# Patient Record
Sex: FEMALE | Race: WHITE | Employment: UNEMPLOYED | URBAN - NONMETROPOLITAN AREA
[De-identification: names, ages, dates, MRNs, and addresses within clinical notes are randomized per-mention and may not be internally consistent; named-entity substitution may affect disease eponyms.]

---

## 2018-07-05 ENCOUNTER — OFFICE VISIT (OUTPATIENT)
Dept: CARDIOLOGY CLINIC | Age: 53
End: 2018-07-05
Payer: COMMERCIAL

## 2018-07-05 VITALS
RESPIRATION RATE: 16 BRPM | HEIGHT: 63 IN | WEIGHT: 222 LBS | SYSTOLIC BLOOD PRESSURE: 110 MMHG | HEART RATE: 75 BPM | DIASTOLIC BLOOD PRESSURE: 76 MMHG | BODY MASS INDEX: 39.34 KG/M2

## 2018-07-05 DIAGNOSIS — R06.02 SOB (SHORTNESS OF BREATH): Primary | ICD-10-CM

## 2018-07-05 DIAGNOSIS — E78.5 DYSLIPIDEMIA: ICD-10-CM

## 2018-07-05 DIAGNOSIS — R07.2 PRECORDIAL CHEST PAIN: ICD-10-CM

## 2018-07-05 DIAGNOSIS — E66.9 NON MORBID OBESITY: ICD-10-CM

## 2018-07-05 DIAGNOSIS — Z98.890 S/P MVR (MITRAL VALVE REPAIR): ICD-10-CM

## 2018-07-05 DIAGNOSIS — Z99.89 OSA ON CPAP: ICD-10-CM

## 2018-07-05 DIAGNOSIS — G47.33 OSA ON CPAP: ICD-10-CM

## 2018-07-05 DIAGNOSIS — I25.10 CORONARY ARTERY DISEASE INVOLVING NATIVE CORONARY ARTERY OF NATIVE HEART WITHOUT ANGINA PECTORIS: ICD-10-CM

## 2018-07-05 PROCEDURE — 1036F TOBACCO NON-USER: CPT | Performed by: INTERNAL MEDICINE

## 2018-07-05 PROCEDURE — G8417 CALC BMI ABV UP PARAM F/U: HCPCS | Performed by: INTERNAL MEDICINE

## 2018-07-05 PROCEDURE — G8427 DOCREV CUR MEDS BY ELIG CLIN: HCPCS | Performed by: INTERNAL MEDICINE

## 2018-07-05 PROCEDURE — 3017F COLORECTAL CA SCREEN DOC REV: CPT | Performed by: INTERNAL MEDICINE

## 2018-07-05 PROCEDURE — 93000 ELECTROCARDIOGRAM COMPLETE: CPT | Performed by: INTERNAL MEDICINE

## 2018-07-05 PROCEDURE — 99204 OFFICE O/P NEW MOD 45 MIN: CPT | Performed by: INTERNAL MEDICINE

## 2018-07-05 PROCEDURE — G8598 ASA/ANTIPLAT THER USED: HCPCS | Performed by: INTERNAL MEDICINE

## 2018-07-05 RX ORDER — BUPROPION HYDROCHLORIDE 100 MG/1
100 TABLET, EXTENDED RELEASE ORAL 2 TIMES DAILY
COMMUNITY
End: 2021-04-01

## 2018-07-05 RX ORDER — ALPRAZOLAM 0.5 MG/1
TABLET ORAL
Refills: 0 | COMMUNITY
Start: 2018-06-28

## 2018-07-05 RX ORDER — LISINOPRIL 10 MG/1
TABLET ORAL
Refills: 0 | COMMUNITY
Start: 2018-04-16 | End: 2018-07-05 | Stop reason: SDUPTHER

## 2018-07-05 RX ORDER — ATORVASTATIN CALCIUM 40 MG/1
TABLET, FILM COATED ORAL
Refills: 0 | COMMUNITY
Start: 2018-04-16 | End: 2019-02-13 | Stop reason: SDUPTHER

## 2018-07-05 RX ORDER — LISINOPRIL 10 MG/1
TABLET ORAL
COMMUNITY
Start: 2018-04-16 | End: 2018-07-05 | Stop reason: SDUPTHER

## 2018-07-05 RX ORDER — ACETAMINOPHEN 325 MG/1
325 TABLET ORAL
COMMUNITY

## 2018-07-05 RX ORDER — FLUOXETINE HYDROCHLORIDE 60 MG/1
TABLET, FILM COATED ORAL; ORAL
Refills: 0 | COMMUNITY
Start: 2018-07-03 | End: 2021-04-01

## 2018-07-05 RX ORDER — LISINOPRIL 5 MG/1
5 TABLET ORAL DAILY
Qty: 90 TABLET | Refills: 3 | Status: SHIPPED | OUTPATIENT
Start: 2018-07-05 | End: 2018-09-13 | Stop reason: SDUPTHER

## 2018-07-05 RX ORDER — ASPIRIN 81 MG/1
81 TABLET, CHEWABLE ORAL
COMMUNITY

## 2018-07-05 RX ORDER — LANOLIN ALCOHOL/MO/W.PET/CERES
3 CREAM (GRAM) TOPICAL DAILY
COMMUNITY
End: 2018-12-18 | Stop reason: ALTCHOICE

## 2018-07-05 RX ORDER — ATORVASTATIN CALCIUM 20 MG/1
TABLET, FILM COATED ORAL
COMMUNITY
Start: 2018-04-16 | End: 2018-08-14 | Stop reason: SDUPTHER

## 2018-07-05 NOTE — PROGRESS NOTES
Past Surgical History:   Procedure Laterality Date    CARDIAC CATHETERIZATION       SECTION      ENDOMETRIAL ABLATION      MITRAL VALVE REPLACEMENT          History reviewed. No pertinent family history. Social History   Substance Use Topics    Smoking status: Former Smoker     Quit date:     Smokeless tobacco: Never Used    Alcohol use No         Review of Systems:  HEENT: negative for acute visual symptoms or auditory problems, no dysphagia  Constitutional: negative for fever and chills, or significant weight loss  Respiratory: negative for cough, wheezing, or hemoptysis  Cardiovascular: +ve for chest pain and dyspnea  Gastrointestinal: negative for abdominal pain, diarrhea, nausea and vomiting  Endocrine: Negative for polyuria and polydyspsia  Genitourinary:negative for dysuria and hematuria  Derm: negative for rash and skin lesion(s)  Neurological: negative for tingling, numbness, weakness, seizures and tremors  Endocrine: negative for polydipsia and polyuria  Musculoskeletal: negative for pain or tenderness  Psychiatric: negative for anxiety, depression, or suicidal ideations         O:  COMPLETE PHYSICAL EXAM:       /76   Pulse 75   Resp 16   Ht 5' 3\" (1.6 m)   Wt 222 lb (100.7 kg)   BMI 39.33 kg/m²       General:   Patient alert, comfortable, no distress. Appears stated age. HEENT:    Pupils equal, no icterus, no nasal drainage, tongue moist.   Neck:              No masses, Thyroid not palpable. Chest:   Normal configuration, non tender. Lungs:   Clear to auscultation bilaterally, few scattered rhonchi. Cardiovascular:  Regular rhythm, 1/6 systolic murmur, No S3, no palpable thrills, No elevated JVD, No carotid bruit. Abdomen:  Soft, Non tender, Bowel sounds normal, no pulsatile abdominal aorta, no palpable masses. Extremities:  No edema. Distal pulses palpable. No cyanosis, no clubbing. Skin:   Good turgor, warm and dry, no cyanosis. Musculoskeletal: No joint swelling or deformity. Neuro:   Cranial nerves grossly intact; No focal neurologic deficit. Psych:   Alert, good mood and effect. REVIEW OF DIAGNOSTIC TESTS:        Electrocardiogram: NSR             A/P:   ASSESSMENT / PLAN:    Sue Swan was seen today for shortness of breath. Diagnoses and all orders for this visit:    SOB (shortness of breath): no acute CHF  -     EKG 12 Lead  -     ECHO Complete 2D W Doppler W Color; Future  -     Stress test, myoview; Future    S/P MVR (mitral valve repair)  -     ECHO Complete 2D W Doppler W Color; Future    Precordial chest pain  -     Stress test, myoview; Future    Coronary artery disease involving native coronary artery of native heart without angina pectoris: s/p MI 2014, s/p PCI 2014- On ASA, Statins; Add low dose BB-Monitor BP/HR; side effects discussed; Decrease Lisinopril dose  -     Stress test, myoview; Future    Non morbid obesity: Diet, exercise and weight loss discussed. Dyslipidemia: On Statins    FELIX on CPAP    Others:  -Metoprolol Tartrate 12.5 mg TWICE daily    Preventive Cardiology: Low cholesterol diet, regular exercise as tolerate, and gradual weight loss discussed. Monitor BP and heart rates. All questions answered about cardiac diagnoses and cardiac medications. Continue current medications. Compliance with medications and f/u with all physicians discussed. Risk factor modification based on risk profile discussed. Call if any exertional chest pain, short of breath, dizzy or palpitations   Follow up in 4-6 weeks or earlier if needed.         [facility]  [address], [place], [address] [facility]  [phone]

## 2018-07-27 LAB
LV EF: 63 %
LVEF MODALITY: NORMAL

## 2018-08-06 ENCOUNTER — TELEPHONE (OUTPATIENT)
Dept: CARDIOLOGY CLINIC | Age: 53
End: 2018-08-06

## 2018-08-06 NOTE — TELEPHONE ENCOUNTER
Stress test was abnormal  She needs to come for OV to discuss test results and also to discuss possible cardiac cath

## 2018-08-14 ENCOUNTER — OFFICE VISIT (OUTPATIENT)
Dept: CARDIOLOGY CLINIC | Age: 53
End: 2018-08-14
Payer: COMMERCIAL

## 2018-08-14 VITALS
BODY MASS INDEX: 39.34 KG/M2 | HEIGHT: 63 IN | WEIGHT: 222 LBS | HEART RATE: 57 BPM | DIASTOLIC BLOOD PRESSURE: 80 MMHG | SYSTOLIC BLOOD PRESSURE: 128 MMHG | RESPIRATION RATE: 14 BRPM

## 2018-08-14 DIAGNOSIS — I25.10 CORONARY ARTERY DISEASE INVOLVING NATIVE CORONARY ARTERY OF NATIVE HEART WITHOUT ANGINA PECTORIS: Primary | ICD-10-CM

## 2018-08-14 DIAGNOSIS — E78.5 HYPERLIPIDEMIA, UNSPECIFIED HYPERLIPIDEMIA TYPE: ICD-10-CM

## 2018-08-14 PROCEDURE — 93000 ELECTROCARDIOGRAM COMPLETE: CPT | Performed by: INTERNAL MEDICINE

## 2018-08-14 PROCEDURE — 1036F TOBACCO NON-USER: CPT | Performed by: INTERNAL MEDICINE

## 2018-08-14 PROCEDURE — 99214 OFFICE O/P EST MOD 30 MIN: CPT | Performed by: INTERNAL MEDICINE

## 2018-08-14 PROCEDURE — G8598 ASA/ANTIPLAT THER USED: HCPCS | Performed by: INTERNAL MEDICINE

## 2018-08-14 PROCEDURE — 3017F COLORECTAL CA SCREEN DOC REV: CPT | Performed by: INTERNAL MEDICINE

## 2018-08-14 PROCEDURE — G8417 CALC BMI ABV UP PARAM F/U: HCPCS | Performed by: INTERNAL MEDICINE

## 2018-08-14 PROCEDURE — G8427 DOCREV CUR MEDS BY ELIG CLIN: HCPCS | Performed by: INTERNAL MEDICINE

## 2018-08-14 RX ORDER — METOPROLOL SUCCINATE 25 MG/1
25 TABLET, EXTENDED RELEASE ORAL DAILY
Qty: 30 TABLET | Refills: 3 | Status: SHIPPED | OUTPATIENT
Start: 2018-08-14 | End: 2018-09-13 | Stop reason: SDUPTHER

## 2018-08-14 RX ORDER — ATORVASTATIN CALCIUM 40 MG/1
40 TABLET, FILM COATED ORAL DAILY
Qty: 30 TABLET | Refills: 5 | Status: ON HOLD | OUTPATIENT
Start: 2018-08-14 | End: 2018-08-22 | Stop reason: SDUPTHER

## 2018-08-14 NOTE — PROGRESS NOTES
OUTPATIENT CARDIOLOGY FOLLOW-UP    Name: Allan Lake    Age: 47 y.o. Primary Care Physician: Cindy Dixon MD    Date of Service: 8/14/2018    Chief Complaint:   Chief Complaint   Patient presents with    Abnormal Test Results       Interim History:   Mrs. Gary Perez is a 48-year-old female history for CAD, status post MI diagnosed in 2014, status post PCI, mitral valve repair 2015 , obesity, dyslipidemia on statin, obstructive sleep apnea on CPAP is here for follow-up visit. She was seen in the office on the 7/5/2018. Since her last visit, she has not had any ER visits or hospitalizations. She was evaluated in the office for an intermittent midsternal chest pain precipitated by stress and also exertion. She underwent stress test on 7/31/2018 which came back as abnormal and it showed a large fixed defect involving the inferior and inferolateral wall and also reversible defect involving the anteriolateral wall. LV function was 60%. Her echocardiogram showed a decreased LV function with an EF of 45-50%, moderate LVH and global hypokinesis. She was instructed to come to the office for evaluation and also to review her a stress test results. She is still complaining of intermittent midsternal chest pain and the fatigue and dyspnea with exertion as well as emotional stress. She also gets intermittent wheezing especially at nighttime without any PND or orthopnea. She is compliant with medications, as well as salt and fluid intake. He does not take any over the arthritis medications. No new cardiac complaints since last cardiology evaluation. He/She denies recent chest pain, SOB, palpitations, lightheadedness, dizziness, syncope, PND, or orthopnea. SR on EKG.     Review of Systems:   Cardiac: As per HPI  General: No fever, chills  Pulmonary: As per HPI  HEENT: No visual disturbances, difficult swallowing  GI: No nausea, vomiting  Endocrine: No thyroid disease or DM  Musculoskeletal: CARMONA x 4, no focal motor deficits  Skin: Intact, no rashes  Neuro/Psych: No headache or seizures    Past Medical History:  Past Medical History:   Diagnosis Date    Anxiety neurosis     Asthma     CAD (coronary artery disease)     Dyspnea     HTN (hypertension)     Hypercholesteremia     Sleep apnea     Wheezing        Past Surgical History:  Past Surgical History:   Procedure Laterality Date    CARDIAC CATHETERIZATION       SECTION      ENDOMETRIAL ABLATION      MITRAL VALVE REPLACEMENT         Family History:  History reviewed. No pertinent family history. Social History:  Social History     Social History    Marital status: Unknown     Spouse name: N/A    Number of children: N/A    Years of education: N/A     Occupational History    Not on file. Social History Main Topics    Smoking status: Former Smoker     Quit date:     Smokeless tobacco: Never Used    Alcohol use No    Drug use: No    Sexual activity: Not on file     Other Topics Concern    Not on file     Social History Narrative    No narrative on file       Allergies:  No Known Allergies    Current Medications:  Current Outpatient Prescriptions   Medication Sig Dispense Refill    acetaminophen (TYLENOL) 325 MG tablet Take 325 mg by mouth      aspirin 81 MG chewable tablet Take 81 mg by mouth      atorvastatin (LIPITOR) 20 MG tablet Take 1 tablet daily in the evening.       ALPRAZolam (XANAX) 0.5 MG tablet take 1 tablet by mouth twice a day if needed  0    buPROPion (WELLBUTRIN SR) 100 MG extended release tablet Take 100 mg by mouth      FLUoxetine HCl 60 MG TABS take 1 tablet by mouth once daily  0    VENTOLIN  (90 Base) MCG/ACT inhaler   0    melatonin 3 MG TABS tablet Take 3 mg by mouth daily      lisinopril (PRINIVIL;ZESTRIL) 5 MG tablet Take 1 tablet by mouth daily 90 tablet 3    metoprolol tartrate (LOPRESSOR) 25 MG tablet Take 0.5 tablets by mouth 2 times daily 90 tablet 3    atorvastatin (LIPITOR) 20 MG tablet take 1 tablet by mouth every evening  0     No current facility-administered medications for this visit. Physical Exam:  /80   Pulse 57   Resp 14   Ht 5' 3\" (1.6 m)   Wt 222 lb (100.7 kg)   BMI 39.33 kg/m²   Wt Readings from Last 3 Encounters:   08/14/18 222 lb (100.7 kg)   07/05/18 222 lb (100.7 kg)     Appearance: Awake, alert and oriented x 3, no acute respiratory distress  Skin: Intact, no rash  Head: Normocephalic, atraumatic  Eyes: EOMI, no conjunctival erythema  ENMT: No pharyngeal erythema, MMM, no rhinorrhea  Neck: Supple, no elevated JVP, no carotid bruits  Lungs: Clear to auscultation bilaterally. No wheezes, rales, or rhonchi. Cardiac: Regular rate and rhythm, +S1S2, no murmurs apparent  Abdomen: Soft, nontender, +bowel sounds  Extremities: Moves all extremities x 4, no lower extremity edema  Neurologic: No focal motor deficits apparent, normal mood and affect, alert and oriented x 3  Peripheral Pulses: Intact posterior tibial pulses bilaterally    Laboratory Tests:  No results found for: CREATININE, BUN, NA, K, CL, CO2  No results found for: MG  No results found for: WBC, HGB, HCT, MCV, PLT  No results found for: ALT, AST, GGT, ALKPHOS, BILITOT  No results found for: CKTOTAL, CKMB, CKMBINDEX, TROPONINI  No results found for: INR, PROTIME  No results found for: TSHFT4, TSH  No results found for: LABA1C  No results found for: EAG  No results found for: CHOL  No results found for: TRIG  No results found for: HDL  No results found for: LDLCALC, LDLCHOLESTEROL  No results found for: LABVLDL, VLDL  No results found for: CHOLHDLRATIO    Cardiac Tests:  ECG:    Echocardiogram: 8/2/2018  LVEF 45-50%, moderate LVH, global hypokinesis, status post mitral valve ring annuloplasty.     Stress test:  7/31/2018abnormal myocardial perfusion scan there is a large fixed defect involving the inferior and inferolateral wall and the reversible defect involving wall and anterolateral wall, LV function was

## 2018-08-22 ENCOUNTER — HOSPITAL ENCOUNTER (OUTPATIENT)
Dept: CARDIAC CATH/INVASIVE PROCEDURES | Age: 53
Discharge: HOME | End: 2018-08-23
Attending: INTERNAL MEDICINE | Admitting: INTERNAL MEDICINE
Payer: COMMERCIAL

## 2018-08-22 DIAGNOSIS — I25.10 CAD IN NATIVE ARTERY: ICD-10-CM

## 2018-08-22 LAB
ABO/RH: NORMAL
ABO/RH: NORMAL
ANION GAP SERPL CALCULATED.3IONS-SCNC: 11 MMOL/L (ref 7–16)
ANTIBODY SCREEN: NORMAL
BUN BLDV-MCNC: 13 MG/DL (ref 6–20)
CALCIUM SERPL-MCNC: 8.8 MG/DL (ref 8.6–10.2)
CHLORIDE BLD-SCNC: 106 MMOL/L (ref 98–107)
CO2: 26 MMOL/L (ref 22–29)
CREAT SERPL-MCNC: 1.1 MG/DL (ref 0.5–1)
GFR AFRICAN AMERICAN: >60
GFR NON-AFRICAN AMERICAN: 52 ML/MIN/1.73
GLUCOSE BLD-MCNC: 118 MG/DL (ref 74–109)
GONADOTROPIN, CHORIONIC (HCG) QUANT: 0.5 MIU/ML
HCT VFR BLD CALC: 39 % (ref 34–48)
HEMOGLOBIN: 12.7 G/DL (ref 11.5–15.5)
INR BLD: 1
MCH RBC QN AUTO: 32.1 PG (ref 26–35)
MCHC RBC AUTO-ENTMCNC: 32.6 % (ref 32–34.5)
MCV RBC AUTO: 98.5 FL (ref 80–99.9)
PDW BLD-RTO: 12.9 FL (ref 11.5–15)
PLATELET # BLD: 204 E9/L (ref 130–450)
PMV BLD AUTO: 10.7 FL (ref 7–12)
POTASSIUM SERPL-SCNC: 4 MMOL/L (ref 3.5–5)
PROTHROMBIN TIME: 11.2 SEC (ref 9.3–12.4)
RBC # BLD: 3.96 E12/L (ref 3.5–5.5)
SODIUM BLD-SCNC: 143 MMOL/L (ref 132–146)
WBC # BLD: 7 E9/L (ref 4.5–11.5)

## 2018-08-22 PROCEDURE — 84702 CHORIONIC GONADOTROPIN TEST: CPT

## 2018-08-22 PROCEDURE — 80048 BASIC METABOLIC PNL TOTAL CA: CPT

## 2018-08-22 PROCEDURE — 6360000002 HC RX W HCPCS

## 2018-08-22 PROCEDURE — 85610 PROTHROMBIN TIME: CPT

## 2018-08-22 PROCEDURE — 86850 RBC ANTIBODY SCREEN: CPT

## 2018-08-22 PROCEDURE — 2709999900 HC NON-CHARGEABLE SUPPLY

## 2018-08-22 PROCEDURE — C1887 CATHETER, GUIDING: HCPCS

## 2018-08-22 PROCEDURE — 6370000000 HC RX 637 (ALT 250 FOR IP): Performed by: INTERNAL MEDICINE

## 2018-08-22 PROCEDURE — 2500000003 HC RX 250 WO HCPCS

## 2018-08-22 PROCEDURE — 92928 PRQ TCAT PLMT NTRAC ST 1 LES: CPT | Performed by: INTERNAL MEDICINE

## 2018-08-22 PROCEDURE — 85027 COMPLETE CBC AUTOMATED: CPT

## 2018-08-22 PROCEDURE — 93458 L HRT ARTERY/VENTRICLE ANGIO: CPT | Performed by: INTERNAL MEDICINE

## 2018-08-22 PROCEDURE — 36415 COLL VENOUS BLD VENIPUNCTURE: CPT

## 2018-08-22 PROCEDURE — C1874 STENT, COATED/COV W/DEL SYS: HCPCS

## 2018-08-22 PROCEDURE — C1769 GUIDE WIRE: HCPCS

## 2018-08-22 PROCEDURE — 6370000000 HC RX 637 (ALT 250 FOR IP)

## 2018-08-22 PROCEDURE — 86900 BLOOD TYPING SEROLOGIC ABO: CPT

## 2018-08-22 PROCEDURE — 86901 BLOOD TYPING SEROLOGIC RH(D): CPT

## 2018-08-22 PROCEDURE — C1894 INTRO/SHEATH, NON-LASER: HCPCS

## 2018-08-22 RX ORDER — SODIUM CHLORIDE 9 MG/ML
INJECTION, SOLUTION INTRAVENOUS ONCE
Status: DISCONTINUED | OUTPATIENT
Start: 2018-08-22 | End: 2018-08-22

## 2018-08-22 RX ORDER — METOPROLOL SUCCINATE 25 MG/1
25 TABLET, EXTENDED RELEASE ORAL DAILY
Status: DISCONTINUED | OUTPATIENT
Start: 2018-08-23 | End: 2018-08-23 | Stop reason: HOSPADM

## 2018-08-22 RX ORDER — ASPIRIN 81 MG/1
81 TABLET, CHEWABLE ORAL DAILY
Status: DISCONTINUED | OUTPATIENT
Start: 2018-08-23 | End: 2018-08-23 | Stop reason: HOSPADM

## 2018-08-22 RX ORDER — LISINOPRIL 5 MG/1
5 TABLET ORAL DAILY
Status: DISCONTINUED | OUTPATIENT
Start: 2018-08-23 | End: 2018-08-23 | Stop reason: HOSPADM

## 2018-08-22 RX ORDER — ATORVASTATIN CALCIUM 40 MG/1
40 TABLET, FILM COATED ORAL DAILY
Status: DISCONTINUED | OUTPATIENT
Start: 2018-08-22 | End: 2018-08-23 | Stop reason: HOSPADM

## 2018-08-22 RX ORDER — BUPROPION HYDROCHLORIDE 100 MG/1
100 TABLET, EXTENDED RELEASE ORAL 2 TIMES DAILY
Status: DISCONTINUED | OUTPATIENT
Start: 2018-08-22 | End: 2018-08-23 | Stop reason: HOSPADM

## 2018-08-22 RX ADMIN — BUPROPION HYDROCHLORIDE 100 MG: 100 TABLET, EXTENDED RELEASE ORAL at 22:07

## 2018-08-22 RX ADMIN — DESMOPRESSIN ACETATE 40 MG: 0.2 TABLET ORAL at 22:07

## 2018-08-22 RX ADMIN — TICAGRELOR 90 MG: 90 TABLET ORAL at 18:56

## 2018-08-22 ASSESSMENT — PAIN SCALES - GENERAL: PAINLEVEL_OUTOF10: 0

## 2018-08-22 NOTE — OP NOTE
Indication:    1. CCS III angina. Procedure: Left Heart Catheterization, coronary angiography, left ventriculography, percutaneous coronary intervention to LAD AND CIRCUMFLEX      Anesthesia: Versed, Fentanyl, BENADRYL  Time sedation was administered: 0850. I was present in the room when sedation was administered. Procedure end time: 7167  Time spent with face to face monitoring of moderate sedation: 45 MIN    Cardiac cath performed via RIGHT RADIAL approach using SLENDER 6 sheath. Findings:   Left main: 0% stenosis  LAD: 80 %  MID stenosis  Circumflex: 80%  PROXIMAL stenosis WITH MID VESSEL  AFTER OM1 WITH LAD-CX COLLATERALS. RCA: Dominant. MODERATE DIFFUSE DISEASE WITH 70 % DISTAL OTIAL PL stenosis. LV angio: 50%  ejection fraction WITH PB HYPOKINESIS. NO MR.  ANNULPLASTY RING NOTED      Hemodynamics: Ao: 131/86  LV: 152  LVEDP: 7    Interventional procedure:   1. PCI vessel: MID LAD. Lesion type: A. ANGLE III flow pre PCI. PRIMARY Stenting performed with MARILIN 3.5 X 12 AND 2.75 X 8 . Result: 0% residual stenosis and ANGLE III distal flow. 2. PCI vessel:  PROXIMAL CX Lesion type A  ANGLE III flow pre PCI  PRIMARY  Stent with MARILIN 3.0 X 12  Result: 0% residual stenosis with ANGLE III distal flow. Drugs: . Anticoagulation was maintained with HEPARIN . BRILINTA load given  in cath lab. Right RADIAL sheath: PULLED AND TR BAND APPLIED. There was good distal pulses in the RUE at the end of the procedure. Complication: None   Blood loss: 10 CC  Contrast used: 125cc      Post op diagnosis:  PCI TO LAD AND CIRCUMFLEX    Plan:  DAPT  Risk profile modification.    See orders

## 2018-08-22 NOTE — PROGRESS NOTES
M in the box handout given for Brilinta, stent model shown to patient and , and stressed need for med compliance vivi with antiplatelet meds.

## 2018-08-22 NOTE — PROCEDURES
LAD AND CIRCUMFLEX       Anesthesia: Versed, Fentanyl, BENADRYL  Time sedation was administered: 0850. I was present in the room when sedation was administered. Procedure end time: 0932  Time spent with face to face monitoring of moderate sedation: 45 MIN     Cardiac cath performed via RIGHT RADIAL approach using SLENDER 6 sheath.     Findings:   Left main: 0% stenosis  LAD: 80 %  MID stenosis  Circumflex: 80%  PROXIMAL stenosis WITH MID VESSEL  AFTER OM1 WITH LAD-CX COLLATERALS. RCA: Dominant. MODERATE DIFFUSE DISEASE WITH 70 % DISTAL OTIAL PL stenosis. LV angio: 50%  ejection fraction WITH PB HYPOKINESIS. NO MR.  ANNULPLASTY RING NOTED        Hemodynamics: Ao: 131/86  LV: 152  LVEDP: 7     Interventional procedure:   1. PCI vessel: MID LAD. Lesion type: A. ANGLE III flow pre PCI. PRIMARY Stenting performed with MARILIN 3.5 X 12 AND 2.75 X 8 . Result: 0% residual stenosis and ANGLE III distal flow.      2. PCI vessel:  PROXIMAL CX Lesion type A  ANGLE III flow pre PCI  PRIMARY  Stent with MARILIN 3.0 X 12  Result: 0% residual stenosis with ANGLE III distal flow.      Drugs: . Anticoagulation was maintained with HEPARIN . BRILINTA load given  in cath lab.      Right RADIAL sheath: PULLED AND TR BAND APPLIED. There was good distal pulses in the RUE at the end of the procedure.     Complication: None   Blood loss: 10 CC  Contrast used: 125cc        Post op diagnosis:  PCI TO LAD AND CIRCUMFLEX     Plan:  DAPT  Risk profile modification.    See orders    Zachariah Davis MD    D: 08/22/2018 9:34:25       T: 08/22/2018 10:58:58     [email or web address]  Job#: [id number]     Doc#: [id number]    CC:

## 2018-08-23 VITALS
WEIGHT: 222 LBS | HEIGHT: 63 IN | RESPIRATION RATE: 16 BRPM | DIASTOLIC BLOOD PRESSURE: 66 MMHG | HEART RATE: 61 BPM | TEMPERATURE: 97.9 F | BODY MASS INDEX: 39.34 KG/M2 | OXYGEN SATURATION: 96 % | SYSTOLIC BLOOD PRESSURE: 133 MMHG

## 2018-08-23 LAB
ANION GAP SERPL CALCULATED.3IONS-SCNC: 11 MMOL/L (ref 7–16)
BUN BLDV-MCNC: 14 MG/DL (ref 6–20)
CALCIUM SERPL-MCNC: 8.6 MG/DL (ref 8.6–10.2)
CHLORIDE BLD-SCNC: 105 MMOL/L (ref 98–107)
CO2: 24 MMOL/L (ref 22–29)
CREAT SERPL-MCNC: 1.2 MG/DL (ref 0.5–1)
GFR AFRICAN AMERICAN: 57
GFR NON-AFRICAN AMERICAN: 47 ML/MIN/1.73
GLUCOSE BLD-MCNC: 128 MG/DL (ref 74–109)
POTASSIUM SERPL-SCNC: 4 MMOL/L (ref 3.5–5)
SODIUM BLD-SCNC: 140 MMOL/L (ref 132–146)

## 2018-08-23 PROCEDURE — 36415 COLL VENOUS BLD VENIPUNCTURE: CPT

## 2018-08-23 PROCEDURE — 6370000000 HC RX 637 (ALT 250 FOR IP): Performed by: INTERNAL MEDICINE

## 2018-08-23 PROCEDURE — 99225 PR SBSQ OBSERVATION CARE/DAY 25 MINUTES: CPT | Performed by: INTERNAL MEDICINE

## 2018-08-23 PROCEDURE — 80048 BASIC METABOLIC PNL TOTAL CA: CPT

## 2018-08-23 RX ADMIN — METOPROLOL SUCCINATE 25 MG: 25 TABLET, FILM COATED, EXTENDED RELEASE ORAL at 10:46

## 2018-08-23 RX ADMIN — TICAGRELOR 90 MG: 90 TABLET ORAL at 08:41

## 2018-08-23 RX ADMIN — ASPIRIN 81 MG CHEWABLE TABLET 81 MG: 81 TABLET CHEWABLE at 08:40

## 2018-08-23 RX ADMIN — BUPROPION HYDROCHLORIDE 100 MG: 100 TABLET, EXTENDED RELEASE ORAL at 08:40

## 2018-08-23 RX ADMIN — LISINOPRIL 5 MG: 5 TABLET ORAL at 08:41

## 2018-08-23 ASSESSMENT — PAIN SCALES - GENERAL
PAINLEVEL_OUTOF10: 0

## 2018-08-23 NOTE — PROGRESS NOTES
CLINICAL PHARMACY NOTE: MEDS TO [address] Select Patient?: No  Total # of Prescriptions Filled: 1   The following medications were delivered to the patient    brilinta 90mg    Total # of Interventions Completed: 2  Time Spent (min): 15    Additional Documentation:

## 2018-09-13 ENCOUNTER — OFFICE VISIT (OUTPATIENT)
Dept: CARDIOLOGY CLINIC | Age: 53
End: 2018-09-13
Payer: COMMERCIAL

## 2018-09-13 VITALS
HEIGHT: 64 IN | SYSTOLIC BLOOD PRESSURE: 130 MMHG | BODY MASS INDEX: 37.73 KG/M2 | DIASTOLIC BLOOD PRESSURE: 82 MMHG | WEIGHT: 221 LBS | RESPIRATION RATE: 14 BRPM | HEART RATE: 62 BPM

## 2018-09-13 DIAGNOSIS — I25.5 ISCHEMIC CARDIOMYOPATHY: ICD-10-CM

## 2018-09-13 DIAGNOSIS — I25.10 CORONARY ARTERY DISEASE INVOLVING NATIVE CORONARY ARTERY OF NATIVE HEART WITHOUT ANGINA PECTORIS: Primary | ICD-10-CM

## 2018-09-13 PROCEDURE — G8598 ASA/ANTIPLAT THER USED: HCPCS | Performed by: INTERNAL MEDICINE

## 2018-09-13 PROCEDURE — 93000 ELECTROCARDIOGRAM COMPLETE: CPT | Performed by: INTERNAL MEDICINE

## 2018-09-13 PROCEDURE — 99214 OFFICE O/P EST MOD 30 MIN: CPT | Performed by: INTERNAL MEDICINE

## 2018-09-13 PROCEDURE — 1036F TOBACCO NON-USER: CPT | Performed by: INTERNAL MEDICINE

## 2018-09-13 PROCEDURE — G8427 DOCREV CUR MEDS BY ELIG CLIN: HCPCS | Performed by: INTERNAL MEDICINE

## 2018-09-13 PROCEDURE — 3017F COLORECTAL CA SCREEN DOC REV: CPT | Performed by: INTERNAL MEDICINE

## 2018-09-13 PROCEDURE — G8417 CALC BMI ABV UP PARAM F/U: HCPCS | Performed by: INTERNAL MEDICINE

## 2018-09-13 RX ORDER — LISINOPRIL 10 MG/1
10 TABLET ORAL DAILY
Qty: 30 TABLET | Refills: 11 | Status: SHIPPED | OUTPATIENT
Start: 2018-09-13 | End: 2018-12-18 | Stop reason: DRUGHIGH

## 2018-09-13 RX ORDER — METOPROLOL SUCCINATE 25 MG/1
25 TABLET, EXTENDED RELEASE ORAL 2 TIMES DAILY
Qty: 60 TABLET | Refills: 5 | Status: SHIPPED
Start: 2018-09-13 | End: 2020-03-05

## 2018-09-13 NOTE — PROGRESS NOTES
deficits  Skin: Intact, no rashes  Neuro/Psych: No headache or seizures    Past Medical History:  Past Medical History:   Diagnosis Date    Anxiety neurosis     Asthma     CAD (coronary artery disease)     Dyspnea     HTN (hypertension)     Hypercholesteremia     Sleep apnea     Wheezing        Past Surgical History:  Past Surgical History:   Procedure Laterality Date    CARDIAC CATHETERIZATION       SECTION      CORONARY ANGIOPLASTY WITH STENT PLACEMENT  2018    Resolute onyx3. 5x12 and 2.75x8 mid LAD  3.0 x12 circumflex by DR Shun Gillespie ENDOMETRIAL ABLATION      MITRAL VALVE REPLACEMENT         Family History:  History reviewed. No pertinent family history. Social History:  Social History     Social History    Marital status: Unknown     Spouse name: N/A    Number of children: N/A    Years of education: N/A     Occupational History    Not on file.      Social History Main Topics    Smoking status: Former Smoker     Quit date:     Smokeless tobacco: Never Used    Alcohol use No    Drug use: No    Sexual activity: Not on file     Other Topics Concern    Not on file     Social History Narrative    No narrative on file       Allergies:  No Known Allergies    Current Medications:  Current Outpatient Prescriptions   Medication Sig Dispense Refill    ticagrelor (BRILINTA) 90 MG TABS tablet Take 1 tablet by mouth 2 times daily 60 tablet 5    metoprolol succinate (TOPROL XL) 25 MG extended release tablet Take 1 tablet by mouth daily 30 tablet 3    acetaminophen (TYLENOL) 325 MG tablet Take 325 mg by mouth      aspirin 81 MG chewable tablet Take 81 mg by mouth      atorvastatin (LIPITOR) 40 MG tablet take 1 tablet by mouth every evening  0    ALPRAZolam (XANAX) 0.5 MG tablet take 1 tablet by mouth twice a day if needed  0    buPROPion (WELLBUTRIN SR) 100 MG extended release tablet 100 mg 2 times daily       FLUoxetine HCl 60 MG TABS take 1 tablet by mouth once daily  0   

## 2018-09-13 NOTE — PATIENT INSTRUCTIONS
1. Continue dual antiplatelet therapy with aspirin and Brilinta  2. Continue Atorvastatin to 40 mg po daily  3. Increase Metoprolol succinate 25 mg po BID due to LV dysfunction. Increase lisinopril to 10 mg po daily  4. Schedule for cardiac rehab in couple of weeks. Schedule for an echo in 3 months  5. Monitor BP at home   6. Continue current rest of the medications as before  7.  Follow up in 3 months

## 2018-12-18 ENCOUNTER — OFFICE VISIT (OUTPATIENT)
Dept: CARDIOLOGY CLINIC | Age: 53
End: 2018-12-18
Payer: COMMERCIAL

## 2018-12-18 VITALS
RESPIRATION RATE: 12 BRPM | HEIGHT: 64 IN | SYSTOLIC BLOOD PRESSURE: 132 MMHG | BODY MASS INDEX: 37.39 KG/M2 | DIASTOLIC BLOOD PRESSURE: 82 MMHG | HEART RATE: 61 BPM | WEIGHT: 219 LBS

## 2018-12-18 DIAGNOSIS — I25.5 ISCHEMIC CARDIOMYOPATHY: ICD-10-CM

## 2018-12-18 DIAGNOSIS — I25.10 CORONARY ARTERY DISEASE INVOLVING NATIVE CORONARY ARTERY OF NATIVE HEART WITHOUT ANGINA PECTORIS: Primary | ICD-10-CM

## 2018-12-18 PROCEDURE — 99214 OFFICE O/P EST MOD 30 MIN: CPT | Performed by: INTERNAL MEDICINE

## 2018-12-18 PROCEDURE — 3017F COLORECTAL CA SCREEN DOC REV: CPT | Performed by: INTERNAL MEDICINE

## 2018-12-18 PROCEDURE — G8417 CALC BMI ABV UP PARAM F/U: HCPCS | Performed by: INTERNAL MEDICINE

## 2018-12-18 PROCEDURE — G8484 FLU IMMUNIZE NO ADMIN: HCPCS | Performed by: INTERNAL MEDICINE

## 2018-12-18 PROCEDURE — 93000 ELECTROCARDIOGRAM COMPLETE: CPT | Performed by: INTERNAL MEDICINE

## 2018-12-18 PROCEDURE — G8427 DOCREV CUR MEDS BY ELIG CLIN: HCPCS | Performed by: INTERNAL MEDICINE

## 2018-12-18 PROCEDURE — G8598 ASA/ANTIPLAT THER USED: HCPCS | Performed by: INTERNAL MEDICINE

## 2018-12-18 PROCEDURE — 1036F TOBACCO NON-USER: CPT | Performed by: INTERNAL MEDICINE

## 2018-12-18 RX ORDER — LISINOPRIL 10 MG/1
10 TABLET ORAL 2 TIMES DAILY
Qty: 60 TABLET | Refills: 5 | Status: SHIPPED | OUTPATIENT
Start: 2018-12-18 | End: 2019-07-08 | Stop reason: SDUPTHER

## 2018-12-18 NOTE — PROGRESS NOTES
[facility]  [facility], [facility] Michelle Castillo M.D. Estefany Bañuelos   1965  Elia Cantu MD    Sofya Niño returned to our [facility] office today, 12/18/2018, for follow up of her atherosclerotic heart disease and ischemic cardiomyopathy. She is a 27-year-old woman who suffered a myocardial infarction in 2014. She underwent percutaneous intervention, but had significant mitral insufficiency and therefore underwent mitral valve repair in 2015. She did experience recent mid sternal chest pain and underwent a stress test 07/31/2018, which demonstrated anterolateral ischemia. Ejection fraction was between 45 and 50%. She therefore underwent catheterization on 08/22/2018, and was found to have an 80% mid LAD stenosis as well as an 80% proximal circumflex lesion. The right coronary artery had diffuse 70% ostial stenosis of a posterolateral ventricular branch. She was treated with a drug-eluting stent to the mid LAD and proximal circumflex arteries and has been treated with aspirin and Brilinta since then. She did recently complete cardiac rehab. It is interesting to note that she does not become dyspneic while exercising, but still does become dyspneic with normal activities of daily living. She denies any chest pain. Past history includes:   1. Asthma. 2. Essential hypertension. 3. Hyperlipidemia. 4. Sleep apnea. 5. Coronary artery disease, S/P myocardial infarction in 2014. Treated with percutaneous intervention. 6. Mitral valve repair, 2015.   7. Cardiac catheterization, 08/22/2018 after an abnormal stress test showed an 80% mid LAD stenosis with 80% proximal circumflex stenosis and diffuse 70% right coronary artery stenosis involving the ostium of a posterolateral branch. Patient was treated with drug-eluting stents to the mid LAD and proximal circumflex.       Review of Systems:  HEENT: negative for acute visual and auditory problems  Constitutional: negative for fever and chills  Respiratory: Patient does have dyspnea with exertion. Cardiovascular: negative for chest pain and dyspnea  Gastrointestinal: negative for abdominal pain, diarrhea, nausea and vomiting  Genitourinary: negative for dysuria and hematuria  Derm: negative for rash and skin lesion(s)  Neurological: negative for seizures and tremors  Endocrine: negative for diabetic symptoms including polydipsia and polyuria  Musculoskeletal: negative for CTD  Psychiatric: negative for anxiety and major depression. The remainder of the review of systems is negative except as noted above. On exam, she is an overweight white female who is awake, alert and oriented. P; 61 and regular. BP; 132/82. Wt. 219 lbs. , which is 2 lbs. Less than she weighed in 09/2018. BMI: 37.6. HEENT is normocephalic and atraumatic. Extraocular muscles are intact. Sclerae are clear. Pupils are equal, round and react to light. The oral mucosa is moist.  Tongue is midline. Her neck is supple. She has no jugular distention or hepatojugular reflux. Carotids are full. There are no bruits. She has no neck or supraclavicular masses and no thyromegaly. Respirations are unlabored. Her chest is clear to auscultation and percussion. She has no presacral edema or chest wall tenderness. Her heart has a regular rhythm with a 4th heart sound, but no 3rd heart sound or murmur. The PMI is not displaced. She has no precordial heave, lift or thrill. Her abdomen is soft and normally active without masses, organomegaly or bruits. Extremities showed no edema. Peripheral pulses are palpable in the feet. I reviewed her electrocardiogram, which showed sinus rhythm at a rate of 61 with an inferior wall myocardial infarction, lateral T wave inversions and lateral ST segment depressions. This was unchanged from 09/13/2018.       She does seem to be clinically

## 2019-02-13 RX ORDER — ATORVASTATIN CALCIUM 40 MG/1
TABLET, FILM COATED ORAL
Qty: 30 TABLET | Refills: 3 | Status: SHIPPED | OUTPATIENT
Start: 2019-02-13 | End: 2019-07-18 | Stop reason: SDUPTHER

## 2019-03-08 PROBLEM — I21.19 INFERIOR MYOCARDIAL INFARCTION (HCC): Status: ACTIVE | Noted: 2017-04-06

## 2019-03-08 RX ORDER — DEXAMETHASONE 4 MG/1
TABLET ORAL
Refills: 0 | COMMUNITY
Start: 2019-01-11

## 2019-03-08 RX ORDER — LISINOPRIL 5 MG/1
TABLET ORAL
Refills: 0 | COMMUNITY
Start: 2018-12-26 | End: 2019-03-11 | Stop reason: DRUGHIGH

## 2019-04-01 RX ORDER — TICAGRELOR 90 MG/1
TABLET ORAL
Qty: 60 TABLET | Refills: 5 | Status: SHIPPED
Start: 2019-04-01 | End: 2020-03-05

## 2019-06-04 ENCOUNTER — OFFICE VISIT (OUTPATIENT)
Dept: CARDIOLOGY CLINIC | Age: 54
End: 2019-06-04
Payer: COMMERCIAL

## 2019-06-04 VITALS
RESPIRATION RATE: 18 BRPM | WEIGHT: 207 LBS | BODY MASS INDEX: 35.34 KG/M2 | HEIGHT: 64 IN | HEART RATE: 54 BPM | SYSTOLIC BLOOD PRESSURE: 124 MMHG | DIASTOLIC BLOOD PRESSURE: 78 MMHG

## 2019-06-04 DIAGNOSIS — I25.10 CORONARY ARTERY DISEASE INVOLVING NATIVE CORONARY ARTERY OF NATIVE HEART WITHOUT ANGINA PECTORIS: Primary | ICD-10-CM

## 2019-06-04 PROCEDURE — G8427 DOCREV CUR MEDS BY ELIG CLIN: HCPCS | Performed by: INTERNAL MEDICINE

## 2019-06-04 PROCEDURE — 99214 OFFICE O/P EST MOD 30 MIN: CPT | Performed by: INTERNAL MEDICINE

## 2019-06-04 PROCEDURE — G8598 ASA/ANTIPLAT THER USED: HCPCS | Performed by: INTERNAL MEDICINE

## 2019-06-04 PROCEDURE — 1036F TOBACCO NON-USER: CPT | Performed by: INTERNAL MEDICINE

## 2019-06-04 PROCEDURE — G8417 CALC BMI ABV UP PARAM F/U: HCPCS | Performed by: INTERNAL MEDICINE

## 2019-06-04 PROCEDURE — 93000 ELECTROCARDIOGRAM COMPLETE: CPT | Performed by: INTERNAL MEDICINE

## 2019-06-04 PROCEDURE — 3017F COLORECTAL CA SCREEN DOC REV: CPT | Performed by: INTERNAL MEDICINE

## 2019-06-04 NOTE — PROGRESS NOTES
CHIEF COMPLAINT: CAD/MVRp    HISTORY OF PRESENT ILLNESS: Patient is a 46 y.o. female seen at the request of Jinny Weiss MD.      No CP or SOB. Past history includes:   1. Asthma. 2. Essential hypertension. 3. Hyperlipidemia. 4. Sleep apnea. 5. Coronary artery disease, S/P myocardial infarction in 2014. Treated with percutaneous intervention. 6. Mitral valve repair, 2015.   7. Cardiac catheterization, 08/22/2018 after an abnormal stress test showed an 80% mid LAD stenosis with 80% proximal circumflex stenosis and diffuse 70% right coronary artery stenosis involving the ostium of a posterolateral branch. Patient was treated with drug-eluting stents to the mid LAD and proximal circumflex.       Past Medical History:   Diagnosis Date    Anxiety neurosis     Asthma     CAD (coronary artery disease)     Dyspnea     HTN (hypertension)     Hypercholesteremia     Sleep apnea     Wheezing        Patient Active Problem List   Diagnosis    S/P MVR (mitral valve repair)    Coronary artery disease involving native coronary artery of native heart without angina pectoris    Non morbid obesity    Dyslipidemia    FELIX on CPAP    CAD in native artery    Inferior myocardial infarction (HCC)       No Known Allergies    Current Outpatient Medications   Medication Sig Dispense Refill    ticagrelor (BRILINTA) 90 MG TABS tablet Take 1 tablet by mouth 2 times daily 60 tablet 5    BRILINTA 90 MG TABS tablet take 1 tablet by mouth twice a day 60 tablet 5    FLOVENT  MCG/ACT inhaler   0    atorvastatin (LIPITOR) 40 MG tablet take 1 tablet by mouth every evening 30 tablet 3    lisinopril (PRINIVIL;ZESTRIL) 10 MG tablet Take 1 tablet by mouth 2 times daily 60 tablet 5    metoprolol succinate (TOPROL XL) 25 MG extended release tablet Take 1 tablet by mouth 2 times daily 60 tablet 5    acetaminophen (TYLENOL) 325 MG tablet Take 325 mg by mouth      aspirin 81 MG chewable tablet Take 81 mg by mouth  ALPRAZolam (XANAX) 0.5 MG tablet take 1 tablet by mouth twice a day if needed  0    buPROPion (WELLBUTRIN SR) 100 MG extended release tablet 100 mg 2 times daily       FLUoxetine HCl 60 MG TABS take 1 tablet by mouth once daily  0    VENTOLIN  (90 Base) MCG/ACT inhaler   0     No current facility-administered medications for this visit. Social History     Socioeconomic History    Marital status: Unknown     Spouse name: Not on file    Number of children: Not on file    Years of education: Not on file    Highest education level: Not on file   Occupational History    Not on file   Social Needs    Financial resource strain: Not on file    Food insecurity:     Worry: Not on file     Inability: Not on file    Transportation needs:     Medical: Not on file     Non-medical: Not on file   Tobacco Use    Smoking status: Former Smoker     Last attempt to quit:      Years since quittin.4    Smokeless tobacco: Never Used   Substance and Sexual Activity    Alcohol use: No    Drug use: No    Sexual activity: Not on file   Lifestyle    Physical activity:     Days per week: Not on file     Minutes per session: Not on file    Stress: Not on file   Relationships    Social connections:     Talks on phone: Not on file     Gets together: Not on file     Attends Synagogue service: Not on file     Active member of club or organization: Not on file     Attends meetings of clubs or organizations: Not on file     Relationship status: Not on file    Intimate partner violence:     Fear of current or ex partner: Not on file     Emotionally abused: Not on file     Physically abused: Not on file     Forced sexual activity: Not on file   Other Topics Concern    Not on file   Social History Narrative    Not on file       History reviewed. No pertinent family history. Review of Systems:  Heart: as above   Lungs: as above   Eyes: denies changes in vision or discharge.    Ears: denies changes in hearing or pain. Nose: denies epistaxis or masses   Throat: denies sore throat or trouble swallowing. Neuro: denies numbness, tingling, tremors. Skin: denies rashes or itching. : denies hematuria, dysuria   GI: denies vomiting, diarrhea   Psych: denies mood changed, anxiety, depression. All other systems negative. Physical Exam   /78   Pulse 54   Resp 18   Ht 5' 4\" (1.626 m)   Wt 207 lb (93.9 kg)   BMI 35.53 kg/m²   Constitutional: Oriented to person, place, and time. Well-developed and well-nourished. No distress. Head: Normocephalic and atraumatic. Eyes: EOM are normal. Pupils are equal, round, and reactive to light. Neck: Normal range of motion. Neck supple. No hepatojugular reflux and no JVD present. Carotid bruit is not present. No tracheal deviation present. No thyromegaly present. Cardiovascular: Normal rate, regular rhythm, normal heart sounds and intact distal pulses. Exam reveals no gallop and no friction rub. No murmur heard. Pulmonary/Chest: Effort normal and breath sounds normal. No respiratory distress. No wheezes. No rales. No tenderness. Abdominal: Soft. Bowel sounds are normal. No distension and no mass. No tenderness. No rebound and no guarding. Musculoskeletal: Normal range of motion. No edema and no tenderness. Lymphadenopathy:   No cervical adenopathy. No groin adenopathy. Neurological: Alert and oriented to person, place, and time. Skin: Skin is warm and dry. No rash noted. Not diaphoretic. No erythema. Psychiatric: Normal mood and affect. Behavior is normal.     EKG:  nonspecific ST and T waves changes, sinus bradycardia. ASSESSMENT AND PLAN:  Patient Active Problem List   Diagnosis    S/P MVR (mitral valve repair)    Coronary artery disease involving native coronary artery of native heart without angina pectoris    Non morbid obesity    Dyslipidemia    FELIX on CPAP    CAD in native artery    Inferior myocardial infarction ([facility] [address]     1. CAD: Post PCI 8/18. ASA/brilinta/statin/BB/ACEI. 2. MVRp: Stable by echo 8/18. 3. HTN: Observe. 4. Lipids: Statin. 5. Asthma    6. FELIX    Abby Barber D.O.   Cardiologist  Cardiology, [facility]

## 2019-07-08 RX ORDER — LISINOPRIL 10 MG/1
10 TABLET ORAL 2 TIMES DAILY
Qty: 60 TABLET | Refills: 5 | Status: SHIPPED
Start: 2019-07-08 | End: 2020-03-05 | Stop reason: SDUPTHER

## 2019-07-18 RX ORDER — ATORVASTATIN CALCIUM 40 MG/1
TABLET, FILM COATED ORAL
Qty: 30 TABLET | Refills: 3 | Status: SHIPPED
Start: 2019-07-18 | End: 2020-03-05 | Stop reason: SDUPTHER

## 2020-02-28 LAB
ABSOLUTE BASO #: 0 10*3/UL (ref 0–0.1)
ABSOLUTE EOS #: 0.2 10*3/UL (ref 0–0.4)
ABSOLUTE NEUT #: 4.7 10*3/UL (ref 2.3–7.9)
BASOPHILS %: 0.5 % (ref 0–1)
BUN BLDV-MCNC: 13 MG/DL (ref 7–24)
CALCIUM SERPL-MCNC: 8.4 MG/DL (ref 8.5–10.5)
CHLORIDE BLD-SCNC: 110 MMOL/L (ref 98–107)
CHOLESTEROL: 214 MG/DL
CO2: 27 MMOL/L (ref 21–32)
CREAT SERPL-MCNC: 1.2 MG/DL (ref 0.55–1.02)
EOSINOPHILS %: 2.6 % (ref 1–4)
GFR AFRICAN AMERICAN: 57 ML/MIN
GFR SERPL CREATININE-BSD FRML MDRD: 47 ML/MIN/
GLUCOSE: 99 MG/DL (ref 65–99)
HCT VFR BLD CALC: 40.7 % (ref 37–47)
HDLC SERPL-MCNC: 43 MG/DL (ref 40–60)
HEMOGLOBIN: 13.3 G/DL (ref 12–16)
IMMATURE GRANULOCYTES #: 0 10*3/UL (ref 0–0.1)
IMMATURE GRANULOCYTES: 0.3 % (ref 0–1)
LDL CHOLESTEROL: 150 MG/DL (ref 9–159)
LYMPHOCYTE %: 16.5 % (ref 27–41)
LYMPHOCYTES # BLD: 1.1 10*3/UL (ref 1.3–4.4)
MCH RBC QN AUTO: 31.3 PG (ref 27–31)
MCHC RBC AUTO-ENTMCNC: 32.7 G/DL (ref 33–37)
MCV RBC AUTO: 95.8 FL (ref 81–99)
MONOCYTES # BLD: 0.5 10*3/UL (ref 0.1–1)
MONOCYTES %: 7.8 % (ref 3–9)
NEUTROPHILS %: 72.3 % (ref 47–73)
NUCLEATED RED BLOOD CELLS: 0 % (ref 0–0)
PDW BLD-RTO: 12.4 % (ref 0–14.5)
PLATELET # BLD: 232 10*3/UL (ref 130–400)
PMV BLD AUTO: 9.9 FL (ref 9.6–12.3)
POTASSIUM SERPL-SCNC: 3.9 MMOL/L (ref 3.5–5.1)
RBC # BLD: 4.25 10*6/UL (ref 4.1–5.1)
SODIUM BLD-SCNC: 142 MMOL/L (ref 136–145)
TRIGL SERPL-MCNC: 103 MG/DL
VLDLC SERPL CALC-MCNC: 21 MG/DL (ref 6–40)
WBC # BLD: 6.5 10*3/UL (ref 4.8–10.8)

## 2020-03-05 ENCOUNTER — OFFICE VISIT (OUTPATIENT)
Dept: CARDIOLOGY CLINIC | Age: 55
End: 2020-03-05
Payer: COMMERCIAL

## 2020-03-05 VITALS
HEIGHT: 64 IN | HEART RATE: 66 BPM | RESPIRATION RATE: 18 BRPM | BODY MASS INDEX: 35.51 KG/M2 | WEIGHT: 208 LBS | SYSTOLIC BLOOD PRESSURE: 126 MMHG | DIASTOLIC BLOOD PRESSURE: 76 MMHG

## 2020-03-05 PROCEDURE — G8417 CALC BMI ABV UP PARAM F/U: HCPCS | Performed by: INTERNAL MEDICINE

## 2020-03-05 PROCEDURE — G8427 DOCREV CUR MEDS BY ELIG CLIN: HCPCS | Performed by: INTERNAL MEDICINE

## 2020-03-05 PROCEDURE — 93000 ELECTROCARDIOGRAM COMPLETE: CPT | Performed by: INTERNAL MEDICINE

## 2020-03-05 PROCEDURE — 1036F TOBACCO NON-USER: CPT | Performed by: INTERNAL MEDICINE

## 2020-03-05 PROCEDURE — G8484 FLU IMMUNIZE NO ADMIN: HCPCS | Performed by: INTERNAL MEDICINE

## 2020-03-05 PROCEDURE — 3017F COLORECTAL CA SCREEN DOC REV: CPT | Performed by: INTERNAL MEDICINE

## 2020-03-05 PROCEDURE — 99214 OFFICE O/P EST MOD 30 MIN: CPT | Performed by: INTERNAL MEDICINE

## 2020-03-05 RX ORDER — METOPROLOL SUCCINATE 25 MG/1
25 TABLET, EXTENDED RELEASE ORAL DAILY
Qty: 90 TABLET | Refills: 3 | Status: SHIPPED
Start: 2020-03-05 | End: 2021-03-11

## 2020-03-05 RX ORDER — FLUOXETINE HYDROCHLORIDE 20 MG/1
3 CAPSULE ORAL DAILY
COMMUNITY
Start: 2020-02-28 | End: 2020-03-05 | Stop reason: SDUPTHER

## 2020-03-05 RX ORDER — ATORVASTATIN CALCIUM 40 MG/1
TABLET, FILM COATED ORAL
Qty: 90 TABLET | Refills: 3 | Status: SHIPPED
Start: 2020-03-05 | End: 2021-03-11

## 2020-03-05 RX ORDER — LISINOPRIL 10 MG/1
10 TABLET ORAL 2 TIMES DAILY
Qty: 180 TABLET | Refills: 3 | Status: SHIPPED
Start: 2020-03-05 | End: 2020-10-05 | Stop reason: SDUPTHER

## 2020-03-05 NOTE — PROGRESS NOTES
times daily       FLUoxetine HCl 60 MG TABS take 1 tablet by mouth once daily  0    VENTOLIN  (90 Base) MCG/ACT inhaler   0     No current facility-administered medications for this visit. Social History     Socioeconomic History    Marital status: Unknown     Spouse name: Not on file    Number of children: Not on file    Years of education: Not on file    Highest education level: Not on file   Occupational History    Not on file   Social Needs    Financial resource strain: Not on file    Food insecurity:     Worry: Not on file     Inability: Not on file    Transportation needs:     Medical: Not on file     Non-medical: Not on file   Tobacco Use    Smoking status: Former Smoker     Last attempt to quit:      Years since quittin.1    Smokeless tobacco: Never Used   Substance and Sexual Activity    Alcohol use: No    Drug use: No    Sexual activity: Not on file   Lifestyle    Physical activity:     Days per week: Not on file     Minutes per session: Not on file    Stress: Not on file   Relationships    Social connections:     Talks on phone: Not on file     Gets together: Not on file     Attends Anabaptism service: Not on file     Active member of club or organization: Not on file     Attends meetings of clubs or organizations: Not on file     Relationship status: Not on file    Intimate partner violence:     Fear of current or ex partner: Not on file     Emotionally abused: Not on file     Physically abused: Not on file     Forced sexual activity: Not on file   Other Topics Concern    Not on file   Social History Narrative    Not on file       History reviewed. No pertinent family history. Review of Systems:  Heart: as above   Lungs: as above   Eyes: denies changes in vision or discharge. Ears: denies changes in hearing or pain. Nose: denies epistaxis or masses   Throat: denies sore throat or trouble swallowing. Neuro: denies numbness, tingling, tremors.    Skin: denies rashes or itching. : denies hematuria, dysuria   GI: denies vomiting, diarrhea   Psych: denies mood changed, anxiety, depression. All other systems negative. Physical Exam   /76   Pulse 66   Resp 18   Ht 5' 4\" (1.626 m)   Wt 208 lb (94.3 kg)   BMI 35.70 kg/m²   Constitutional: Oriented to person, place, and time. Well-developed and well-nourished. No distress. Head: Normocephalic and atraumatic. Eyes: EOM are normal. Pupils are equal, round, and reactive to light. Neck: Normal range of motion. Neck supple. No hepatojugular reflux and no JVD present. Carotid bruit is not present. No tracheal deviation present. No thyromegaly present. Cardiovascular: Normal rate, regular rhythm, normal heart sounds and intact distal pulses. Exam reveals no gallop and no friction rub. No murmur heard. Pulmonary/Chest: Effort normal and breath sounds normal. No respiratory distress. No wheezes. No rales. No tenderness. Abdominal: Soft. Bowel sounds are normal. No distension and no mass. No tenderness. No rebound and no guarding. Musculoskeletal: Normal range of motion. No edema and no tenderness. Lymphadenopathy:   No cervical adenopathy. No groin adenopathy. Neurological: Alert and oriented to person, place, and time. Skin: Skin is warm and dry. No rash noted. Not diaphoretic. No erythema. Psychiatric: Normal mood and affect. Behavior is normal.     EKG:  NSR, nonspecific ST and T waves changes. ASSESSMENT AND PLAN:  Patient Active Problem List   Diagnosis    S/P MVR (mitral valve repair)    Coronary artery disease involving native coronary artery of native heart without angina pectoris    Non morbid obesity    Dyslipidemia    FELIX on CPAP    CAD in native artery    Inferior myocardial infarction ([facility] [address]     1. CAD: Post PCI 8/18. ASA/statin/BB(reduce)/ACEI. 2. MVRp: Stable by echo 8/18. 3. HTN: Observe. 4. Lipids: Statin. 5. Asthma    6.  FELIX    Ania Jurado TOBY  Cardiologist  Cardiology, [facility]

## 2020-10-05 RX ORDER — LISINOPRIL 10 MG/1
10 TABLET ORAL 2 TIMES DAILY
Qty: 180 TABLET | Refills: 3 | Status: SHIPPED
Start: 2020-10-05 | End: 2021-12-27

## 2020-11-03 PROBLEM — I25.10 CAD IN NATIVE ARTERY: Status: RESOLVED | Noted: 2018-08-22 | Resolved: 2020-11-03

## 2021-03-11 RX ORDER — ATORVASTATIN CALCIUM 40 MG/1
TABLET, FILM COATED ORAL
Qty: 90 TABLET | Refills: 3 | Status: SHIPPED
Start: 2021-03-11 | End: 2022-03-24

## 2021-03-11 RX ORDER — METOPROLOL SUCCINATE 25 MG/1
TABLET, EXTENDED RELEASE ORAL
Qty: 90 TABLET | Refills: 3 | Status: SHIPPED
Start: 2021-03-11 | End: 2022-03-17

## 2021-04-01 ENCOUNTER — OFFICE VISIT (OUTPATIENT)
Dept: CARDIOLOGY CLINIC | Age: 56
End: 2021-04-01
Payer: COMMERCIAL

## 2021-04-01 VITALS
BODY MASS INDEX: 38.76 KG/M2 | DIASTOLIC BLOOD PRESSURE: 74 MMHG | RESPIRATION RATE: 18 BRPM | HEIGHT: 64 IN | HEART RATE: 51 BPM | WEIGHT: 227 LBS | SYSTOLIC BLOOD PRESSURE: 128 MMHG

## 2021-04-01 DIAGNOSIS — I25.10 CORONARY ARTERY DISEASE INVOLVING NATIVE CORONARY ARTERY OF NATIVE HEART WITHOUT ANGINA PECTORIS: Primary | ICD-10-CM

## 2021-04-01 PROCEDURE — 93000 ELECTROCARDIOGRAM COMPLETE: CPT | Performed by: INTERNAL MEDICINE

## 2021-04-01 PROCEDURE — 99214 OFFICE O/P EST MOD 30 MIN: CPT | Performed by: INTERNAL MEDICINE

## 2021-04-01 RX ORDER — DOXEPIN HYDROCHLORIDE 6 MG/1
1 TABLET ORAL NIGHTLY
COMMUNITY
Start: 2021-03-29

## 2021-04-01 RX ORDER — BUPROPION HYDROCHLORIDE 150 MG/1
1 TABLET, EXTENDED RELEASE ORAL 2 TIMES DAILY
COMMUNITY
Start: 2021-03-29

## 2021-04-01 NOTE — PROGRESS NOTES
CHIEF COMPLAINT: CAD/MVRp    HISTORY OF PRESENT ILLNESS: Patient is a 63 y.o. female seen at the request of John Paul Kraus MD.      No CP or SOB. Rare palpitations. Mild edema. Past history includes:   1. Asthma. 2. Essential hypertension. 3. Hyperlipidemia. 4. Sleep apnea. 5. Coronary artery disease, S/P myocardial infarction in 2014. Treated with percutaneous intervention. 6. Mitral valve repair, 2015.   7. Cardiac catheterization, 08/22/2018 after an abnormal stress test showed an 80% mid LAD stenosis with 80% proximal circumflex stenosis and diffuse 70% right coronary artery stenosis involving the ostium of a posterolateral branch. Patient was treated with drug-eluting stents to the mid LAD and proximal circumflex.       Past Medical History:   Diagnosis Date    Anxiety neurosis     Asthma     CAD (coronary artery disease)     Dyspnea     HTN (hypertension)     Hypercholesteremia     Sleep apnea     Wheezing        Patient Active Problem List   Diagnosis    S/P MVR (mitral valve repair)    Coronary artery disease involving native coronary artery of native heart without angina pectoris    Non morbid obesity    Dyslipidemia    FELIX on CPAP    Inferior myocardial infarction (HCC)       No Known Allergies    Current Outpatient Medications   Medication Sig Dispense Refill    buPROPion (WELLBUTRIN SR) 150 MG extended release tablet Take 1 tablet by mouth 2 times daily       Doxepin HCl 6 MG TABS Take 1 tablet by mouth nightly      Melatonin 5 MG CAPS Take 1 tablet by mouth      atorvastatin (LIPITOR) 40 MG tablet take 1 tablet by mouth once daily 90 tablet 3    metoprolol succinate (TOPROL XL) 25 MG extended release tablet take 1 tablet by mouth once daily 90 tablet 3    lisinopril (PRINIVIL;ZESTRIL) 10 MG tablet Take 1 tablet by mouth 2 times daily 180 tablet 3    FLOVENT  MCG/ACT inhaler   0    acetaminophen (TYLENOL) 325 MG tablet Take 325 mg by mouth      aspirin 81 MG chewable tablet Take 81 mg by mouth      ALPRAZolam (XANAX) 0.5 MG tablet take 1 tablet by mouth twice a day if needed  0    VENTOLIN  (90 Base) MCG/ACT inhaler   0     No current facility-administered medications for this visit. Social History     Socioeconomic History    Marital status: Unknown     Spouse name: Not on file    Number of children: Not on file    Years of education: Not on file    Highest education level: Not on file   Occupational History    Not on file   Social Needs    Financial resource strain: Not on file    Food insecurity     Worry: Not on file     Inability: Not on file    Transportation needs     Medical: Not on file     Non-medical: Not on file   Tobacco Use    Smoking status: Former Smoker     Quit date:      Years since quittin.2    Smokeless tobacco: Never Used   Substance and Sexual Activity    Alcohol use: No    Drug use: No    Sexual activity: Not on file   Lifestyle    Physical activity     Days per week: Not on file     Minutes per session: Not on file    Stress: Not on file   Relationships    Social connections     Talks on phone: Not on file     Gets together: Not on file     Attends Protestant service: Not on file     Active member of club or organization: Not on file     Attends meetings of clubs or organizations: Not on file     Relationship status: Not on file    Intimate partner violence     Fear of current or ex partner: Not on file     Emotionally abused: Not on file     Physically abused: Not on file     Forced sexual activity: Not on file   Other Topics Concern    Not on file   Social History Narrative    Not on file       History reviewed. No pertinent family history. Review of Systems:  Heart: as above   Lungs: as above   Eyes: denies changes in vision or discharge. Ears: denies changes in hearing or pain. Nose: denies epistaxis or masses   Throat: denies sore throat or trouble swallowing.    Neuro: denies numbness, tingling, tremors. Skin: denies rashes or itching. : denies hematuria, dysuria   GI: denies vomiting, diarrhea   Psych: denies mood changed, anxiety, depression. All other systems negative. Physical Exam   /74   Pulse 51   Resp 18   Ht 5' 4\" (1.626 m)   Wt 227 lb (103 kg)   BMI 38.96 kg/m²   Constitutional: Oriented to person, place, and time. Well-developed and well-nourished. No distress. Head: Normocephalic and atraumatic. Eyes: EOM are normal. Pupils are equal, round, and reactive to light. Neck: Normal range of motion. Neck supple. No hepatojugular reflux and no JVD present. Carotid bruit is not present. No tracheal deviation present. No thyromegaly present. Cardiovascular: Normal rate, regular rhythm, normal heart sounds and intact distal pulses. Exam reveals no gallop and no friction rub. No murmur heard. Pulmonary/Chest: Effort normal and breath sounds normal. No respiratory distress. No wheezes. No rales. No tenderness. Abdominal: Soft. Bowel sounds are normal. No distension and no mass. No tenderness. No rebound and no guarding. Musculoskeletal: Normal range of motion. No edema and no tenderness. Lymphadenopathy:   No cervical adenopathy. No groin adenopathy. Neurological: Alert and oriented to person, place, and time. Skin: Skin is warm and dry. No rash noted. Not diaphoretic. No erythema. Psychiatric: Normal mood and affect. Behavior is normal.     EKG:  Sinus bradycardia, nonspecific ST and T waves changes. ASSESSMENT AND PLAN:  Patient Active Problem List   Diagnosis    S/P MVR (mitral valve repair)    Coronary artery disease involving native coronary artery of native heart without angina pectoris    Non morbid obesity    Dyslipidemia    FELIX on CPAP    Inferior myocardial infarction ([facility] [address]     1. CAD: Post PCI 8/18. ASA/statin/BB/ACEI. 2. MVRp: Stable by echo 8/18. 3. HTN: Observe. 4. Lipids: Statin. 5. Asthma    6.  FELIX    Jason JACOBSEN Abigail Rodríguez.   Cardiologist  Cardiology, [facility]

## 2021-12-27 RX ORDER — LISINOPRIL 10 MG/1
TABLET ORAL
Qty: 180 TABLET | Refills: 1 | Status: SHIPPED | OUTPATIENT
Start: 2021-12-27

## 2022-03-17 RX ORDER — METOPROLOL SUCCINATE 25 MG/1
TABLET, EXTENDED RELEASE ORAL
Qty: 90 TABLET | Refills: 3 | Status: SHIPPED | OUTPATIENT
Start: 2022-03-17

## 2022-03-24 RX ORDER — ATORVASTATIN CALCIUM 40 MG/1
TABLET, FILM COATED ORAL
Qty: 90 TABLET | Refills: 0 | Status: SHIPPED
Start: 2022-03-24 | End: 2022-07-18

## 2022-07-18 RX ORDER — ATORVASTATIN CALCIUM 40 MG/1
TABLET, FILM COATED ORAL
Qty: 90 TABLET | Refills: 0 | Status: SHIPPED | OUTPATIENT
Start: 2022-07-18

## 2022-11-28 RX ORDER — ATORVASTATIN CALCIUM 40 MG/1
TABLET, FILM COATED ORAL
Qty: 90 TABLET | Refills: 0 | Status: SHIPPED | OUTPATIENT
Start: 2022-11-28

## 2022-12-21 RX ORDER — DOXEPIN HYDROCHLORIDE 10 MG/1
CAPSULE ORAL
COMMUNITY
Start: 2022-11-23

## 2023-03-07 ENCOUNTER — OFFICE VISIT (OUTPATIENT)
Dept: CARDIOLOGY CLINIC | Age: 58
End: 2023-03-07
Payer: COMMERCIAL

## 2023-03-07 VITALS
HEIGHT: 64 IN | HEART RATE: 55 BPM | WEIGHT: 222 LBS | DIASTOLIC BLOOD PRESSURE: 86 MMHG | BODY MASS INDEX: 37.9 KG/M2 | SYSTOLIC BLOOD PRESSURE: 134 MMHG | RESPIRATION RATE: 18 BRPM

## 2023-03-07 DIAGNOSIS — I25.10 CORONARY ARTERY DISEASE INVOLVING NATIVE CORONARY ARTERY OF NATIVE HEART WITHOUT ANGINA PECTORIS: Primary | ICD-10-CM

## 2023-03-07 PROCEDURE — G8417 CALC BMI ABV UP PARAM F/U: HCPCS | Performed by: INTERNAL MEDICINE

## 2023-03-07 PROCEDURE — 3017F COLORECTAL CA SCREEN DOC REV: CPT | Performed by: INTERNAL MEDICINE

## 2023-03-07 PROCEDURE — G8484 FLU IMMUNIZE NO ADMIN: HCPCS | Performed by: INTERNAL MEDICINE

## 2023-03-07 PROCEDURE — 93000 ELECTROCARDIOGRAM COMPLETE: CPT | Performed by: INTERNAL MEDICINE

## 2023-03-07 PROCEDURE — 1036F TOBACCO NON-USER: CPT | Performed by: INTERNAL MEDICINE

## 2023-03-07 PROCEDURE — 99214 OFFICE O/P EST MOD 30 MIN: CPT | Performed by: INTERNAL MEDICINE

## 2023-03-07 PROCEDURE — G8427 DOCREV CUR MEDS BY ELIG CLIN: HCPCS | Performed by: INTERNAL MEDICINE

## 2023-03-07 NOTE — PROGRESS NOTES
CHIEF COMPLAINT: CAD/MVRp    HISTORY OF PRESENT ILLNESS: Patient is a 61 y.o. female seen at the request of Westley Arora MD.      No CP or SOB. Rare palpitations. Mild edema. Past history includes:   Asthma. Essential hypertension. Hyperlipidemia. Sleep apnea. Coronary artery disease, S/P myocardial infarction in 2014. Treated with percutaneous intervention. Mitral valve repair, 2015. Cardiac catheterization, 08/22/2018 after an abnormal stress test showed an 80% mid LAD stenosis with 80% proximal circumflex stenosis and diffuse 70% right coronary artery stenosis involving the ostium of a posterolateral branch. Patient was treated with drug-eluting stents to the mid LAD and proximal circumflex.       Past Medical History:   Diagnosis Date    Anxiety neurosis     Asthma     CAD (coronary artery disease)     Dyspnea     HTN (hypertension)     Hypercholesteremia     Sleep apnea     Wheezing        Patient Active Problem List   Diagnosis    S/P MVR (mitral valve repair)    Coronary artery disease involving native coronary artery of native heart without angina pectoris    Non morbid obesity    Dyslipidemia    FELIX on CPAP    Inferior myocardial infarction (HCC)       No Known Allergies    Current Outpatient Medications   Medication Sig Dispense Refill    doxepin (SINEQUAN) 10 MG capsule take 1 capsule by mouth at bedtime      atorvastatin (LIPITOR) 40 MG tablet TAKE 1 TABLET BY MOUTH DAILY 90 tablet 0    metoprolol succinate (TOPROL XL) 25 MG extended release tablet take 1 tablet by mouth once daily 90 tablet 3    lisinopril (PRINIVIL;ZESTRIL) 10 MG tablet take 1 tablet by mouth twice a day 180 tablet 1    buPROPion (WELLBUTRIN SR) 150 MG extended release tablet Take 1 tablet by mouth 2 times daily       Melatonin 5 MG CAPS Take 1 tablet by mouth      FLOVENT  MCG/ACT inhaler   0    acetaminophen (TYLENOL) 325 MG tablet Take 325 mg by mouth      aspirin 81 MG chewable tablet Take 81 mg by mouth ALPRAZolam (XANAX) 0.5 MG tablet take 1 tablet by mouth twice a day if needed  0    VENTOLIN  (90 Base) MCG/ACT inhaler   0     No current facility-administered medications for this visit. Social History     Socioeconomic History    Marital status:      Spouse name: Not on file    Number of children: Not on file    Years of education: Not on file    Highest education level: Not on file   Occupational History    Not on file   Tobacco Use    Smoking status: Former     Types: Cigarettes     Quit date:      Years since quittin.1    Smokeless tobacco: Never   Vaping Use    Vaping Use: Never used   Substance and Sexual Activity    Alcohol use: No    Drug use: No    Sexual activity: Not on file   Other Topics Concern    Not on file   Social History Narrative    Not on file     Social Determinants of Health     Financial Resource Strain: Not on file   Food Insecurity: Not on file   Transportation Needs: Not on file   Physical Activity: Not on file   Stress: Not on file   Social Connections: Not on file   Intimate Partner Violence: Not on file   Housing Stability: Not on file       No family history on file. Review of Systems:  Heart: as above   Lungs: as above   Eyes: denies changes in vision or discharge. Ears: denies changes in hearing or pain. Nose: denies epistaxis or masses   Throat: denies sore throat or trouble swallowing. Neuro: denies numbness, tingling, tremors. Skin: denies rashes or itching. : denies hematuria, dysuria   GI: denies vomiting, diarrhea   Psych: denies mood changed, anxiety, depression. All other systems negative. Physical Exam   /86   Pulse 55   Resp 18   Ht 5' 4\" (1.626 m)   Wt 222 lb (100.7 kg)   BMI 38.11 kg/m²   Constitutional: Oriented to person, place, and time. Well-developed and well-nourished. No distress. Head: Normocephalic and atraumatic. Eyes: EOM are normal. Pupils are equal, round, and reactive to light.    Neck: Normal range of motion. Neck supple. No hepatojugular reflux and no JVD present. Carotid bruit is not present. No tracheal deviation present. No thyromegaly present. Cardiovascular: Normal rate, regular rhythm, normal heart sounds and intact distal pulses. Exam reveals no gallop and no friction rub. No murmur heard. Pulmonary/Chest: Effort normal and breath sounds normal. No respiratory distress. No wheezes. No rales. No tenderness. Abdominal: Soft. Bowel sounds are normal. No distension and no mass. No tenderness. No rebound and no guarding. Musculoskeletal: Normal range of motion. No edema and no tenderness. Lymphadenopathy:   No cervical adenopathy. No groin adenopathy. Neurological: Alert and oriented to person, place, and time. Skin: Skin is warm and dry. No rash noted. Not diaphoretic. No erythema. Psychiatric: Normal mood and affect. Behavior is normal.     EKG:  Sinus bradycardia, nonspecific ST and T waves changes. ASSESSMENT AND PLAN:  Patient Active Problem List   Diagnosis    S/P MVR (mitral valve repair)    Coronary artery disease involving native coronary artery of native heart without angina pectoris    Non morbid obesity    Dyslipidemia    FELIX on CPAP    Inferior myocardial infarction ([facility] [address]     1. CAD: Post PCI 8/18. ASA/statin/BB/ACEI. 2. MVRp: Stable by echo 8/18. 3. HTN: Observe. 4. Lipids: Statin. 5. Asthma    6. FELIX    Josefina Brewer D.O.   Cardiologist  Cardiology, [facility]

## 2023-03-10 RX ORDER — ATORVASTATIN CALCIUM 40 MG/1
TABLET, FILM COATED ORAL
Qty: 90 TABLET | Refills: 3 | Status: SHIPPED | OUTPATIENT
Start: 2023-03-10

## 2023-04-21 RX ORDER — LISINOPRIL 10 MG/1
10 TABLET ORAL 2 TIMES DAILY
Qty: 180 TABLET | Refills: 1 | Status: SHIPPED | OUTPATIENT
Start: 2023-04-21

## 2023-04-21 RX ORDER — METOPROLOL SUCCINATE 25 MG/1
TABLET, EXTENDED RELEASE ORAL
Qty: 90 TABLET | Refills: 3 | Status: SHIPPED | OUTPATIENT
Start: 2023-04-21

## 2024-01-15 RX ORDER — LISINOPRIL 10 MG/1
10 TABLET ORAL 2 TIMES DAILY
Qty: 180 TABLET | Refills: 1 | Status: SHIPPED | OUTPATIENT
Start: 2024-01-15

## 2024-03-18 RX ORDER — ATORVASTATIN CALCIUM 40 MG/1
TABLET, FILM COATED ORAL
Qty: 90 TABLET | Refills: 0 | Status: SHIPPED | OUTPATIENT
Start: 2024-03-18

## 2024-03-21 NOTE — PATIENT INSTRUCTIONS
Increase lisinopril pill to one tablet twice a day. Major Shift Events  Overnight pt remained in stable condition requiring BiPap and anti-arrhythmic medications. In terms of BiPap support, pt tolerating 4L NC well during wakefulness, but requires BiPap w/ minimal settings during sleep. Pt remains on 0.5 mg/min Amiodarone infusion overnight r/t new onset Afib/Flutter during day, tolerating well.     Neuro: A&Ox4, following commands, PERRLA, sensation intact, MSK: 4/5  Cv: Afib/Aflutter (HR: 's), BP WDL, pulses +2, afebrile  Resp: 4L NC vs. BiPap (35%, 12, 16/5), LS: clear/diminished, SpO2 > 92%, minimal secretion  GI/: Anuric, BM x2 (continent, loose/liquid), BS WDL, regular diet, PEG w/ TF 35 mL/hr, FWF 30 mL q4  Skin:  generalized ecchymosis, Skin tear LUE, R-neck incision, sacral erythema      Drips: Amiodarone 0.5 mg/min, TKO  Sedation: None     Plan: Follow POC. Monitor closely, notify MD of acute changes.  For vital signs and complete assessments, please see documentation flowsheets.

## 2024-04-29 RX ORDER — METOPROLOL SUCCINATE 25 MG/1
25 TABLET, EXTENDED RELEASE ORAL DAILY
Qty: 90 TABLET | Refills: 3 | Status: SHIPPED | OUTPATIENT
Start: 2024-04-29

## 2024-04-29 RX ORDER — METOPROLOL SUCCINATE 25 MG/1
TABLET, EXTENDED RELEASE ORAL
Qty: 90 TABLET | Refills: 0 | Status: SHIPPED | OUTPATIENT
Start: 2024-04-29
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
## 2024-09-20 ENCOUNTER — OFFICE VISIT (OUTPATIENT)
Dept: OTOLARYNGOLOGY | Facility: HOSPITAL | Age: 59
End: 2024-09-20
Payer: MEDICAID

## 2024-09-20 VITALS — WEIGHT: 166 LBS | TEMPERATURE: 98.8 F | HEIGHT: 63 IN | BODY MASS INDEX: 29.41 KG/M2

## 2024-09-20 DIAGNOSIS — C10.9 MALIGNANT NEOPLASM OF OROPHARYNX: Primary | ICD-10-CM

## 2024-09-20 DIAGNOSIS — R13.12 OROPHARYNGEAL DYSPHAGIA: ICD-10-CM

## 2024-09-20 DIAGNOSIS — K14.8 TONGUE MASS: ICD-10-CM

## 2024-09-20 PROCEDURE — 99244 OFF/OP CNSLTJ NEW/EST MOD 40: CPT | Performed by: OTOLARYNGOLOGY

## 2024-09-20 PROCEDURE — 1036F TOBACCO NON-USER: CPT | Performed by: OTOLARYNGOLOGY

## 2024-09-20 PROCEDURE — 31575 DIAGNOSTIC LARYNGOSCOPY: CPT | Performed by: OTOLARYNGOLOGY

## 2024-09-20 RX ORDER — ALBUTEROL SULFATE 90 UG/1
2 INHALANT RESPIRATORY (INHALATION) EVERY 4 HOURS PRN
COMMUNITY

## 2024-09-20 RX ORDER — FLUTICASONE PROPIONATE 44 UG/1
2 AEROSOL, METERED RESPIRATORY (INHALATION)
COMMUNITY
Start: 2024-05-17

## 2024-09-20 RX ORDER — METOPROLOL SUCCINATE 25 MG/1
25 TABLET, EXTENDED RELEASE ORAL DAILY
COMMUNITY
End: 2024-09-20 | Stop reason: WASHOUT

## 2024-09-20 RX ORDER — ACETAMINOPHEN 325 MG/1
325 TABLET ORAL
COMMUNITY

## 2024-09-20 RX ORDER — NAPROXEN SODIUM 220 MG/1
81 TABLET, FILM COATED ORAL DAILY
COMMUNITY

## 2024-09-20 RX ORDER — LISINOPRIL 10 MG/1
10 TABLET ORAL 2 TIMES DAILY
COMMUNITY
End: 2024-09-20 | Stop reason: WASHOUT

## 2024-09-20 RX ORDER — ALPRAZOLAM 0.5 MG/1
0.5 TABLET ORAL
COMMUNITY
Start: 2024-09-17

## 2024-09-20 RX ORDER — ATORVASTATIN CALCIUM 40 MG/1
1 TABLET, FILM COATED ORAL DAILY
COMMUNITY
Start: 2024-03-18

## 2024-09-20 RX ORDER — BUPROPION HYDROCHLORIDE 150 MG/1
1 TABLET, EXTENDED RELEASE ORAL DAILY
COMMUNITY
Start: 2024-09-19

## 2024-09-20 RX ORDER — DOXEPIN HYDROCHLORIDE 10 MG/1
1 CAPSULE ORAL NIGHTLY
COMMUNITY
Start: 2022-11-23

## 2024-09-20 RX ORDER — MIDODRINE HYDROCHLORIDE 5 MG/1
TABLET ORAL
COMMUNITY
Start: 2024-09-06

## 2024-09-20 ASSESSMENT — PATIENT HEALTH QUESTIONNAIRE - PHQ9
1. LITTLE INTEREST OR PLEASURE IN DOING THINGS: NOT AT ALL
SUM OF ALL RESPONSES TO PHQ9 QUESTIONS 1 & 2: 0
2. FEELING DOWN, DEPRESSED OR HOPELESS: NOT AT ALL

## 2024-09-25 ENCOUNTER — SOCIAL WORK (OUTPATIENT)
Dept: CASE MANAGEMENT | Facility: HOSPITAL | Age: 59
End: 2024-09-25
Payer: MEDICAID

## 2024-09-25 ENCOUNTER — TELEPHONE (OUTPATIENT)
Dept: OTOLARYNGOLOGY | Facility: CLINIC | Age: 59
End: 2024-09-25
Payer: MEDICAID

## 2024-09-25 NOTE — TELEPHONE ENCOUNTER
"I received the following email at 8:35 a.m.:    \"Good morning,    My name is Taylor Cuadra i am reaching out in regards to my mother Amalia Roa her  is 64.  First I want to thank you for being so kind and informative at her first appointment.  I just wanted to reach to see if you have social work available. On top of this my parents are also tight on finances, i didnt know if you have any resources available to assist with transportation expenses, and or lodging accomedations if needed.     My email is [email or web address]  Phone number [phone]    Best Regards,  Taylor\"    I messaged KATHY Palencia asking that she reach out to Taylor.  I messaged Taylor back at 9:42 a.m.:    \"Good morning Taylor--    We do have a .  Her name is Amirah.  I will reach out to her and have her contact you directly.    Thanks,    Adalgisa Mejía, MSN, RN, ACNS-BC, CORLN  Clinical Nurse Specialist  Certified Otorhinolaryngology Nurse  [phone]/fax [phone]\"    "

## 2024-09-25 NOTE — PROGRESS NOTES
CHARLOTTE received staff message from PABLO Diana to call Amalia's daughter, Taylor [phone]. Phone call made, no answer. Left vm introducing self and role and to return call at earliest convenience. Will continue to follow.     UPDATE: Taylor returned call. Shared that her mother lives roughly 1.5 hours away and was curious on assistance. SW shared [facility] information, Taylor explained that they would like that option. CHARLOTTE explained process of referral once Amalia's treatment plan is scheduled. Taylor asked about gas cards, shared unfortunately team does not have any. Encouraged Taylor to reach out with any questions or concerns in the meantime. Will continue to follow.

## 2024-10-03 ENCOUNTER — PRE-ADMISSION TESTING (OUTPATIENT)
Dept: PREADMISSION TESTING | Facility: HOSPITAL | Age: 59
End: 2024-10-03
Payer: MEDICAID

## 2024-10-03 ENCOUNTER — ANESTHESIA EVENT (OUTPATIENT)
Dept: OPERATING ROOM | Facility: HOSPITAL | Age: 59
End: 2024-10-03
Payer: MEDICAID

## 2024-10-03 VITALS
SYSTOLIC BLOOD PRESSURE: 125 MMHG | BODY MASS INDEX: 29.06 KG/M2 | HEIGHT: 63 IN | WEIGHT: 164 LBS | OXYGEN SATURATION: 98 % | HEART RATE: 75 BPM | TEMPERATURE: 97.9 F | DIASTOLIC BLOOD PRESSURE: 80 MMHG

## 2024-10-03 DIAGNOSIS — Z41.9 SURGERY, ELECTIVE: ICD-10-CM

## 2024-10-03 DIAGNOSIS — K14.8 TONGUE MASS: ICD-10-CM

## 2024-10-03 DIAGNOSIS — I25.10 CORONARY ARTERY DISEASE INVOLVING NATIVE HEART WITHOUT ANGINA PECTORIS, UNSPECIFIED VESSEL OR LESION TYPE: ICD-10-CM

## 2024-10-03 DIAGNOSIS — I10 PRIMARY HYPERTENSION: Primary | ICD-10-CM

## 2024-10-03 DIAGNOSIS — R13.12 OROPHARYNGEAL DYSPHAGIA: ICD-10-CM

## 2024-10-03 LAB
ABO GROUP (TYPE) IN BLOOD: NORMAL
ANION GAP SERPL CALC-SCNC: 13 MMOL/L (ref 10–20)
ANTIBODY SCREEN: NORMAL
BUN SERPL-MCNC: 13 MG/DL (ref 6–23)
CALCIUM SERPL-MCNC: 9.4 MG/DL (ref 8.6–10.6)
CHLORIDE SERPL-SCNC: 102 MMOL/L (ref 98–107)
CO2 SERPL-SCNC: 32 MMOL/L (ref 21–32)
CREAT SERPL-MCNC: 0.95 MG/DL (ref 0.5–1.05)
EGFRCR SERPLBLD CKD-EPI 2021: 69 ML/MIN/1.73M*2
ERYTHROCYTE [DISTWIDTH] IN BLOOD BY AUTOMATED COUNT: 12.5 % (ref 11.5–14.5)
GLUCOSE SERPL-MCNC: 102 MG/DL (ref 74–99)
HCT VFR BLD AUTO: 40 % (ref 36–46)
HGB BLD-MCNC: 12.8 G/DL (ref 12–16)
MCH RBC QN AUTO: 30.1 PG (ref 26–34)
MCHC RBC AUTO-ENTMCNC: 32 G/DL (ref 32–36)
MCV RBC AUTO: 94 FL (ref 80–100)
NRBC BLD-RTO: 0 /100 WBCS (ref 0–0)
PLATELET # BLD AUTO: 284 X10*3/UL (ref 150–450)
POTASSIUM SERPL-SCNC: 4.6 MMOL/L (ref 3.5–5.3)
RBC # BLD AUTO: 4.25 X10*6/UL (ref 4–5.2)
RH FACTOR (ANTIGEN D): NORMAL
SODIUM SERPL-SCNC: 142 MMOL/L (ref 136–145)
WBC # BLD AUTO: 8.9 X10*3/UL (ref 4.4–11.3)

## 2024-10-03 PROCEDURE — 93005 ELECTROCARDIOGRAM TRACING: CPT

## 2024-10-03 PROCEDURE — 85027 COMPLETE CBC AUTOMATED: CPT

## 2024-10-03 PROCEDURE — 36415 COLL VENOUS BLD VENIPUNCTURE: CPT

## 2024-10-03 PROCEDURE — 86901 BLOOD TYPING SEROLOGIC RH(D): CPT

## 2024-10-03 PROCEDURE — 80048 BASIC METABOLIC PNL TOTAL CA: CPT

## 2024-10-03 PROCEDURE — 99244 OFF/OP CNSLTJ NEW/EST MOD 40: CPT | Performed by: ANESTHESIOLOGY

## 2024-10-03 PROCEDURE — 93010 ELECTROCARDIOGRAM REPORT: CPT | Performed by: INTERNAL MEDICINE

## 2024-10-03 PROCEDURE — 82374 ASSAY BLOOD CARBON DIOXIDE: CPT

## 2024-10-03 ASSESSMENT — DUKE ACTIVITY SCORE INDEX (DASI)
CAN YOU CLIMB A FLIGHT OF STAIRS OR WALK UP A HILL: YES
DASI METS SCORE: 9.9
CAN YOU WALK INDOORS, SUCH AS AROUND YOUR HOUSE: YES
CAN YOU RUN A SHORT DISTANCE: YES
CAN YOU PARTICIPATE IN STRENOUS SPORTS LIKE SWIMMING, SINGLES TENNIS, FOOTBALL, BASKETBALL, OR SKIING: YES
CAN YOU TAKE CARE OF YOURSELF (EAT, DRESS, BATHE, OR USE TOILET): YES
CAN YOU DO HEAVY WORK AROUND THE HOUSE LIKE SCRUBBING FLOORS OR LIFTING AND MOVING HEAVY FURNITURE: YES
TOTAL_SCORE: 58.2
CAN YOU DO YARD WORK LIKE RAKING LEAVES, WEEDING OR PUSHING A MOWER: YES
CAN YOU HAVE SEXUAL RELATIONS: YES
CAN YOU DO MODERATE WORK AROUND THE HOUSE LIKE VACUUMING, SWEEPING FLOORS OR CARRYING GROCERIES: YES
CAN YOU WALK A BLOCK OR TWO ON LEVEL GROUND: YES
CAN YOU DO LIGHT WORK AROUND THE HOUSE LIKE DUSTING OR WASHING DISHES: YES
CAN YOU PARTICIPATE IN MODERATE RECREATIONAL ACTIVITIES LIKE GOLF, BOWLING, DANCING, DOUBLES TENNIS OR THROWING A BASEBALL OR FOOTBALL: YES

## 2024-10-03 ASSESSMENT — ENCOUNTER SYMPTOMS
CHILLS: 0
NAUSEA: 0
EYES NEGATIVE: 1
WEAKNESS: 0
VOMITING: 0
DIFFICULTY URINATING: 0
FEVER: 0
NUMBNESS: 0
COUGH: 1
TROUBLE SWALLOWING: 1
NECK SWELLING: 1
UNEXPECTED WEIGHT CHANGE: 1
NECK PAIN: 0

## 2024-10-03 ASSESSMENT — LIFESTYLE VARIABLES: SMOKING_STATUS: NONSMOKER

## 2024-10-03 NOTE — PREPROCEDURE INSTRUCTIONS
Thank you for visiting [facility] ([facility]) today for your pre-procedure evaluation, you were seen by Dr. Vik Gaytan ([phone])    This summary includes instructions and information to aid you during your perioperative period.  Please read carefully. If you have any questions about your visit today, please call the number listed above.  If you become ill or have any changes to your health before your surgery, please contact your primary care provider and alert your surgeon.    Preparing for your Surgery       Exercises  Preoperative Deep Breathing Exercises  Why it is important to do deep breathing exercises before my surgery?  Deep breathing exercises strengthen your breathing muscles.  This helps you to recover after your surgery and decreases the chance of breathing complications.  How are the deep breathing exercises done?  Sit straight with your back supported.  Breathe in deeply and slowly through your nose. Your lower rib cage should expand and your abdomen may move forward.  Hold that breath for 3 to 5 seconds.  Breathe out through pursed lips, slowly and completely.  Rest and repeat 10 times every hour while awake.  Rest longer if you become dizzy or lightheaded.       Incentive Spirometer   You were not provided an incentive spirometer in [facility], please disregard the incentive spirometer instructions  What is an incentive spirometer?  An incentive spirometer is a device used before and after surgery to “exercise” your lungs.  It helps you to take deeper breaths to expand your lungs.  Below is an example of a basic incentive spirometer.  The device you receive may differ slightly but they all function the same.    Why do I need to use an incentive spirometer?  Using your incentive spirometer prepares your lungs for surgery and helps prevent lung problems after surgery.  How do I use my incentive spirometer?  When you're using your incentive spirometer, make sure to breathe  through your mouth. If you breathe through your nose, the incentive spirometer won't work properly. You can hold your nose if you have trouble.  If you feel dizzy at any time, stop and rest. Try again at a later time.  Follow the steps below:  Set up your incentive spirometer, expand the flexible tubing and connect to the outlet.  Sit upright in a chair or bed. Hold the incentive spirometer at eye level.   Put the mouthpiece in your mouth and close your lips tightly around it. Slowly breathe out (exhale) completely.  Breathe in (inhale) slowly through your mouth as deeply as you can. As you take a breath, you will see the piston rise inside the large column. While the piston rises, the indicator should move upwards. It should stay in between the 2 arrows (see Figure).  Try to get the piston as high as you can, while keeping the indicator between the arrows.   If the indicator doesn't stay between the arrows, you're breathing either too fast or too slow.  When you get it as high as you can, hold your breath for 10 seconds, or as long as possible. While you're holding your breath, the piston will slowly fall to the base of the spirometer.  Once the piston reaches the bottom of the spirometer, breathe out slowly through your mouth. Rest for a few seconds.  Repeat 10 times. Try to get the piston to the same level with each breath.  Repeat every hour while awake  You can carefully clean the outside of the mouthpiece with an alcohol wipe or soap and water.      Preoperative Brain Exercises    What are brain exercises?  A brain exercise is any activity that engages your thinking (cognitive) skills.    What types of activities are considered brain exercises?  Jigsaw puzzles, crossword puzzles, word jumble, memory games, word search, and many more.  Many can be found free online or on your phone via a mobile [employer]ari.    Why should I do brain exercises before my surgery?  More recent research has shown brain exercise before  surgery can lower the risk of postoperative delirium (confusion) which can be especially important for older adults.  Patients who did brain exercises for 5 to 10 hours the days before surgery, cut their risk of postoperative delirium in half up to 1 week after surgery.    Sit-to-Stand Exercise    What is the sit-to-stand exercise?  The sit-to-stand exercise strengthens the muscles of your lower body and muscles in the center of your body (core muscles for stability) helping to maintain and improve your strength and mobility.  How do I do the sit-to-stand exercise?  The goal is to do this exercise without using your arms or hands.  If this is too difficult, use your arms and hands or a chair with armrests to help slowly push yourself to the standing position and lower yourself back to the sitting position. As the movement becomes easier use your arms and hands less.    Steps to the sit-to-stand exercise  Sit up tall in a sturdy chair, knees bent, feet flat on the floor shoulder-width apart.  Shift your hips/pelvis forward in the chair to correctly position yourself for the next movement.  Lean forward at your hips.  Stand up straight putting equal weight on both feet.  Check to be sure you are properly aligned with the chair, in a slow controlled movement sit back down.  Repeat this exercise 10-15 times.  If needed you can do it fewer times until your strength improves.  Rest for 1 minute.  Do another 10-15 sit-to-stand exercises.  Try to do this in the morning and evening.        Instructions    Preoperative Fasting Guidelines    Why must I stop eating and drinking near surgery time?  With sedation, food or liquid in your stomach can enter your lungs causing serious complications  Food can increase nausea and vomiting  When do I need to stop eating and drinking before my surgery?      Do not eat any food or drink any liquids after midnight the night before your surgery/procedure.  You may have sips of water to take  medications.            Simple things you can do to help prevent blood clots     Blood clots are blockages that can form in the body's veins. When a blood clot forms in your deep veins, it may be called a deep vein thrombosis, or DVT for short. Blood clots can happen in any part of the body where blood flows, but they are most common in the arms and legs. If a piece of a blood clot breaks free and travels to the lungs, it is called a pulmonary embolus (PE). A PE can be a very serious problem.         Being in the hospital or having surgery can raise your chances of getting a blood clot because you may not be well enough to move around as much as you normally do.         Ways you can help prevent blood clots in the hospital       Wearing SCDs  SCDs stands for Sequential Compression Devices.   SCDs are special sleeves that wrap around your legs. They attach to a pump that fills them with air to gently squeeze your legs every few minutes.  This helps return the blood in your legs to your heart.   SCDs should only be taken off when walking or bathing. SCDs may not be comfortable, but they can help save your life.              Pump SCD leg sleeves  Wearing compression stockings - if your doctor orders them. These special snug-fitting stockings gently squeeze your legs to help blood flow.       Walking. Walking helps move the blood in your legs.   If your doctor says it is ok, try walking the halls at least   5 times a day. Ask us to help you get up, so you don't fall.      Taking any blood-thinning medicines your doctor orders.              Ways you can help prevent blood clots at home         Wearing compression stockings - if your doctor orders them.   Walking - to help move the blood in your legs.    Taking any blood-thinning medicines your doctor orders.      Signs of a blood clot or PE    Tell your doctor or nurse right away if you have any of the problems listed below.         If you are at home, seek medical care  right away. Call 911 for chest pain or problems breathing.            Signs of a blood clot (DVT) - such as pain, swelling, redness, or warmth in your arm or legs.  Signs of a pulmonary embolism (PE) - such as chest pain or feeling short of breath      Tobacco and Alcohol;  Do not drink alcohol or smoke within 24 hours of surgery.  It is best to quit smoking for as long as possible before any surgery or procedure.          The Week before Surgery        Seven days before Surgery  Check your [facility] medication instructions  Do the exercises provided to you by [facility]   Arrange for a responsible, adult licensed  to take you home after surgery and stay with you for 24 hours.  You will not be permitted to drive yourself home if you have received any anesthetic/sedation  Six days before surgery  Check your [facility] medication instructions  Do the exercises provided to you by [facility]   Start using Chlorhexidene (CHG) body wash if prescribed  Five days before surgery  Check your [facility] medication instructions  Do the exercises provided to you by [facility]   Continue to use CHG body wash if prescribed  Three days before surgery  Check your [facility] medication instructions  Do the exercises provided to you by [facility]   Continue to use CHG body wash if prescribed  Two days before surgery  Check your [facility] medication instructions  Do the exercises provided to you by [facility]   Continue to use CHG body wash if prescribed    The Day before Surgery       Check your [facility] medication and all other [facility] instructions including when to stop eating and drinking  You will be called with your arrival time for surgery in the late afternoon.  If you do not receive a call please reach out to your surgeon's office.  Do not smoke or drink 24 hours before surgery  Prepare items to bring with you to the hospital  Shower with your chlorhexidine wash if prescribed  Brush your teeth and use your chlorhexidine dental rinse if prescribed    The Day of Surgery       Check your [facility]  medication instructions  Ensure you follow the instructions for when to stop eating and drinking  Shower, if prescribed use CHG.  Do not apply any lotions, creams, moisturizers, perfume or deodorant  Brush your teeth and use your CHG dental rinse if prescribed  Wear loose comfortable clothing  Avoid make-up  Remove  jewelry and piercings, consider professional piercing removal with a plastic spacer if needed  Bring photo ID and Insurance card  Bring an accurate medication list that includes medication dose, frequency and allergies  Bring a copy of your advanced directives (will, health care power of )  Bring any devices and controllers as well as medical devices you have been provided with for surgery (CPAP, slings, braces, etc.)  Dentures, eyeglasses, and contacts will be removed before surgery, please bring cases for contacts or glasses         Medication List            Accurate as of October 3, 2024  2:02 PM. Always use your most recent med list.                acetaminophen 325 mg tablet  Commonly known as: Tylenol  Medication Adjustments for Surgery: Take/Use as prescribed     albuterol 90 mcg/actuation inhaler  Medication Adjustments for Surgery: Take/Use as prescribed     ALPRAZolam 0.5 mg tablet  Commonly known as: Xanax  Medication Adjustments for Surgery: Take/Use as prescribed     aspirin 81 mg chewable tablet  Medication Adjustments for Surgery: Take/Use as prescribed     atorvastatin 40 mg tablet  Commonly known as: Lipitor  Medication Adjustments for Surgery: Take/Use as prescribed     buPROPion  mg 12 hr tablet  Commonly known as: Wellbutrin SR  Medication Adjustments for Surgery: Take/Use as prescribed     doxepin 10 mg capsule  Commonly known as: SINEquan  Medication Adjustments for Surgery: Take/Use as prescribed     fluticasone 44 mcg/actuation inhaler  Commonly known as: Flovent  Medication Adjustments for Surgery: Take/Use as prescribed     melatonin 10 mg  tablet,chewable  Medication Adjustments for Surgery: Take/Use as prescribed     midodrine 5 mg tablet  Commonly known as: Proamatine  Medication Adjustments for Surgery: Take/Use as prescribed
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
## 2024-10-07 LAB
ATRIAL RATE: 72 BPM
P AXIS: 61 DEGREES
P OFFSET: 209 MS
P ONSET: 149 MS
PR INTERVAL: 132 MS
Q ONSET: 215 MS
QRS COUNT: 12 BEATS
QRS DURATION: 88 MS
QT INTERVAL: 404 MS
QTC CALCULATION(BAZETT): 442 MS
QTC FREDERICIA: 429 MS
R AXIS: 15 DEGREES
T AXIS: -17 DEGREES
T OFFSET: 417 MS
VENTRICULAR RATE: 72 BPM

## 2024-10-07 RX ORDER — MIDODRINE HYDROCHLORIDE 5 MG/1
TABLET ORAL
Qty: 45 TABLET | Refills: 3 | Status: SHIPPED | OUTPATIENT
Start: 2024-10-07

## 2024-10-08 ENCOUNTER — HOSPITAL ENCOUNTER (OUTPATIENT)
Dept: RADIOLOGY | Facility: HOSPITAL | Age: 59
Discharge: HOME | End: 2024-10-08
Payer: MEDICAID

## 2024-10-08 DIAGNOSIS — K14.8 TONGUE MASS: ICD-10-CM

## 2024-10-08 DIAGNOSIS — R13.12 OROPHARYNGEAL DYSPHAGIA: ICD-10-CM

## 2024-10-08 PROCEDURE — 2550000001 HC RX 255 CONTRASTS: Mod: SE | Performed by: OTOLARYNGOLOGY

## 2024-10-08 PROCEDURE — 70491 CT SOFT TISSUE NECK W/DYE: CPT

## 2024-10-08 PROCEDURE — 70491 CT SOFT TISSUE NECK W/DYE: CPT | Performed by: RADIOLOGY

## 2024-10-10 ENCOUNTER — TELEPHONE (OUTPATIENT)
Dept: OTOLARYNGOLOGY | Facility: HOSPITAL | Age: 59
End: 2024-10-10
Payer: MEDICAID

## 2024-10-10 NOTE — TELEPHONE ENCOUNTER
"Taylor called and left me a message at 9:03 a.m. asking that I call her about some symptoms her mom is experiencing.  Amalia is scheduled for her triple endoscopy with biopsy and placement of feeding tube on 10/14/24.  When we saw her in the office we wanted to admit her but she declined.    I called Taylor and we spoke.  She shared with me that after her mom had her CT scan and they were driving home she \"passed out.\"  She stated that her mom \"made herself NPO\" and took her blood pressure medication after the test.  Taylor wondered if the syncope could be related to her mass.    I informed her not likely.  I stated she was not NPO for the scan.  She was instructed that it's clear liquids only 3 hours before the test.    At the time of her apt with us she was on a holter monitor at the request of her cardiologist as she was having syncopal episodes.  I also noted that she had a virtual visit with her PCP this morning who adjusted her anti-hypertensive medication and also commented about being concerned with her poor po intake and how dehydration may be part of the issue as well.    I reminded Taylor that we had concerns about her po intake in the office and wanted to admit her but she refused.  I really encouraged her to find out exactly what her mom has been eating and drinking (and how much) over the past several days.  Taylor did tell me she's been taking a [place] Instant Breakfast with whole milk, but not sure how many she's doing a day.    I did confirm for her that we will be placing a feeding tube at the time of her surgery.  I did inform her as well that Dr. Montanez may keep her after the surgery to start enteral nutrition.    She asked about setting her mom up with cardiology at .  She stated they are having issues with contacting her cardiologist and concerned about how far out her apt with them is.    Not specializing in cardiology, I stated the timing of the apt may be due to when the holter " monitor data is back.  I suggested they call to inquire about a sooner apt and maybe ask about when the data would be back.  However, if need be we can see about connecting her with cardiology through .  But since they don't live close, I know her mom would prefer someone closer to them.    Taylor appreciated the call back and will follow up with her mom.  I encouraged her to keep me posted.  I did confirm for her that both hers and her brother's FMLA forms were completed and faxed to their companies.  She was appreciative.

## 2024-10-14 ENCOUNTER — HOSPITAL ENCOUNTER (INPATIENT)
Facility: HOSPITAL | Age: 59
LOS: 4 days | Discharge: HOME HEALTH | End: 2024-10-18
Attending: OTOLARYNGOLOGY | Admitting: OTOLARYNGOLOGY
Payer: MEDICAID

## 2024-10-14 ENCOUNTER — APPOINTMENT (OUTPATIENT)
Dept: RADIOLOGY | Facility: HOSPITAL | Age: 59
End: 2024-10-14
Payer: MEDICAID

## 2024-10-14 ENCOUNTER — ANESTHESIA (OUTPATIENT)
Dept: OPERATING ROOM | Facility: HOSPITAL | Age: 59
End: 2024-10-14
Payer: MEDICAID

## 2024-10-14 DIAGNOSIS — R13.12 OROPHARYNGEAL DYSPHAGIA: ICD-10-CM

## 2024-10-14 DIAGNOSIS — C01 CANCER OF BASE OF TONGUE (MULTI): Primary | ICD-10-CM

## 2024-10-14 DIAGNOSIS — K14.8 TONGUE MASS: ICD-10-CM

## 2024-10-14 LAB
ABO GROUP (TYPE) IN BLOOD: NORMAL
CREAT SERPL-MCNC: 0.76 MG/DL (ref 0.5–1.05)
EGFRCR SERPLBLD CKD-EPI 2021: 90 ML/MIN/1.73M*2
GLUCOSE BLD MANUAL STRIP-MCNC: 112 MG/DL (ref 74–99)
RH FACTOR (ANTIGEN D): NORMAL

## 2024-10-14 PROCEDURE — 2550000001 HC RX 255 CONTRASTS: Performed by: OTOLARYNGOLOGY

## 2024-10-14 PROCEDURE — 3700000001 HC GENERAL ANESTHESIA TIME - INITIAL BASE CHARGE: Performed by: OTOLARYNGOLOGY

## 2024-10-14 PROCEDURE — 88305 TISSUE EXAM BY PATHOLOGIST: CPT | Mod: TC,SUR | Performed by: OTOLARYNGOLOGY

## 2024-10-14 PROCEDURE — 71260 CT THORAX DX C+: CPT | Performed by: RADIOLOGY

## 2024-10-14 PROCEDURE — 71260 CT THORAX DX C+: CPT

## 2024-10-14 PROCEDURE — 0BJ08ZZ INSPECTION OF TRACHEOBRONCHIAL TREE, VIA NATURAL OR ARTIFICIAL OPENING ENDOSCOPIC: ICD-10-PCS | Performed by: OTOLARYNGOLOGY

## 2024-10-14 PROCEDURE — 88342 IMHCHEM/IMCYTCHM 1ST ANTB: CPT | Performed by: STUDENT IN AN ORGANIZED HEALTH CARE EDUCATION/TRAINING PROGRAM

## 2024-10-14 PROCEDURE — 88331 PATH CONSLTJ SURG 1 BLK 1SPC: CPT | Mod: TC,SUR | Performed by: PATHOLOGY

## 2024-10-14 PROCEDURE — A31535 PR LARYNGOSCOPY,DIRCT,OP,BIOPSY: Performed by: ANESTHESIOLOGIST ASSISTANT

## 2024-10-14 PROCEDURE — 2500000004 HC RX 250 GENERAL PHARMACY W/ HCPCS (ALT 636 FOR OP/ED)

## 2024-10-14 PROCEDURE — 36415 COLL VENOUS BLD VENIPUNCTURE: CPT

## 2024-10-14 PROCEDURE — 2500000004 HC RX 250 GENERAL PHARMACY W/ HCPCS (ALT 636 FOR OP/ED): Performed by: ANESTHESIOLOGIST ASSISTANT

## 2024-10-14 PROCEDURE — 43246 EGD PLACE GASTROSTOMY TUBE: CPT | Performed by: OTOLARYNGOLOGY

## 2024-10-14 PROCEDURE — 31535 LARYNGOSCOPY W/BIOPSY: CPT | Performed by: OTOLARYNGOLOGY

## 2024-10-14 PROCEDURE — 1200000003 HC ONCOLOGY  ROOM WITH TELEMETRY DAILY

## 2024-10-14 PROCEDURE — 36415 COLL VENOUS BLD VENIPUNCTURE: CPT | Performed by: STUDENT IN AN ORGANIZED HEALTH CARE EDUCATION/TRAINING PROGRAM

## 2024-10-14 PROCEDURE — 3600000009 HC OR TIME - EACH INCREMENTAL 1 MINUTE - PROCEDURE LEVEL FOUR: Performed by: OTOLARYNGOLOGY

## 2024-10-14 PROCEDURE — 7100000001 HC RECOVERY ROOM TIME - INITIAL BASE CHARGE: Performed by: OTOLARYNGOLOGY

## 2024-10-14 PROCEDURE — 82565 ASSAY OF CREATININE: CPT

## 2024-10-14 PROCEDURE — 2500000004 HC RX 250 GENERAL PHARMACY W/ HCPCS (ALT 636 FOR OP/ED): Performed by: OTOLARYNGOLOGY

## 2024-10-14 PROCEDURE — 2500000005 HC RX 250 GENERAL PHARMACY W/O HCPCS: Performed by: STUDENT IN AN ORGANIZED HEALTH CARE EDUCATION/TRAINING PROGRAM

## 2024-10-14 PROCEDURE — 82947 ASSAY GLUCOSE BLOOD QUANT: CPT

## 2024-10-14 PROCEDURE — 2500000001 HC RX 250 WO HCPCS SELF ADMINISTERED DRUGS (ALT 637 FOR MEDICARE OP)

## 2024-10-14 PROCEDURE — 2500000001 HC RX 250 WO HCPCS SELF ADMINISTERED DRUGS (ALT 637 FOR MEDICARE OP): Performed by: OTOLARYNGOLOGY

## 2024-10-14 PROCEDURE — 0CBM8ZX EXCISION OF PHARYNX, VIA NATURAL OR ARTIFICIAL OPENING ENDOSCOPIC, DIAGNOSTIC: ICD-10-PCS | Performed by: OTOLARYNGOLOGY

## 2024-10-14 PROCEDURE — 3600000004 HC OR TIME - INITIAL BASE CHARGE - PROCEDURE LEVEL FOUR: Performed by: OTOLARYNGOLOGY

## 2024-10-14 PROCEDURE — A31535 PR LARYNGOSCOPY,DIRCT,OP,BIOPSY: Performed by: STUDENT IN AN ORGANIZED HEALTH CARE EDUCATION/TRAINING PROGRAM

## 2024-10-14 PROCEDURE — 3700000002 HC GENERAL ANESTHESIA TIME - EACH INCREMENTAL 1 MINUTE: Performed by: OTOLARYNGOLOGY

## 2024-10-14 PROCEDURE — 88305 TISSUE EXAM BY PATHOLOGIST: CPT | Performed by: STUDENT IN AN ORGANIZED HEALTH CARE EDUCATION/TRAINING PROGRAM

## 2024-10-14 PROCEDURE — 2780000003 HC OR 278 NO HCPCS: Performed by: OTOLARYNGOLOGY

## 2024-10-14 PROCEDURE — 31622 DX BRONCHOSCOPE/WASH: CPT | Performed by: OTOLARYNGOLOGY

## 2024-10-14 PROCEDURE — 7100000002 HC RECOVERY ROOM TIME - EACH INCREMENTAL 1 MINUTE: Performed by: OTOLARYNGOLOGY

## 2024-10-14 PROCEDURE — 0DH63UZ INSERTION OF FEEDING DEVICE INTO STOMACH, PERCUTANEOUS APPROACH: ICD-10-PCS | Performed by: OTOLARYNGOLOGY

## 2024-10-14 RX ORDER — OXYCODONE HCL 5 MG/5 ML
5 SOLUTION, ORAL ORAL EVERY 4 HOURS PRN
Status: DISCONTINUED | OUTPATIENT
Start: 2024-10-14 | End: 2024-10-18 | Stop reason: HOSPADM

## 2024-10-14 RX ORDER — SENNOSIDES 8.6 MG/1
2 TABLET ORAL 2 TIMES DAILY
Status: DISCONTINUED | OUTPATIENT
Start: 2024-10-14 | End: 2024-10-18 | Stop reason: HOSPADM

## 2024-10-14 RX ORDER — DROPERIDOL 2.5 MG/ML
0.62 INJECTION, SOLUTION INTRAMUSCULAR; INTRAVENOUS ONCE AS NEEDED
Status: DISCONTINUED | OUTPATIENT
Start: 2024-10-14 | End: 2024-10-14 | Stop reason: HOSPADM

## 2024-10-14 RX ORDER — LIDOCAINE 560 MG/1
1 PATCH PERCUTANEOUS; TOPICAL; TRANSDERMAL EVERY 24 HOURS
Status: DISCONTINUED | OUTPATIENT
Start: 2024-10-14 | End: 2024-10-18 | Stop reason: HOSPADM

## 2024-10-14 RX ORDER — NALOXONE HYDROCHLORIDE 0.4 MG/ML
0.2 INJECTION, SOLUTION INTRAMUSCULAR; INTRAVENOUS; SUBCUTANEOUS EVERY 5 MIN PRN
Status: DISCONTINUED | OUTPATIENT
Start: 2024-10-14 | End: 2024-10-18 | Stop reason: HOSPADM

## 2024-10-14 RX ORDER — ACETAMINOPHEN 160 MG/5ML
1000 SOLUTION ORAL EVERY 8 HOURS SCHEDULED
Status: DISCONTINUED | OUTPATIENT
Start: 2024-10-14 | End: 2024-10-18 | Stop reason: HOSPADM

## 2024-10-14 RX ORDER — HYDROMORPHONE HYDROCHLORIDE 1 MG/ML
0.2 INJECTION, SOLUTION INTRAMUSCULAR; INTRAVENOUS; SUBCUTANEOUS EVERY 5 MIN PRN
Status: DISCONTINUED | OUTPATIENT
Start: 2024-10-14 | End: 2024-10-14 | Stop reason: HOSPADM

## 2024-10-14 RX ORDER — ALPRAZOLAM 0.5 MG/1
0.5 TABLET ORAL DAILY PRN
Status: DISCONTINUED | OUTPATIENT
Start: 2024-10-14 | End: 2024-10-15

## 2024-10-14 RX ORDER — PROPOFOL 10 MG/ML
INJECTION, EMULSION INTRAVENOUS AS NEEDED
Status: DISCONTINUED | OUTPATIENT
Start: 2024-10-14 | End: 2024-10-14

## 2024-10-14 RX ORDER — LIDOCAINE HYDROCHLORIDE 10 MG/ML
INJECTION, SOLUTION INFILTRATION; PERINEURAL AS NEEDED
Status: DISCONTINUED | OUTPATIENT
Start: 2024-10-14 | End: 2024-10-14 | Stop reason: HOSPADM

## 2024-10-14 RX ORDER — OXYCODONE HYDROCHLORIDE 5 MG/1
5 TABLET ORAL EVERY 4 HOURS PRN
Status: DISCONTINUED | OUTPATIENT
Start: 2024-10-14 | End: 2024-10-18 | Stop reason: HOSPADM

## 2024-10-14 RX ORDER — DOXEPIN HYDROCHLORIDE 10 MG/1
10 CAPSULE ORAL NIGHTLY
Status: DISCONTINUED | OUTPATIENT
Start: 2024-10-14 | End: 2024-10-18 | Stop reason: HOSPADM

## 2024-10-14 RX ORDER — MIDAZOLAM HYDROCHLORIDE 1 MG/ML
INJECTION INTRAMUSCULAR; INTRAVENOUS AS NEEDED
Status: DISCONTINUED | OUTPATIENT
Start: 2024-10-14 | End: 2024-10-14

## 2024-10-14 RX ORDER — ENOXAPARIN SODIUM 100 MG/ML
40 INJECTION SUBCUTANEOUS EVERY 24 HOURS
Status: DISCONTINUED | OUTPATIENT
Start: 2024-10-14 | End: 2024-10-18 | Stop reason: HOSPADM

## 2024-10-14 RX ORDER — CEFAZOLIN 1 G/1
INJECTION, POWDER, FOR SOLUTION INTRAVENOUS AS NEEDED
Status: DISCONTINUED | OUTPATIENT
Start: 2024-10-14 | End: 2024-10-14

## 2024-10-14 RX ORDER — POLYETHYLENE GLYCOL 3350 17 G/17G
17 POWDER, FOR SOLUTION ORAL DAILY
Status: DISCONTINUED | OUTPATIENT
Start: 2024-10-14 | End: 2024-10-15

## 2024-10-14 RX ORDER — ONDANSETRON HYDROCHLORIDE 2 MG/ML
INJECTION, SOLUTION INTRAVENOUS AS NEEDED
Status: DISCONTINUED | OUTPATIENT
Start: 2024-10-14 | End: 2024-10-14

## 2024-10-14 RX ORDER — NAPROXEN SODIUM 220 MG/1
81 TABLET, FILM COATED ORAL DAILY
Status: DISCONTINUED | OUTPATIENT
Start: 2024-10-14 | End: 2024-10-15

## 2024-10-14 RX ORDER — MEPERIDINE HYDROCHLORIDE 25 MG/ML
12.5 INJECTION INTRAMUSCULAR; INTRAVENOUS; SUBCUTANEOUS EVERY 10 MIN PRN
Status: DISCONTINUED | OUTPATIENT
Start: 2024-10-14 | End: 2024-10-14 | Stop reason: HOSPADM

## 2024-10-14 RX ORDER — SODIUM CHLORIDE, SODIUM LACTATE, POTASSIUM CHLORIDE, CALCIUM CHLORIDE 600; 310; 30; 20 MG/100ML; MG/100ML; MG/100ML; MG/100ML
100 INJECTION, SOLUTION INTRAVENOUS CONTINUOUS
Status: DISCONTINUED | OUTPATIENT
Start: 2024-10-14 | End: 2024-10-14 | Stop reason: HOSPADM

## 2024-10-14 RX ORDER — ROCURONIUM BROMIDE 10 MG/ML
INJECTION, SOLUTION INTRAVENOUS AS NEEDED
Status: DISCONTINUED | OUTPATIENT
Start: 2024-10-14 | End: 2024-10-14

## 2024-10-14 RX ORDER — HYDROMORPHONE HYDROCHLORIDE 1 MG/ML
0.2 INJECTION, SOLUTION INTRAMUSCULAR; INTRAVENOUS; SUBCUTANEOUS
Status: DISCONTINUED | OUTPATIENT
Start: 2024-10-14 | End: 2024-10-16

## 2024-10-14 RX ORDER — CYANOCOBALAMIN (VITAMIN B-12) 500 MCG
1 TABLET ORAL NIGHTLY PRN
Status: DISCONTINUED | OUTPATIENT
Start: 2024-10-14 | End: 2024-10-15

## 2024-10-14 RX ORDER — OXYCODONE HYDROCHLORIDE 5 MG/1
5 TABLET ORAL EVERY 4 HOURS PRN
Status: DISCONTINUED | OUTPATIENT
Start: 2024-10-14 | End: 2024-10-14 | Stop reason: HOSPADM

## 2024-10-14 RX ORDER — HYDROMORPHONE HYDROCHLORIDE 1 MG/ML
0.5 INJECTION, SOLUTION INTRAMUSCULAR; INTRAVENOUS; SUBCUTANEOUS EVERY 5 MIN PRN
Status: DISCONTINUED | OUTPATIENT
Start: 2024-10-14 | End: 2024-10-14 | Stop reason: HOSPADM

## 2024-10-14 RX ORDER — PHENYLEPHRINE HYDROCHLORIDE 10 MG/ML
INJECTION INTRAVENOUS AS NEEDED
Status: DISCONTINUED | OUTPATIENT
Start: 2024-10-14 | End: 2024-10-14

## 2024-10-14 RX ORDER — LIDOCAINE HYDROCHLORIDE 40 MG/ML
SOLUTION TOPICAL AS NEEDED
Status: DISCONTINUED | OUTPATIENT
Start: 2024-10-14 | End: 2024-10-14

## 2024-10-14 RX ORDER — ONDANSETRON 4 MG/1
4 TABLET, FILM COATED ORAL EVERY 8 HOURS PRN
Status: DISCONTINUED | OUTPATIENT
Start: 2024-10-14 | End: 2024-10-16

## 2024-10-14 RX ORDER — ALBUTEROL SULFATE 90 UG/1
2 INHALANT RESPIRATORY (INHALATION) EVERY 4 HOURS PRN
Status: DISCONTINUED | OUTPATIENT
Start: 2024-10-14 | End: 2024-10-18 | Stop reason: HOSPADM

## 2024-10-14 RX ORDER — SODIUM CHLORIDE 9 MG/ML
INJECTION, SOLUTION INTRAVENOUS CONTINUOUS PRN
Status: DISCONTINUED | OUTPATIENT
Start: 2024-10-14 | End: 2024-10-14

## 2024-10-14 RX ORDER — ACETAMINOPHEN 325 MG/1
975 TABLET ORAL EVERY 8 HOURS SCHEDULED
Status: DISCONTINUED | OUTPATIENT
Start: 2024-10-14 | End: 2024-10-18 | Stop reason: HOSPADM

## 2024-10-14 RX ORDER — ONDANSETRON HYDROCHLORIDE 2 MG/ML
4 INJECTION, SOLUTION INTRAVENOUS ONCE AS NEEDED
Status: DISCONTINUED | OUTPATIENT
Start: 2024-10-14 | End: 2024-10-14 | Stop reason: HOSPADM

## 2024-10-14 RX ORDER — ATORVASTATIN CALCIUM 40 MG/1
40 TABLET, FILM COATED ORAL DAILY
Status: DISCONTINUED | OUTPATIENT
Start: 2024-10-14 | End: 2024-10-15

## 2024-10-14 RX ORDER — LIDOCAINE HYDROCHLORIDE 10 MG/ML
0.1 INJECTION, SOLUTION INFILTRATION; PERINEURAL ONCE
Status: DISCONTINUED | OUTPATIENT
Start: 2024-10-14 | End: 2024-10-14 | Stop reason: HOSPADM

## 2024-10-14 RX ORDER — ONDANSETRON HYDROCHLORIDE 2 MG/ML
4 INJECTION, SOLUTION INTRAVENOUS EVERY 8 HOURS PRN
Status: DISCONTINUED | OUTPATIENT
Start: 2024-10-14 | End: 2024-10-16

## 2024-10-14 RX ORDER — SODIUM CHLORIDE, SODIUM LACTATE, POTASSIUM CHLORIDE, CALCIUM CHLORIDE 600; 310; 30; 20 MG/100ML; MG/100ML; MG/100ML; MG/100ML
75 INJECTION, SOLUTION INTRAVENOUS CONTINUOUS
Status: DISCONTINUED | OUTPATIENT
Start: 2024-10-14 | End: 2024-10-15

## 2024-10-14 RX ORDER — GLYCOPYRROLATE 0.2 MG/ML
INJECTION INTRAMUSCULAR; INTRAVENOUS AS NEEDED
Status: DISCONTINUED | OUTPATIENT
Start: 2024-10-14 | End: 2024-10-14

## 2024-10-14 RX ORDER — BUPROPION HYDROCHLORIDE 150 MG/1
150 TABLET, EXTENDED RELEASE ORAL DAILY
Status: DISCONTINUED | OUTPATIENT
Start: 2024-10-14 | End: 2024-10-18 | Stop reason: HOSPADM

## 2024-10-14 RX ORDER — FENTANYL CITRATE 50 UG/ML
INJECTION, SOLUTION INTRAMUSCULAR; INTRAVENOUS AS NEEDED
Status: DISCONTINUED | OUTPATIENT
Start: 2024-10-14 | End: 2024-10-14

## 2024-10-14 RX ORDER — MIDODRINE HYDROCHLORIDE 5 MG/1
5 TABLET ORAL
Status: DISCONTINUED | OUTPATIENT
Start: 2024-10-14 | End: 2024-10-18 | Stop reason: HOSPADM

## 2024-10-14 SDOH — ECONOMIC STABILITY: HOUSING INSECURITY: AT ANY TIME IN THE PAST 12 MONTHS, WERE YOU HOMELESS OR LIVING IN A SHELTER (INCLUDING NOW)?: NO

## 2024-10-14 SDOH — ECONOMIC STABILITY: FOOD INSECURITY: WITHIN THE PAST 12 MONTHS, YOU WORRIED THAT YOUR FOOD WOULD RUN OUT BEFORE YOU GOT THE MONEY TO BUY MORE.: NEVER TRUE

## 2024-10-14 SDOH — ECONOMIC STABILITY: HOUSING INSECURITY: IN THE PAST 12 MONTHS, HOW MANY TIMES HAVE YOU MOVED WHERE YOU WERE LIVING?: 1

## 2024-10-14 SDOH — SOCIAL STABILITY: SOCIAL INSECURITY
WITHIN THE LAST YEAR, HAVE YOU BEEN KICKED, HIT, SLAPPED, OR OTHERWISE PHYSICALLY HURT BY YOUR PARTNER OR EX-PARTNER?: NO

## 2024-10-14 SDOH — SOCIAL STABILITY: SOCIAL INSECURITY: WITHIN THE LAST YEAR, HAVE YOU BEEN HUMILIATED OR EMOTIONALLY ABUSED IN OTHER WAYS BY YOUR PARTNER OR EX-PARTNER?: NO

## 2024-10-14 SDOH — SOCIAL STABILITY: SOCIAL INSECURITY
WITHIN THE LAST YEAR, HAVE YOU BEEN RAPED OR FORCED TO HAVE ANY KIND OF SEXUAL ACTIVITY BY YOUR PARTNER OR EX-PARTNER?: NO

## 2024-10-14 SDOH — SOCIAL STABILITY: SOCIAL INSECURITY: HAVE YOU HAD ANY THOUGHTS OF HARMING ANYONE ELSE?: NO

## 2024-10-14 SDOH — SOCIAL STABILITY: SOCIAL INSECURITY: DO YOU FEEL UNSAFE GOING BACK TO THE PLACE WHERE YOU ARE LIVING?: NO

## 2024-10-14 SDOH — ECONOMIC STABILITY: HOUSING INSECURITY: IN THE LAST 12 MONTHS, WAS THERE A TIME WHEN YOU WERE NOT ABLE TO PAY THE MORTGAGE OR RENT ON TIME?: NO

## 2024-10-14 SDOH — SOCIAL STABILITY: SOCIAL INSECURITY: ARE THERE ANY APPARENT SIGNS OF INJURIES/BEHAVIORS THAT COULD BE RELATED TO ABUSE/NEGLECT?: NO

## 2024-10-14 SDOH — SOCIAL STABILITY: SOCIAL INSECURITY: WITHIN THE LAST YEAR, HAVE YOU BEEN AFRAID OF YOUR PARTNER OR EX-PARTNER?: NO

## 2024-10-14 SDOH — SOCIAL STABILITY: SOCIAL INSECURITY: DOES ANYONE TRY TO KEEP YOU FROM HAVING/CONTACTING OTHER FRIENDS OR DOING THINGS OUTSIDE YOUR HOME?: NO

## 2024-10-14 SDOH — SOCIAL STABILITY: SOCIAL INSECURITY: WERE YOU ABLE TO COMPLETE ALL THE BEHAVIORAL HEALTH SCREENINGS?: YES

## 2024-10-14 SDOH — ECONOMIC STABILITY: INCOME INSECURITY
IN THE PAST 12 MONTHS HAS THE ELECTRIC, GAS, OIL, OR WATER COMPANY THREATENED TO SHUT OFF SERVICES IN YOUR HOME?: PATIENT DECLINED

## 2024-10-14 SDOH — SOCIAL STABILITY: SOCIAL INSECURITY: HAVE YOU HAD THOUGHTS OF HARMING ANYONE ELSE?: NO

## 2024-10-14 SDOH — ECONOMIC STABILITY: TRANSPORTATION INSECURITY: IN THE PAST 12 MONTHS, HAS LACK OF TRANSPORTATION KEPT YOU FROM MEDICAL APPOINTMENTS OR FROM GETTING MEDICATIONS?: NO

## 2024-10-14 SDOH — ECONOMIC STABILITY: FOOD INSECURITY: WITHIN THE PAST 12 MONTHS, THE FOOD YOU BOUGHT JUST DIDN'T LAST AND YOU DIDN'T HAVE MONEY TO GET MORE.: NEVER TRUE

## 2024-10-14 SDOH — ECONOMIC STABILITY: FOOD INSECURITY: HOW HARD IS IT FOR YOU TO PAY FOR THE VERY BASICS LIKE FOOD, HOUSING, MEDICAL CARE, AND HEATING?: NOT HARD AT ALL

## 2024-10-14 SDOH — SOCIAL STABILITY: SOCIAL INSECURITY: HAS ANYONE EVER THREATENED TO HURT YOUR FAMILY OR YOUR PETS?: NO

## 2024-10-14 SDOH — SOCIAL STABILITY: SOCIAL INSECURITY: ARE YOU OR HAVE YOU BEEN THREATENED OR ABUSED PHYSICALLY, EMOTIONALLY, OR SEXUALLY BY ANYONE?: NO

## 2024-10-14 SDOH — SOCIAL STABILITY: SOCIAL INSECURITY: ABUSE: ADULT

## 2024-10-14 SDOH — SOCIAL STABILITY: SOCIAL INSECURITY: DO YOU FEEL ANYONE HAS EXPLOITED OR TAKEN ADVANTAGE OF YOU FINANCIALLY OR OF YOUR PERSONAL PROPERTY?: NO

## 2024-10-14 ASSESSMENT — PAIN - FUNCTIONAL ASSESSMENT
PAIN_FUNCTIONAL_ASSESSMENT: 0-10

## 2024-10-14 ASSESSMENT — LIFESTYLE VARIABLES
AUDIT-C TOTAL SCORE: 0
AUDIT-C TOTAL SCORE: 0
HOW MANY STANDARD DRINKS CONTAINING ALCOHOL DO YOU HAVE ON A TYPICAL DAY: PATIENT DOES NOT DRINK
SKIP TO QUESTIONS 9-10: 1
HOW OFTEN DO YOU HAVE A DRINK CONTAINING ALCOHOL: NEVER
HOW OFTEN DO YOU HAVE 6 OR MORE DRINKS ON ONE OCCASION: NEVER

## 2024-10-14 ASSESSMENT — PAIN SCALES - GENERAL
PAINLEVEL_OUTOF10: 0 - NO PAIN
PAINLEVEL_OUTOF10: 8
PAINLEVEL_OUTOF10: 6
PAINLEVEL_OUTOF10: 0 - NO PAIN
PAINLEVEL_OUTOF10: 8

## 2024-10-14 ASSESSMENT — ACTIVITIES OF DAILY LIVING (ADL)
FEEDING YOURSELF: NEEDS ASSISTANCE
HEARING - RIGHT EAR: FUNCTIONAL
ADEQUATE_TO_COMPLETE_ADL: YES
TOILETING: INDEPENDENT
LACK_OF_TRANSPORTATION: NO
DRESSING YOURSELF: INDEPENDENT
GROOMING: INDEPENDENT
HEARING - LEFT EAR: FUNCTIONAL
PATIENT'S MEMORY ADEQUATE TO SAFELY COMPLETE DAILY ACTIVITIES?: YES
BATHING: INDEPENDENT
JUDGMENT_ADEQUATE_SAFELY_COMPLETE_DAILY_ACTIVITIES: YES
WALKS IN HOME: INDEPENDENT

## 2024-10-14 ASSESSMENT — COGNITIVE AND FUNCTIONAL STATUS - GENERAL
DAILY ACTIVITIY SCORE: 24
PATIENT BASELINE BEDBOUND: NO
MOBILITY SCORE: 24

## 2024-10-14 ASSESSMENT — COLUMBIA-SUICIDE SEVERITY RATING SCALE - C-SSRS
2. HAVE YOU ACTUALLY HAD ANY THOUGHTS OF KILLING YOURSELF?: NO
1. IN THE PAST MONTH, HAVE YOU WISHED YOU WERE DEAD OR WISHED YOU COULD GO TO SLEEP AND NOT WAKE UP?: NO
6. HAVE YOU EVER DONE ANYTHING, STARTED TO DO ANYTHING, OR PREPARED TO DO ANYTHING TO END YOUR LIFE?: NO

## 2024-10-14 ASSESSMENT — PATIENT HEALTH QUESTIONNAIRE - PHQ9
SUM OF ALL RESPONSES TO PHQ9 QUESTIONS 1 & 2: 0
2. FEELING DOWN, DEPRESSED OR HOPELESS: NOT AT ALL
1. LITTLE INTEREST OR PLEASURE IN DOING THINGS: NOT AT ALL

## 2024-10-14 ASSESSMENT — PAIN DESCRIPTION - LOCATION: LOCATION: ABDOMEN

## 2024-10-14 NOTE — BRIEF OP NOTE
[facility] - Operative Report  Name: Amalia Roa   MRN: [id number]  Date of Procedure: 10/14/24  Location: [facility]    Attending Surgeon: Morro Montanez MD     Resident/Fellow: [place]    Preoperative Diagnosis:  Oropharyngeal cancer, dysphagia    Postoperative Diagnosis:  Same    Operative indications:  This patient was found to have a large lesion involving her pharynx and base of tongue.  She also has bilateral neck metastasis.  She is here today to undergo a panendoscopy, biopsy, and insertion of a feeding tube (PEG).  She understands the surgery and the possible complications and wishes to proceed.    Procedure:  Direct laryngoscopy and biopsy, flexible bronchoscopy, gastro esophagoscopy and insertion of a feeding tube (PEG)  Operative procedure:  With the patient under general anesthesia the Dedo laryngoscope was introduced.  Careful examination of the oral cavity, oropharynx, hypopharynx, and larynx is carried out.  There is a larger mass that is mainly submucosal involving the right oropharynx and base of tongue and hypopharynx.  Biopsies were obtained and sent for frozen section.  The laryngoscope was then suspended over the larynx.  The bronchoscope was inserted through the laryngoscope into the tracheobronchial tree.  All the different bronchial segments are visualized and are felt to be within normal limits.  The scopes are pulled.  The gastroscope was introduced all the way down to the stomach.  The stomach was inflated.  The abdomen was prepped and draped in usual sterile fashion.  The area to place the PEG was determined by manual pressure and transillumination.  A needle was inserted with negative pressure and no air was obtained until the needle was seen in the stomach.  The tract was injected with Xylocaine 1% to 100,000 epinephrine.  A small cutaneous incision was made.  The trocar and catheter were inserted.  The catheter was retrieved in the scopic lead with a  snare.  The guidewire was then inserted retrieved under sterile and pull into the oral cavity.  The PEG tube was attached to the guidewire and pulled into its proper position on the abdominal wall where it was secured with the provided bumper.  Light dressing with antibiotic ointment was applied around the PEG site and the tube was secured to the abdomen.  The gastroscope was then utilized to examine the entirety of the stomach from the gastroesophageal junction to the pylorus and was found to be negative.  The PEG tube is benign in appearance.  The entire length of the esophagus is visualized on the way out and found to be negative.  The frozen section came back as squamous cell carcinoma.  It was not felt anything further needed to be done.  The procedure was terminated and the patient was sent back to recovery in fair condition this is dictated on 10/14/2024.  I was present for the entire procedure    Morro Montanez MD

## 2024-10-14 NOTE — OP NOTE
[facility] - Operative Report  Name: Amalia Roa   MRN: [id number]  Date of Procedure: 10/14/24  Location: [facility]    Attending Surgeon: Morro Montanez MD     Resident/Fellow: Maximo Joiner    Preoperative Diagnosis:  Oropharyngeal cancer, dysphagia    Postoperative Diagnosis:  Same   Operative indications:  This patient was found to have a large oropharyngeal lesion with bilateral neck disease.  She is here today to undergo panendoscopy, biopsy, and insertion of a PEG tube    Procedure:  Direct laryngoscopy and biopsy, flexible bronchoscopy, flexible gastro esophagoscopy and insertion of a feeding tube (PEG)  Operative procedure:  With the patient under general anesthesia the Dedo laryngoscope was introduced.  Careful examination of the oral cavity, oropharynx, hypopharynx, and larynx is carried out.  A larger lesion which is mainly submucosal was found to involve the base of tongue crossing midline and extending superiorly into the oropharynx to the level of the soft palate and possibly higher.  It extends inferiorly in the piriform sinus.  Biopsies were obtained.  This was sent for frozen section.  The laryngoscope was then suspended over the larynx.  The bronchoscope was inserted through the laryngoscope into the tracheobronchial tree.  All the different bronchial segments are visualized and are felt to be within normal limits.  The scopes are pulled.  The gastroscope was introduced all the way down to the stomach.  The stomach was inflated.  The abdomen was prepped and draped in usual sterile fashion.  The area to place the PEG was determined by manual pressure and transillumination.  A needle was inserted with negative pressure and no air was obtained until the needle was seen in the stomach.  The tract was injected with Xylocaine 1% to 100,000 epinephrine.  A small cutaneous incision was made.  The trocar and catheter were inserted.  The catheter was retrieved endoscopically with a  snare.  The guidewire was then inserted retrieved with a snare and pulled into the oral cavity.  The PEG tube was attached to guidewire and pulled to its proper position on the abdominal wall where was secured with the provided bumper.  Light dressing with antibiotic ointment was applied around the PEG site and the tube was secured to the abdomen.  The gastroscope was then utilized to examine the entirety of the stomach from the gastroesophageal junction to the pylorus and was found to be negative.  The entire length of the esophagus is visualized on the way up to is also negative.  The frozen section was consistent with squamous cell carcinoma.  The procedure was terminated and the patient was sent back to recovery in fair condition.  This is dictated on 10/14/2024.  I was present for the entire procedure    Morro Montanez MD

## 2024-10-14 NOTE — PROGRESS NOTES
Pharmacy Medication History Review    Amalia Roa is a 59 y.o. female admitted for Cancer of base of tongue (Multi). Pharmacy reviewed the patient's [id number] medications and allergies for accuracy.    Medications ADDED:  N/A  Medications CHANGED:  Xanax 0.5 mg tab  Tylenol 325 mg tab  Flovent inhaler  Melatonin 10 mg tab  Medications REMOVED:   Wellbutrin  mg tab     The list below reflects the updated PTA list.   Prior to Admission Medications   Prescriptions Last Dose Informant   ALPRAZolam (Xanax) 0.5 mg tablet 10/14/2024 Self   Sig: Take 1 tablet (0.5 mg) by mouth 2 times a day as needed for anxiety.   acetaminophen (Tylenol) 325 mg tablet 10/13/2024 Self   Sig: Take 1 tablet (325 mg) by mouth every 8 hours if needed.   albuterol 90 mcg/actuation inhaler Not Taking Self   Sig: Inhale 2 puffs every 4 hours if needed for wheezing.   aspirin 81 mg chewable tablet 10/13/2024 Self   Sig: Chew 1 tablet (81 mg) once daily.   atorvastatin (Lipitor) 40 mg tablet More than a month Self   Sig: Take 1 tablet (40 mg) by mouth once daily.   doxepin (SINEquan) 10 mg capsule 10/13/2024 Self   Sig: Take 1 capsule (10 mg) by mouth once daily at bedtime.   fluticasone (Flovent) 44 mcg/actuation inhaler Past Week Self   Sig: Inhale 2 puffs 2 times a day.   melatonin 10 mg tablet,chewable Not Taking Self   Sig: Chew 1 tablet once daily at bedtime.   midodrine (Proamatine) 5 mg tablet 10/14/2024 Self   Sig: TAKE ONE-HALF TABLET BY MOUTH THREE TIMES A DAY. CHECK BLOOD PRESSURE AT HOME THREE TIMES A DAY. NOTIFY PCP IF BLOOD PRESSURE IS OVER 160 OR      Facility-Administered Medications: None          The list below reflects the updated allergy list. Please review each documented allergy for additional clarification and justification.  Allergies  Reviewed by Gerson Angeles on 10/14/2024        Severity Reactions Comments    Cyclobenzaprine High Anaphylaxis             Patient accepts M2B at discharge.     Sources:  "  Medication dispense history  Patient interviewed at bedside (patient was a good historian)  OARRS    Additional Comments:  N/A      MARIAM REED  Pharmacy Technician  10/14/24     Secure Chat preferred   If no response call [phone] or [employer] \"Med Rec\"   "

## 2024-10-14 NOTE — ANESTHESIA PROCEDURE NOTES
Airway  Date/Time: 10/14/2024 12:30 PM  Urgency: elective    Difficult airway    Staffing  Performed: JACOB and attending   Authorized by: Edu Vila MD    Performed by: DALIA Larios  Patient location during procedure: OR    Indications and Patient Condition  Indications for airway management: anesthesia  Spontaneous Ventilation: absent  Sedation level: deep  Preoxygenated: yes  Patient position: sniffing  MILS not maintained throughout  Mask difficulty assessment: 1 - vent by mask  Planned trial extubation    Final Airway Details  Final airway type: endotracheal airway      Successful airway: ETT  Cuffed: yes   Successful intubation technique: video laryngoscopy  Facilitating devices/methods: cricoid pressure and intubating stylet  Endotracheal tube insertion site: oral  Blade: Nolberto  Blade size: #3  ETT size (mm): 6.0  Cormack-Lehane Classification: grade IIa - partial view of glottis  Placement verified by: chest auscultation and capnometry   Measured from: lips  ETT to lips (cm): 23  Number of attempts at approach: 2  Ventilation between attempts: BVM  Number of other approaches attempted: 0    Additional Comments  Difficult airway anatomy. 1 failed attempt by JACOB. Second attempt by ENT surgeon. Cuff on ETT was ripped by pt. Teeth. Third attempt successful. Glidescope used. Pt. Had multiple broken and decayed teeth.

## 2024-10-14 NOTE — ANESTHESIA POSTPROCEDURE EVALUATION
Patient: Amalia Roa    Procedure Summary       Date: 10/14/24 Room / Location: [facility] OR 04 / Virtual [facility] OR    Anesthesia Start: 1225 Anesthesia Stop: 1336    Procedures:       Panendoscopy      Bronchoscopy      Esophagoscopy      Esophagogastroduodenoscopy with Insertion PEG Diagnosis:       Oropharyngeal dysphagia      Tongue mass      (Oropharyngeal dysphagia [R13.12])      (Tongue mass [K14.8])    Surgeons: Morro Montanez MD Responsible Provider: Edu Vila MD    Anesthesia Type: general ASA Status: 3            Anesthesia Type: general    Vitals Value Taken Time   /72 10/14/24 1430   Temp 36.4 °C (97.5 °F) 10/14/24 1430   Pulse 68 10/14/24 1431   Resp 13 10/14/24 1431   SpO2 98 % 10/14/24 1431   Vitals shown include unfiled device data.    Anesthesia Post Evaluation    Patient location during evaluation: PACU  Patient participation: complete - patient participated  Level of consciousness: awake  Pain management: adequate  Airway patency: patent  Cardiovascular status: acceptable  Respiratory status: acceptable  Hydration status: acceptable  Postoperative Nausea and Vomiting: none        No notable events documented.

## 2024-10-14 NOTE — CARE PLAN
The patient's goals for the shift include      The clinical goals for the shift include to have adequate pain management    Over the shift, the patient did not make progress toward the following goals. Barriers to progression include surgical pain. Recommendations to address these barriers include utilize pain medication and repositioning.

## 2024-10-14 NOTE — INTERVAL H&P NOTE
H&P reviewed. The patient was examined and there are no changes to the H&P except the following additions:    Pulm: breathing comfortably on room air, no stridor or stertor  CV: clinically well perfused, pulses RRR
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
## 2024-10-14 NOTE — CONSULTS
"Nutrition Initial Assessment:   Nutrition Assessment    The patient is a 59 y.o. female who is hospital day #0.  Pt w/ large oropharyngeal lesion w/ bilateral neck disease. Is s/p bx and PEG tube placement.     Reason for Assessment: Admission nursing screening  Malnutrition Screening Tool (MST) Score: 4  Have you recently lost weight without trying?: Yes  If yes, how much weight have you lost?: Lost 34 pounds or more  Have you been eating poorly because of a decreased appetite?: No  Home Tube Feeding or Total Parenteral Nutrition (TPN): Yes (Comment) (new peg tube placement)    Nutrition History:  Food and Nutrient History: Pt reports she has had constant wt loss in the past year with increase in wt loss rate the past couple of months. Harder to take food via PO given throat pain. States she would have good and bad days. On the good days able to eat 3 times a day with snacks. Ex. if eating pizza could do a couple of slices + breadsticks. If it was a bad day only doing some bites of food at a time.  States food that went down well is food \"you would not expect\" like the pizza and breadcrums and chicken in a biskit crackers. Things like applesauce or soups were harder. Also avoiding ground beef or spaguetti noodles.  Recently starting drinking CIB w/ whole milk - doing 1-2 a day. Doing 2 if it was a day where she was not able to eat as much. No N/V/D/C nor food allergies. PEG tube placed today. Thinks TF will be supplemental to PO intake.  Vitamin/Herbal Supplement Use: none per pt      Anthropometrics:  Start of admission anthropometrics:  Height: 160 cm (5' 3\")  Weight: 73.3 kg (161 lb 8 oz)  BMI (Calculated): 28.62    IBW/kg (Dietitian Calculated): 52.27 kg  Percent of IBW: 140 %       Wt Hx   10/14/24 73.3 kg (161 lb 8 oz)   10/03/24 74.4 kg (164 lb)   09/20/24 75.3 kg (166 lb)   05/17/24 83 kg    11/17/23 89.4 kg (197 lb 3.2 oz)     Weight Change %:  Weight History / % Weight Change: Only two measured weights in " EMR this past year - show a 18% wt loss x11 months (significant). Per pt, constant wt loss over the past year d/t issues with PO intake and throat pain. Used to be 228# and thinks she is now 162-165# - 26% wt loss (significant). Thinks wt loss rate has increased in the last couple of months given increased difficulty with eating.  Significant Weight Loss: Yes    Nutrition Focused Physical Exam Findings:    Subcutaneous Fat Loss:   Orbital Fat Pads: Well nourished (slightly bulging fat pads)  Buccal Fat Pads: Mild-Moderate (flat cheeks, minimal bounce)  Triceps: Mild-Moderate (less than ample fat tissue)  Muscle Wasting:  Temporalis: Mild-Moderate (slight depression)  Pectoralis (Clavicular Region): Well nourished (clavicle not visible)  Deltoid/Trapezius: Well nourished (rounded appearance at arm, shoulder, neck)  Quadriceps: Defer  Gastrocnemius: Defer  Edema:  Edema: none  Physical Findings:  Hair: Negative  Eyes: Negative  Mouth: Negative  Nails: Negative  Skin: Negative    Objective Data:         Nutrition Significant Labs:          Nutrition Specific Medications:  atorvastatin, 40 mg, oral, Daily  polyethylene glycol, 17 g, oral, Daily  sennosides, 2 tablet, g-tube, BID  lactated Ringer's, 75 mL/hr        I/O:   No intake/output data recorded.    Dietary Orders (From admission, onward)       Start     Ordered    10/14/24 1400  NPO Diet; Effective now  Diet effective now         10/14/24 1359                     Estimated Needs:   Total Energy Estimated Needs (kCal): 1825 kCal  Method for Estimating Needs: 25 kcal/kg * 73 kg    Total Protein Estimated Needs (g): 87.96 g  Method for Estimating Needs: 1.2 g/kg * 73 kg    Method for Estimating Needs: 1 ml/kcal or per team          Nutrition Diagnosis   Malnutrition Diagnosis  Patient has Malnutrition Diagnosis: Yes  Diagnosis Status: New  Malnutrition Diagnosis: Moderate malnutrition related to chronic disease or condition  As Evidenced by: significant wt loss of  18% x11 months; PO intake likely meeting <75% of nutr needs for >/= 1 month            Nutrition Interventions/Recommendations   Individualized Nutrition Prescription Provided for : 1850 kcal and 90g protein via TF and oral diet     - Check and replete electrolytes as needed prior to TF initiation.    - Recommend the following TF regimen:  -- Start Isosource 1.5 @20 ml/hr and increase by 10 ml/hr q12h until goal rate of 40 ml/hr  -- Check RFP + Mg BID - if lytes drop then start and complete repletion prior to increasing TF rate  -- FWF: 180 ml x4/d or per team   -- This regimen provides: 960 ml, 1440 kcal, 65g protein, 1453 ml free water (733 ml from TF)    - Once pt is at continuous goal rate for at least 24 hrs with adequate tolerance and stable lytes can transition to bolus feeds:   -- Isosource 1.5 bolus @330 ml x3/d (4 cartons/d)   -- FWF: 60 ml pre/post feeds + 180 ml BID   -- This regimen provides: 1000 ml, 1500 kcal, 68g protein 1484 ml free water (764 ml from TF)    --> Note, plan is for TF to meet ~75% of nutrient needs as pt reports she will continue to be on a PO diet.     Nutrition Education: Discussed basics of EN       Nutrition Monitoring and Evaluation   Monitoring and Evaluation Plan: Enteral and parenteral nutrition intake, Energy intake  Energy Intake: Estimated energy intake  Criteria: at least 25% of nutr needs  Enteral and Parenteral Nutrition Intake: Enteral nutrition formula/solution, Enteral nutrition intake  Criteria: Recommended regimen    Monitoring and Evaluation Plan: Weight  Weight: Weight change  Criteria: no wt change                   Time Spent (min): 60 minutes

## 2024-10-15 ENCOUNTER — APPOINTMENT (OUTPATIENT)
Dept: RADIOLOGY | Facility: HOSPITAL | Age: 59
End: 2024-10-15
Payer: MEDICAID

## 2024-10-15 LAB
ALBUMIN SERPL BCP-MCNC: 3.7 G/DL (ref 3.4–5)
ANION GAP SERPL CALC-SCNC: 15 MMOL/L (ref 10–20)
BUN SERPL-MCNC: 16 MG/DL (ref 6–23)
CALCIUM SERPL-MCNC: 9 MG/DL (ref 8.6–10.6)
CHLORIDE SERPL-SCNC: 103 MMOL/L (ref 98–107)
CO2 SERPL-SCNC: 28 MMOL/L (ref 21–32)
CREAT SERPL-MCNC: 0.85 MG/DL (ref 0.5–1.05)
EGFRCR SERPLBLD CKD-EPI 2021: 79 ML/MIN/1.73M*2
ERYTHROCYTE [DISTWIDTH] IN BLOOD BY AUTOMATED COUNT: 12.7 % (ref 11.5–14.5)
GLUCOSE SERPL-MCNC: 119 MG/DL (ref 74–99)
HCT VFR BLD AUTO: 41.1 % (ref 36–46)
HGB BLD-MCNC: 12.9 G/DL (ref 12–16)
MAGNESIUM SERPL-MCNC: 2.02 MG/DL (ref 1.6–2.4)
MCH RBC QN AUTO: 29.7 PG (ref 26–34)
MCHC RBC AUTO-ENTMCNC: 31.4 G/DL (ref 32–36)
MCV RBC AUTO: 95 FL (ref 80–100)
NRBC BLD-RTO: 0 /100 WBCS (ref 0–0)
PHOSPHATE SERPL-MCNC: 4.3 MG/DL (ref 2.5–4.9)
PLATELET # BLD AUTO: 232 X10*3/UL (ref 150–450)
POTASSIUM SERPL-SCNC: 4.6 MMOL/L (ref 3.5–5.3)
RBC # BLD AUTO: 4.34 X10*6/UL (ref 4–5.2)
SODIUM SERPL-SCNC: 141 MMOL/L (ref 136–145)
WBC # BLD AUTO: 9.7 X10*3/UL (ref 4.4–11.3)

## 2024-10-15 PROCEDURE — 2500000001 HC RX 250 WO HCPCS SELF ADMINISTERED DRUGS (ALT 637 FOR MEDICARE OP): Performed by: NURSE PRACTITIONER

## 2024-10-15 PROCEDURE — 99232 SBSQ HOSP IP/OBS MODERATE 35: CPT | Performed by: NURSE PRACTITIONER

## 2024-10-15 PROCEDURE — 99223 1ST HOSP IP/OBS HIGH 75: CPT

## 2024-10-15 PROCEDURE — 74230 X-RAY XM SWLNG FUNCJ C+: CPT | Performed by: RADIOLOGY

## 2024-10-15 PROCEDURE — 92610 EVALUATE SWALLOWING FUNCTION: CPT | Mod: GN | Performed by: SPEECH-LANGUAGE PATHOLOGIST

## 2024-10-15 PROCEDURE — 2500000001 HC RX 250 WO HCPCS SELF ADMINISTERED DRUGS (ALT 637 FOR MEDICARE OP)

## 2024-10-15 PROCEDURE — 1200000003 HC ONCOLOGY  ROOM WITH TELEMETRY DAILY

## 2024-10-15 PROCEDURE — 2500000005 HC RX 250 GENERAL PHARMACY W/O HCPCS: Performed by: STUDENT IN AN ORGANIZED HEALTH CARE EDUCATION/TRAINING PROGRAM

## 2024-10-15 PROCEDURE — 92526 ORAL FUNCTION THERAPY: CPT | Mod: GN | Performed by: SPEECH-LANGUAGE PATHOLOGIST

## 2024-10-15 PROCEDURE — 2500000004 HC RX 250 GENERAL PHARMACY W/ HCPCS (ALT 636 FOR OP/ED)

## 2024-10-15 PROCEDURE — 80069 RENAL FUNCTION PANEL: CPT

## 2024-10-15 PROCEDURE — 70355 PANORAMIC X-RAY OF JAWS: CPT | Performed by: RADIOLOGY

## 2024-10-15 PROCEDURE — 83735 ASSAY OF MAGNESIUM: CPT

## 2024-10-15 PROCEDURE — 70355 PANORAMIC X-RAY OF JAWS: CPT

## 2024-10-15 PROCEDURE — 74230 X-RAY XM SWLNG FUNCJ C+: CPT

## 2024-10-15 PROCEDURE — 92611 MOTION FLUOROSCOPY/SWALLOW: CPT | Mod: GN | Performed by: SPEECH-LANGUAGE PATHOLOGIST

## 2024-10-15 PROCEDURE — 3E0G76Z INTRODUCTION OF NUTRITIONAL SUBSTANCE INTO UPPER GI, VIA NATURAL OR ARTIFICIAL OPENING: ICD-10-PCS | Performed by: NURSE PRACTITIONER

## 2024-10-15 PROCEDURE — 36415 COLL VENOUS BLD VENIPUNCTURE: CPT

## 2024-10-15 PROCEDURE — 85027 COMPLETE CBC AUTOMATED: CPT

## 2024-10-15 PROCEDURE — 99222 1ST HOSP IP/OBS MODERATE 55: CPT | Performed by: OTOLARYNGOLOGY

## 2024-10-15 PROCEDURE — 2500000005 HC RX 250 GENERAL PHARMACY W/O HCPCS: Performed by: OTOLARYNGOLOGY

## 2024-10-15 RX ORDER — CYANOCOBALAMIN (VITAMIN B-12) 500 MCG
1 TABLET ORAL NIGHTLY PRN
Status: DISCONTINUED | OUTPATIENT
Start: 2024-10-15 | End: 2024-10-18 | Stop reason: HOSPADM

## 2024-10-15 RX ORDER — POLYETHYLENE GLYCOL 3350 17 G/17G
17 POWDER, FOR SOLUTION ORAL DAILY
Status: DISCONTINUED | OUTPATIENT
Start: 2024-10-15 | End: 2024-10-16

## 2024-10-15 RX ORDER — ATORVASTATIN CALCIUM 40 MG/1
40 TABLET, FILM COATED ORAL DAILY
Status: DISCONTINUED | OUTPATIENT
Start: 2024-10-15 | End: 2024-10-18 | Stop reason: HOSPADM

## 2024-10-15 RX ORDER — ALPRAZOLAM 0.5 MG/1
0.5 TABLET ORAL DAILY PRN
Status: DISCONTINUED | OUTPATIENT
Start: 2024-10-15 | End: 2024-10-18 | Stop reason: HOSPADM

## 2024-10-15 RX ORDER — NAPROXEN SODIUM 220 MG/1
81 TABLET, FILM COATED ORAL DAILY
Status: DISCONTINUED | OUTPATIENT
Start: 2024-10-15 | End: 2024-10-18 | Stop reason: HOSPADM

## 2024-10-15 ASSESSMENT — COGNITIVE AND FUNCTIONAL STATUS - GENERAL
DAILY ACTIVITIY SCORE: 24
MOBILITY SCORE: 24

## 2024-10-15 ASSESSMENT — PAIN SCALES - GENERAL
PAINLEVEL_OUTOF10: 8
PAINLEVEL_OUTOF10: 4
PAINLEVEL_OUTOF10: 7
PAINLEVEL_OUTOF10: 6
PAINLEVEL_OUTOF10: 5 - MODERATE PAIN
PAINLEVEL_OUTOF10: 3
PAINLEVEL_OUTOF10: 7

## 2024-10-15 ASSESSMENT — PAIN - FUNCTIONAL ASSESSMENT
PAIN_FUNCTIONAL_ASSESSMENT: 0-10

## 2024-10-15 ASSESSMENT — PAIN DESCRIPTION - LOCATION: LOCATION: ABDOMEN

## 2024-10-15 ASSESSMENT — PAIN DESCRIPTION - ORIENTATION: ORIENTATION: ANTERIOR;LOWER
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
## 2024-10-15 NOTE — CONSULTS
Inpatient consult to Medicine  Consult performed by: Jensen Crane MD  Consult ordered by: Morro Montanez MD        Reason For Consult  Co-management    History Of Present Illness  Amalia Roa is a 59 y.o. female 60 y/o female s/p panendoscopy, biopsy and G-tube insertion on 10/14/24 with Dr. Montanez secondary to dysphagia in the setting of large tongue mass.  PMHX includes Depression, anxiety, PTSD, panic attacks, HTN, HFrEF (EF 43% 9/2024), HLD, CAD with MI in 2014 s/p stenting to LAD and LCx, mitral regurgitation s/p repair in 2015 with residual mild MVR, Asthma, MEÑO on CPAP.     Patient doing well this morning. Denies chest pain, palpitations, syncope, dizziness, SOB, nausea, vomiting. States she has had no episodes or orthostasis since being admitted. Tube feeds started today. Patient mentions she was previously on lisinopril and metoprolol but the medications were stopped due to episodes of fainting prior to discovery of tongue mass. Medications were thought to be contributing to patients low blood pressure so they were discontinued and she was started on midodrine. Pressures have been stable without midodrine since hospitalized. Patients syncopal episodes likely due to poor oral intake in setting of odynophagia from tongue mass. Patient also endorsing history of 70lb weight loss in last 6 months - 1 year due to odynophagia     Past Medical History  She has a past medical history of Asthma, CHF (congestive heart failure), Coronary artery disease, Depression, Dysphagia, Heart valve disease, Hyperlipidemia, Hypertension, Myocardial infarction (Multi), PTSD (post-traumatic stress disorder), and Sleep apnea.    She has no past medical history of Cerebral vascular accident (Multi), Chronic kidney disease, Disease of thyroid gland, GERD (gastroesophageal reflux disease), PONV (postoperative nausea and vomiting), Seizure disorder (Multi), TIA (transient ischemic attack), or Type 2 diabetes  mellitus.    Surgical History  She has no past surgical history on file.     Social History  She reports that she has quit smoking. Her smoking use included cigarettes. She started smoking about 10 years ago. She has a 10.8 pack-year smoking history. She has never used smokeless tobacco. She reports that she does not drink alcohol and does not use drugs.    Family History  No family history on file.     Allergies  Cyclobenzaprine       Physical Exam  Constitutional:       Appearance: Normal appearance.   Cardiovascular:      Rate and Rhythm: Normal rate and regular rhythm.      Pulses: Normal pulses.      Heart sounds: Normal heart sounds.   Pulmonary:      Effort: Pulmonary effort is normal.      Breath sounds: Normal breath sounds.   Abdominal:      General: Abdomen is flat.      Palpations: Abdomen is soft.   Skin:     General: Skin is warm and dry.   Neurological:      General: No focal deficit present.      Mental Status: She is alert and oriented to person, place, and time.   Psychiatric:         Mood and Affect: Mood normal.         Behavior: Behavior normal.        Last Recorded Vitals  /84 (Patient Position: Sitting)   Pulse 69   Temp 36.6 °C (97.9 °F)   Resp 18   Wt 73.3 kg (161 lb 8 oz)   SpO2 98%     Relevant Results       Assessment/Plan     Amalia Roa is a 59 y.o. female 58 y/o female s/p panendoscopy, biopsy and G-tube insertion on 10/14/24 with Dr. Montanez secondary to dysphagia in the setting of large tongue mass.  PMHX includes Depression, anxiety, PTSD, panic attacks, HTN, HFrEF (EF 43% 9/2024), HLD, CAD with MI in 2014 s/p stenting to LAD and LCx, mitral regurgitation s/p repair in 2015 with residual mild MVR, Asthma, MEÑO on CPAP. Medicine consulted for co-management.    Recs:  -Restart home wellbutrin  -Restart metoprolol 12.5mg BID, monitor BP after initiation   -Continue holding midodrine in setting of normal blood pressures  -RFP/Mag BID, replete lytes as needed     Medicine  will continue to follow     *Recommendations not final until signed by attending*    Jensen Crane MD  PGY-1
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
## 2024-10-15 NOTE — CONSULTS
OTOLARYNGOLOGY - HEAD AND NECK SURGERY ROUNDING NOTE    Subjective:  Doing well. No changes from morning rounds. Passed MBS.    Objective:  Visit Vitals  /84 (Patient Position: Sitting)   Pulse 69   Temp 36.6 °C (97.9 °F)   Resp 18     Gen: NAD, AOx3, resting comfortably in bed  Eyes: EOMI, sclera clear, PERRL  Ears: Normal external ears bilaterally  Nose: No rhinorrhea; anterior nares clear  Oral Cavity: MMM  Head: normocephalic, atraumatic  Neck: Skin free of scars  Resp: Non-labored breathing  on julio gail  Cards: No clubbing/cyanosis/edema in hands  Gastro: Soft, non-distended,   : Voids  MSK: Moves all extremities  Psych: Appropriate mood and affect  Drains: All drains in place and holding suction with serosanguinous drainage (stripped)      Assessment/Plan:  59 year old female with a lesion involving her tongue status post direct laryngoscopy and biopsy, bronchoscopy, and esophagoscopy and insertion of a feeding tube on 10/14/2024 by Dr. Montanez.     - Continue current management    ENT [id number]

## 2024-10-15 NOTE — PROGRESS NOTES
"  Ear Nose & Throat Progress Note        Amalia Roa is a 59 y.o. female on day 1 of admission presenting with Cancer of base of tongue (Multi).     Subjective   No acute events overnight. Patient doing well.       Objective   Physical Exam  Vitals reviewed in EMR  Gen: NAD, AOx3, resting comfortably in bed  Eyes: EOMI, sclera clear, PERRL  Ears: Normal external ears bilaterally  Nose: No rhinorrhea; anterior nares clear  Oral Cavity: MMM  Head: normocephalic, atraumatic  Neck: Skin free of scars  Resp: Non-labored breathing  on julio gail  Cards: No clubbing/cyanosis/edema in hands  Gastro: Soft, non-distended,   : Voids  MSK: Moves all extremities  Psych: Appropriate mood and affect    Last Recorded Vitals  Blood pressure 145/84, pulse 69, temperature 36.6 °C (97.9 °F), resp. rate 18, height 1.6 m (5' 3\"), weight 73.3 kg (161 lb 8 oz), SpO2 98%.  Intake/Output last 3 Shifts:  I/O last 3 completed shifts:  In: 160 (2.2 mL/kg) [I.V.:100 (1.4 mL/kg); NG/GT:60]  Out: 150 (2 mL/kg) [Urine:150 (0.1 mL/kg/hr)]  Weight: 73.3 kg     Relevant Results  Results for orders placed or performed during the hospital encounter of 10/14/24 (from the past 24 hour(s))   Creatinine, Serum   Result Value Ref Range    Creatinine 0.76 0.50 - 1.05 mg/dL    eGFR 90 >60 mL/min/1.73m*2   POCT GLUCOSE   Result Value Ref Range    POCT Glucose 112 (H) 74 - 99 mg/dL   Renal Function Panel   Result Value Ref Range    Glucose 119 (H) 74 - 99 mg/dL    Sodium 141 136 - 145 mmol/L    Potassium 4.6 3.5 - 5.3 mmol/L    Chloride 103 98 - 107 mmol/L    Bicarbonate 28 21 - 32 mmol/L    Anion Gap 15 10 - 20 mmol/L    Urea Nitrogen 16 6 - 23 mg/dL    Creatinine 0.85 0.50 - 1.05 mg/dL    eGFR 79 >60 mL/min/1.73m*2    Calcium 9.0 8.6 - 10.6 mg/dL    Phosphorus 4.3 2.5 - 4.9 mg/dL    Albumin 3.7 3.4 - 5.0 g/dL   CBC   Result Value Ref Range    WBC 9.7 4.4 - 11.3 x10*3/uL    nRBC 0.0 0.0 - 0.0 /100 WBCs    RBC 4.34 4.00 - 5.20 x10*6/uL    Hemoglobin 12.9 12.0 " - 16.0 g/dL    Hematocrit 41.1 36.0 - 46.0 %    MCV 95 80 - 100 fL    MCH 29.7 26.0 - 34.0 pg    MCHC 31.4 (L) 32.0 - 36.0 g/dL    RDW 12.7 11.5 - 14.5 %    Platelets 232 150 - 450 x10*3/uL   Magnesium   Result Value Ref Range    Magnesium 2.02 1.60 - 2.40 mg/dL      No results found.      Scheduled medications  acetaminophen, 975 mg, oral, q8h JESSE   Or  acetaminophen, 1,000 mg, oral, q8h JESSE   Or  acetaminophen, 1,000 mg, g-tube, q8h JESSE  aspirin, 81 mg, g-tube, Daily  atorvastatin, 40 mg, g-tube, Daily  [Held by provider] buPROPion SR, 150 mg, oral, Daily  doxepin, 10 mg, g-tube, Nightly  enoxaparin, 40 mg, subcutaneous, q24h  lidocaine, 1 patch, transdermal, q24h  [Held by provider] midodrine, 5 mg, g-tube, TID  mometasone, 2 puff, inhalation, Daily  polyethylene glycol, 17 g, g-tube, Daily  sennosides, 2 tablet, g-tube, BID      Continuous medications  lactated Ringer's, 75 mL/hr, Last Rate: 75 mL/hr (10/14/24 1530)      PRN medications  PRN medications: albuterol, ALPRAZolam, HYDROmorphone, melatonin, naloxone, ondansetron **OR** ondansetron, oxyCODONE **OR** oxyCODONE **OR** oxyCODONE                    Assessment/Plan   Assessment & Plan  Cancer of base of tongue (Multi)    Oropharyngeal dysphagia    Tongue mass      Malnutrition Diagnosis Status: New  Malnutrition Diagnosis: Moderate malnutrition related to chronic disease or condition  As Evidenced by: significant wt loss of 18% x11 months; PO intake likely meeting <75% of nutr needs for >/= 1 month  I agree with the dietitian's malnutrition diagnosis.          Assessment:  58 year old female with a lesion involving her tongue status post direct laryngoscopy and biopsy, bronchoscopy, and esophagoscopy and insertion of a feeding tube on 10/14/2024 by Dr. Montanez.    Active Issues:  Base of tongue Cancer  Oropharyngeal Dysphagia  Overweight per BMI  Moderate Protein Calorie Malnutrition  Depression  Anxiety  PTSD  Panic Attacks  HTN  HFrEF(43%  HLD  CAD  Hx  ox MI  Stent to LAD  Asthma  MEÑO    Plan:  -Analgesia:  Scheduled Acetaminophen; PRN Oxycodone  -FEN: advancing TF, Regular diet; monitor and replete electrolytes as needed  -Pulm: wean oxygen to room air,   -ID: No current interventions  -Cardiac: Vitals Q4hr   -Endo: No current interventions  -GI: Bowel regimen, PPI,  and PRN anti-emetic  -: voids  -Steroids/Special: SLP consult->MBS-> passed  -Embolic PPx: SQH, SCDs while in bed  -Dispo: Continue Care on Tip 5; Discharge planning for POD 2 3 home with home care vs skilled nursing facility    All plans discussed with attendings after morning rounds.       I spent 35 minutes in the professional and overall care of this patient.      Madelyn Parr APRN, CNP  Certified Family Nurse Practitioner  Nurse Practitioner III  Department of Otolaryngology: Head & Neck Surgery  Personal Pager [phone]  ENT Team  Head and Neck Phone: [phone]

## 2024-10-15 NOTE — CARE PLAN
The patient's goals for the shift include      The clinical goals for the shift include to have adequate pain management

## 2024-10-16 LAB
ALBUMIN SERPL BCP-MCNC: 3.5 G/DL (ref 3.4–5)
ALBUMIN SERPL BCP-MCNC: 3.7 G/DL (ref 3.4–5)
ANION GAP SERPL CALC-SCNC: 11 MMOL/L (ref 10–20)
ANION GAP SERPL CALC-SCNC: 13 MMOL/L (ref 10–20)
BUN SERPL-MCNC: 14 MG/DL (ref 6–23)
BUN SERPL-MCNC: 15 MG/DL (ref 6–23)
CALCIUM SERPL-MCNC: 8.9 MG/DL (ref 8.6–10.6)
CALCIUM SERPL-MCNC: 9 MG/DL (ref 8.6–10.6)
CHLORIDE SERPL-SCNC: 101 MMOL/L (ref 98–107)
CHLORIDE SERPL-SCNC: 103 MMOL/L (ref 98–107)
CO2 SERPL-SCNC: 31 MMOL/L (ref 21–32)
CO2 SERPL-SCNC: 33 MMOL/L (ref 21–32)
CREAT SERPL-MCNC: 0.78 MG/DL (ref 0.5–1.05)
CREAT SERPL-MCNC: 0.81 MG/DL (ref 0.5–1.05)
EGFRCR SERPLBLD CKD-EPI 2021: 83 ML/MIN/1.73M*2
EGFRCR SERPLBLD CKD-EPI 2021: 87 ML/MIN/1.73M*2
ERYTHROCYTE [DISTWIDTH] IN BLOOD BY AUTOMATED COUNT: 12.8 % (ref 11.5–14.5)
GLUCOSE SERPL-MCNC: 101 MG/DL (ref 74–99)
GLUCOSE SERPL-MCNC: 130 MG/DL (ref 74–99)
HCT VFR BLD AUTO: 36 % (ref 36–46)
HGB BLD-MCNC: 11.8 G/DL (ref 12–16)
MAGNESIUM SERPL-MCNC: 2.03 MG/DL (ref 1.6–2.4)
MAGNESIUM SERPL-MCNC: 2.08 MG/DL (ref 1.6–2.4)
MCH RBC QN AUTO: 30.7 PG (ref 26–34)
MCHC RBC AUTO-ENTMCNC: 32.8 G/DL (ref 32–36)
MCV RBC AUTO: 94 FL (ref 80–100)
NRBC BLD-RTO: 0 /100 WBCS (ref 0–0)
PHOSPHATE SERPL-MCNC: 3.5 MG/DL (ref 2.5–4.9)
PHOSPHATE SERPL-MCNC: 3.7 MG/DL (ref 2.5–4.9)
PLATELET # BLD AUTO: 217 X10*3/UL (ref 150–450)
POTASSIUM SERPL-SCNC: 4.1 MMOL/L (ref 3.5–5.3)
POTASSIUM SERPL-SCNC: 4.2 MMOL/L (ref 3.5–5.3)
RBC # BLD AUTO: 3.84 X10*6/UL (ref 4–5.2)
SODIUM SERPL-SCNC: 141 MMOL/L (ref 136–145)
SODIUM SERPL-SCNC: 143 MMOL/L (ref 136–145)
WBC # BLD AUTO: 9 X10*3/UL (ref 4.4–11.3)

## 2024-10-16 PROCEDURE — 2500000001 HC RX 250 WO HCPCS SELF ADMINISTERED DRUGS (ALT 637 FOR MEDICARE OP)

## 2024-10-16 PROCEDURE — 83735 ASSAY OF MAGNESIUM: CPT

## 2024-10-16 PROCEDURE — 80069 RENAL FUNCTION PANEL: CPT

## 2024-10-16 PROCEDURE — 36415 COLL VENOUS BLD VENIPUNCTURE: CPT

## 2024-10-16 PROCEDURE — 2500000004 HC RX 250 GENERAL PHARMACY W/ HCPCS (ALT 636 FOR OP/ED)

## 2024-10-16 PROCEDURE — 2500000004 HC RX 250 GENERAL PHARMACY W/ HCPCS (ALT 636 FOR OP/ED): Performed by: NURSE PRACTITIONER

## 2024-10-16 PROCEDURE — 1200000003 HC ONCOLOGY  ROOM WITH TELEMETRY DAILY

## 2024-10-16 PROCEDURE — 85027 COMPLETE CBC AUTOMATED: CPT

## 2024-10-16 PROCEDURE — 92526 ORAL FUNCTION THERAPY: CPT | Mod: GN | Performed by: SPEECH-LANGUAGE PATHOLOGIST

## 2024-10-16 PROCEDURE — 2500000001 HC RX 250 WO HCPCS SELF ADMINISTERED DRUGS (ALT 637 FOR MEDICARE OP): Performed by: NURSE PRACTITIONER

## 2024-10-16 PROCEDURE — 99232 SBSQ HOSP IP/OBS MODERATE 35: CPT | Performed by: NURSE PRACTITIONER

## 2024-10-16 PROCEDURE — 2500000005 HC RX 250 GENERAL PHARMACY W/O HCPCS: Performed by: STUDENT IN AN ORGANIZED HEALTH CARE EDUCATION/TRAINING PROGRAM

## 2024-10-16 PROCEDURE — 99232 SBSQ HOSP IP/OBS MODERATE 35: CPT

## 2024-10-16 RX ORDER — ONDANSETRON HYDROCHLORIDE 2 MG/ML
4 INJECTION, SOLUTION INTRAVENOUS EVERY 8 HOURS PRN
Status: DISCONTINUED | OUTPATIENT
Start: 2024-10-16 | End: 2024-10-18 | Stop reason: HOSPADM

## 2024-10-16 RX ORDER — ADHESIVE BANDAGE
30 BANDAGE TOPICAL DAILY PRN
Status: DISCONTINUED | OUTPATIENT
Start: 2024-10-16 | End: 2024-10-17

## 2024-10-16 RX ORDER — HYDROMORPHONE HYDROCHLORIDE 1 MG/ML
0.2 INJECTION, SOLUTION INTRAMUSCULAR; INTRAVENOUS; SUBCUTANEOUS
Status: DISCONTINUED | OUTPATIENT
Start: 2024-10-16 | End: 2024-10-18 | Stop reason: HOSPADM

## 2024-10-16 RX ORDER — METOCLOPRAMIDE HYDROCHLORIDE 5 MG/ML
10 INJECTION INTRAMUSCULAR; INTRAVENOUS EVERY 6 HOURS PRN
Status: DISCONTINUED | OUTPATIENT
Start: 2024-10-16 | End: 2024-10-18 | Stop reason: HOSPADM

## 2024-10-16 RX ORDER — POLYETHYLENE GLYCOL 3350 17 G/17G
17 POWDER, FOR SOLUTION ORAL 2 TIMES DAILY
Status: DISCONTINUED | OUTPATIENT
Start: 2024-10-16 | End: 2024-10-18 | Stop reason: HOSPADM

## 2024-10-16 RX ORDER — METOPROLOL TARTRATE 25 MG/1
12.5 TABLET, FILM COATED ORAL 2 TIMES DAILY
Status: DISCONTINUED | OUTPATIENT
Start: 2024-10-16 | End: 2024-10-18 | Stop reason: HOSPADM

## 2024-10-16 RX ORDER — OXYCODONE HYDROCHLORIDE 5 MG/1
10 TABLET ORAL EVERY 4 HOURS PRN
Status: DISCONTINUED | OUTPATIENT
Start: 2024-10-16 | End: 2024-10-18 | Stop reason: HOSPADM

## 2024-10-16 RX ORDER — ONDANSETRON 4 MG/1
4 TABLET, FILM COATED ORAL EVERY 8 HOURS PRN
Status: DISCONTINUED | OUTPATIENT
Start: 2024-10-16 | End: 2024-10-18 | Stop reason: HOSPADM

## 2024-10-16 ASSESSMENT — PAIN SCALES - GENERAL
PAINLEVEL_OUTOF10: 6
PAINLEVEL_OUTOF10: 9
PAINLEVEL_OUTOF10: 4
PAINLEVEL_OUTOF10: 5 - MODERATE PAIN
PAINLEVEL_OUTOF10: 3
PAINLEVEL_OUTOF10: 4
PAINLEVEL_OUTOF10: 9
PAINLEVEL_OUTOF10: 4
PAINLEVEL_OUTOF10: 7

## 2024-10-16 ASSESSMENT — COGNITIVE AND FUNCTIONAL STATUS - GENERAL
MOBILITY SCORE: 21
CLIMB 3 TO 5 STEPS WITH RAILING: A LITTLE
STANDING UP FROM CHAIR USING ARMS: A LITTLE
WALKING IN HOSPITAL ROOM: A LITTLE
DAILY ACTIVITIY SCORE: 24

## 2024-10-16 ASSESSMENT — PAIN - FUNCTIONAL ASSESSMENT
PAIN_FUNCTIONAL_ASSESSMENT: 0-10
PAIN_FUNCTIONAL_ASSESSMENT: 0-10

## 2024-10-16 ASSESSMENT — ACTIVITIES OF DAILY LIVING (ADL): LACK_OF_TRANSPORTATION: NO

## 2024-10-16 ASSESSMENT — PAIN DESCRIPTION - LOCATION
LOCATION: ABDOMEN
LOCATION: ABDOMEN

## 2024-10-16 ASSESSMENT — PAIN DESCRIPTION - ORIENTATION: ORIENTATION: ANTERIOR;LEFT

## 2024-10-16 NOTE — PROGRESS NOTES
Speech-Language Pathology  Adult Inpatient Swallow Treatment    Patient Name: Amalia Roa  MRN: [id number]  Today's Date: 10/16/2024   Start Time: 1046  Stop Time: 1107  Time Calculation (min): 21    Impression:   Dysphagia therapy completed. Pt reported eating ok but feeling full. Will ask RD to coordinate PO and tube feeding. Pt independently stated and performed safe swallow strategies reportedly during meals and with thin liquids during session. Instructed pt to perform effortful swallow x10, 3 times a day. SLP to continue as IP, would benefit from OP services upon discharge.      Recommendations:  - Regular (IDDSI Level 7)  - Thin liquids (IDDSI Level 0)  Per the results of today's MBSS, patient to continue baseline diet of regular consistencies and thin liquids following swallow strategies listed below:     STRATEGIES:  - Small bites  - Small, single sips  - Medications whole in puree or as best tolerated  -Cough every few bites/sips      Goal:   Pt will tolerate least restrictive diet without s/s aspiration, respiratory compromise, or overt difficulty throughout admission.  Start: 10/15/24, End: 10/29/24  Status:  Progressing     Pt will complete x30 trials of effortful swallows with cues as needed for accurate completion.               Start: 10/15/24, End: 10/29/24              Status: goal initiated       Plan:  SLP Services Indicated: Yes  Frequency: 2x week  Discussed POC with patient  SLP - OK to Discharge    Pain:   0-10  0 = No pain.     Inpatient Education:  Extensive education provided to patient regarding current swallow function, recommendations/results, and POC.      Consultations/Referrals/Coordination of Services:   N/A

## 2024-10-16 NOTE — NURSING NOTE
Paused tube feed due to patients nausea. Zofran was given, residual checked (10 ml). Mya Parr notified. Will restart tube feed in 30 mins if tolerated.
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
## 2024-10-16 NOTE — NURSING NOTE
Restarted tube feed at 30 ML/HR. Patient has been nauseated during shift. Team aware. Had patient ambulate in the talley twice this shift to help to promote bowel movement

## 2024-10-16 NOTE — PROGRESS NOTES
"Amalia Roa is a 59 y.o. female on day 2 of admission presenting with Cancer of base of tongue (Multi).    Subjective   Patient endorsing some nausea after starting tube feeds. Denies vomiting. Feeling well otherwise.        Objective     Physical Exam  Constitutional:       Appearance: Normal appearance.   Cardiovascular:      Rate and Rhythm: Normal rate and regular rhythm.      Pulses: Normal pulses.      Heart sounds: Normal heart sounds.   Pulmonary:      Effort: Pulmonary effort is normal.      Breath sounds: Normal breath sounds.   Abdominal:      General: Abdomen is flat.      Palpations: Abdomen is soft.   Skin:     General: Skin is warm and dry.   Neurological:      General: No focal deficit present.      Mental Status: She is alert and oriented to person, place, and time.   Psychiatric:         Mood and Affect: Mood normal.         Behavior: Behavior normal.     Last Recorded Vitals  Blood pressure 109/65, pulse 70, temperature 37 °C (98.6 °F), temperature source Temporal, resp. rate 16, height 1.6 m (5' 3\"), weight 73.3 kg (161 lb 8 oz), SpO2 98%.  Intake/Output last 3 Shifts:  I/O last 3 completed shifts:  In: 870 (11.9 mL/kg) [P.O.:50; I.V.:300 (4.1 mL/kg); NG/GT:520]  Out: 2300 (31.4 mL/kg) [Urine:2100 (0.8 mL/kg/hr); Emesis/NG output:200]  Weight: 73.3 kg     Relevant Results               Scheduled medications  acetaminophen, 975 mg, oral, q8h JESSE   Or  acetaminophen, 1,000 mg, oral, q8h JESSE   Or  acetaminophen, 1,000 mg, g-tube, q8h JSESE  aspirin, 81 mg, g-tube, Daily  atorvastatin, 40 mg, g-tube, Daily  [Held by provider] buPROPion SR, 150 mg, oral, Daily  doxepin, 10 mg, g-tube, Nightly  enoxaparin, 40 mg, subcutaneous, q24h  lidocaine, 1 patch, transdermal, q24h  [Held by provider] midodrine, 5 mg, g-tube, TID  mometasone, 2 puff, inhalation, Daily  polyethylene glycol, 17 g, g-tube, Daily  sennosides, 2 tablet, g-tube, BID      Continuous medications     PRN medications  PRN medications: " albuterol, ALPRAZolam, HYDROmorphone, melatonin, naloxone, ondansetron **OR** ondansetron, oxyCODONE, oxyCODONE **OR** oxyCODONE **OR** oxyCODONE  No results found for this or any previous visit (from the past 24 hours).           Assessment/Plan   Assessment & Plan  Cancer of base of tongue (Multi)    Oropharyngeal dysphagia    Tongue mass    Amalia Roa is a 59 y.o. female 60 y/o female s/p panendoscopy, biopsy and G-tube insertion on 10/14/24 with Dr. Montanez secondary to dysphagia in the setting of large tongue mass.  PMHX includes Depression, anxiety, PTSD, panic attacks, HTN, HFrEF (EF 43% 9/2024), HLD, CAD with MI in 2014 s/p stenting to LAD and LCx, mitral regurgitation s/p repair in 2015 with residual mild MVR, Asthma, MEÑO on CPAP. Medicine consulted for co-management.     Recs:  -Restart home wellbutrin  -Continue metoprolol 12.5mg BID, monitor BP after initiation   -Continue holding midodrine in setting of normal blood pressures  -RFP/Mag BID, replete lytes as needed     *Recommendations not final until signed by attending*    Jensen Crane MD  PGY-1

## 2024-10-16 NOTE — PROGRESS NOTES
10/16/24 0800   Discharge Planning   Living Arrangements Spouse/significant other   Support Systems Spouse/significant other;Children;Family members   Assistance Needed none   Type of Residence Private residence   Number of Stairs to Enter Residence 3   Number of Stairs Within Residence 6   Who is requesting discharge planning? Provider   Home or Post Acute Services In home services   Type of Home Care Services DME or oxygen;Home nursing visits   Expected Discharge Disposition Home H   Does the patient need discharge transport arranged? No   Financial Resource Strain   How hard is it for you to pay for the very basics like food, housing, medical care, and heating? Not hard   Housing Stability   In the last 12 months, was there a time when you were not able to pay the mortgage or rent on time? N   In the past 12 months, how many times have you moved where you were living? 1   At any time in the past 12 months, were you homeless or living in a shelter (including now)? N   Transportation Needs   In the past 12 months, has lack of transportation kept you from medical appointments or from getting medications? no   In the past 12 months, has lack of transportation kept you from meetings, work, or from getting things needed for daily living? No     TCC Note    Plan per Medical/Surgical Team:  status post direct laryngoscopy and biopsy, bronchoscopy, and esophagoscopy and insertion of a feeding tube on 10/14/2024 by Dr. Montanez   Status: inpatient   Payor Source: [facility] medicaid  Discharge disposition: home  Expected date of discharge: 10/17  Barriers: none  Preferred home care agency: blanket referral    TCC met with patient at bedside to discuss anticipated discharge needs. Demographics and insurance verifed with patient. Patient lives at home with spouse and was independent with ADLs prior to admission. Patient is agreeable to TCC sending referral for home enteral nutrition to [facility] and a blanket  referral for home health. Will continue to follow patient for any discharge needs.   Stephania Easley RN TCC

## 2024-10-16 NOTE — CARE PLAN
The patient's goals for the shift include rest and pain management.       The clinical goals for the shift include Pt will report adequate pain management and rest throughout shift.    Over the shift, the patient did not make progress toward the following goals. Barriers to progression include . Recommendations to address these barriers include .      Problem: Fall/Injury  Goal: Not fall by end of shift  Outcome: Progressing  Goal: Be free from injury by end of the shift  Outcome: Progressing  Goal: Verbalize understanding of personal risk factors for fall in the hospital  Outcome: Progressing  Goal: Verbalize understanding of risk factor reduction measures to prevent injury from fall in the home  Outcome: Progressing  Goal: Use assistive devices by end of the shift  Outcome: Progressing  Goal: Pace activities to prevent fatigue by end of the shift  Outcome: Progressing

## 2024-10-16 NOTE — CARE PLAN
The patient's goals for the shift include      The clinical goals for the shift include Pt will report adequate pain management and rest throughout shift.

## 2024-10-17 DIAGNOSIS — C01 CANCER OF BASE OF TONGUE (MULTI): ICD-10-CM

## 2024-10-17 LAB
ALBUMIN SERPL BCP-MCNC: 3.3 G/DL (ref 3.4–5)
ALBUMIN SERPL BCP-MCNC: 3.5 G/DL (ref 3.4–5)
ANION GAP SERPL CALC-SCNC: 12 MMOL/L (ref 10–20)
ANION GAP SERPL CALC-SCNC: 8 MMOL/L (ref 10–20)
BUN SERPL-MCNC: 14 MG/DL (ref 6–23)
BUN SERPL-MCNC: 14 MG/DL (ref 6–23)
CALCIUM SERPL-MCNC: 8.3 MG/DL (ref 8.6–10.6)
CALCIUM SERPL-MCNC: 8.5 MG/DL (ref 8.6–10.6)
CHLORIDE SERPL-SCNC: 101 MMOL/L (ref 98–107)
CHLORIDE SERPL-SCNC: 103 MMOL/L (ref 98–107)
CO2 SERPL-SCNC: 29 MMOL/L (ref 21–32)
CO2 SERPL-SCNC: 36 MMOL/L (ref 21–32)
CREAT SERPL-MCNC: 0.71 MG/DL (ref 0.5–1.05)
CREAT SERPL-MCNC: 0.72 MG/DL (ref 0.5–1.05)
EGFRCR SERPLBLD CKD-EPI 2021: >90 ML/MIN/1.73M*2
EGFRCR SERPLBLD CKD-EPI 2021: >90 ML/MIN/1.73M*2
ERYTHROCYTE [DISTWIDTH] IN BLOOD BY AUTOMATED COUNT: 12.8 % (ref 11.5–14.5)
GLUCOSE SERPL-MCNC: 131 MG/DL (ref 74–99)
GLUCOSE SERPL-MCNC: 92 MG/DL (ref 74–99)
HCT VFR BLD AUTO: 36.7 % (ref 36–46)
HGB BLD-MCNC: 11.1 G/DL (ref 12–16)
MAGNESIUM SERPL-MCNC: 2.04 MG/DL (ref 1.6–2.4)
MAGNESIUM SERPL-MCNC: 2.17 MG/DL (ref 1.6–2.4)
MCH RBC QN AUTO: 30 PG (ref 26–34)
MCHC RBC AUTO-ENTMCNC: 30.2 G/DL (ref 32–36)
MCV RBC AUTO: 99 FL (ref 80–100)
NRBC BLD-RTO: 0 /100 WBCS (ref 0–0)
PHOSPHATE SERPL-MCNC: 3.1 MG/DL (ref 2.5–4.9)
PHOSPHATE SERPL-MCNC: 3.3 MG/DL (ref 2.5–4.9)
PLATELET # BLD AUTO: 192 X10*3/UL (ref 150–450)
POTASSIUM SERPL-SCNC: 3.7 MMOL/L (ref 3.5–5.3)
POTASSIUM SERPL-SCNC: 4.2 MMOL/L (ref 3.5–5.3)
RBC # BLD AUTO: 3.7 X10*6/UL (ref 4–5.2)
SODIUM SERPL-SCNC: 140 MMOL/L (ref 136–145)
SODIUM SERPL-SCNC: 141 MMOL/L (ref 136–145)
WBC # BLD AUTO: 8 X10*3/UL (ref 4.4–11.3)

## 2024-10-17 PROCEDURE — 2500000001 HC RX 250 WO HCPCS SELF ADMINISTERED DRUGS (ALT 637 FOR MEDICARE OP)

## 2024-10-17 PROCEDURE — 1200000003 HC ONCOLOGY  ROOM WITH TELEMETRY DAILY

## 2024-10-17 PROCEDURE — 2500000004 HC RX 250 GENERAL PHARMACY W/ HCPCS (ALT 636 FOR OP/ED): Performed by: NURSE PRACTITIONER

## 2024-10-17 PROCEDURE — 80069 RENAL FUNCTION PANEL: CPT

## 2024-10-17 PROCEDURE — 92526 ORAL FUNCTION THERAPY: CPT | Mod: GN | Performed by: SPEECH-LANGUAGE PATHOLOGIST

## 2024-10-17 PROCEDURE — 99252 IP/OBS CONSLTJ NEW/EST SF 35: CPT | Performed by: PHYSICIAN ASSISTANT

## 2024-10-17 PROCEDURE — 83735 ASSAY OF MAGNESIUM: CPT

## 2024-10-17 PROCEDURE — 2500000005 HC RX 250 GENERAL PHARMACY W/O HCPCS: Performed by: STUDENT IN AN ORGANIZED HEALTH CARE EDUCATION/TRAINING PROGRAM

## 2024-10-17 PROCEDURE — 36415 COLL VENOUS BLD VENIPUNCTURE: CPT

## 2024-10-17 PROCEDURE — 99232 SBSQ HOSP IP/OBS MODERATE 35: CPT

## 2024-10-17 PROCEDURE — 2500000001 HC RX 250 WO HCPCS SELF ADMINISTERED DRUGS (ALT 637 FOR MEDICARE OP): Performed by: NURSE PRACTITIONER

## 2024-10-17 PROCEDURE — 85027 COMPLETE CBC AUTOMATED: CPT

## 2024-10-17 PROCEDURE — 2500000004 HC RX 250 GENERAL PHARMACY W/ HCPCS (ALT 636 FOR OP/ED)

## 2024-10-17 PROCEDURE — 99232 SBSQ HOSP IP/OBS MODERATE 35: CPT | Performed by: NURSE PRACTITIONER

## 2024-10-17 RX ORDER — ADHESIVE BANDAGE
30 BANDAGE TOPICAL DAILY
Status: DISCONTINUED | OUTPATIENT
Start: 2024-10-17 | End: 2024-10-18 | Stop reason: HOSPADM

## 2024-10-17 ASSESSMENT — PAIN SCALES - GENERAL
PAINLEVEL_OUTOF10: 8
PAINLEVEL_OUTOF10: 7

## 2024-10-17 NOTE — PROGRESS NOTES
"Amalia Roa is a 59 y.o. female on day 3 of admission presenting with Cancer of base of tongue (Multi).    Subjective   Patient endorsing some nausea after starting tube feeds. Denies vomiting. Feeling well otherwise.        Objective     Physical Exam  Constitutional:       Appearance: Normal appearance.   Cardiovascular:      Rate and Rhythm: Normal rate and regular rhythm.      Pulses: Normal pulses.      Heart sounds: Normal heart sounds.   Pulmonary:      Effort: Pulmonary effort is normal.      Breath sounds: Normal breath sounds.   Abdominal:      General: Abdomen is flat.      Palpations: Abdomen is soft.   Skin:     General: Skin is warm and dry.   Neurological:      General: No focal deficit present.      Mental Status: She is alert and oriented to person, place, and time.   Psychiatric:         Mood and Affect: Mood normal.         Behavior: Behavior normal.     Last Recorded Vitals  Blood pressure 115/67, pulse 62, temperature 36.6 °C (97.9 °F), resp. rate 16, height 1.6 m (5' 3\"), weight 73.3 kg (161 lb 8 oz), SpO2 98%.  Intake/Output last 3 Shifts:  I/O last 3 completed shifts:  In: 1340 (18.3 mL/kg) [P.O.:100; NG/GT:1240]  Out: 2400 (32.8 mL/kg) [Urine:2200 (0.8 mL/kg/hr); Emesis/NG output:200]  Weight: 73.3 kg     Relevant Results               Scheduled medications  acetaminophen, 975 mg, oral, q8h JESSE   Or  acetaminophen, 1,000 mg, oral, q8h JESSE   Or  acetaminophen, 1,000 mg, g-tube, q8h JESSE  aspirin, 81 mg, g-tube, Daily  atorvastatin, 40 mg, g-tube, Daily  buPROPion SR, 150 mg, oral, Daily  doxepin, 10 mg, g-tube, Nightly  enoxaparin, 40 mg, subcutaneous, q24h  lidocaine, 1 patch, transdermal, q24h  metoprolol tartrate, 12.5 mg, oral, BID  [Held by provider] midodrine, 5 mg, g-tube, TID  mometasone, 2 puff, inhalation, Daily  polyethylene glycol, 17 g, g-tube, BID  sennosides, 2 tablet, g-tube, BID      Continuous medications     PRN medications  PRN medications: albuterol, ALPRAZolam, " HYDROmorphone, magnesium hydroxide, melatonin, metoclopramide, naloxone, ondansetron **OR** ondansetron, oxyCODONE, oxyCODONE **OR** oxyCODONE **OR** oxyCODONE  Results for orders placed or performed during the hospital encounter of 10/14/24 (from the past 24 hours)   CBC   Result Value Ref Range    WBC 9.0 4.4 - 11.3 x10*3/uL    nRBC 0.0 0.0 - 0.0 /100 WBCs    RBC 3.84 (L) 4.00 - 5.20 x10*6/uL    Hemoglobin 11.8 (L) 12.0 - 16.0 g/dL    Hematocrit 36.0 36.0 - 46.0 %    MCV 94 80 - 100 fL    MCH 30.7 26.0 - 34.0 pg    MCHC 32.8 32.0 - 36.0 g/dL    RDW 12.8 11.5 - 14.5 %    Platelets 217 150 - 450 x10*3/uL   Renal function panel   Result Value Ref Range    Glucose 130 (H) 74 - 99 mg/dL    Sodium 143 136 - 145 mmol/L    Potassium 4.1 3.5 - 5.3 mmol/L    Chloride 103 98 - 107 mmol/L    Bicarbonate 31 21 - 32 mmol/L    Anion Gap 13 10 - 20 mmol/L    Urea Nitrogen 15 6 - 23 mg/dL    Creatinine 0.78 0.50 - 1.05 mg/dL    eGFR 87 >60 mL/min/1.73m*2    Calcium 9.0 8.6 - 10.6 mg/dL    Phosphorus 3.5 2.5 - 4.9 mg/dL    Albumin 3.5 3.4 - 5.0 g/dL   Magnesium   Result Value Ref Range    Magnesium 2.03 1.60 - 2.40 mg/dL   Renal function panel   Result Value Ref Range    Glucose 101 (H) 74 - 99 mg/dL    Sodium 141 136 - 145 mmol/L    Potassium 4.2 3.5 - 5.3 mmol/L    Chloride 101 98 - 107 mmol/L    Bicarbonate 33 (H) 21 - 32 mmol/L    Anion Gap 11 10 - 20 mmol/L    Urea Nitrogen 14 6 - 23 mg/dL    Creatinine 0.81 0.50 - 1.05 mg/dL    eGFR 83 >60 mL/min/1.73m*2    Calcium 8.9 8.6 - 10.6 mg/dL    Phosphorus 3.7 2.5 - 4.9 mg/dL    Albumin 3.7 3.4 - 5.0 g/dL   Magnesium   Result Value Ref Range    Magnesium 2.08 1.60 - 2.40 mg/dL   CBC   Result Value Ref Range    WBC 8.0 4.4 - 11.3 x10*3/uL    nRBC 0.0 0.0 - 0.0 /100 WBCs    RBC 3.70 (L) 4.00 - 5.20 x10*6/uL    Hemoglobin 11.1 (L) 12.0 - 16.0 g/dL    Hematocrit 36.7 36.0 - 46.0 %    MCV 99 80 - 100 fL    MCH 30.0 26.0 - 34.0 pg    MCHC 30.2 (L) 32.0 - 36.0 g/dL    RDW 12.8 11.5 - 14.5  %    Platelets 192 150 - 450 x10*3/uL              Assessment/Plan   Assessment & Plan  Cancer of base of tongue (Multi)    Oropharyngeal dysphagia    Tongue mass    Amalia Roa is a 59 y.o. female 60 y/o female s/p panendoscopy, biopsy and G-tube insertion on 10/14/24 with Dr. Montanez secondary to dysphagia in the setting of large tongue mass.  PMHX includes Depression, anxiety, PTSD, panic attacks, HTN, HFrEF (EF 43% 9/2024), HLD, CAD with MI in 2014 s/p stenting to LAD and LCx, mitral regurgitation s/p repair in 2015 with residual mild MVR, Asthma, MEÑO on CPAP. Medicine consulted for co-management.     Recs:  -Continue home wellbutrin  -Continue metoprolol 12.5mg BID, monitor BP after initiation   -Continue holding midodrine in setting of normal blood pressures  -RFP/Mag BID, replete lytes as needed     *Recommendations not final until signed by attending*    Jensen Crane MD  PGY-1

## 2024-10-17 NOTE — CARE PLAN
Briefly, this is a 60 yo F with a tongue mass found on prelim biopsy to be squamous cell carcinoma. Patient will follow with ENT, rad/onc, and med/onc after discharge. Patient is on for tumor board 10/25.     She has a scheduled appointment to see Dr. Pauline Garcia on 10/22 at 1:20pm at [facility].    Chico Lauren MD  Medical Oncology Fellow  10/17/2024

## 2024-10-17 NOTE — PROGRESS NOTES
Speech-Language Pathology  Adult Inpatient Swallow Treatment    Patient Name: Amalia Roa  MRN: [id number]  Today's Date: 10/17/2024   Start Time: 1039  Stop Time: 1108  Time Calculation (min): 29    Impression:   Dysphagia therapy completed. Pt has not been eating due to nausea, but has been drinking water utilizing the safe swallow strategies reviewed in prior session.     Pt most likely going to begin XRT and Chemo as both referrals have been made. Therefore introduced head and neck exercises and provided written sheet for her to follow. Pt to complete each exercise 10 times, 2-3 times a day. Pt demonstrated all exercises without further instruction. Provided general information regarding the effects of radiation on the swallow mechanism and answered all questions.     SLP to continue to follow to ensure compliance with PO recommendations as well as introduce EMST.      Recommendations:  - Regular (IDDSI Level 7)  - Thin liquids (IDDSI Level 0)  Per the results of today's MBSS, patient to continue baseline diet of regular consistencies and thin liquids following swallow strategies listed below:     STRATEGIES:  - Small bites  - Small, single sips  - Medications whole in puree or as best tolerated  -Cough every few bites/sips      Goal:   Pt will tolerate least restrictive diet without s/s aspiration, respiratory compromise, or overt difficulty throughout admission.  Start: 10/15/24, End: 10/29/24  Status:  Progressing     Pt will complete x30 trials of effortful swallows with cues as needed for accurate completion.               Start: 10/15/24, End: 10/29/24              Status: progressing       Plan:  SLP Services Indicated: Yes  Frequency: 2x week  Discussed POC with patient  SLP - OK to Discharge    Pain:   0-10  0 = No pain.     Inpatient Education:  Extensive education provided to patient regarding current swallow function, recommendations/results, and POC.      Consultations/Referrals/Coordination of  Services:   N/A

## 2024-10-17 NOTE — PROGRESS NOTES
Dr. Montanez left a note asking that Ms. Roa be added to the head and neck tumor board.    I added her to 10/25 tumor board.

## 2024-10-17 NOTE — PROGRESS NOTES
"  Ear Nose & Throat Progress Note      Amalia Roa is a 59 y.o. female on day 3 of admission presenting with Cancer of base of tongue (Multi).     Subjective   No acute event overnight. Patient continues to require her TF being held due tofeel full/nauseated. Patient's bowel regimen has been increased.     Objective   Physical Exam  Vitals reviewed in EMR  Gen: NAD, AOx3, resting comfortably in bed  Eyes: EOMI, sclera clear, PERRL  Ears: Normal external ears bilaterally  Nose: No rhinorrhea; anterior nares clear  Oral Cavity: MMM  Head: normocephalic, atraumatic  Neck: Skin free of scars  Resp: Non-labored breathing  on julio gail  Cards: No clubbing/cyanosis/edema in hands  Gastro: Soft, non-distended, PEG tube in place  : Voids  MSK: Moves all extremities  Psych: Appropriate mood and affect    Last Recorded Vitals  Blood pressure 128/70, pulse 76, temperature 36.6 °C (97.9 °F), temperature source Temporal, resp. rate 16, height 1.6 m (5' 3\"), weight 73.3 kg (161 lb 8 oz), SpO2 93%.  Intake/Output last 3 Shifts:  I/O last 3 completed shifts:  In: 1340 (18.3 mL/kg) [P.O.:100; NG/GT:1240]  Out: 2400 (32.8 mL/kg) [Urine:2200 (0.8 mL/kg/hr); Emesis/NG output:200]  Weight: 73.3 kg     Relevant Results  Results for orders placed or performed during the hospital encounter of 10/14/24 (from the past 24 hours)   CBC   Result Value Ref Range    WBC 9.0 4.4 - 11.3 x10*3/uL    nRBC 0.0 0.0 - 0.0 /100 WBCs    RBC 3.84 (L) 4.00 - 5.20 x10*6/uL    Hemoglobin 11.8 (L) 12.0 - 16.0 g/dL    Hematocrit 36.0 36.0 - 46.0 %    MCV 94 80 - 100 fL    MCH 30.7 26.0 - 34.0 pg    MCHC 32.8 32.0 - 36.0 g/dL    RDW 12.8 11.5 - 14.5 %    Platelets 217 150 - 450 x10*3/uL   Renal function panel   Result Value Ref Range    Glucose 130 (H) 74 - 99 mg/dL    Sodium 143 136 - 145 mmol/L    Potassium 4.1 3.5 - 5.3 mmol/L    Chloride 103 98 - 107 mmol/L    Bicarbonate 31 21 - 32 mmol/L    Anion Gap 13 10 - 20 mmol/L    Urea Nitrogen 15 6 - 23 mg/dL    " Creatinine 0.78 0.50 - 1.05 mg/dL    eGFR 87 >60 mL/min/1.73m*2    Calcium 9.0 8.6 - 10.6 mg/dL    Phosphorus 3.5 2.5 - 4.9 mg/dL    Albumin 3.5 3.4 - 5.0 g/dL   Magnesium   Result Value Ref Range    Magnesium 2.03 1.60 - 2.40 mg/dL   Renal function panel   Result Value Ref Range    Glucose 101 (H) 74 - 99 mg/dL    Sodium 141 136 - 145 mmol/L    Potassium 4.2 3.5 - 5.3 mmol/L    Chloride 101 98 - 107 mmol/L    Bicarbonate 33 (H) 21 - 32 mmol/L    Anion Gap 11 10 - 20 mmol/L    Urea Nitrogen 14 6 - 23 mg/dL    Creatinine 0.81 0.50 - 1.05 mg/dL    eGFR 83 >60 mL/min/1.73m*2    Calcium 8.9 8.6 - 10.6 mg/dL    Phosphorus 3.7 2.5 - 4.9 mg/dL    Albumin 3.7 3.4 - 5.0 g/dL   Magnesium   Result Value Ref Range    Magnesium 2.08 1.60 - 2.40 mg/dL   CBC   Result Value Ref Range    WBC 8.0 4.4 - 11.3 x10*3/uL    nRBC 0.0 0.0 - 0.0 /100 WBCs    RBC 3.70 (L) 4.00 - 5.20 x10*6/uL    Hemoglobin 11.1 (L) 12.0 - 16.0 g/dL    Hematocrit 36.7 36.0 - 46.0 %    MCV 99 80 - 100 fL    MCH 30.0 26.0 - 34.0 pg    MCHC 30.2 (L) 32.0 - 36.0 g/dL    RDW 12.8 11.5 - 14.5 %    Platelets 192 150 - 450 x10*3/uL   Renal function panel   Result Value Ref Range    Glucose 92 74 - 99 mg/dL    Sodium 140 136 - 145 mmol/L    Potassium 4.2 3.5 - 5.3 mmol/L    Chloride 103 98 - 107 mmol/L    Bicarbonate 29 21 - 32 mmol/L    Anion Gap 12 10 - 20 mmol/L    Urea Nitrogen 14 6 - 23 mg/dL    Creatinine 0.72 0.50 - 1.05 mg/dL    eGFR >90 >60 mL/min/1.73m*2    Calcium 8.5 (L) 8.6 - 10.6 mg/dL    Phosphorus 3.3 2.5 - 4.9 mg/dL    Albumin 3.3 (L) 3.4 - 5.0 g/dL   Magnesium   Result Value Ref Range    Magnesium 2.17 1.60 - 2.40 mg/dL        Scheduled medications  acetaminophen, 975 mg, oral, q8h JESSE   Or  acetaminophen, 1,000 mg, oral, q8h JESSE   Or  acetaminophen, 1,000 mg, g-tube, q8h JESSE  aspirin, 81 mg, g-tube, Daily  atorvastatin, 40 mg, g-tube, Daily  buPROPion SR, 150 mg, oral, Daily  doxepin, 10 mg, g-tube, Nightly  enoxaparin, 40 mg, subcutaneous,  q24h  lidocaine, 1 patch, transdermal, q24h  metoprolol tartrate, 12.5 mg, oral, BID  [Held by provider] midodrine, 5 mg, g-tube, TID  mometasone, 2 puff, inhalation, Daily  polyethylene glycol, 17 g, g-tube, BID  sennosides, 2 tablet, g-tube, BID      Continuous medications     PRN medications  PRN medications: albuterol, ALPRAZolam, HYDROmorphone, magnesium hydroxide, melatonin, metoclopramide, naloxone, ondansetron **OR** ondansetron, oxyCODONE, oxyCODONE **OR** oxyCODONE **OR** oxyCODONE                         Assessment/Plan   Assessment & Plan  Cancer of base of tongue (Multi)    Oropharyngeal dysphagia    Tongue mass    Assessment:  58 year old female with a lesion involving her tongue status post direct laryngoscopy and biopsy, bronchoscopy, and esophagoscopy and insertion of a feeding tube on 10/14/2024 by Dr. Montanez.     Active Issues:  Base of tongue Cancer  Oropharyngeal Dysphagia  Overweight per BMI  Moderate Protein Calorie Malnutrition  Depression  Anxiety  PTSD  Panic Attacks  HTN  HFrEF(43%  HLD  CAD  Hx ox MI  Stent to LAD  Asthma  MEÑO     Plan:  -Analgesia:  Scheduled Acetaminophen; PRN Oxycodone  -Neuro: restarted home Wellbutrin   -FEN: advancing TF->transition to bolus feeds when able, Regular diet; monitor and replete electrolytes as needed  -Pulm: wean oxygen to room air,   -ID: No current interventions  -Cardiac: Vitals Q4hr ; Medicine consult recs appreciated  -Endo: No current interventions  -GI: Bowel regimen, PPI,  and PRN anti-emetic  -: voids  -Steroids/Special: SLP consult->MBS-> passed              -Medical oncology consulted              -Radiation oncology consulted  -Embolic PPx: SQH, SCDs while in bed  -Dispo: Continue Care on Tip 5; Discharge planning for POD 2 3 home with home care vs skilled nursing facility     All plans discussed with attendings after morning rounds.          I spent 35 minutes in the professional and overall care of this patient.      Madelyn HUNTER  Keshav SULLIVAN, CNP  Certified Family Nurse Practitioner  Nurse Practitioner III  Department of Otolaryngology: Head & Neck Surgery  Personal Pager [phone]  ENT Team  Head and Neck Phone: [phone]

## 2024-10-17 NOTE — CARE PLAN
The patient's goals for the shift include      The clinical goals for the shift include meet tube feed goal    Over the shift, the patient did not make progress toward the following goals. Barriers to progression include intermittent nausea, feelings of fullness and frequently needing to pause tube feed. Recommendations to address these barriers include administering nausea medications when indicated and frequently assessing pt nausea.      Problem: Fall/Injury  Goal: Not fall by end of shift  Outcome: Progressing  Goal: Be free from injury by end of the shift  Outcome: Progressing  Goal: Verbalize understanding of personal risk factors for fall in the hospital  Outcome: Progressing  Goal: Verbalize understanding of risk factor reduction measures to prevent injury from fall in the home  Outcome: Progressing  Goal: Use assistive devices by end of the shift  Outcome: Progressing  Goal: Pace activities to prevent fatigue by end of the shift  Outcome: Progressing     Problem: Pain  Goal: Takes deep breaths with improved pain control throughout the shift  Outcome: Progressing  Goal: Turns in bed with improved pain control throughout the shift  Outcome: Progressing  Goal: Walks with improved pain control throughout the shift  Outcome: Progressing  Goal: Performs ADL's with improved pain control throughout shift  Outcome: Progressing

## 2024-10-17 NOTE — CONSULTS
Radiation Oncology Inpatient Consult    Patient Name:  Amalia Roa  MRN:  [id number]  :  1965    Referring Provider: Dr. Lisseth MD  Primary Care Provider: WHITNEY GARCIA MA  Care Team: Patient Care Team:  Whitney Garcia MA as PCP - General  Lizandro Hoffmann MD as Referring Physician (Otolaryngology)    Date of Service: 10/17/2024    SUBJECTIVE  History of Present Illness:  This is a 58 year old female who was seen in consultation by Dr. Montanez on  for management of a base of tongue mass, with associated dysphagia.  Physical exam and flexible laryngoscopy revealed a large neoplastic growth involving the oral cavity, oropharynx, and hypopharynx, with inga disease, which was later confirmed on CT neck 10/8.  The patient was brought to the OR on 10/14 for endoscopic evaluation, biopsy, and placement of PEG tube.  Preliminary path is reported to show SCC.   Staging CT chest 10/14 shows multiple parenchymal lung nodules and enlarged mediastinal and hilar lymph nodes, concerning for metastatic disease.  Medical oncology and Radiation Oncology have been ask to assist with treatment planning.     The patient reports developing progressive dysphagia and eventually dysarthria over the last 2-3 months with weight loss.  She denies bleeding.  She has had an occasional cough, no hemoptysis.  No fevers, chills or recent illness.  No chest pain or abdominal pain.       Prior Radiotherapy:  no    Current Systemic Treatment:  no     Presence of Pacemaker or ICD:  no    Past Medical History:    Past Medical History:   Diagnosis Date    Asthma     CHF (congestive heart failure)     Coronary artery disease     Depression     Dysphagia     Heart valve disease     Hyperlipidemia     Hypertension     Myocardial infarction (Multi)     PTSD (post-traumatic stress disorder)     Sleep apnea         Past Surgical History:  History reviewed. No pertinent surgical history.     Family History:  Cancer-related family history is  not on file.    Social History:    Social History     Tobacco Use    Smoking status: Former     Current packs/day: 1.00     Average packs/day: 1 pack/day for 10.8 years (10.8 ttl pk-yrs)     Types: Cigarettes     Start date: 2014    Smokeless tobacco: Never   Vaping Use    Vaping status: Never Used   Substance Use Topics    Alcohol use: Never    Drug use: Never       Allergies:    Allergies   Allergen Reactions    Cyclobenzaprine Anaphylaxis        Medications:    Current Facility-Administered Medications:     acetaminophen (Tylenol) tablet 975 mg, 975 mg, oral, q8h JESSE **OR** acetaminophen (Tylenol) oral liquid 1,000 mg, 1,000 mg, oral, q8h JESSE **OR** acetaminophen (Tylenol) oral liquid 1,000 mg, 1,000 mg, g-tube, q8h JESSE, Maximo Joiner MD, 1,000 mg at 10/17/24 1632    albuterol 90 mcg/actuation inhaler 2 puff, 2 puff, inhalation, q4h PRN, Maximo Joiner MD, 2 puff at 10/15/24 2210    ALPRAZolam (Xanax) tablet 0.5 mg, 0.5 mg, g-tube, Daily PRN, LAURIE Horta-CNP, 0.5 mg at 10/16/24 2216    aspirin chewable tablet 81 mg, 81 mg, g-tube, Daily, LAURIE Horta-CNP, 81 mg at 10/16/24 2216    atorvastatin (Lipitor) tablet 40 mg, 40 mg, g-tube, Daily, LAURIE Horta-CNP, 40 mg at 10/16/24 2216    buPROPion SR (Wellbutrin SR) 12 hr tablet 150 mg, 150 mg, oral, Daily, Edouard Angeles MD, 150 mg at 10/17/24 0934    doxepin (SINEquan) capsule 10 mg, 10 mg, g-tube, Nightly, Maximo Joiner MD, 10 mg at 10/16/24 2216    enoxaparin (Lovenox) syringe 40 mg, 40 mg, subcutaneous, q24h, Maximo Joiner MD, 40 mg at 10/16/24 2216    HYDROmorphone (Dilaudid) injection 0.2 mg, 0.2 mg, intravenous, q3h PRN, Madelyn Parr, APRN-CNP, 0.2 mg at 10/16/24 0955    lidocaine 4 % patch 1 patch, 1 patch, transdermal, q24h, Naomi Higginbotham MD, 1 patch at 10/17/24 1632    magnesium hydroxide (Milk of Magnesia) 400 mg/5 mL suspension 30 mL, 30 mL, oral, Daily, Edouard Angeles MD, 30 mL at 10/17/24 0170     "melatonin tablet 1 mg, 1 mg, g-tube, Nightly PRN, Madelyn Parr, APRN-CNP, 1 mg at 10/15/24 2037    metoclopramide (Reglan) injection 10 mg, 10 mg, intravenous, q6h PRN, Lyudmila Alonzo MD, 10 mg at 10/16/24 2216    metoprolol tartrate (Lopressor) tablet 12.5 mg, 12.5 mg, oral, BID, Edouard Angeles MD, 12.5 mg at 10/17/24 0931    [Held by provider] midodrine (Proamatine) tablet 5 mg, 5 mg, g-tube, TID, Maximo Joiner MD    mometasone (Asmanex) 220 mcg/ actuation (14) inhaler 2 puff, 2 puff, inhalation, Daily, Maximo Joiner MD, 2 puff at 10/17/24 1325    naloxone (Narcan) injection 0.2 mg, 0.2 mg, intravenous, q5 min PRN, Maximo Joiner MD    ondansetron (Zofran) tablet 4 mg, 4 mg, g-tube, q8h PRN **OR** ondansetron (Zofran) injection 4 mg, 4 mg, intravenous, q8h PRN, Lyudmila Alonzo MD    oxyCODONE (Roxicodone) immediate release tablet 10 mg, 10 mg, g-tube, q4h PRN, Madelyn Parr, APRN-CNP, 10 mg at 10/17/24 1742    oxyCODONE (Roxicodone) immediate release tablet 5 mg, 5 mg, g-tube, q4h PRN **OR** oxyCODONE (Roxicodone) solution 5 mg, 5 mg, g-tube, q4h PRN, 5 mg at 10/15/24 2036 **OR** oxyCODONE (Roxicodone) solution 5 mg, 5 mg, g-tube, q4h PRN, Maximo Joienr MD, 5 mg at 10/16/24 1138    polyethylene glycol (Glycolax, Miralax) packet 17 g, 17 g, g-tube, BID, Madelyn Parr, APRN-CNP, 17 g at 10/17/24 0934    sennosides (Senokot) tablet 17.2 mg, 2 tablet, g-tube, BID, Maximo Joiner MD, 17.2 mg at 10/17/24 0933      Review of Systems:    No neurologic deficits, no headaches or seizures, no joint pain or swelling, no changes in bowel or urinary function    Performance Status:  The Karnofsky performance scale today is 80, Normal activity with effort; some signs or symptoms of disease (ECOG equivalent 1).        OBJECTIVE  Physical Exam:  /75   Pulse 71   Temp 36.5 °C (97.7 °F) (Temporal)   Resp 16   Ht 1.6 m (5' 3\")   Wt 73.3 kg (161 lb 8 oz)   SpO2 96%   BMI 28.61 kg/m²  "   General: awake, alert, resting comfortably, well developed and well nourished in appearance  HEENT: pupils equal and round, no scleral icterus  Pulmonary: Breathing comfortably at rest   Cardiac: regular rate  Abdomen: soft, nondistended, non tender to palpation   EXT: no peripheral edema, no asymmetry noted  MSK: no focal joint swelling noted  Neuro: A&O x 3, cranial nerves II through XII grossly intact, sensation and strength intact  Psych: Normal affect     Laboratory Review:   10/17/24 1638  Renal function panel  Collected: 10/17/24 1501  Final result  Specimen: Blood, Venous    Glucose 131 High  mg/dL Urea Nitrogen 14 mg/dL   Sodium 141 mmol/L Creatinine 0.71 mg/dL   Potassium 3.7 mmol/L eGFR >90 mL/min/1.73m*2    Chloride 101 mmol/L Calcium 8.3 Low  mg/dL   Bicarbonate 36 High  mmol/L Phosphorus 3.1 mg/dL    Anion Gap 8 Low  mmol/L Albumin 3.5 g/dL        10/17/24 0810  CBC  Collected: 10/17/24 0625  Final result  Specimen: Blood, Venous    WBC 8.0 x10*3/uL MCV 99 fL   nRBC 0.0 /100 WBCs MCH 30.0 pg   RBC 3.70 Low  x10*6/uL MCHC 30.2 Low  g/dL   Hemoglobin 11.1 Low  g/dL RDW 12.8 %   Hematocrit 36.7 % Platelets 192 x10*3/uL          Pathology Review:  prelim supporting SCC    Imaging:    CT Neck 10/8  IMPRESSION:  1. There is an extensive right sided soft tissue mass involving the  right oropharynx, oral cavity and hypopharynx, as detailed above. At  the level of the hypopharynx there appears to be extra laryngeal  spread into the adjacent strap muscles.  2. There is bilateral cervical lymphadenopathy. On the right there is  retropharyngeal, level 2 through level 4 lymphadenopathy with  thrombosis or occlusion of the right internal jugular vein and  displacement and partial encasement of the right carotid artery. On  the left there is a cystic or necrotic lymph node with mass effect  and mild narrowing of the left internal jugular vein.  3. Multiple pulmonary nodules concerning for metastatic  disease.    CT Chest w/ IV contrast 10/14  IMPRESSION:  1.  Findings concerning for parenchymal and mediastinal metastatic  disease with multiple parenchymal lung nodules and enlarged  mediastinal and hilar lymph nodes.  2. Postoperative pneumoperitoneum and G-tube placement  3. Suspicious left adrenal gland nodule for adrenal gland metastases.  4. Postoperative changes of mitral valve repair and bilateral  coronary stent placement.    ASSESSMENT:  This is a 58 year old female who was seen in consultation by Dr. Montanez on 9/20 for management of a base of tongue mass, with associated dysphagia.  Physical exam and flexible laryngoscopy revealed a large neoplastic growth involving the oral cavity, oropharynx, and hypopharynx, with inga disease, which was later confirmed on CT neck 10/8.  The patient was brought to the OR on 10/14 for endoscopic evaluation, biopsy, and placement of PEG tube.  Preliminary path is reported to show SCC.   Staging CT chest 10/14 shows multiple parenchymal lung nodules and enlarged mediastinal and hilar lymph nodes, concerning for metastatic disease.  Medical oncology and Radiation Oncology have been ask to assist with treatment planning.     PLAN:    Patient was discussed with my attending physician, Dr. Nolan.    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  There are plans for multidisciplinary tumor board review.      Notably, the patient lives approximately 2 hours from [place], however, states that she would be interested in continuing care with  moving forwards.        I spent 35 minutes in the professional and overall care of this patient.

## 2024-10-18 VITALS
OXYGEN SATURATION: 96 % | DIASTOLIC BLOOD PRESSURE: 78 MMHG | HEIGHT: 63 IN | BODY MASS INDEX: 28.62 KG/M2 | HEART RATE: 65 BPM | TEMPERATURE: 97.9 F | WEIGHT: 161.5 LBS | RESPIRATION RATE: 16 BRPM | SYSTOLIC BLOOD PRESSURE: 145 MMHG

## 2024-10-18 LAB
ALBUMIN SERPL BCP-MCNC: 3.4 G/DL (ref 3.4–5)
ALBUMIN SERPL BCP-MCNC: 3.4 G/DL (ref 3.4–5)
ANION GAP SERPL CALC-SCNC: 10 MMOL/L (ref 10–20)
ANION GAP SERPL CALC-SCNC: 8 MMOL/L (ref 10–20)
BUN SERPL-MCNC: 15 MG/DL (ref 6–23)
BUN SERPL-MCNC: 16 MG/DL (ref 6–23)
CALCIUM SERPL-MCNC: 8.8 MG/DL (ref 8.6–10.6)
CALCIUM SERPL-MCNC: 9 MG/DL (ref 8.6–10.6)
CHLORIDE SERPL-SCNC: 100 MMOL/L (ref 98–107)
CHLORIDE SERPL-SCNC: 100 MMOL/L (ref 98–107)
CO2 SERPL-SCNC: 33 MMOL/L (ref 21–32)
CO2 SERPL-SCNC: 36 MMOL/L (ref 21–32)
CREAT SERPL-MCNC: 0.72 MG/DL (ref 0.5–1.05)
CREAT SERPL-MCNC: 0.73 MG/DL (ref 0.5–1.05)
EGFRCR SERPLBLD CKD-EPI 2021: >90 ML/MIN/1.73M*2
EGFRCR SERPLBLD CKD-EPI 2021: >90 ML/MIN/1.73M*2
ERYTHROCYTE [DISTWIDTH] IN BLOOD BY AUTOMATED COUNT: 12.8 % (ref 11.5–14.5)
GLUCOSE SERPL-MCNC: 111 MG/DL (ref 74–99)
GLUCOSE SERPL-MCNC: 134 MG/DL (ref 74–99)
HCT VFR BLD AUTO: 38.2 % (ref 36–46)
HGB BLD-MCNC: 11.9 G/DL (ref 12–16)
MAGNESIUM SERPL-MCNC: 2.31 MG/DL (ref 1.6–2.4)
MAGNESIUM SERPL-MCNC: 2.34 MG/DL (ref 1.6–2.4)
MCH RBC QN AUTO: 30.4 PG (ref 26–34)
MCHC RBC AUTO-ENTMCNC: 31.2 G/DL (ref 32–36)
MCV RBC AUTO: 97 FL (ref 80–100)
NRBC BLD-RTO: 0 /100 WBCS (ref 0–0)
PHOSPHATE SERPL-MCNC: 2.4 MG/DL (ref 2.5–4.9)
PHOSPHATE SERPL-MCNC: 2.8 MG/DL (ref 2.5–4.9)
PLATELET # BLD AUTO: 245 X10*3/UL (ref 150–450)
POTASSIUM SERPL-SCNC: 3.7 MMOL/L (ref 3.5–5.3)
POTASSIUM SERPL-SCNC: 3.9 MMOL/L (ref 3.5–5.3)
RBC # BLD AUTO: 3.92 X10*6/UL (ref 4–5.2)
SODIUM SERPL-SCNC: 139 MMOL/L (ref 136–145)
SODIUM SERPL-SCNC: 140 MMOL/L (ref 136–145)
WBC # BLD AUTO: 10.9 X10*3/UL (ref 4.4–11.3)

## 2024-10-18 PROCEDURE — 99232 SBSQ HOSP IP/OBS MODERATE 35: CPT

## 2024-10-18 PROCEDURE — 83735 ASSAY OF MAGNESIUM: CPT

## 2024-10-18 PROCEDURE — 80069 RENAL FUNCTION PANEL: CPT

## 2024-10-18 PROCEDURE — 2500000001 HC RX 250 WO HCPCS SELF ADMINISTERED DRUGS (ALT 637 FOR MEDICARE OP)

## 2024-10-18 PROCEDURE — 36415 COLL VENOUS BLD VENIPUNCTURE: CPT

## 2024-10-18 PROCEDURE — 2500000004 HC RX 250 GENERAL PHARMACY W/ HCPCS (ALT 636 FOR OP/ED)

## 2024-10-18 PROCEDURE — 97165 OT EVAL LOW COMPLEX 30 MIN: CPT | Mod: GO

## 2024-10-18 PROCEDURE — 97161 PT EVAL LOW COMPLEX 20 MIN: CPT | Mod: GP

## 2024-10-18 PROCEDURE — 99238 HOSP IP/OBS DSCHRG MGMT 30/<: CPT | Performed by: NURSE PRACTITIONER

## 2024-10-18 PROCEDURE — 97535 SELF CARE MNGMENT TRAINING: CPT | Mod: GO

## 2024-10-18 PROCEDURE — 85027 COMPLETE CBC AUTOMATED: CPT

## 2024-10-18 PROCEDURE — 94640 AIRWAY INHALATION TREATMENT: CPT

## 2024-10-18 RX ORDER — METOPROLOL TARTRATE 25 MG/1
12.5 TABLET, FILM COATED ORAL 2 TIMES DAILY
Qty: 60 TABLET | Refills: 5 | Status: SHIPPED | OUTPATIENT
Start: 2024-10-18 | End: 2025-10-18

## 2024-10-18 RX ORDER — LIDOCAINE 50 MG/G
1 PATCH TOPICAL DAILY
Qty: 14 PATCH | Refills: 0 | Status: SHIPPED | OUTPATIENT
Start: 2024-10-18

## 2024-10-18 RX ORDER — OXYCODONE HYDROCHLORIDE 5 MG/1
5 TABLET ORAL EVERY 6 HOURS PRN
Qty: 40 TABLET | Refills: 0 | Status: SHIPPED | OUTPATIENT
Start: 2024-10-18 | End: 2024-10-28

## 2024-10-18 ASSESSMENT — PAIN - FUNCTIONAL ASSESSMENT
PAIN_FUNCTIONAL_ASSESSMENT: 0-10
PAIN_FUNCTIONAL_ASSESSMENT: 0-10

## 2024-10-18 ASSESSMENT — PAIN SCALES - GENERAL
PAINLEVEL_OUTOF10: 5 - MODERATE PAIN
PAINLEVEL_OUTOF10: 6

## 2024-10-18 ASSESSMENT — COGNITIVE AND FUNCTIONAL STATUS - GENERAL
MOBILITY SCORE: 24
MOVING TO AND FROM BED TO CHAIR: A LITTLE
DAILY ACTIVITIY SCORE: 24
MOVING FROM LYING ON BACK TO SITTING ON SIDE OF FLAT BED WITH BEDRAILS: A LITTLE
CLIMB 3 TO 5 STEPS WITH RAILING: A LITTLE
TOILETING: A LITTLE
DAILY ACTIVITIY SCORE: 22
HELP NEEDED FOR BATHING: A LITTLE
WALKING IN HOSPITAL ROOM: A LITTLE
TURNING FROM BACK TO SIDE WHILE IN FLAT BAD: A LITTLE
STANDING UP FROM CHAIR USING ARMS: A LITTLE
MOBILITY SCORE: 18

## 2024-10-18 ASSESSMENT — ACTIVITIES OF DAILY LIVING (ADL)
ADL_ASSISTANCE: INDEPENDENT
HOME_MANAGEMENT_TIME_ENTRY: 10
BATHING_ASSISTANCE: STAND BY

## 2024-10-18 NOTE — PROGRESS NOTES
Physical Therapy    Physical Therapy Evaluation    Patient Name: Amalia Roa  MRN: [id number]  Department: [facility]  Room: [facility]5030-A  Today's Date: 10/18/2024   Time Calculation  Start Time: 0846  Stop Time: 0909  Time Calculation (min): 23 min    Assessment/Plan   PT Assessment  PT Assessment Results: Decreased strength, Decreased endurance, Impaired balance, Decreased mobility, Pain  Rehab Prognosis: Excellent  Barriers to Discharge: none  Evaluation/Treatment Tolerance: Patient tolerated treatment well  Medical Staff Made Aware: Yes  Strengths: Attitude of self, Ability to acquire knowledge  Barriers to Participation: Comorbidities  End of Session Communication: Bedside nurse  Assessment Comment: Pt is a 59 year old female who presents s/p PEG. Pt demonstrates deficits in strength, endurance, balance leading to impairments in functional mobility  End of Session Patient Position: Up in chair, Alarm off, not on at start of session  IP OR SWING BED PT PLAN  Inpatient or Swing Bed: Inpatient  PT Plan  Treatment/Interventions: Bed mobility, Transfer training, Gait training, Stair training, Balance training, Neuromuscular re-education, Strengthening, Endurance training, Range of motion, Therapeutic exercise, Therapeutic activity, Home exercise program, Positioning, Postural re-education  PT Plan: Ongoing PT  PT Frequency: 2 times per week  PT Discharge Recommendations: Low intensity level of continued care  PT Recommended Transfer Status: Assist x1  PT - OK to Discharge: Yes (see discharge recommendation)    Subjective   General Visit Information:  General  Reason for Referral: lesion involving her tongue status post direct laryngoscopy and biopsy, bronchoscopy, and esophagoscopy and insertion of a feeding tube  Past Medical History Relevant to Rehab: Base of tongue Cancer  Oropharyngeal Dysphagia  Overweight per BMI  Moderate Protein Calorie Malnutrition  Depression  Anxiety  PTSD  Panic Attacks  HTN  HFrEF(43%  HLD   CAD  Hx ox MI  Stent to LAD  Asthma  MEÑO  Family/Caregiver Present: Yes  Caregiver Feedback: Pt's  present at start of session  Co-Treatment: OT  Co-Treatment Reason: to maximize pt safety, function, and mobility requiring 2 sets of skilled hands  Prior to Session Communication: Bedside nurse  Patient Position Received: Bed, 3 rail up, Alarm off, not on at start of session  Preferred Learning Style: visual, verbal  General Comment: Pt is pleasant, cooperative, and willing to participate in therapy  Home Living:  Home Living  Type of Home: Mobile home  Lives With: Spouse  Home Adaptive Equipment: None  Home Layout: One level, Able to live on main level with bedroom/bathroom  Home Access: Stairs to enter with rails  Entrance Stairs-Number of Steps: 2  Bathroom Shower/Tub: Tub/shower unit  Bathroom Toilet: Standard  Prior Level of Function:  Prior Function Per Pt/Caregiver Report  Level of [place]: Independent with ADLs and functional transfers, Independent with homemaking with ambulation  ADL Assistance: Independent  Homemaking Assistance: Independent  Ambulatory Assistance: Independent  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)  Leisure: family  Hand Dominance: Right  Prior Function Comments: no hx of falls, (+) driving  Precautions:  Precautions  Medical Precautions: Fall precautions             Objective   Pain:  Pain Assessment  Pain Assessment: 0-10  0-10 (Numeric) Pain Score: 5 - Moderate pain  Pain Type: Surgical pain  Pain Location: Abdomen  Cognition:  Cognition  Overall Cognitive Status: Within Functional Limits  Orientation Level: Oriented X4  Insight: Within function limits    General Assessments:  Activity Tolerance  Endurance: Tolerates 10 - 20 min exercise with multiple rests    Sensation  Light Touch: No apparent deficits       Static Sitting Balance  Static Sitting-Balance Support: Feet supported, Bilateral upper extremity supported  Static Sitting-Level of Assistance: Close  supervision  Dynamic Sitting Balance  Dynamic Sitting-Balance Support: Feet supported, Bilateral upper extremity supported  Dynamic Sitting-Level of Assistance: Close supervision    Static Standing Balance  Static Standing-Balance Support: No upper extremity supported  Static Standing-Level of Assistance: Close supervision  Dynamic Standing Balance  Dynamic Standing-Balance Support: No upper extremity supported  Dynamic Standing-Level of Assistance: Close supervision  Functional Assessments:  Bed Mobility  Bed Mobility: Yes  Bed Mobility 1  Bed Mobility 1: Supine to sitting  Level of Assistance 1: Distant supervision, Minimal verbal cues  Bed Mobility Comments 1: VCs. HOB elevated    Transfers  Transfer: Yes  Transfer 1  Transfer From 1: Sit to, Stand to  Transfer to 1: Stand, Sit  Technique 1: Sit to stand, Stand to sit  Transfer Level of Assistance 1:  (SBA)  Trials/Comments 1: VCs, cues for hip and knee extension, x2 trials    Ambulation/Gait Training  Ambulation/Gait Training Performed: Yes  Ambulation/Gait Training 1  Surface 1: Level tile  Device 1: No device  Assistance 1: Minimal verbal cues (SBA)  Quality of Gait 1: Decreased step length (decreased sachin)  Comments/Distance (ft) 1: 10 ft, 50 ft    Stairs  Stairs: No  Extremity/Trunk Assessments:  RLE   RLE :  (>/= 3+/5 observed via function)  LLE   LLE :  (>/= 3+/5 observed via function)  Outcome Measures:  [facility] Basic Mobility  Turning from your back to your side while in a flat bed without using bedrails: A little  Moving from lying on your back to sitting on the side of a flat bed without using bedrails: A little  Moving to and from bed to chair (including a wheelchair): A little  Standing up from a chair using your arms (e.g. wheelchair or bedside chair): A little  To walk in hospital room: A little  Climbing 3-5 steps with railing: A little  Basic Mobility - Total Score: 18    Encounter Problems       Encounter Problems (Active)       Mobility        STG - Patient will ambulate >150 ft independently with no LOB, progress as able and appropriate        Start:  10/18/24    Expected End:  11/01/24            STG - Patient will ascend and descend four to six stairs independently        Start:  10/18/24    Expected End:  11/01/24            Pt will ambulate 150 ft independently and no signs of fatigue or SOB        Start:  10/18/24    Expected End:  11/01/24               PT Transfers       STG - Patient will perform bed mobility independently        Start:  10/18/24    Expected End:  11/01/24            STG - Patient will transfer sit to and from stand independently        Start:  10/18/24    Expected End:  11/01/24                   Education Documentation  Precautions, taught by Caitlyn Cardenas PT at 10/18/2024 12:56 PM.  Learner: Patient  Readiness: Acceptance  Method: Explanation  Response: Verbalizes Understanding  Comment: bed mobility, transfers, gait, abdominal precautions, activity and symptom monitoring    Mobility Training, taught by Caitlyn Cardenas PT at 10/18/2024 12:56 PM.  Learner: Patient  Readiness: Acceptance  Method: Explanation  Response: Verbalizes Understanding  Comment: bed mobility, transfers, gait, abdominal precautions, activity and symptom monitoring    Education Comments  No comments found.        10/18/24 at 12:56 PM - Caitlyn Cardenas PT

## 2024-10-18 NOTE — PROGRESS NOTES
"Nutrition Follow Up Assessment:   Nutrition Assessment    The patient is a 59 y.o. female who is hospital day #4.  Since last RD visit:  - SLP evaluated pt. Rec regular diet with thin liquids on 10/15.  - Working on TF advancement. Some c/o fullness where TF Was briefly stopped. Bowel regimen adjusted to help pt have a BM.   - Rad consulted. Pt added to tumor board.    Nutrition History:  Food and Nutrient History: Met with patient today. Reports continuous TF went well. Only complain is that it made her feel full. Plan is for her to transition to bolus feeds today. Is currently awaiting her first feed. Knows that plan is for bolus feeds 3 times a day. No BM yet (last recorded 10/13). Pt continues to eat by mouth 2-3 meals ordered per day. Per record of flowsheet intake varies (25-75% of meals).      Anthropometrics:  Start of admission anthropometrics:  Height: 160 cm (5' 3\")  Weight: 73.3 kg (161 lb 8 oz)  BMI (Calculated): 28.62    IBW/kg (Dietitian Calculated): 52.27 kg  Percent of IBW: 140 %       Weight Change %: No new wt to assess       Nutrition Focused Physical Exam Findings:  defer: done 10/14    Edema:   none  Physical Findings:   Skin wnl     Objective Data:    Last BM Date: 10/13/24    Nutrition Significant Labs:    Results from last 7 days   Lab Units 10/18/24  0720 10/17/24  1501 10/17/24  0625 10/16/24  1506 10/16/24  1115   HEMOGLOBIN g/dL 11.9*  --  11.1*  --  11.8*   MCV fL 97  --  99  --  94   GLUCOSE mg/dL 111*   < > 92   < > 130*   POTASSIUM mmol/L 3.9   < > 4.2   < > 4.1   SODIUM mmol/L 140   < > 140   < > 143   PHOSPHORUS mg/dL 2.8   < > 3.3   < > 3.5   MAGNESIUM mg/dL 2.34   < > 2.17   < > 2.03   CREATININE mg/dL 0.72   < > 0.72   < > 0.78   BUN mg/dL 15   < > 14   < > 15    < > = values in this interval not displayed.       Nutrition Specific Medications:  atorvastatin, 40 mg, g-tube, Daily  magnesium hydroxide, 30 mL, oral, Daily  polyethylene glycol, 17 g, g-tube, BID  sennosides, 2 " tablet, g-tube, BID    I/O:   I/O last 2 completed shifts:  In: 1730 (23.6 mL/kg) [P.O.:200; NG/GT:1530]  Out: 550 (7.5 mL/kg) [Urine:550 (0.3 mL/kg/hr)]  Weight: 73.3 kg     Dietary Orders (From admission, onward)       Start     Ordered    10/18/24 0433  Enteral feeding WITH diet order Diet type: Regular; Fluid consistency: Thin 0; Tube feeding formula: Isosource 1.5; 330; TID; 60; Water; Other (specify); pre and post feed  Continuous        Comments: -- Isosource 1.5 bolus @330 ml x3/d (4 cartons/d)              -- FWF: 60 ml pre/post feeds + 180 ml BID   Question Answer Comment   Diet type Regular    Fluid consistency Thin 0    Tube feeding formula: Isosource 1.5    Tube feeding bolus (mL): 330    Tube feeding bolus frequency: TID    Tube feeding flush (mL): 60    Flush type: Water    Flush frequency: Other (specify)    Additional Details pre and post feed        10/18/24 0433                     Estimated Needs:   Total Energy Estimated Needs (kCal): 1825 kCal  Method for Estimating Needs: 25 kcal/kg * 73 kg    Total Protein Estimated Needs (g): 87.96 g  Method for Estimating Needs: 1.2 g/kg * 73 kg    Method for Estimating Needs: 1 ml/kcal or per team          Nutrition Diagnosis   Malnutrition Diagnosis  Patient has Malnutrition Diagnosis: Yes  Diagnosis Status: Ongoing  Malnutrition Diagnosis: Moderate malnutrition related to chronic disease or condition  As Evidenced by: significant wt loss of 18% x11 months; PO intake likely meeting <75% of nutr needs for >/= 1 month            Nutrition Interventions/Recommendations   Individualized Nutrition Prescription Provided for : 1850 kcal and 90g protein via TF and oral diet    Current bolus regimen remains appropriate:    -- Isosource 1.5 bolus @330 ml x3/d (4 cartons/d)              -- FWF: 60 ml pre/post feeds + 180 ml BID   -- This regimen provides: 1000 ml, 1500 kcal, 68g protein 1484 ml free water (764 ml from TF)  If pt continues to c/o fullness w/ bolus  feeds consider increasing bolus feed infusion time to 30-45 minutes (can use pump). If pt tolerates this better will need to be set up with gravity bags at discharge.      Nutrition Education: Discussed bolus feed regimen, FWF, use of gravity bag, and staying upright for at least 30 minutes after feeds. Answered all nutrition related questions that came up.        Nutrition Monitoring and Evaluation   Monitoring and Evaluation Plan: Enteral and parenteral nutrition intake, Energy intake  Energy Intake: Estimated energy intake  Criteria: at least 25% of nutr needs  Enteral and Parenteral Nutrition Intake: Enteral nutrition formula/solution, Enteral nutrition intake  Criteria: Recommended regimen    Monitoring and Evaluation Plan: Weight  Weight: Weight change  Criteria: no wt change                   Time Spent (min): 30 minutes

## 2024-10-18 NOTE — PROGRESS NOTES
10/16/24 0800   Discharge Planning   Living Arrangements Spouse/significant other   Support Systems Spouse/significant other;Children;Family members   Assistance Needed none   Type of Residence Private residence   Number of Stairs to Enter Residence 3   Number of Stairs Within Residence 6   Who is requesting discharge planning? Provider   Home or Post Acute Services In home services   Type of Home Care Services DME or oxygen;Home nursing visits   Expected Discharge Disposition Home H   Does the patient need discharge transport arranged? No   Financial Resource Strain   How hard is it for you to pay for the very basics like food, housing, medical care, and heating? Not hard   Housing Stability   In the last 12 months, was there a time when you were not able to pay the mortgage or rent on time? N   In the past 12 months, how many times have you moved where you were living? 1   At any time in the past 12 months, were you homeless or living in a shelter (including now)? N   Transportation Needs   In the past 12 months, has lack of transportation kept you from medical appointments or from getting medications? no   In the past 12 months, has lack of transportation kept you from meetings, work, or from getting things needed for daily living? No     TCC Note    Plan per Medical/Surgical Team:  status post direct laryngoscopy and biopsy, bronchoscopy, and esophagoscopy and insertion of a feeding tube on 10/14/2024 by Dr. Montanez   Status: inpatient   Payor Source: [employer] medicaid  Discharge disposition: home  Expected date of discharge: 10/18/24  Barriers: no home care agency due to insurance  Preferred home care agency: blanket referral    TCC met with patient at bedside to discuss anticipated discharge needs. Demographics and insurance verifed with patient. Patient lives at home with spouse and was independent with ADLs prior to admission. Patient is agreeable to TCC sending referral for home enteral  nutrition to [facility] and a blanket referral for home health. Will continue to follow patient for any discharge needs.   Stephania HUSSEIN     10/18/24- No accepting home health agency at this time. [facility] provided patient with a case of home tube feeds and education. Wll continue to follow patient for discharge needs.  Stephania Easley RN TCC

## 2024-10-18 NOTE — CARE PLAN
The patient's goals for the shift include      The clinical goals for the shift include tolerate tube feeds

## 2024-10-18 NOTE — DISCHARGE SUMMARY
Discharge Diagnosis  Cancer of base of tongue (Multi)    Issues Requiring Follow-Up  Post op visit & Oncology visits    Test Results Pending At Discharge  Pending Labs       Order Current Status    PD-L1 (ALL NON-LUNG) In process    Surgical Pathology Exam In process            Hospital Course  Amalia Roa is a 59 y.o. female with depression, anxiety, PTSD, panic attacks, HTN, HFrEF (EF 43% 9/2024), HLD, CAD, MI in 2014 s/p stenting to LAD and LCx, mitral regurg s/p repair, asthma, MEÑO on CPAP who presented for Triple endoscopy, PEG, and tongue biopsy by Dr Montanez on 10/14. Patient had an uncomplicated surgical course. Patient recovered in PACU and was transferred to [place] [address] for post-operative care.     Patient post-operative course was uncomplicated.  During the stay she had a CT scan which showed likely meds to her lungs. Medical oncology and radiation oncology were consulted for her, and she was scheduled for follow up here at [facility]. During her stay, tube feeds were started through her PEG tube which she tolerated well on day of discharge. On day of discharge, post-operative pain was well controlled with enteral pain medication, breathing on room air, voiding spontaneously ambulating well, and was tolerating her tube feeds. Follow-up arranged.      Pertinent Physical Exam At Time of Discharge  Physical Exam    Home Medications     Medication List      START taking these medications     lidocaine 5 % patch; Commonly known as: Lidoderm; Place 1 patch over 12   hours on the skin once daily. Remove & discard patch within 12 hours or as   directed by MD.   metoprolol tartrate 25 mg tablet; Commonly known as: Lopressor; Take 0.5   tablets (12.5 mg) by mouth 2 times a day.     CONTINUE taking these medications     acetaminophen 325 mg tablet; Commonly known as: Tylenol   albuterol 90 mcg/actuation inhaler   ALPRAZolam 0.5 mg tablet; Commonly known as: Xanax   aspirin 81 mg chewable tablet   atorvastatin 40 mg  tablet; Commonly known as: Lipitor   doxepin 10 mg capsule; Commonly known as: SINEquan   fluticasone 44 mcg/actuation inhaler; Commonly known as: Flovent   melatonin 10 mg tablet,chewable   midodrine 5 mg tablet; Commonly known as: Proamatine       Outpatient Follow-Up  Future Appointments   Date Time Provider Department Center   10/22/2024  9:15 AM [facility] ADMIN ROOM PET CT 2 [facility]   10/22/2024 10:15 AM [facility] PET CT 2 [facility]   10/22/2024  1:20 PM Pauline Garcia MD [id number] [facility]   10/25/2024 11:15 AM [id number]   10/25/2024  1:00 PM Shayna Nolan MD [id number] [facility]       Madelyn Parr APRN, CNP  Certified Family Nurse Practitioner  Nurse Practitioner III  Department of Otolaryngology: Head & Neck Surgery  Personal Pager [phone]  ENT Team  Head and Neck Phone: [phone]

## 2024-10-18 NOTE — PROGRESS NOTES
"Amalia Roa is a 59 y.o. female on day 4 of admission presenting with Cancer of base of tongue (Multi).    Subjective   Patient doing well this morning. Currently no nausea/vomiting.    Objective     Physical Exam  Constitutional:       Appearance: Normal appearance.   Cardiovascular:      Rate and Rhythm: Normal rate and regular rhythm.      Pulses: Normal pulses.      Heart sounds: Normal heart sounds.   Pulmonary:      Effort: Pulmonary effort is normal.      Breath sounds: Normal breath sounds.   Abdominal:      General: Abdomen is flat.      Palpations: Abdomen is soft.   Skin:     General: Skin is warm and dry.   Neurological:      General: No focal deficit present.      Mental Status: She is alert and oriented to person, place, and time.   Psychiatric:         Mood and Affect: Mood normal.         Behavior: Behavior normal.     Last Recorded Vitals  Blood pressure 117/67, pulse 63, temperature 37 °C (98.6 °F), temperature source Temporal, resp. rate 16, height 1.6 m (5' 3\"), weight 73.3 kg (161 lb 8 oz), SpO2 100%.  Intake/Output last 3 Shifts:  I/O last 3 completed shifts:  In: 2160 (29.5 mL/kg) [P.O.:200; NG/GT:1960]  Out: 650 (8.9 mL/kg) [Urine:650 (0.2 mL/kg/hr)]  Weight: 73.3 kg     Relevant Results               Scheduled medications  acetaminophen, 975 mg, oral, q8h JESSE   Or  acetaminophen, 1,000 mg, oral, q8h JESSE   Or  acetaminophen, 1,000 mg, g-tube, q8h JESSE  aspirin, 81 mg, g-tube, Daily  atorvastatin, 40 mg, g-tube, Daily  buPROPion SR, 150 mg, oral, Daily  doxepin, 10 mg, g-tube, Nightly  enoxaparin, 40 mg, subcutaneous, q24h  lidocaine, 1 patch, transdermal, q24h  magnesium hydroxide, 30 mL, oral, Daily  metoprolol tartrate, 12.5 mg, oral, BID  [Held by provider] midodrine, 5 mg, g-tube, TID  mometasone, 2 puff, inhalation, Daily  polyethylene glycol, 17 g, g-tube, BID  sennosides, 2 tablet, g-tube, BID      Continuous medications     PRN medications  PRN medications: albuterol, ALPRAZolam, " HYDROmorphone, melatonin, metoclopramide, naloxone, ondansetron **OR** ondansetron, oxyCODONE, oxyCODONE **OR** oxyCODONE **OR** oxyCODONE  Results for orders placed or performed during the hospital encounter of 10/14/24 (from the past 24 hours)   Renal function panel   Result Value Ref Range    Glucose 131 (H) 74 - 99 mg/dL    Sodium 141 136 - 145 mmol/L    Potassium 3.7 3.5 - 5.3 mmol/L    Chloride 101 98 - 107 mmol/L    Bicarbonate 36 (H) 21 - 32 mmol/L    Anion Gap 8 (L) 10 - 20 mmol/L    Urea Nitrogen 14 6 - 23 mg/dL    Creatinine 0.71 0.50 - 1.05 mg/dL    eGFR >90 >60 mL/min/1.73m*2    Calcium 8.3 (L) 8.6 - 10.6 mg/dL    Phosphorus 3.1 2.5 - 4.9 mg/dL    Albumin 3.5 3.4 - 5.0 g/dL   Magnesium   Result Value Ref Range    Magnesium 2.04 1.60 - 2.40 mg/dL              Assessment/Plan   Assessment & Plan  Cancer of base of tongue (Multi)    Oropharyngeal dysphagia    Tongue mass    Amalia Roa is a 59 y.o. female 58 y/o female s/p panendoscopy, biopsy and G-tube insertion on 10/14/24 with Dr. Montanez secondary to dysphagia in the setting of large tongue mass.  PMHX includes Depression, anxiety, PTSD, panic attacks, HTN, HFrEF (EF 43% 9/2024), HLD, CAD with MI in 2014 s/p stenting to LAD and LCx, mitral regurgitation s/p repair in 2015 with residual mild MVR, Asthma, MEÑO on CPAP. Medicine consulted for co-management.     Recs:  -Continue home wellbutrin  -Continue metoprolol 12.5mg BID, monitor BP after initiation   -Continue holding midodrine in setting of normal blood pressures  -RFP/Mag BID, replete lytes as needed     Patient tolerating tube feeds. Likely discharge today. Medicine will sign off     *Recommendations not final until signed by attending*    Jensen Crane MD  PGY-1

## 2024-10-18 NOTE — TUMOR BOARD NOTE
[facility] HEAD AND NECK TUMOR BOARD NOTE:    Amalia Roa Is a 59 y.o. female who was presented by Dr. Montanez at [facility] Head & Neck Tumor Board on 10/28/24 which included representatives from all Head & Neck disciplines (Medical oncology/Radiation oncology/Otolaryngology/Radiology/Pathology).     History and Physical in Brief:  59 y.o. female who presented to Dr. Montanez's clinic on 9/20/2024 with a lesion involving her tongue causing dysphagia. Examination of the oral cavity and oropharynx showed asymmetry of the tongue, more prominent on the right, as well as an ulcerative lesion involving the right posterior aspect of the anterior tongue and the retromolar trigone. She was noted to have some weakness of her tongue but good mandibular excursion. Neck exam revealed a 3 cm left-sided level 2 mass. Scope exam revealed an exophytic mass involving the right piriform sinus.    Imaging:  CT Neck with Contrast (10/8/2024):   IMPRESSION:  1. There is an extensive right sided soft tissue mass involving the right oropharynx, oral cavity and hypopharynx, as detailed above. At the level of the hypopharynx there appears to be extra laryngeal spread into the adjacent strap muscles.  2. There is bilateral cervical lymphadenopathy. On the right there is retropharyngeal, level 2 through level 4 lymphadenopathy with thrombosis or occlusion of the right internal jugular vein and displacement and partial encasement of the right carotid artery. On the left there is a cystic or necrotic lymph node with mass effect and mild narrowing of the left internal jugular vein.  3. Multiple pulmonary nodules concerning for metastatic disease.    Chest CT with Contrast (10/14/2024):   IMPRESSION:  1.  Findings concerning for parenchymal and mediastinal metastatic disease with multiple parenchymal lung nodules and enlarged mediastinal and hilar lymph nodes.  2. Postoperative pneumoperitoneum and G-tube placement  3.  Suspicious left adrenal gland nodule for adrenal gland metastases.  4. Postoperative changes of mitral valve repair and bilateral  coronary stent placement.    Procedures to date:  Panendoscopy with biopsy and insertion of PEG tube with Dr. Montanez on 10/14/2024.     Pertinent Pathology:  Frozen sections with squamous cell carcinoma. P16 status pending.     The [facility] Head and Neck Tumor Board considered available treatment options and made the following staging and recommendations:    Staging and Recommendations:    Site: right Oropharyngeal  squamous cell carcinoma, p16 status unknown  Stage:  Cancer Staging   No matching staging information was found for the patient.  T4 N2 M1    Recommendation:  Induction chemotherapy , possible immunotherapy, possible radiation    Clinical Trial Status:  Immunotherapy trial is a possibility. PDL-1 will be added to pathology to determine candidacy.     National site-specific guidelines were discussed with respect to the case.

## 2024-10-18 NOTE — HOSPITAL COURSE
Amalia Roa is a 59 y.o. female with depression, anxiety, PTSD, panic attacks, HTN, HFrEF (EF 43% 9/2024), HLD, CAD, MI in 2014 s/p stenting to LAD and LCx, mitral regurg s/p repair, asthma, MEÑO on CPAP who presented for Triple endoscopy, PEG, and tongue biopsy by Dr Montanez on 10/14. Patient had an uncomplicated surgical course. Patient recovered in PACU and was transferred to [place] [address] for post-operative care.     Patient post-operative course was uncomplicated.  During the stay she had a CT scan which showed likely meds to her lungs. Medical oncology and radiation oncology were consulted for her, and she was scheduled for follow up here at [facility]. During her stay, tube feeds were started through her PEG tube which she tolerated well on day of discharge. On day of discharge, post-operative pain was well controlled with enteral pain medication, breathing on room air, voiding spontaneously ambulating well, and was tolerating her tube feeds. Follow-up arranged.

## 2024-10-18 NOTE — PROGRESS NOTES
Occupational Therapy    Evaluation/Treatment    Patient Name: Amalia Roa  MRN: [id number]  Department: [facility]  Room: [address] [place] [address]-  Today's Date: 10/18/24  Time Calculation  Start Time: 0846  Stop Time: 0909  Time Calculation (min): 23 min       Assessment:  OT Assessment: NN  Prognosis: Excellent  Barriers to Discharge: None  Evaluation/Treatment Tolerance: Patient tolerated treatment well  Medical Staff Made Aware: Yes  End of Session Communication: Bedside nurse  End of Session Patient Position: Up in chair, Alarm off, not on at start of session  OT Assessment Results: Decreased ADL status  Prognosis: Excellent  Barriers to Discharge: None  Evaluation/Treatment Tolerance: Patient tolerated treatment well  Medical Staff Made Aware: Yes  Strengths: Ability to acquire knowledge, Attitude of self, Support of Caregivers  Barriers to Participation:  (none)  Plan:  No Skilled OT: No acute OT goals identified  OT Discharge Recommendations: No further acute OT, No OT needed after discharge  Equipment Recommended upon Discharge:  (shower chair)  OT Recommended Transfer Status: Assist of 1  OT - OK to Discharge: Yes       Subjective   Current Problem:  1. Cancer of base of tongue (Multi)  Enteral Nutrition    Referral to Home Health    Referral to Hematology and Oncology    metoprolol tartrate (Lopressor) 25 mg tablet    Referral to Radiation Oncology      2. Oropharyngeal dysphagia  Surgical Pathology Exam    Surgical Pathology Exam    Enteral Nutrition    Referral to Home Health    PD-L1 (ALL NON-LUNG)    PD-L1 (ALL NON-LUNG)      3. Tongue mass  Surgical Pathology Exam    Surgical Pathology Exam    PD-L1 (ALL NON-LUNG)    PD-L1 (ALL NON-LUNG)        General:   OT Received On: 10/18/24  General  Reason for Referral: lesion involving her tongue status post direct laryngoscopy and biopsy, bronchoscopy, and esophagoscopy and insertion of a feeding tube  Past Medical History Relevant to Rehab: Base of tongue Cancer   Oropharyngeal Dysphagia  Overweight per BMI  Moderate Protein Calorie Malnutrition  Depression  Anxiety  PTSD  Panic Attacks  HTN  HFrEF(43%  HLD  CAD  Hx ox MI  Stent to LAD  Asthma  MEÑO  Family/Caregiver Present: Yes  Co-Treatment: PT  Co-Treatment Reason: maximize pt safety  Prior to Session Communication: Bedside nurse  Patient Position Received: Bed, 3 rail up, Alarm off, not on at start of session  Precautions:  Medical Precautions: Fall precautions            Pain:  Pain Assessment  Pain Assessment: 0-10  0-10 (Numeric) Pain Score: 5 - Moderate pain  Pain Type: Surgical pain  Pain Location: Abdomen    Objective   Cognition:  Overall Cognitive Status: Within Functional Limits  Orientation Level: Oriented X4  Following Commands: Follows all commands and directions without difficulty  Safety Judgment: Good awareness of safety precautions  Insight: Within function limits           Home Living:  Type of Home: Mobile home  Lives With: Spouse  Home Adaptive Equipment: None  Home Layout:  (2 KATHY HR 1 level)  Bathroom Shower/Tub: Tub/shower unit  Bathroom Toilet: Standard  Prior Function:  Level of [place]: Independent with ADLs and functional transfers, Independent with homemaking with ambulation  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)  Leisure: family  Hand Dominance: Right  IADL History:  IADL Comments: I I/ADLs +drives  ADL:  Eating Assistance: Independent  Grooming Assistance: Independent  Bathing Assistance: Stand by (antiicpated)  UE Dressing Assistance: Independent  LE Dressing Assistance: Independent  Toileting Assistance with Device:  (S)  Activities of Daily Living:      Grooming  Grooming Level of Assistance:  (handwashing standing sink side I)       UE Dressing  UE Dressing Level of Assistance:  (adjusted gown I standing)    LE Dressing  LE Dressing:  (doffed/donned undergarments toileting I)    Toileting  Toileting Level of Assistance:  (SBA transfers, I seated toileting and yuliya care)  Bed  Mobility/Transfers: Bed Mobility  Bed Mobility:  (sup to sit I)    Transfers  Transfer:  (sit/stand SBA, toilet transfer SBA L grab bar)      Functional Mobility:  Functional Mobility  Functional Mobility Performed:  (pt performed fxnl mob to/from bed/bathroom/x2 hallway distance S)  Sitting Balance:  Dynamic Sitting Balance  Dynamic Sitting-Level of Assistance: Independent  Standing Balance:  Dynamic Standing Balance  Dynamic Standing-Level of Assistance: Close supervision        Vision:Vision - Basic Assessment  Current Vision: No visual deficits  Sensation:  Light Touch: No apparent deficits  Strength:  Strength Comments: NED WFL       Coordination:  Movements are Fluid and Coordinated: Yes   Hand Function:  Hand Function  Gross Grasp: Functional  Extremities: RUE   RUE : Within Functional Limits and LUE   LUE: Within Functional Limits      Outcome Measures: [facility] Daily Activity  Putting on and taking off regular lower body clothing: None  Bathing (including washing, rinsing, drying): A little  Putting on and taking off regular upper body clothing: None  Toileting, which includes using toilet, bedpan or urinal: A little  Taking care of personal grooming such as brushing teeth: None  Eating Meals: None  Daily Activity - Total Score: 22         and OT Adult Other Outcome Measures  4AT: -    Education Documentation  Body Mechanics, taught by Eliza Lopez OT at 10/18/2024 11:20 AM.  Learner: Patient  Readiness: Acceptance  Method: Explanation, Demonstration  Response: Verbalizes Understanding, Demonstrated Understanding  Comment: abd prec I/ADLs    Precautions, taught by Eliza Lopez OT at 10/18/2024 11:20 AM.  Learner: Patient  Readiness: Acceptance  Method: Explanation, Demonstration  Response: Verbalizes Understanding, Demonstrated Understanding  Comment: abd prec I/ADLs    ADL Training, taught by Eliza Lopez OT at 10/18/2024 11:20 AM.  Learner: Patient  Readiness: Acceptance  Method: Explanation,  Demonstration  Response: Verbalizes Understanding, Demonstrated Understanding  Comment: abd prec I/ADLs    Education Comments  No comments found.

## 2024-10-18 NOTE — PROGRESS NOTES
"  Ear Nose & Throat Progress Note      Amalia Roa is a 59 y.o. female on day 4 of admission presenting with Cancer of base of tongue (Multi).     Subjective   No acute event overnight. Patient with a large bowel movement in the early morning hours..     Objective   Physical Exam  Vitals reviewed in EMR  Gen: NAD, AOx3, resting comfortably in bed  Eyes: EOMI, sclera clear, PERRL  Ears: Normal external ears bilaterally  Nose: No rhinorrhea; anterior nares clear  Oral Cavity: MMM  Head: normocephalic, atraumatic  Neck: Skin free of scars  Resp: Non-labored breathing  on julio gail  Cards: No clubbing/cyanosis/edema in hands  Gastro: Soft, non-distended, PEG tube in place  : Voids  MSK: Moves all extremities  Psych: Appropriate mood and affect    Last Recorded Vitals  Blood pressure 117/67, pulse 63, temperature 37 °C (98.6 °F), temperature source Temporal, resp. rate 16, height 1.6 m (5' 3\"), weight 73.3 kg (161 lb 8 oz), SpO2 100%.  Intake/Output last 3 Shifts:  I/O last 3 completed shifts:  In: 2160 (29.5 mL/kg) [P.O.:200; NG/GT:1960]  Out: 650 (8.9 mL/kg) [Urine:650 (0.2 mL/kg/hr)]  Weight: 73.3 kg     Relevant Results  Results for orders placed or performed during the hospital encounter of 10/14/24 (from the past 24 hours)   Renal function panel   Result Value Ref Range    Glucose 131 (H) 74 - 99 mg/dL    Sodium 141 136 - 145 mmol/L    Potassium 3.7 3.5 - 5.3 mmol/L    Chloride 101 98 - 107 mmol/L    Bicarbonate 36 (H) 21 - 32 mmol/L    Anion Gap 8 (L) 10 - 20 mmol/L    Urea Nitrogen 14 6 - 23 mg/dL    Creatinine 0.71 0.50 - 1.05 mg/dL    eGFR >90 >60 mL/min/1.73m*2    Calcium 8.3 (L) 8.6 - 10.6 mg/dL    Phosphorus 3.1 2.5 - 4.9 mg/dL    Albumin 3.5 3.4 - 5.0 g/dL   Magnesium   Result Value Ref Range    Magnesium 2.04 1.60 - 2.40 mg/dL         Scheduled medications  acetaminophen, 975 mg, oral, q8h JESSE   Or  acetaminophen, 1,000 mg, oral, q8h JESSE   Or  acetaminophen, 1,000 mg, g-tube, q8h JESSE  aspirin, 81 mg, " g-tube, Daily  atorvastatin, 40 mg, g-tube, Daily  buPROPion SR, 150 mg, oral, Daily  doxepin, 10 mg, g-tube, Nightly  enoxaparin, 40 mg, subcutaneous, q24h  lidocaine, 1 patch, transdermal, q24h  magnesium hydroxide, 30 mL, oral, Daily  metoprolol tartrate, 12.5 mg, oral, BID  [Held by provider] midodrine, 5 mg, g-tube, TID  mometasone, 2 puff, inhalation, Daily  polyethylene glycol, 17 g, g-tube, BID  sennosides, 2 tablet, g-tube, BID      Continuous medications     PRN medications  PRN medications: albuterol, ALPRAZolam, HYDROmorphone, melatonin, metoclopramide, naloxone, ondansetron **OR** ondansetron, oxyCODONE, oxyCODONE **OR** oxyCODONE **OR** oxyCODONE                         Assessment/Plan   Assessment & Plan  Cancer of base of tongue (Multi)    Oropharyngeal dysphagia    Tongue mass    Assessment:  58 year old female with a lesion involving her tongue status post direct laryngoscopy and biopsy, bronchoscopy, and esophagoscopy and insertion of a feeding tube on 10/14/2024 by Dr. Montanez.     Active Issues:  Base of tongue Cancer  Oropharyngeal Dysphagia  Overweight per BMI  Moderate Protein Calorie Malnutrition  Depression  Anxiety  PTSD  Panic Attacks  HTN  HFrEF(43%  HLD  CAD  Hx ox MI  Stent to LAD  Asthma  MEÑO     Plan:  -Analgesia:  Scheduled Acetaminophen; PRN Oxycodone  -Neuro: restarted home Wellbutrin   -FEN:  Transition to intermittent bolus TF, Regular diet; monitor and replete electrolytes as needed  -Pulm: wean oxygen to room air,   -ID: No current interventions  -Cardiac: Vitals Q4hr ; Medicine consult recs appreciated  -Endo: No current interventions  -GI: Bowel regimen, PPI,  and PRN anti-emetic  -: voids  -Steroids/Special: SLP consult->MBS-> passed              -Medical oncology consulted              -Radiation oncology consulted  -Embolic PPx: SQH, SCDs while in bed  -Dispo: Continue Care on Tip 5; Discharge planning for today vs tomorrow home with home care pending TF  tolerance.     All plans discussed with attendings after morning rounds.          I spent 35 minutes in the professional and overall care of this patient.      Madelyn Parr APRN, CNP  Certified Family Nurse Practitioner  Nurse Practitioner III  Department of Otolaryngology: Head & Neck Surgery  Personal Pager [phone]  ENT Team  Head and Neck Phone: [phone]

## 2024-10-21 ENCOUNTER — PATIENT OUTREACH (OUTPATIENT)
Dept: HEMATOLOGY/ONCOLOGY | Facility: HOSPITAL | Age: 59
End: 2024-10-21
Payer: MEDICAID

## 2024-10-21 LAB
LAB AP ASR DISCLAIMER: NORMAL
LABORATORY COMMENT REPORT: NORMAL
Lab: NORMAL
PATH REPORT.COMMENTS IMP SPEC: NORMAL
PATH REPORT.FINAL DX SPEC: NORMAL
PATH REPORT.GROSS SPEC: NORMAL
PATH REPORT.RELEVANT HX SPEC: NORMAL
PATH REPORT.TOTAL CANCER: NORMAL
RESIDENT REVIEW: NORMAL

## 2024-10-21 NOTE — CONSULTS
"Reason For Consult  Radiation clearance    History Of Present Illness  Amalia Roa is a 59 y.o. female presenting with  Cancer of base of tongue     Past Medical History  She has a past medical history of Asthma, CHF (congestive heart failure), Coronary artery disease, Depression, Dysphagia, Heart valve disease, Hyperlipidemia, Hypertension, Myocardial infarction (Multi), PTSD (post-traumatic stress disorder), and Sleep apnea.    She has no past medical history of Cerebral vascular accident (Multi), Chronic kidney disease, Disease of thyroid gland, GERD (gastroesophageal reflux disease), PONV (postoperative nausea and vomiting), Seizure disorder (Multi), TIA (transient ischemic attack), or Type 2 diabetes mellitus.    Surgical History  She has no past surgical history on file.     Social History  She reports that she has quit smoking. Her smoking use included cigarettes. She started smoking about 10 years ago. She has a 10.8 pack-year smoking history. She has never used smokeless tobacco. She reports that she does not drink alcohol and does not use drugs.    Family History  No family history on file.     Allergies  Cyclobenzaprine    Review of Systems  Please refer to provider's note     Physical Exam  Patient presented with multiple carious/ fractured teeth that need extractions. Patient was not in any pain. No mobility of any teeth. No active infection with any teeth. Swollen lesion on right base of tongue.     Last Recorded Vitals  Blood pressure 145/78, pulse 65, temperature 36.6 °C (97.9 °F), temperature source Temporal, resp. rate 16, height 1.6 m (5' 3\"), weight 73.3 kg (161 lb 8 oz), SpO2 96%.    Relevant Results  Patient had severely decayed and fractured teeth in need of extractions. Tooth #7 and #12 had periapical radiolucencies on the Panorex.     Assessment/Plan     Patient had no active infection. Diagnosis could not be made via Panorex. Before radiation Oral Surgery will need to conduct a consult " for teeth removal of teeth prior to radiation . Patient was advised to consult with their dentist once discharged.    I spent 20 minutes in the professional and overall care of this patient.      Aubrie Bennett DDS

## 2024-10-21 NOTE — PROGRESS NOTES
Dental procedures in this visit    There are no dental procedures in this visit.     Subjective   Patient ID: Amalia Roa is a 59 y.o. female.  No chief complaint on file.    HPI    Objective   Dental Soft Tissue Exam     Dental Exam Findings  {Dental Caries:54984}     Dental Exam Occlusion    Assessment/Plan   {Assess/PlanSmartLinks:00634}

## 2024-10-21 NOTE — PROGRESS NOTES
[facility] - Medical Oncology New Patient Visit    Patient ID: Amalia Roa is a 59 y.o. female.  Referring Physician: Morro Montanez MD  [id number] [place] [address]  Primary Care Provider: CHARAN SNIDER MA       DIAGNOSIS  Oropharyngeal squamous cell ca     STAGING  T4N2M1      CURRENT SITES OF DISEASE  R oropharynx, tongue, bilateral lungs, L adrenal gland     MOLECULAR GENOMICS  P16+      PRIOR THERAPY        CURRENT THERAPY           CURRENT ONCOLOGICAL PROBLEMS           HISTORY OF PRESENT ILLNESS  Ms. Roa is a 60 yo with PMH significant for multiple medical comorbidities including HFrEF, CAD s/p JOHN, mitral regurgitation, and MEÑO who had had trouble swallowing for at least a year with throat discomfort, particularly on the R side. CT of the cervical spine at OSH was concerning for 3.6 x 2.0 necrotic level 2 lymph node. She was referred to Dr. Montanez who she met on 9/20/24 due to concerns for a mass. Flex laryngoscopy showed a prominence at the base of the tongue of the R and an exophytic mass of the R piriform sinus, with evidence of inga disease on the L. CT of the neck on 10/8/24 revealed large, irregular mass of the R oropharynx measuring at least 4 x 3.2 x 7.6 cm, with involvement of the R lateral wall/tonsillar region, base of tongue/glossal tonsillar sulcus, soft palate, and abutment of the epiglottis with extension into the R parapharyngeal fat and  space. There appeared to be extension into the R tongue and within the floor of the mouth and oral cavity. Also noted was thrombosis or occlusion of the RIJ, bilateral cervical lymphadenopathy with levels 2-4 noted on the R and on the left, and multiple pulmonary nodules. She was admitted to the hospital and underwent panendoscopy, biopsy, and PEG placement on 10/14/24. CT chest also on 10/14 was notable for multiple parenchymal bilateral lung nodules and enlarged  mediastinal and hilar lymph nodes, as well as suspicious L adrenal gland nodule. She was discussed in tumor board, with PD-L1 added on and pending. PET/CT scheduled for 10/22.     She is here today with her . She feels pretty good, says the supplements through her PEG fill her up. She's drinking and tries to eat, but sometimes feels full. She's eating solid foods, but at the moment she's taking in more by the supplements. It's a little hard to eat, sometimes it hurts, sometimes it doesn't. Dense things like pizza and bread sticks go down with no problem, and soft things like spaghetti make it harder - chokes her. Sometimes she has some pain in her R cheek into her ear; sometimes her throat might hurt a little. Sometimes she wakes up and her tongue hurts a little, ice helps with that. She also has MEÑO and doesn't wear her CPAP, thinks it might be because of that, it's a mask issue. She thinks she might still be losing weight, but maybe stabilized. She has lost 70 lbs over the last 6 months-1 year. 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 She is helping with the cooking, cleaning, laundry. She has inhalers for her asthma. She does have a cough productive of some phlegm, but occasionally some blood.        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 Lives at home with her . Has two grandsons who are 6 and 2 yo, and two granddaughters who are 3 yo and 10 months.    Tob: quit smoking 2014. 40 years x 1/2 ppd  EtOH: none  Illicits: none     FAMILY HISTORY  Mother - breast cancer dx 50s or 60s  Sister - colon ca dx 58 yo  Niece - cervical cancer dx 30s    Meds  (Current):    Current Outpatient Medications:     acetaminophen (Tylenol) 325 mg tablet, Take 1 tablet (325 mg) by mouth every 8 hours if needed., Disp: , Rfl:     albuterol 90 mcg/actuation inhaler, Inhale 2 puffs every 4 hours if needed for wheezing., Disp: , Rfl:     ALPRAZolam (Xanax) 0.5 mg tablet, Take 1 tablet (0.5 mg) by mouth 2 times a day as needed for anxiety., Disp: , Rfl:     aspirin 81 mg chewable tablet, Chew 1 tablet (81 mg) once daily., Disp: , Rfl:     atorvastatin (Lipitor) 40 mg tablet, Take 1 tablet (40 mg) by mouth once daily., Disp: , Rfl:     doxepin (SINEquan) 10 mg capsule, Take 1 capsule (10 mg) by mouth once daily at bedtime., Disp: , Rfl:     fluticasone (Flovent) 44 mcg/actuation inhaler, Inhale 2 puffs 2 times a day., Disp: , Rfl:     lidocaine (Lidoderm) 5 % patch, Place 1 patch over 12 hours on the skin once daily. Remove & discard patch within 12 hours or as directed by MD., Disp: 14 patch, Rfl: 0    melatonin 10 mg tablet,chewable, Chew 1 tablet once daily at bedtime., Disp: , Rfl:     metoprolol tartrate (Lopressor) 25 mg tablet, Take 0.5 tablets (12.5 mg) by mouth 2 times a day., Disp: 60 tablet, Rfl: 5    midodrine (Proamatine) 5 mg tablet, TAKE ONE-HALF TABLET BY MOUTH THREE TIMES A DAY. CHECK BLOOD PRESSURE AT HOME THREE TIMES A DAY. NOTIFY PCP IF BLOOD PRESSURE IS OVER 160 OR, Disp: , Rfl:     oxyCODONE (Roxicodone) 5 mg immediate release tablet, 1 tablet (5 mg) by g-tube route every 6 hours if needed for moderate pain (4 - 6) or severe pain (7 - 10) for up to 10 days., Disp: 40 tablet, Rfl: 0    Allergies   Allergen Reactions    Cyclobenzaprine Anaphylaxis       Review of Systems   All other systems reviewed and are negative.       Objective   BSA: 1.78 meters squared  Wt Readings from Last 5 Encounters:   10/22/24 71.8 kg (158 lb 6.4 oz)   10/14/24 73.3 kg (161 lb 8 oz)   10/03/24 74.4 kg (164 lb)   09/20/24 75.3 kg (166 lb)     /62 (BP Location: Left arm, Patient  "Position: Sitting)   Pulse 65   Temp 35.9 °C (96.6 °F) (Temporal)   Resp 16   Ht (S) 1.593 m (5' 2.72\")   Wt 71.8 kg (158 lb 6.4 oz)   SpO2 99%   BMI 28.31 kg/m²     ECOG Score: 0- Fully active, able to carry on all pre-disease performance w/o restriction.      Physical Exam  Vitals reviewed.   Constitutional:       General: She is not in acute distress.  HENT:      Head: Normocephalic and atraumatic.      Mouth/Throat:      Mouth: Mucous membranes are moist.   Eyes:      Extraocular Movements: Extraocular movements intact.      Conjunctiva/sclera: Conjunctivae normal.      Pupils: Pupils are equal, round, and reactive to light.   Cardiovascular:      Rate and Rhythm: Normal rate and regular rhythm.      Heart sounds: Normal heart sounds. No murmur heard.  Pulmonary:      Effort: Pulmonary effort is normal. No respiratory distress.      Breath sounds: Normal breath sounds.   Abdominal:      General: Bowel sounds are normal. There is no distension.      Palpations: Abdomen is soft.   Skin:     General: Skin is warm and dry.   Neurological:      General: No focal deficit present.      Mental Status: She is alert and oriented to person, place, and time.   Psychiatric:         Mood and Affect: Mood normal.         Behavior: Behavior normal.         Thought Content: Thought content normal.          Results:  Labs:  Lab Results   Component Value Date    WBC 10.9 10/18/2024    HGB 11.9 (L) 10/18/2024    HCT 38.2 10/18/2024    MCV 97 10/18/2024     10/18/2024      No results found for: \"NEUTROABS\"   Lab Results   Component Value Date    GLUCOSE 134 (H) 10/18/2024    CALCIUM 8.8 10/18/2024     10/18/2024    K 3.7 10/18/2024    CO2 33 (H) 10/18/2024     10/18/2024    BUN 16 10/18/2024    CREATININE 0.73 10/18/2024     No results found for: \"ALT\", \"AST\", \"GGT\", \"ALKPHOS\", \"BILITOT\"     Imaging:  I have personally reviewed the below imaging and concur with the reported findings unless otherwise " stated:    === Results for orders placed during the hospital encounter of 10/14/24 ===    CT chest w IV contrast [[id number]] 10/14/2024    Status: Normal  1.  Findings concerning for parenchymal and mediastinal metastatic  disease with multiple parenchymal lung nodules and enlarged  mediastinal and hilar lymph nodes.  2. Postoperative pneumoperitoneum and G-tube placement  3. Suspicious left adrenal gland nodule for adrenal gland metastases.  4. Postoperative changes of mitral valve repair and bilateral  coronary stent placement.    Signed by: Juanito Rashid 10/16/2024 9:42 AM  Dictation workstation:   [id number]      === Results for orders placed during the hospital encounter of 10/08/24 ===    CT soft tissue neck w IV contrast [[id number]] 10/08/2024    Status: Normal  1. There is an extensive right sided soft tissue mass involving the  right oropharynx, oral cavity and hypopharynx, as detailed above. At  the level of the hypopharynx there appears to be extra laryngeal  spread into the adjacent strap muscles.  2. There is bilateral cervical lymphadenopathy. On the right there is  retropharyngeal, level 2 through level 4 lymphadenopathy with  thrombosis or occlusion of the right internal jugular vein and  displacement and partial encasement of the right carotid artery. On  the left there is a cystic or necrotic lymph node with mass effect  and mild narrowing of the left internal jugular vein.  3. Multiple pulmonary nodules concerning for metastatic disease.    I personally reviewed the images/study and I agree with the findings  as stated. This study was interpreted at [place], [place].    MACRO:  None    Signed by: Oneida Lindo 10/10/2024 10:07 AM  Dictation workstation:   [id number]  No results found for this or any previous visit.  No results found for this or any previous visit.  No results found for this or any previous visit.  No results found for this or any previous  visit.  No results found for this or any previous visit.    Pathology:    Lab Results   Component Value Date    COMDX  10/14/2024     Tumor Block:  A1    Preliminary Assessment of Specimen Adequacy for Ancillary Testing:  Adequate.    Viable tumor nuclei :  40%         Assessment/Plan      Amalia Roa is a 59 y.o. female here for recommendations and to establish care for oropharyngeal squamous cell ca    # Oropharyngeal squamous cell ca  - T4N2M1  - p16+  - PD-L1 pending for CPS   - discussed that with metastatic disease would recommend systemic therapy, and depending on CPS would recommend chemotherapy+immunotherapy or immunotherapy monotherapy. Also potentially eligible for clinical trial based on CPS  - she is interested in clinical trial and will discuss with trial nurse  - provided information on pembrolizumab, carboplatin, and paclitaxel  - will await PD-L1 results      Pauline Garcia MD

## 2024-10-21 NOTE — PROGRESS NOTES
RN called and left a message for Amalia in regards to her upcoming appointment with Dr. Garcia on 10/22 at 1:20pm. RN wanted to make sure Amalia knew where to go for this appointment and see if she had any questions or concerns beforehand. Clinic contact information was given for callback.

## 2024-10-22 ENCOUNTER — HOSPITAL ENCOUNTER (OUTPATIENT)
Dept: RADIOLOGY | Facility: HOSPITAL | Age: 59
Discharge: HOME | End: 2024-10-22
Payer: MEDICAID

## 2024-10-22 ENCOUNTER — OFFICE VISIT (OUTPATIENT)
Dept: HEMATOLOGY/ONCOLOGY | Facility: HOSPITAL | Age: 59
End: 2024-10-22
Payer: MEDICAID

## 2024-10-22 ENCOUNTER — APPOINTMENT (OUTPATIENT)
Dept: RADIATION ONCOLOGY | Facility: HOSPITAL | Age: 59
End: 2024-10-22
Payer: MEDICAID

## 2024-10-22 ENCOUNTER — SOCIAL WORK (OUTPATIENT)
Dept: CASE MANAGEMENT | Facility: HOSPITAL | Age: 59
End: 2024-10-22
Payer: MEDICAID

## 2024-10-22 VITALS
HEIGHT: 63 IN | HEART RATE: 65 BPM | SYSTOLIC BLOOD PRESSURE: 131 MMHG | DIASTOLIC BLOOD PRESSURE: 62 MMHG | OXYGEN SATURATION: 99 % | RESPIRATION RATE: 16 BRPM | TEMPERATURE: 96.6 F | BODY MASS INDEX: 28.07 KG/M2 | WEIGHT: 158.4 LBS

## 2024-10-22 DIAGNOSIS — C01 CANCER OF BASE OF TONGUE (MULTI): ICD-10-CM

## 2024-10-22 LAB — GLUCOSE BLD MANUAL STRIP-MCNC: 109 MG/DL (ref 74–99)

## 2024-10-22 PROCEDURE — 78815 PET IMAGE W/CT SKULL-THIGH: CPT

## 2024-10-22 PROCEDURE — 3008F BODY MASS INDEX DOCD: CPT | Performed by: STUDENT IN AN ORGANIZED HEALTH CARE EDUCATION/TRAINING PROGRAM

## 2024-10-22 PROCEDURE — 3430000001 HC RX 343 DIAGNOSTIC RADIOPHARMACEUTICALS: Mod: SE | Performed by: RADIOLOGY

## 2024-10-22 PROCEDURE — 82947 ASSAY GLUCOSE BLOOD QUANT: CPT

## 2024-10-22 PROCEDURE — A9552 F18 FDG: HCPCS | Mod: SE | Performed by: RADIOLOGY

## 2024-10-22 PROCEDURE — 99205 OFFICE O/P NEW HI 60 MIN: CPT | Performed by: STUDENT IN AN ORGANIZED HEALTH CARE EDUCATION/TRAINING PROGRAM

## 2024-10-22 PROCEDURE — 99215 OFFICE O/P EST HI 40 MIN: CPT | Mod: 25 | Performed by: STUDENT IN AN ORGANIZED HEALTH CARE EDUCATION/TRAINING PROGRAM

## 2024-10-22 PROCEDURE — 78815 PET IMAGE W/CT SKULL-THIGH: CPT | Mod: PET TUMOR INIT TX STRAT | Performed by: STUDENT IN AN ORGANIZED HEALTH CARE EDUCATION/TRAINING PROGRAM

## 2024-10-22 RX ORDER — FLUDEOXYGLUCOSE F 18 200 MCI/ML
12.2 INJECTION, SOLUTION INTRAVENOUS
Status: COMPLETED | OUTPATIENT
Start: 2024-10-22 | End: 2024-10-22

## 2024-10-22 SDOH — ECONOMIC STABILITY: INCOME INSECURITY: IN THE LAST 12 MONTHS, WAS THERE A TIME WHEN YOU WERE NOT ABLE TO PAY THE MORTGAGE OR RENT ON TIME?: NO

## 2024-10-22 SDOH — HEALTH STABILITY: PHYSICAL HEALTH: ON AVERAGE, HOW MANY DAYS PER WEEK DO YOU ENGAGE IN MODERATE TO STRENUOUS EXERCISE (LIKE A BRISK WALK)?: 0 DAYS

## 2024-10-22 SDOH — ECONOMIC STABILITY: FOOD INSECURITY: WITHIN THE PAST 12 MONTHS, THE FOOD YOU BOUGHT JUST DIDN'T LAST AND YOU DIDN'T HAVE MONEY TO GET MORE.: SOMETIMES TRUE

## 2024-10-22 SDOH — HEALTH STABILITY: PHYSICAL HEALTH: ON AVERAGE, HOW MANY MINUTES DO YOU ENGAGE IN EXERCISE AT THIS LEVEL?: 0 MIN

## 2024-10-22 SDOH — ECONOMIC STABILITY: TRANSPORTATION INSECURITY
IN THE PAST 12 MONTHS, HAS THE LACK OF TRANSPORTATION KEPT YOU FROM MEDICAL APPOINTMENTS OR FROM GETTING MEDICATIONS?: NO

## 2024-10-22 SDOH — ECONOMIC STABILITY: FOOD INSECURITY: WITHIN THE PAST 12 MONTHS, YOU WORRIED THAT YOUR FOOD WOULD RUN OUT BEFORE YOU GOT MONEY TO BUY MORE.: SOMETIMES TRUE

## 2024-10-22 SDOH — ECONOMIC STABILITY: GENERAL
WHICH OF THE FOLLOWING WOULD YOU LIKE TO GET CONNECTED TO IN ORDER TO RECEIVE A DISCOUNT OR FOR FREE? (CHOOSE ALL THAT APPLY): COMPUTER;BROADBAND INTERNET SUBSCRIPTION;DIGITAL/INTERNET SKILLS TRAINING

## 2024-10-22 SDOH — ECONOMIC STABILITY: FOOD INSECURITY: WITHIN THE PAST 12 MONTHS, YOU WORRIED THAT YOUR FOOD WOULD RUN OUT BEFORE YOU GOT MONEY TO BUY MORE.: OFTEN TRUE

## 2024-10-22 SDOH — ECONOMIC STABILITY: GENERAL
WHICH OF THE FOLLOWING DO YOU KNOW HOW TO USE AND HAVE ACCESS TO EVERY DAY? (CHOOSE ALL THAT APPLY): DESKTOP COMPUTER, LAPTOP COMPUTER, OR TABLET WITH BROADBAND INTERNET CONNECTION;SMARTPHONE WITH CELLULAR DATA PLAN

## 2024-10-22 SDOH — ECONOMIC STABILITY: FOOD INSECURITY: WITHIN THE PAST 12 MONTHS, THE FOOD YOU BOUGHT JUST DIDN'T LAST AND YOU DIDN'T HAVE MONEY TO GET MORE.: OFTEN TRUE

## 2024-10-22 SDOH — ECONOMIC STABILITY: TRANSPORTATION INSECURITY
IN THE PAST 12 MONTHS, HAS LACK OF TRANSPORTATION KEPT YOU FROM MEETINGS, WORK, OR FROM GETTING THINGS NEEDED FOR DAILY LIVING?: NO

## 2024-10-22 ASSESSMENT — SOCIAL DETERMINANTS OF HEALTH (SDOH)
HOW OFTEN DO YOU ATTEND CHURCH OR RELIGIOUS SERVICES?: NEVER
WITHIN THE LAST YEAR, HAVE YOU BEEN HUMILIATED OR EMOTIONALLY ABUSED IN OTHER WAYS BY YOUR PARTNER OR EX-PARTNER?: NO
HOW OFTEN DO YOU ATTENT MEETINGS OF THE CLUB OR ORGANIZATION YOU BELONG TO?: NEVER
HOW HARD IS IT FOR YOU TO PAY FOR THE VERY BASICS LIKE FOOD, HOUSING, MEDICAL CARE, AND HEATING?: HARD
WITHIN THE LAST YEAR, HAVE YOU BEEN AFRAID OF YOUR PARTNER OR EX-PARTNER?: NO
WITHIN THE LAST YEAR, HAVE TO BEEN RAPED OR FORCED TO HAVE ANY KIND OF SEXUAL ACTIVITY BY YOUR PARTNER OR EX-PARTNER?: NO
HOW OFTEN DO YOU GET TOGETHER WITH FRIENDS OR RELATIVES?: THREE TIMES A WEEK
IN A TYPICAL WEEK, HOW MANY TIMES DO YOU TALK ON THE PHONE WITH FAMILY, FRIENDS, OR NEIGHBORS?: MORE THAN THREE TIMES A WEEK
IN THE PAST 12 MONTHS, HAS THE ELECTRIC, GAS, OIL, OR WATER COMPANY THREATENED TO SHUT OFF SERVICE IN YOUR HOME?: YES
WITHIN THE LAST YEAR, HAVE YOU BEEN KICKED, HIT, SLAPPED, OR OTHERWISE PHYSICALLY HURT BY YOUR PARTNER OR EX-PARTNER?: NO
DO YOU BELONG TO ANY CLUBS OR ORGANIZATIONS SUCH AS CHURCH GROUPS UNIONS, FRATERNAL OR ATHLETIC GROUPS, OR SCHOOL GROUPS?: NO

## 2024-10-22 ASSESSMENT — LIFESTYLE VARIABLES
HOW OFTEN DO YOU HAVE A DRINK CONTAINING ALCOHOL: NEVER
SKIP TO QUESTIONS 9-10: 1
HOW OFTEN DO YOU HAVE SIX OR MORE DRINKS ON ONE OCCASION: NEVER
AUDIT-C TOTAL SCORE: 0
HOW MANY STANDARD DRINKS CONTAINING ALCOHOL DO YOU HAVE ON A TYPICAL DAY: PATIENT DOES NOT DRINK

## 2024-10-22 ASSESSMENT — PAIN SCALES - GENERAL: PAINLEVEL_OUTOF10: 0-NO PAIN

## 2024-10-22 NOTE — PROGRESS NOTES
PSYCHOSOCIAL ASSESSMENT     Demographic Information  Amalia Roa  1965  [id number]  Preferred Name: Amalia  Pronouns: she/her/hers  Assessment Type:  Psychosocial  Date of assessment: 10/22/24  Provider(s): Dr. Pauline Garcia  Diagnosis: Cancer of base of tongue   Person(s) present during assessment: Amalia,  Amirah Charles   Primary language: English  Interpretive services used: None                  Living Environment/Support Systems  Partner Status:   Children: two, Taylor & Sajan  Support systems: , children, daughter-in-law Martine, sister Neyda, niece Mandi, overall family.  Primary caregiver: Self  Current Living Situation: House Own  Resides with: Melvin   Concerns with Housing Environment: None  Comments:     Safety  Patient safe at home? 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 No   Applicable [place]: No   Comments:      Advance Directives  No Advance Directives- Declined additional information   Health Care Agent Status:Not Activated  Health Care Agent, When applicable: Melvin  Comments:    Legal Involvement  Relevant current or previous legal concerns: None     Sikhism or Spiritual Identity  Comments: [facility]  Active SI/HI:  No  Mental Health Diagnosis, if applicable: Anxiety, PTSD, depression  Mood: generally good  Concerns relating to substance use (including alcohol/tobacco): None  Patient identified coping skills: Watching tv, scrolling on facebook, coloring on tablet  Learning Preferences: both written/verbal  Cognitive Comments: none  Relevant Providers: none  Comments:       Assessment  Potential Barriers to Care:  Financial Concerns  Patient Strengths:  Healthy Coping Skills, Motivated for Treatment, Self-Advocate, and Strong Support System    Plan  Referrals: [employer]  Applications: [place]  Other: N/A    Narrative: Amalia presented in the clinic today at a [facility] with cancer to the base of the tongue. Amalia came today with her  of 39 years, Melvin. She shared that she is currently unemployed as she had a heart attack on her way to work. 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 The department reached 114 degrees minimum and due to the conditions of extreme heat and her condition, her company let her go. She did apply to SSDI and was denied at that time. Melvin was a , but was laid off and the only income they have is his unemployment that is around $400/week. They both admit that it is not enough to get by at this time. They live alone together in a home that they own. Together they share two children, Taylor and Sajan, and have four grandchildren who Amalia is very involved with. Amalia shared that she is safe at home and denies history of DV, thoughts of harming herself, or dying by suicide. She is self described as independent, but shared that she is not allowed to drive at this time. Her current insurance is Medicaid where she explained that she does not have challenges affording medications due to it. She does not have  history. Her and Melvin explained that they often have the delay paying one bill to pay another. She denied any justice  involvement. She explained that she identifies with the [employer] clarissa. She endorsed that she does have anxiety, depression, ptsd, and has panic attacks, but she has medication that alleviates these symptoms and is content with the regimen. She described her mood as generally happy and in her spare time she enjoys watching tv, scrolling on Facebook, and coloring on her tablet.     PLAN:    -[facility] [employer] referral  -Reapply to [facility]  -Look for any applicable joaquin  -[facility] [place] referral IF she is not a research candidate, will not need [facility] if she is a research patient

## 2024-10-24 ENCOUNTER — TELEPHONE (OUTPATIENT)
Dept: RADIATION ONCOLOGY | Facility: HOSPITAL | Age: 59
End: 2024-10-24
Payer: MEDICAID

## 2024-10-24 NOTE — PROGRESS NOTES
History of Present Illness    Amalia Roa was seen in September 2024 at the request of Dr. Hoffmann for the management of a lesion involving her tongue.  She has had some issues with swallowing for 2 years or so she had some fairly significant discomfort in her throat especially on the right side where it shoots to the ear.  She was found on scanning to have a very large oropharyngeal lesion.  She does have multiple neck metastasis.  She had a biopsy done that showed squamous cell carcinoma which was p16 positive.  She had a PET scan in October 2024 that confirms the presence of multiple pulmonary metastasis.  I personally reviewed that scan.  She was presented at our tumor board and it was felt that the she would be a candidate for possibly induction chemo possibly immunotherapy as well as radiation.  Arrangements have been made for her to see these colleagues.    Physical Exam    The PEG site was examined.  There is no evidence of any inflammation or infection.  The tube is also not too tight.    Assessment and Plan    Large oropharyngeal cancer with regional and distant metastasis.  The patient is being directed to my colleagues in medical oncology and radiation oncology to look at all the possible therapeutic options.  She was here today with many family members.  I answered all their questions.  She knows to call me if they have any further questions.    She was asked to make an appointment in 2 months.

## 2024-10-25 ENCOUNTER — OFFICE VISIT (OUTPATIENT)
Dept: OTOLARYNGOLOGY | Facility: HOSPITAL | Age: 59
End: 2024-10-25
Payer: MEDICAID

## 2024-10-25 ENCOUNTER — TUMOR BOARD CONFERENCE (OUTPATIENT)
Dept: HEMATOLOGY/ONCOLOGY | Facility: HOSPITAL | Age: 59
End: 2024-10-25
Payer: MEDICAID

## 2024-10-25 ENCOUNTER — HOSPITAL ENCOUNTER (OUTPATIENT)
Dept: RADIATION ONCOLOGY | Facility: HOSPITAL | Age: 59
Setting detail: RADIATION/ONCOLOGY SERIES
Discharge: HOME | End: 2024-10-25
Payer: MEDICAID

## 2024-10-25 ENCOUNTER — NUTRITION (OUTPATIENT)
Dept: HEMATOLOGY/ONCOLOGY | Facility: HOSPITAL | Age: 59
End: 2024-10-25

## 2024-10-25 VITALS
SYSTOLIC BLOOD PRESSURE: 115 MMHG | BODY MASS INDEX: 28.13 KG/M2 | HEART RATE: 66 BPM | WEIGHT: 157.4 LBS | TEMPERATURE: 98.1 F | DIASTOLIC BLOOD PRESSURE: 69 MMHG | RESPIRATION RATE: 18 BRPM | OXYGEN SATURATION: 96 %

## 2024-10-25 VITALS — BODY MASS INDEX: 28 KG/M2 | TEMPERATURE: 97.8 F | WEIGHT: 158 LBS | HEIGHT: 63 IN

## 2024-10-25 DIAGNOSIS — C01 CANCER OF BASE OF TONGUE (MULTI): Primary | ICD-10-CM

## 2024-10-25 DIAGNOSIS — C10.9 MALIGNANT NEOPLASM OF OROPHARYNX: Primary | ICD-10-CM

## 2024-10-25 DIAGNOSIS — R13.12 OROPHARYNGEAL DYSPHAGIA: ICD-10-CM

## 2024-10-25 PROCEDURE — 99214 OFFICE O/P EST MOD 30 MIN: CPT | Performed by: RADIOLOGY

## 2024-10-25 PROCEDURE — 1036F TOBACCO NON-USER: CPT | Performed by: OTOLARYNGOLOGY

## 2024-10-25 PROCEDURE — 99214 OFFICE O/P EST MOD 30 MIN: CPT | Performed by: OTOLARYNGOLOGY

## 2024-10-25 PROCEDURE — 3008F BODY MASS INDEX DOCD: CPT | Performed by: OTOLARYNGOLOGY

## 2024-10-25 RX ORDER — OXYCODONE HYDROCHLORIDE 5 MG/1
5 TABLET ORAL EVERY 6 HOURS PRN
Qty: 120 TABLET | Refills: 0 | Status: SHIPPED | OUTPATIENT
Start: 2024-10-25 | End: 2024-11-24

## 2024-10-25 RX ORDER — METOCLOPRAMIDE 10 MG/1
10 TABLET ORAL 3 TIMES DAILY
Qty: 90 TABLET | Refills: 0 | Status: SHIPPED | OUTPATIENT
Start: 2024-10-25 | End: 2024-11-24

## 2024-10-25 ASSESSMENT — ENCOUNTER SYMPTOMS
SHORTNESS OF BREATH: 1
DIAPHORESIS: 0
DEPRESSION: 0
SEIZURES: 0
CHILLS: 0
ABDOMINAL DISTENTION: 0
CHEST TIGHTNESS: 1
ENDOCRINE NEGATIVE: 1
NUMBNESS: 0
EYE PROBLEMS: 1
HEADACHES: 0
VOICE CHANGE: 1
NAUSEA: 1
HEMOPTYSIS: 0
BLOOD IN STOOL: 0
DECREASED CONCENTRATION: 0
WHEEZING: 0
COUGH: 1
SPEECH DIFFICULTY: 0
DEPRESSION: 1
SORE THROAT: 0
UNEXPECTED WEIGHT CHANGE: 1
EXTREMITY WEAKNESS: 0
VOMITING: 0
MUSCULOSKELETAL NEGATIVE: 1
FATIGUE: 0
PALPITATIONS: 1
TROUBLE SWALLOWING: 1
OCCASIONAL FEELINGS OF UNSTEADINESS: 0
LEG SWELLING: 0
DIZZINESS: 1
SCLERAL ICTERUS: 0
CONFUSION: 0
APPETITE CHANGE: 1
HEMATOLOGIC/LYMPHATIC NEGATIVE: 1
ABDOMINAL PAIN: 0
LOSS OF SENSATION IN FEET: 0
FEVER: 0
NERVOUS/ANXIOUS: 1
LIGHT-HEADEDNESS: 1
CONSTIPATION: 0
SLEEP DISTURBANCE: 0

## 2024-10-25 ASSESSMENT — PATIENT HEALTH QUESTIONNAIRE - PHQ9
1. LITTLE INTEREST OR PLEASURE IN DOING THINGS: NOT AT ALL
SUM OF ALL RESPONSES TO PHQ9 QUESTIONS 1 AND 2: 0
2. FEELING DOWN, DEPRESSED OR HOPELESS: NOT AT ALL

## 2024-10-25 ASSESSMENT — COLUMBIA-SUICIDE SEVERITY RATING SCALE - C-SSRS
6. HAVE YOU EVER DONE ANYTHING, STARTED TO DO ANYTHING, OR PREPARED TO DO ANYTHING TO END YOUR LIFE?: NO
1. IN THE PAST MONTH, HAVE YOU WISHED YOU WERE DEAD OR WISHED YOU COULD GO TO SLEEP AND NOT WAKE UP?: NO
2. HAVE YOU ACTUALLY HAD ANY THOUGHTS OF KILLING YOURSELF?: NO

## 2024-10-25 ASSESSMENT — PAIN SCALES - GENERAL: PAINLEVEL_OUTOF10: 0-NO PAIN

## 2024-10-25 NOTE — PROGRESS NOTES
Radiation Oncology Nursing Note    Prior Radiotherapy:  No  No radiation treatments to show. (Treatments may have been administered in another system.)     Current Systemic Treatment:  No     Presence of Pacemaker or ICD:  No    History of Autoimmune or Connective Tissue Disorders:  No    Pain: The patient's current pain level was assessed.  They report currently having a pain of 0 out of 10.  They feel their pain is under control with the use of pain medications.    Review of Systems:  Review of Systems   Constitutional:  Positive for appetite change and unexpected weight change. Negative for chills, diaphoresis, fatigue and fever.        Loss 70 # - about 6-12 months.   Appetite poor.   Able to swallow liquid, dense foods can swallow.   Soft food like bread and cake gets stuck.   Most of nutrition through PEG tube.    HENT:   Positive for trouble swallowing and voice change. Negative for hearing loss, mouth sores, nosebleeds, sore throat and tinnitus.    Eyes:  Positive for eye problems. Negative for icterus.        Wears glasses.    Respiratory:  Positive for chest tightness, cough and shortness of breath. Negative for hemoptysis and wheezing.    Cardiovascular:  Positive for palpitations. Negative for chest pain and leg swelling.   Gastrointestinal:  Positive for nausea. Negative for abdominal distention, abdominal pain, blood in stool, constipation and vomiting.   Endocrine: Negative.    Genitourinary: Negative.     Musculoskeletal: Negative.  Negative for gait problem.   Skin: Negative.    Neurological:  Positive for dizziness and light-headedness. Negative for extremity weakness, gait problem, headaches, numbness, seizures and speech difficulty.        Dehydration. Causes dizziness and light headed.    Hematological: Negative.    Psychiatric/Behavioral:  Positive for depression. Negative for confusion, decreased concentration, sleep disturbance and suicidal ideas. The patient is nervous/anxious.

## 2024-10-25 NOTE — PROGRESS NOTES
Radiation Oncology Outpatient Consult    Patient Name:  Amalia Roa  MRN:  [id number]  :  1965    Referring Provider: Morro Montanez MD  Primary Care Provider: WHITNEY GARCIA MA  Care Team: Patient Care Team:  Whitney Garcia MA as PCP - General  Lizandro Hoffmann MD as Referring Physician (Otolaryngology)  Pauline Garcia MD as On Call Attending Physician (Hematology and Oncology)  LUKAS Palencia as  ()    Date of Service: 10/25/2024     SUBJECTIVE  History of Present Illness:  Amalia Roa is a 59 y.o. female who was referred by Morro Montanez MD, for a consultation to the [facility] Department of Radiation Oncology.      Ms. Roa is a 60 yo with PMH significant for multiple medical comorbidities including HFrEF, CAD s/p JOHN, mitral regurgitation, and MEÑO who had had trouble swallowing for at least a year with throat discomfort, particularly on the R side.     CT of the cervical spine at OSH was concerning for 3.6 x 2.0 necrotic level 2 lymph node. S    he was referred to Dr. Montanez who she met on 24 due to concerns for a mass. Flex laryngoscopy showed a prominence at the base of the tongue of the R and an exophytic mass of the R piriform sinus, with evidence of inga disease on the L.     CT of the neck on 10/8/24 revealed large, irregular mass of the R oropharynx measuring at least 4 x 3.2 x 7.6 cm, with involvement of the R lateral wall/tonsillar region, base of tongue/glossal tonsillar sulcus, soft palate, and abutment of the epiglottis with extension into the R parapharyngeal fat and  space. There appeared to be extension into the R tongue and within the floor of the mouth and oral cavity. Also noted was thrombosis or occlusion of the RIJ, bilateral cervical lymphadenopathy with levels 2-4 noted on the R and on the left, and multiple pulmonary nodules.     She was admitted to the hospital and underwent  panendoscopy, biopsy, and PEG placement on 10/14/24.     CT chest also on 10/14 was notable for multiple parenchymal bilateral lung nodules and enlarged mediastinal and hilar lymph nodes, as well as suspicious L adrenal gland nodule. She was discussed in tumor board, with PD-L1 added on and pending.     PET/CT on 10/22 revealed:  MPRESSION:  1. A large FDG avid mass centered within the right oropharynx, oral  cavity, and hypopharynx, consistent with known malignancy.  2. Multiple FDG avid bilateral cervical, mediastinal and hilar nodes,  likely representing inga metastases.  3. Multiple FDG avid pulmonary nodules scattered throughout the  bilateral lungs as well as FDG avid lesion in the left adrenal gland,  likely representing other sites of metastatic disease.  4. Mildly FDG avid small left layering pleural effusion, concerning  for malignant process.     She is here today with her . She feels pretty good, says the supplements through her PEG fill her up. She's drinking and tries to eat, but sometimes feels full. She's eating solid foods, but at the moment she's taking in more by the supplements. It's a little hard to eat, sometimes it hurts, sometimes it doesn't. Dense things like pizza and bread sticks go down with no problem, and soft things like spaghetti make it harder - chokes her. Sometimes she has some pain in her R cheek into her ear; sometimes her throat might hurt a little -- alleviated by oxycodone 5mg 1-2 daily. Sometimes she wakes up and her tongue hurts a little, ice helps with that. She also has MEÑO and doesn't wear her CPAP, thinks it might be because of that, it's a mask issue. She thinks she might still be losing weight, but maybe stabilized. She has lost 70 lbs over the last 6 months-1 year. 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 She is helping with the cooking,  cleaning, laundry. She has inhalers for her asthma. She does have a cough productive of some phlegm, but occasionally some blood.     BOR TC HN 43 quality-of-life form reveals the following:  Quite a bit trouble swallowing solid food trouble with teeth, trouble with coughing, hoarseness, lack of enjoyment with meals, worry about weight  A little pain in jaw/throat, trouble swallowing liquids, trouble swallowing purées, choking with swallowing, losing teeth, trismus, dry mouth, sticky saliva, trouble eating, trouble speaking clearly, swelling in neck     PAST MEDICAL HISTORY  PTSD  Depression/anxiety/panic attacks  HTN  HFrEF (EF 43% 9/2024)  HLD  CAD s/p MI 2014 and JOHN to LAD and Lcx  Mitral regurgitation s/p repair  Asthma  MEÑO on CPAP         SOCIAL HISTORY  She used to work in a factory, 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 Lives at home with her . Has two grandsons who are 6 and 2 yo, and two granddaughters who are 3 yo and 10 months.    Tob: quit smoking 2014. 40 years x 1/2 ppd  EtOH: none  Illicits: none     FAMILY HISTORY  Mother - breast cancer dx 50s or 60s  Sister - colon ca dx 58 yo  Niece - cervical cancer dx 30s  Prior Radiotherapy:  None    Past Medical History:    Past Medical History:   Diagnosis Date    Asthma     CHF (congestive heart failure)     Coronary artery disease     Depression     Dysphagia     Heart valve disease     Hyperlipidemia     Hypertension     Myocardial infarction (Multi)     PTSD (post-traumatic stress disorder)     Sleep apnea         Past Surgical History:    Past Surgical History:   Procedure Laterality Date    BIOPSY  10/14/2024    CORONARY ANGIOPLASTY WITH STENT PLACEMENT      GASTROSTOMY TUBE PLACEMENT      MITRAL VALVE REPAIR      MOUTH SURGERY          Family History:  Cancer-related family history includes Breast cancer in her mother; Cervical cancer in an other family member; Colon cancer in her sister.    Social History:    Social History     Tobacco Use    Smoking  status: Former     Current packs/day: 1.00     Average packs/day: 1 pack/day for 10.8 years (10.8 ttl pk-yrs)     Types: Cigarettes     Start date: 2014    Smokeless tobacco: Never   Vaping Use    Vaping status: Never Used   Substance Use Topics    Alcohol use: Not Currently    Drug use: Never       Allergies:    Allergies   Allergen Reactions    Cyclobenzaprine Anaphylaxis        Medications:    Current Outpatient Medications:     acetaminophen (Tylenol) 325 mg tablet, Take 1 tablet (325 mg) by mouth every 8 hours if needed., Disp: , Rfl:     albuterol 90 mcg/actuation inhaler, Inhale 2 puffs every 4 hours if needed for wheezing., Disp: , Rfl:     ALPRAZolam (Xanax) 0.5 mg tablet, Take 1 tablet (0.5 mg) by mouth 2 times a day as needed for anxiety., Disp: , Rfl:     aspirin 81 mg chewable tablet, Chew 1 tablet (81 mg) once daily., Disp: , Rfl:     atorvastatin (Lipitor) 40 mg tablet, Take 1 tablet (40 mg) by mouth once daily., Disp: , Rfl:     doxepin (SINEquan) 10 mg capsule, Take 1 capsule (10 mg) by mouth once daily at bedtime., Disp: , Rfl:     fluticasone (Flovent) 44 mcg/actuation inhaler, Inhale 2 puffs 2 times a day., Disp: , Rfl:     lidocaine (Lidoderm) 5 % patch, Place 1 patch over 12 hours on the skin once daily. Remove & discard patch within 12 hours or as directed by MD., Disp: 14 patch, Rfl: 0    metoprolol tartrate (Lopressor) 25 mg tablet, Take 0.5 tablets (12.5 mg) by mouth 2 times a day., Disp: 60 tablet, Rfl: 5    oxyCODONE (Roxicodone) 5 mg immediate release tablet, 1 tablet (5 mg) by g-tube route every 6 hours if needed for moderate pain (4 - 6) or severe pain (7 - 10) for up to 10 days., Disp: 40 tablet, Rfl: 0    melatonin 10 mg tablet,chewable, Chew 1 tablet once daily at bedtime. (Patient not taking: Reported on 10/25/2024), Disp: , Rfl:     metoclopramide (Reglan) 10 mg tablet, Take 1 tablet (10 mg) by mouth 3 times a day., Disp: 90 tablet, Rfl: 0    oxyCODONE (Roxicodone) 5 mg immediate  release tablet, Take 1 tablet (5 mg) by mouth every 6 hours if needed for moderate pain (4 - 6) or severe pain (7 - 10)., Disp: 120 tablet, Rfl: 0      Review of Systems:  Review of Systems - Oncology    Performance Status:  The Karnofsky performance scale today is 90, Able to carry on normal activity; minor signs or symptoms of disease (ECOG equivalent 0).        OBJECTIVE  /69   Pulse 66   Temp 36.7 °C (98.1 °F) (Skin)   Resp 18   Wt 71.4 kg (157 lb 6.4 oz)   SpO2 96%   BMI 28.13 kg/m²    Physical Exam   HEENT: Patient has multiple broken teeth in the mandible and maxilla, has bilateral high level 2 lymphadenopathy that is palpable and measures 2 to 3 cm not fixed.  There is evidence of tumor involving the right retromolar trigone.  Soft palate is not visualized on transoral exam.  Tongue deviates to the right with extrusion.  Heart: Regular rate and rhythm  Lungs: Clear to auscultation bilaterally  Abdomen: Soft nontender nondistended, PEG tube in place  Extremities: No cyanosis clubbing or edema  Psych: Patient is an excellent historian  Neuro: Grossly intact  Skin: No lesions visualized or palpated       ASSESSMENT:   Amalia Roa is a 59 y.o. 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  I discussed with her that based on her response to systemic therapy, that we can consider a course of hypofractionated, definitive radiation to offer durable local regional control of her head and neck cancer.  Fortunately, she is currently doing well and not having symptoms such as tumor bleeding, airway obstruction, or other symptoms that would require urgent, palliative radiation.  I sent her prescription for oxycodone 5 mg to take as needed to help with her right-sided otalgia.  I placed a  consult for speech and language pathology to help with ongoing dysphagia and trouble with tongue movements.  I gave a prescription for Reglan 10 mg, which she reports helped in the hospital when she was having nausea associated with tube feeds.  She will follow-up with Dr. Garcia to discuss systemic therapy and possible clinical trial candidacy.  I will see her in the future after she has had imaging studies to evaluate systemic therapy response to consider radiation down the line.    NCCN Guidelines were applicable to guide this patients treatment plan.   Shayna Nolan MD

## 2024-10-28 LAB
AP SUMMARY REPORT: NORMAL
SCAN RESULT: NORMAL

## 2024-10-29 ENCOUNTER — TELEMEDICINE (OUTPATIENT)
Dept: HEMATOLOGY/ONCOLOGY | Facility: HOSPITAL | Age: 59
End: 2024-10-29
Payer: MEDICAID

## 2024-10-29 DIAGNOSIS — C01 CANCER OF BASE OF TONGUE (MULTI): Primary | ICD-10-CM

## 2024-10-29 PROCEDURE — 99215 OFFICE O/P EST HI 40 MIN: CPT | Performed by: STUDENT IN AN ORGANIZED HEALTH CARE EDUCATION/TRAINING PROGRAM

## 2024-10-29 RX ORDER — HEPARIN SODIUM,PORCINE/PF 10 UNIT/ML
50 SYRINGE (ML) INTRAVENOUS AS NEEDED
OUTPATIENT
Start: 2024-10-29

## 2024-10-29 RX ORDER — HEPARIN 100 UNIT/ML
500 SYRINGE INTRAVENOUS AS NEEDED
OUTPATIENT
Start: 2024-10-29

## 2024-10-29 RX ORDER — PROCHLORPERAZINE MALEATE 10 MG
10 TABLET ORAL EVERY 6 HOURS PRN
Qty: 30 TABLET | Refills: 5 | Status: SHIPPED | OUTPATIENT
Start: 2024-11-11

## 2024-10-30 DIAGNOSIS — C01 CANCER OF BASE OF TONGUE (MULTI): Primary | ICD-10-CM

## 2024-11-01 ENCOUNTER — TELEPHONE (OUTPATIENT)
Dept: HEMATOLOGY/ONCOLOGY | Facility: HOSPITAL | Age: 59
End: 2024-11-01
Payer: MEDICAID

## 2024-11-01 DIAGNOSIS — C01 CANCER OF BASE OF TONGUE (MULTI): Primary | ICD-10-CM

## 2024-11-01 NOTE — TELEPHONE ENCOUNTER
Taylor calls to ask about pts first appt is not on the day that is requested.  She will call scheduling to fix this.  She is aware to call back as needed.      She is asking about the labs and will verify with the lab in [place] that they can see lab orders and do labs.   She will call in if they need lab orders faxed.  She is aware that we need their fax number and contact number to confirm lab orders were received.      She is also asking about lab work prior to port placement.  We reviewed labs ordered prior to port placement.      This is for your info.    Message sent

## 2024-11-05 ENCOUNTER — SOCIAL WORK (OUTPATIENT)
Dept: CASE MANAGEMENT | Facility: HOSPITAL | Age: 59
End: 2024-11-05
Payer: MEDICAID

## 2024-11-05 DIAGNOSIS — C01 CANCER OF BASE OF TONGUE (MULTI): Primary | ICD-10-CM

## 2024-11-05 NOTE — TELEPHONE ENCOUNTER
This does not match ongoing conversation.    Cycle 1 and 2 look correct    Cycle 3 tx scheduled on 12/23 but a request to see LIAN Marcial cnp is in for 12/24 (not scheduled)    Scan is not requested for after cycle 3.    Please advise

## 2024-11-05 NOTE — PROGRESS NOTES
Lodging Referral    Reason for Lodging: Distance   Dates Needed: Intermittently, every 3 weeks.    Ambulation: Independently  Caregiver: Melvin Roa ()  Special Accommodations: None noted  Lodging Location: [facility]  Referral submitted via fax on 11/05/24    Backup Lodging needed: Not at this time  Referral submitted Yes - left note on application that Amalia will need to stay on separate occasions and to call Taylor. SW will put in multiple requests if required by [facility].     Amalia's daughter, Taylor is agreeable to plan. SW will continue to follow.    Thank you for choosing [facility].     Please closely monitor for further symptoms. Return to the Emergency Department if you develop any new or worsening signs or symptoms.    If you received any opiate pain medications or sedatives during your visit, please do not drive for at least 8 hours.     Labs, cultures or final xray interpretations may still need to be reviewed.  We will call you if your plan of care needs to be changed.    Please follow up with your hematologist in clinic.

## 2024-11-06 ENCOUNTER — LAB (OUTPATIENT)
Dept: LAB | Facility: LAB | Age: 59
End: 2024-11-06
Payer: MEDICAID

## 2024-11-06 DIAGNOSIS — C01 CANCER OF BASE OF TONGUE (MULTI): ICD-10-CM

## 2024-11-06 LAB
ALBUMIN SERPL BCP-MCNC: 3.9 G/DL (ref 3.4–5)
ALP SERPL-CCNC: 66 U/L (ref 33–136)
ALT SERPL W P-5'-P-CCNC: 11 U/L (ref 7–45)
ANION GAP SERPL CALC-SCNC: 10 MMOL/L (ref 10–20)
AST SERPL W P-5'-P-CCNC: 15 U/L (ref 9–39)
B-HCG SERPL-ACNC: 6 MIU/ML
BASOPHILS # BLD AUTO: 0.03 X10*3/UL (ref 0–0.1)
BASOPHILS NFR BLD AUTO: 0.3 %
BILIRUB SERPL-MCNC: 0.5 MG/DL (ref 0–1.2)
BUN SERPL-MCNC: 21 MG/DL (ref 6–23)
CALCIUM SERPL-MCNC: 8.9 MG/DL (ref 8.6–10.3)
CHLORIDE SERPL-SCNC: 100 MMOL/L (ref 98–107)
CO2 SERPL-SCNC: 33 MMOL/L (ref 21–32)
CORTIS AM PEAK SERPL-MSCNC: 18.3 UG/DL (ref 5–20)
CREAT SERPL-MCNC: 0.87 MG/DL (ref 0.5–1.05)
EGFRCR SERPLBLD CKD-EPI 2021: 76 ML/MIN/1.73M*2
EOSINOPHIL # BLD AUTO: 0.3 X10*3/UL (ref 0–0.7)
EOSINOPHIL NFR BLD AUTO: 3.4 %
ERYTHROCYTE [DISTWIDTH] IN BLOOD BY AUTOMATED COUNT: 13.7 % (ref 11.5–14.5)
GLUCOSE SERPL-MCNC: 79 MG/DL (ref 74–99)
HBV CORE AB SER QL: NONREACTIVE
HBV SURFACE AB SER-ACNC: <3.1 MIU/ML
HBV SURFACE AG SERPL QL IA: NONREACTIVE
HCT VFR BLD AUTO: 40.1 % (ref 36–46)
HGB BLD-MCNC: 12.4 G/DL (ref 12–16)
IMM GRANULOCYTES # BLD AUTO: 0.04 X10*3/UL (ref 0–0.7)
IMM GRANULOCYTES NFR BLD AUTO: 0.5 % (ref 0–0.9)
INR PPP: 1.1 (ref 0.9–1.1)
LYMPHOCYTES # BLD AUTO: 1.15 X10*3/UL (ref 1.2–4.8)
LYMPHOCYTES NFR BLD AUTO: 13 %
MCH RBC QN AUTO: 30 PG (ref 26–34)
MCHC RBC AUTO-ENTMCNC: 30.9 G/DL (ref 32–36)
MCV RBC AUTO: 97 FL (ref 80–100)
MONOCYTES # BLD AUTO: 0.75 X10*3/UL (ref 0.1–1)
MONOCYTES NFR BLD AUTO: 8.5 %
NEUTROPHILS # BLD AUTO: 6.56 X10*3/UL (ref 1.2–7.7)
NEUTROPHILS NFR BLD AUTO: 74.3 %
NRBC BLD-RTO: 0 /100 WBCS (ref 0–0)
PLATELET # BLD AUTO: 324 X10*3/UL (ref 150–450)
POTASSIUM SERPL-SCNC: 4.9 MMOL/L (ref 3.5–5.3)
PROT SERPL-MCNC: 6 G/DL (ref 6.4–8.2)
PROTHROMBIN TIME: 11.9 SECONDS (ref 9.8–12.8)
RBC # BLD AUTO: 4.13 X10*6/UL (ref 4–5.2)
SODIUM SERPL-SCNC: 138 MMOL/L (ref 136–145)
TSH SERPL-ACNC: 1.09 MIU/L (ref 0.44–3.98)
WBC # BLD AUTO: 8.8 X10*3/UL (ref 4.4–11.3)

## 2024-11-06 PROCEDURE — 86704 HEP B CORE ANTIBODY TOTAL: CPT

## 2024-11-06 PROCEDURE — 84443 ASSAY THYROID STIM HORMONE: CPT

## 2024-11-06 PROCEDURE — 82024 ASSAY OF ACTH: CPT

## 2024-11-06 PROCEDURE — 84702 CHORIONIC GONADOTROPIN TEST: CPT

## 2024-11-06 PROCEDURE — 87340 HEPATITIS B SURFACE AG IA: CPT

## 2024-11-06 PROCEDURE — 80053 COMPREHEN METABOLIC PANEL: CPT

## 2024-11-06 PROCEDURE — 82533 TOTAL CORTISOL: CPT

## 2024-11-06 PROCEDURE — 85610 PROTHROMBIN TIME: CPT

## 2024-11-06 PROCEDURE — 86706 HEP B SURFACE ANTIBODY: CPT

## 2024-11-06 PROCEDURE — 85025 COMPLETE CBC W/AUTO DIFF WBC: CPT

## 2024-11-06 PROCEDURE — 36415 COLL VENOUS BLD VENIPUNCTURE: CPT

## 2024-11-06 RX ORDER — ALBUTEROL SULFATE 0.83 MG/ML
3 SOLUTION RESPIRATORY (INHALATION) AS NEEDED
OUTPATIENT
Start: 2024-11-12

## 2024-11-06 RX ORDER — PROCHLORPERAZINE MALEATE 10 MG
10 TABLET ORAL EVERY 6 HOURS PRN
OUTPATIENT
Start: 2024-11-12

## 2024-11-06 RX ORDER — DIPHENHYDRAMINE HYDROCHLORIDE 50 MG/ML
50 INJECTION INTRAMUSCULAR; INTRAVENOUS AS NEEDED
OUTPATIENT
Start: 2024-11-12

## 2024-11-06 RX ORDER — EPINEPHRINE 0.3 MG/.3ML
0.3 INJECTION SUBCUTANEOUS EVERY 5 MIN PRN
OUTPATIENT
Start: 2024-11-12

## 2024-11-06 RX ORDER — PROCHLORPERAZINE EDISYLATE 5 MG/ML
10 INJECTION INTRAMUSCULAR; INTRAVENOUS EVERY 6 HOURS PRN
OUTPATIENT
Start: 2024-11-12

## 2024-11-06 RX ORDER — FAMOTIDINE 10 MG/ML
20 INJECTION INTRAVENOUS ONCE AS NEEDED
OUTPATIENT
Start: 2024-11-12

## 2024-11-08 ENCOUNTER — APPOINTMENT (OUTPATIENT)
Dept: RADIOLOGY | Facility: HOSPITAL | Age: 59
End: 2024-11-08
Payer: MEDICAID

## 2024-11-08 LAB — ACTH PLAS-MCNC: 14.3 PG/ML (ref 7.2–63.3)

## 2024-11-11 ENCOUNTER — TELEPHONE (OUTPATIENT)
Dept: ADMISSION | Facility: HOSPITAL | Age: 59
End: 2024-11-11
Payer: MEDICAID

## 2024-11-11 NOTE — TELEPHONE ENCOUNTER
Daughter called in with questions regarding mom's first infusion tomorrow. All questions answered.

## 2024-11-12 ENCOUNTER — INFUSION (OUTPATIENT)
Dept: HEMATOLOGY/ONCOLOGY | Facility: HOSPITAL | Age: 59
End: 2024-11-12
Payer: MEDICAID

## 2024-11-12 ENCOUNTER — NUTRITION (OUTPATIENT)
Dept: HEMATOLOGY/ONCOLOGY | Facility: HOSPITAL | Age: 59
End: 2024-11-12

## 2024-11-12 VITALS
HEART RATE: 68 BPM | WEIGHT: 158.73 LBS | OXYGEN SATURATION: 99 % | SYSTOLIC BLOOD PRESSURE: 109 MMHG | TEMPERATURE: 97.9 F | RESPIRATION RATE: 18 BRPM | BODY MASS INDEX: 28.37 KG/M2 | DIASTOLIC BLOOD PRESSURE: 58 MMHG

## 2024-11-12 VITALS — WEIGHT: 158.73 LBS | HEIGHT: 63 IN | BODY MASS INDEX: 28.12 KG/M2

## 2024-11-12 DIAGNOSIS — C01 CANCER OF BASE OF TONGUE (MULTI): ICD-10-CM

## 2024-11-12 PROCEDURE — 96413 CHEMO IV INFUSION 1 HR: CPT

## 2024-11-12 PROCEDURE — 2500000004 HC RX 250 GENERAL PHARMACY W/ HCPCS (ALT 636 FOR OP/ED): Mod: JZ,SE | Performed by: STUDENT IN AN ORGANIZED HEALTH CARE EDUCATION/TRAINING PROGRAM

## 2024-11-12 RX ORDER — EPINEPHRINE 0.3 MG/.3ML
0.3 INJECTION SUBCUTANEOUS EVERY 5 MIN PRN
Status: DISCONTINUED | OUTPATIENT
Start: 2024-11-12 | End: 2024-11-12 | Stop reason: HOSPADM

## 2024-11-12 RX ORDER — PROCHLORPERAZINE MALEATE 10 MG
10 TABLET ORAL EVERY 6 HOURS PRN
Status: DISCONTINUED | OUTPATIENT
Start: 2024-11-12 | End: 2024-11-12 | Stop reason: HOSPADM

## 2024-11-12 RX ORDER — PROCHLORPERAZINE EDISYLATE 5 MG/ML
10 INJECTION INTRAMUSCULAR; INTRAVENOUS EVERY 6 HOURS PRN
Status: DISCONTINUED | OUTPATIENT
Start: 2024-11-12 | End: 2024-11-12 | Stop reason: HOSPADM

## 2024-11-12 RX ORDER — DIPHENHYDRAMINE HYDROCHLORIDE 50 MG/ML
50 INJECTION INTRAMUSCULAR; INTRAVENOUS AS NEEDED
Status: DISCONTINUED | OUTPATIENT
Start: 2024-11-12 | End: 2024-11-12 | Stop reason: HOSPADM

## 2024-11-12 RX ORDER — ALBUTEROL SULFATE 0.83 MG/ML
3 SOLUTION RESPIRATORY (INHALATION) AS NEEDED
Status: DISCONTINUED | OUTPATIENT
Start: 2024-11-12 | End: 2024-11-12 | Stop reason: HOSPADM

## 2024-11-12 RX ORDER — FAMOTIDINE 10 MG/ML
20 INJECTION INTRAVENOUS ONCE AS NEEDED
Status: DISCONTINUED | OUTPATIENT
Start: 2024-11-12 | End: 2024-11-12 | Stop reason: HOSPADM

## 2024-11-12 ASSESSMENT — PAIN SCALES - GENERAL: PAINLEVEL_OUTOF10: 0-NO PAIN

## 2024-11-12 NOTE — PROGRESS NOTES
Patient in clinic for 1st dose Pembrolizumab. Tolerated infusion without issue. Denied problem or concern. Aware of follow up appointments. DC to home in stable condition

## 2024-11-12 NOTE — PROGRESS NOTES
"NUTRITION Follow-Up NOTE    Nutrition Assessment     Reason for Visit:  Amalia Roa is a 59 y.o. female who presents for base of tongue cancer.   PEG placed 10/18/24  [place] Pembrolizumab start today 11/12/24  Pt seen by dietitian Awais Issa 10/25/24    11/12- FUV. Pt seen with  in infusion. Pleasant.     Lab Results   Component Value Date/Time    GLUCOSE 79 11/06/2024 1219     11/06/2024 1219    K 4.9 11/06/2024 1219     11/06/2024 1219    CO2 33 (H) 11/06/2024 1219    ANIONGAP 10 11/06/2024 1219    BUN 21 11/06/2024 1219    CREATININE 0.87 11/06/2024 1219    EGFR 76 11/06/2024 1219    CALCIUM 8.9 11/06/2024 1219    ALBUMIN 3.9 11/06/2024 1219    ALKPHOS 66 11/06/2024 1219    PROT 6.0 (L) 11/06/2024 1219    AST 15 11/06/2024 1219    BILITOT 0.5 11/06/2024 1219    ALT 11 11/06/2024 1219     No results found for: \"VITD25\"    Anthropometrics:  Anthropometrics  Height: 159.3 cm (5' 2.72\")  Weight: 72 kg (158 lb 11.7 oz)  BMI (Calculated): 28.37  IBW/kg (Dietitian Calculated): 52.2 kg  Percent of IBW: 137 %  Adjusted Body Weight (kg): 57.1 kg  Weight Change  Weight History / % Weight Change: weight stable since last RDN visit over past 3 weeks        Wt Readings from Last 10 Encounters:   11/12/24 72 kg (158 lb 11.7 oz)   11/12/24 72 kg (158 lb 11.7 oz)   10/25/24 71.4 kg (157 lb 6.4 oz)   10/25/24 71.7 kg (158 lb)   10/22/24 71.8 kg (158 lb 6.4 oz)   10/14/24 73.3 kg (161 lb 8 oz)   10/03/24 74.4 kg (164 lb)   09/20/24 75.3 kg (166 lb)        Food And Nutrient Intake:  Food and Nutrient History  Energy Intake: Good > 75 % (enteral + small amount oral intake some days)  Fluid Intake: good - oral fluid intake water/sweet tea + FWF + free water in enteral feeds. Pt feels she drinks adequately  Food Allergy: no food allergies  GI Symptoms: constipation (senna via PEG)  Oral Problems: dysphagia     Food Intake  Amount of Food: has been able to take solid food 5 days in a row. Eating solids varies day to " "day. States dense foods are better tolerated, bread sticks, Alexandrea Doones. Also tolerates soup well. Does not do as well with soft foods like spaghetti                                                        Nutrition Focused Physical Exam Findings:      Subcutaneous Fat Loss  Orbital Fat Pads: Well nourished (slightly bulging fat pads)  Buccal Fat Pads: Well nourished (full, rounded cheeks)  Triceps: Defer  Ribs: Defer    Muscle Wasting  Temporalis: Well nourished (well-defined muscle)  Pectoralis (Clavicular Region): Well nourished (clavicle not visible)  Deltoid/Trapezius: Well nourished (rounded appearance at arm, shoulder, neck)  Interosseous: Well nourished (muscle bulges)  Trapezius/Infraspinatus/Supraspinatus (Scapular Region): Well nourished (bones not prominent, muscle taut)  Quadriceps: Defer  Gastrocnemius: Defer         Physical Findings (Nutrition Deficiency/Toxicity)  Hair: Negative  Eyes: Negative  Mouth: Negative  Nails: Negative  Skin: Negative    Energy Needs  Calculated Energy Needs Using Equations  Height: 159.3 cm (5' 2.72\")  Estimated Energy Needs  Total Energy Estimated Needs (kCal): 1875 kCal (actual BW used for assessment weight 72 kg 11/12)  Total Estimated Energy Need per Day (kCal/kg): 26 kCal/kg  Estimated Fluid Needs  Total Fluid Estimated Needs (mL): 2150 mL  Total Fluid Estimated Needs (mL/kg): 30 mL/kg  Estimated Protein Needs  Total Protein Estimated Needs (g): 86 g  Total Protein Estimated Needs (g/kg): 1.2 g/kg        Nutrition Diagnosis   Malnutrition Diagnosis  Patient has Malnutrition Diagnosis: No    Nutrition Diagnosis  Patient has Nutrition Diagnosis: Yes  Diagnosis Status (1): Ongoing  Nutrition Diagnosis 1: Inadequate oral intake  Related to (1): chronic condition  As Evidenced by (1): diagnosis, need for PEG  Additional Nutrition Diagnosis: Diagnosis 2  Diagnosis Status (2): New  Nutrition Diagnosis 2: Swallowing difficulity  Related to (2): diagnosis  As Evidenced by (2): " "dysphagia, need for PEG    Nutrition Interventions/Recommendations   Nutrition Prescription  Individualized Nutrition Prescription Provided for : enteral prescription - soft diet as tolerated    Food and Nutrition Delivery  Food and Nutrition Delivery  Meals & Snacks: Texture-Modified Diet  Goals: soft HAROON, encouraged oral diet as tolerated to maintain swallow function and for \"bonus\" calories. Chop/puree as needed, add moisture to foods as needed to aide swallowing. Chew food throroughly. Avoid dry rough foods, acidic foods  Enteral Nutrition: Modify schedule of enteral nutrition, Modify rate of enteral nutrition  Total Nutrition Provided: Encouraged goal of 4 cartons of Isosource 1.5/day and expalined rationale for getting all of the nutrition she needs especially now that treatment has started. Suggested 1 1/3 carton TID. she is currently doing 1 carton at a feed. Also decreasing FWF from 60 ml before and after to 30 ml as she is complaining of feeling full. Also discussed spacing feeds out as much as possible. She thought this was feasible    Nutrition Education       Coordination of Care       11/12- provided pt with split 4X4 gauze.      DME:Joseph  TF: Isosource 1.5  GOAL RATE: 4 cartons per day  PROVIDES: 1500 calories 68 grams protein 764 ml free water  METHOD: bolus  COMMENTS:     Nutrition Monitoring and Evaluation   Food/Nutrient Related History Monitoring  Monitoring and Evaluation Plan: Enteral and parenteral nutrition intake, Meal/snack pattern  Meal/Snack Pattern: Food variety  Criteria: soft diet as tolerated  Enteral and Parenteral Nutrition Intake: Enteral nutrition intake  Criteria: tolerate TF at goal  Body Composition/Growth/Weight History  Monitoring and Evaluation Plan: Weight  Weight: Measured weight  Criteria: maintain weight          Time Spent  Prep time on day of patient encounter: 5 minutes  Time spent directly with patient, family or caregiver: 25 minutes  Additional Time Spent on " Patient Care Activities: 5 minutes  Documentation Time: 25 minutes  Other Time Spent: 0 minutes  Total: 60 minutes

## 2024-11-13 ENCOUNTER — SOCIAL WORK (OUTPATIENT)
Dept: CASE MANAGEMENT | Facility: HOSPITAL | Age: 59
End: 2024-11-13
Payer: MEDICAID

## 2024-11-13 ENCOUNTER — TELEPHONE (OUTPATIENT)
Dept: ADMISSION | Facility: HOSPITAL | Age: 59
End: 2024-11-13
Payer: MEDICAID

## 2024-11-13 ENCOUNTER — APPOINTMENT (OUTPATIENT)
Dept: HEMATOLOGY/ONCOLOGY | Facility: HOSPITAL | Age: 59
End: 2024-11-13
Payer: MEDICAID

## 2024-11-13 NOTE — TELEPHONE ENCOUNTER
The daughter had a question regarding the port placement scheduled for next Friday, she was transferred to the IR dept at the main campus to get confirmatory answers.

## 2024-11-13 NOTE — PROGRESS NOTES
CHARLOTTE received email from [place] Korina Gutierrez confirming dates that were submitted to her. SW confirmed dates that were listed in chart aligned with what was requested. Will remain available as needed.

## 2024-11-14 ENCOUNTER — TELEPHONE (OUTPATIENT)
Dept: OTOLARYNGOLOGY | Facility: HOSPITAL | Age: 59
End: 2024-11-14
Payer: MEDICAID

## 2024-11-14 DIAGNOSIS — C01 CANCER OF BASE OF TONGUE (MULTI): ICD-10-CM

## 2024-11-14 RX ORDER — LIDOCAINE 50 MG/G
1 PATCH TOPICAL DAILY
Qty: 14 PATCH | Refills: 0 | Status: SHIPPED | OUTPATIENT
Start: 2024-11-14

## 2024-11-20 ENCOUNTER — APPOINTMENT (OUTPATIENT)
Dept: RADIOLOGY | Facility: HOSPITAL | Age: 59
End: 2024-11-20
Payer: MEDICAID

## 2024-11-21 ENCOUNTER — ANESTHESIA EVENT (OUTPATIENT)
Dept: RADIOLOGY | Facility: HOSPITAL | Age: 59
End: 2024-11-21
Payer: MEDICAID

## 2024-11-22 ENCOUNTER — ANESTHESIA (OUTPATIENT)
Dept: RADIOLOGY | Facility: HOSPITAL | Age: 59
End: 2024-11-22
Payer: MEDICAID

## 2024-11-22 ENCOUNTER — HOSPITAL ENCOUNTER (OUTPATIENT)
Dept: RADIOLOGY | Facility: HOSPITAL | Age: 59
Discharge: HOME | End: 2024-11-22
Payer: MEDICAID

## 2024-11-22 VITALS
DIASTOLIC BLOOD PRESSURE: 77 MMHG | SYSTOLIC BLOOD PRESSURE: 157 MMHG | BODY MASS INDEX: 28 KG/M2 | OXYGEN SATURATION: 96 % | HEIGHT: 63 IN | RESPIRATION RATE: 17 BRPM | HEART RATE: 82 BPM | WEIGHT: 158 LBS | TEMPERATURE: 96.8 F

## 2024-11-22 DIAGNOSIS — C01 CANCER OF BASE OF TONGUE (MULTI): ICD-10-CM

## 2024-11-22 PROBLEM — I38 VALVULAR HEART DISEASE: Status: ACTIVE | Noted: 2024-11-22

## 2024-11-22 PROBLEM — I10 HTN (HYPERTENSION): Status: ACTIVE | Noted: 2024-11-22

## 2024-11-22 PROBLEM — E78.5 HYPERLIPIDEMIA: Status: ACTIVE | Noted: 2024-11-22

## 2024-11-22 PROBLEM — Z95.5 STENTED CORONARY ARTERY: Status: ACTIVE | Noted: 2024-11-22

## 2024-11-22 PROBLEM — I25.10 CAD (CORONARY ARTERY DISEASE): Status: ACTIVE | Noted: 2024-11-22

## 2024-11-22 PROBLEM — F43.10 PTSD (POST-TRAUMATIC STRESS DISORDER): Status: ACTIVE | Noted: 2024-11-22

## 2024-11-22 PROBLEM — I21.9 MI (MYOCARDIAL INFARCTION) (MULTI): Status: ACTIVE | Noted: 2024-11-22

## 2024-11-22 PROBLEM — F32.A DEPRESSION: Status: ACTIVE | Noted: 2024-11-22

## 2024-11-22 PROBLEM — G47.33 OSA (OBSTRUCTIVE SLEEP APNEA): Status: ACTIVE | Noted: 2024-11-22

## 2024-11-22 PROBLEM — I34.0 MITRAL REGURGITATION: Status: ACTIVE | Noted: 2024-11-22

## 2024-11-22 PROBLEM — I50.9 CHF (CONGESTIVE HEART FAILURE): Status: ACTIVE | Noted: 2024-11-22

## 2024-11-22 PROBLEM — R94.31 ABNORMAL EKG: Status: ACTIVE | Noted: 2024-11-22

## 2024-11-22 PROBLEM — J45.909 ASTHMA: Status: ACTIVE | Noted: 2024-11-22

## 2024-11-22 PROBLEM — R01.1 MURMUR: Status: ACTIVE | Noted: 2024-11-22

## 2024-11-22 PROCEDURE — 2550000001 HC RX 255 CONTRASTS: Mod: SE | Performed by: ANESTHESIOLOGY

## 2024-11-22 PROCEDURE — C1769 GUIDE WIRE: HCPCS

## 2024-11-22 PROCEDURE — 76937 US GUIDE VASCULAR ACCESS: CPT | Performed by: RADIOLOGY

## 2024-11-22 PROCEDURE — 7100000002 HC RECOVERY ROOM TIME - EACH INCREMENTAL 1 MINUTE

## 2024-11-22 PROCEDURE — 2720000007 HC OR 272 NO HCPCS

## 2024-11-22 PROCEDURE — 3700000002 HC GENERAL ANESTHESIA TIME - EACH INCREMENTAL 1 MINUTE

## 2024-11-22 PROCEDURE — C1788 PORT, INDWELLING, IMP: HCPCS

## 2024-11-22 PROCEDURE — 2500000001 HC RX 250 WO HCPCS SELF ADMINISTERED DRUGS (ALT 637 FOR MEDICARE OP): Mod: SE | Performed by: ANESTHESIOLOGY

## 2024-11-22 PROCEDURE — 36010 PLACE CATHETER IN VEIN: CPT | Performed by: RADIOLOGY

## 2024-11-22 PROCEDURE — 7100000009 HC PHASE TWO TIME - INITIAL BASE CHARGE

## 2024-11-22 PROCEDURE — 7100000001 HC RECOVERY ROOM TIME - INITIAL BASE CHARGE

## 2024-11-22 PROCEDURE — 2780000003 HC OR 278 NO HCPCS

## 2024-11-22 PROCEDURE — 7100000010 HC PHASE TWO TIME - EACH INCREMENTAL 1 MINUTE

## 2024-11-22 PROCEDURE — 77001 FLUOROGUIDE FOR VEIN DEVICE: CPT | Performed by: RADIOLOGY

## 2024-11-22 PROCEDURE — C1894 INTRO/SHEATH, NON-LASER: HCPCS

## 2024-11-22 PROCEDURE — 36558 INSERT TUNNELED CV CATH: CPT | Performed by: RADIOLOGY

## 2024-11-22 PROCEDURE — 31525 DX LARYNGOSCOPY EXCL NB: CPT | Performed by: OTOLARYNGOLOGY

## 2024-11-22 PROCEDURE — 99214 OFFICE O/P EST MOD 30 MIN: CPT | Performed by: OTOLARYNGOLOGY

## 2024-11-22 PROCEDURE — 2500000004 HC RX 250 GENERAL PHARMACY W/ HCPCS (ALT 636 FOR OP/ED): Mod: SE

## 2024-11-22 PROCEDURE — 3700000001 HC GENERAL ANESTHESIA TIME - INITIAL BASE CHARGE

## 2024-11-22 RX ORDER — PROPOFOL 10 MG/ML
INJECTION, EMULSION INTRAVENOUS AS NEEDED
Status: DISCONTINUED | OUTPATIENT
Start: 2024-11-22 | End: 2024-11-22

## 2024-11-22 RX ORDER — ROCURONIUM BROMIDE 10 MG/ML
INJECTION, SOLUTION INTRAVENOUS AS NEEDED
Status: DISCONTINUED | OUTPATIENT
Start: 2024-11-22 | End: 2024-11-22

## 2024-11-22 RX ORDER — ACETAMINOPHEN 160 MG/5ML
650 SOLUTION ORAL ONCE
Status: COMPLETED | OUTPATIENT
Start: 2024-11-22 | End: 2024-11-22

## 2024-11-22 RX ORDER — SODIUM CHLORIDE, SODIUM LACTATE, POTASSIUM CHLORIDE, CALCIUM CHLORIDE 600; 310; 30; 20 MG/100ML; MG/100ML; MG/100ML; MG/100ML
50 INJECTION, SOLUTION INTRAVENOUS CONTINUOUS
Status: DISCONTINUED | OUTPATIENT
Start: 2024-11-22 | End: 2024-11-23 | Stop reason: HOSPADM

## 2024-11-22 RX ORDER — ONDANSETRON HYDROCHLORIDE 2 MG/ML
INJECTION, SOLUTION INTRAVENOUS AS NEEDED
Status: DISCONTINUED | OUTPATIENT
Start: 2024-11-22 | End: 2024-11-22

## 2024-11-22 RX ORDER — LIDOCAINE HYDROCHLORIDE 20 MG/ML
INJECTION, SOLUTION INFILTRATION; PERINEURAL AS NEEDED
Status: DISCONTINUED | OUTPATIENT
Start: 2024-11-22 | End: 2024-11-22

## 2024-11-22 RX ORDER — MIDAZOLAM HYDROCHLORIDE 1 MG/ML
INJECTION, SOLUTION INTRAMUSCULAR; INTRAVENOUS AS NEEDED
Status: DISCONTINUED | OUTPATIENT
Start: 2024-11-22 | End: 2024-11-22

## 2024-11-22 RX ORDER — ACETAMINOPHEN 325 MG/1
650 TABLET ORAL EVERY 4 HOURS PRN
Status: DISCONTINUED | OUTPATIENT
Start: 2024-11-22 | End: 2024-11-23 | Stop reason: HOSPADM

## 2024-11-22 RX ORDER — LIDOCAINE IN NACL,ISO-OSMOT/PF 30 MG/3 ML
0.1 SYRINGE (ML) INJECTION ONCE
Status: DISCONTINUED | OUTPATIENT
Start: 2024-11-22 | End: 2024-11-23 | Stop reason: HOSPADM

## 2024-11-22 RX ORDER — CEFAZOLIN 1 G/1
INJECTION, POWDER, FOR SOLUTION INTRAVENOUS AS NEEDED
Status: DISCONTINUED | OUTPATIENT
Start: 2024-11-22 | End: 2024-11-22

## 2024-11-22 RX ORDER — FENTANYL CITRATE 50 UG/ML
INJECTION, SOLUTION INTRAMUSCULAR; INTRAVENOUS CONTINUOUS PRN
Status: DISCONTINUED | OUTPATIENT
Start: 2024-11-22 | End: 2024-11-22

## 2024-11-22 RX ORDER — ACETAMINOPHEN 325 MG/1
650 TABLET ORAL ONCE
Status: COMPLETED | OUTPATIENT
Start: 2024-11-22 | End: 2024-11-22

## 2024-11-22 ASSESSMENT — PAIN - FUNCTIONAL ASSESSMENT
PAIN_FUNCTIONAL_ASSESSMENT: 0-10

## 2024-11-22 ASSESSMENT — PAIN SCALES - GENERAL
PAINLEVEL_OUTOF10: 0 - NO PAIN
PAINLEVEL_OUTOF10: 1
PAINLEVEL_OUTOF10: 1
PAINLEVEL_OUTOF10: 0 - NO PAIN
PAINLEVEL_OUTOF10: 0 - NO PAIN
PAINLEVEL_OUTOF10: 1
PAINLEVEL_OUTOF10: 3

## 2024-11-22 NOTE — POST-PROCEDURE NOTE
Interventional Radiology Post-Procedure Note    RIGHT SIDED MEDIPORT PLACEMENT    Procedure Details:  Technically successful and uncomplicated fluoroscopic guided right sided Mediport Placement.  Please see PACS for full procedural details.    Patient Tolerance: good  Complications: None    Indication for procedure: The encounter diagnosis was Cancer of base of tongue (Multi).    Pre-Procedure Verification and Time Out:  · Procedure Location procedure area   · HUDDLE - Pre-procedure Verification completed   · TIME OUT - Final Verification completed immediately prior to procedure start   · DEBRIEF completed     General Information:  Date/Time of Procedure: 11/22/24 at 5:50 PM  Indication(s): Malignancy   Findings: See PACS  Procedure performed by: Jg Mcelroy MD   Assistant(s): Dr. Wade Horton MD  Estimated Blood Loss (mL): minimal  Specimen: No  Informed Consent: written consent obtained    Prep:  Ultrasound Guided Insertion: Yes  Large Drape, Hand Hygiene, Surgical Cap, Surgical Mask, Sterile Gown, Sterile Gloves, Glasses, and Scrubs  Patient Position: Supine  Site Prep: chlorhexidine, draped, usual sterile procedure followed    Anesthesia/Medications:  Procedural Sedation: General Anesthesia  Medications (Filter: Administrations occurring from 1521 to 1750 on 11/22/24) As of 11/22/24 1750      iohexol (OMNIPaque) 350 mg iodine/mL solution 50 mL (mL) Total volume:  50 mL Dosing weight:  71.7      Date/Time Rate/Dose/Volume Action       11/22/24  1709 50 mL Given                   Jg Mcelroy MD, PGY-4  Interventional Radiology  IR pager: [phone]    NON-Urgent on call weekends and after hours weekdays (5pm - 5am) IR pager: [phone]  Urgent & emergent on call weekends and after hours weekdays (5pm-7am) IR pager: [phone]

## 2024-11-22 NOTE — Clinical Note
R sided chest mediport placed. Sutured in place with over top gauze and dermabond. Dressing CDI. Case managed with anesthesia care for medication and vitals admin.

## 2024-11-22 NOTE — ANESTHESIA PROCEDURE NOTES
Airway  Date/Time: 11/22/2024 4:13 PM  Urgency: elective    Difficult airway    Staffing  Performed: DALIA   Authorized by: Leonor Horton MD    Performed by: DALIA Santiago  Patient location during procedure: OR    Indications and Patient Condition  Indications for airway management: anesthesia and airway protection  Spontaneous Ventilation: absent  Sedation level: deep  Preoxygenated: yes  Patient position: sniffing  MILS not maintained throughout  Mask difficulty assessment: 1 - vent by mask  Planned trial extubation    Final Airway Details  Final airway type: endotracheal airway      Successful airway: ETT  Cuffed: yes   Successful intubation technique: video laryngoscopy  Facilitating devices/methods: intubating stylet and cricoid pressure  Endotracheal tube insertion site: oral  Blade size: #3  ETT size (mm): 6.0  Cormack-Lehane Classification: grade IV - neither glottis nor epiglottis seen  Placement verified by: chest auscultation and capnometry   Cuff volume (mL): 8  Measured from: lips  ETT to lips (cm): 22  Number of attempts at approach: 3 or more  Ventilation between attempts: BVM  Number of other approaches attempted: 3 or more    Other Attempts  Unsuccessful attempted endotracheal techniques: video laryngoscopy and flexible bronchoscopy    Additional Comments  Mask ventilation easy and sustained throughout.  Glidescope x3 with blade 3 and 4 used. Third time with FOB.  Unable to get a clear view of the cords due to what appears like extension of the mass as described from previous notes.  ENT team was called for assistance. Dr. Hicks was able to obtain the airway using a dedo scope. ETT 6.0, cuffed. 23cm at the lip.

## 2024-11-22 NOTE — ANESTHESIA PREPROCEDURE EVALUATION
Patient: Amalia Roa    Procedure Information       Date/Time: 11/22/24 1200    Scheduled providers: Leonor Horton MD; DALIA Santiago    Procedure: IR CVC PORT PLACEMENT    Location: [facility]            Relevant Problems   Anesthesia  H/o difficult airway , mask ventilation ok, but glide 3 partial of view of glottis      Cardiac  Multiple JOHN last placed 2018  S/p MVR 2015 9/2024: Echo EF 43%, mild MR s/p prosthesis, LA mildly enlarged  9/2024 Stress test negative for ischemia   (+) Abnormal EKG   (+) CAD (coronary artery disease)   (+) CHF (congestive heart failure)   (+) HTN (hypertension)   (+) Hyperlipidemia   (+) MI (myocardial infarction) (Multi)   (+) Mitral regurgitation   (+) Murmur   (+) Stented coronary artery   (+) Valvular heart disease      Pulmonary  Non-compliant with CPAP   (+) Asthma   (+) MEÑO (obstructive sleep apnea)      Neuro   (+) Depression   (+) PTSD (post-traumatic stress disorder)      GI   (+) Oropharyngeal dysphagia      Liver   (+) Cancer of base of tongue (Multi)      Digestive   (+) Tongue mass       Clinical information reviewed:    Allergies  Meds               NPO Detail:  NPO/Void Status  Date of Last Liquid: 11/22/24  Time of Last Liquid: 0000  Date of Last Solid: 11/22/24  Time of Last Solid: 0000         Physical Exam    Airway  Mallampati: III  TM distance: >3 FB  Neck ROM: full     Cardiovascular   Rhythm: regular  Rate: normal  (+) murmur     Dental    Pulmonary   Breath sounds clear to auscultation  (+) decreased breath sounds     Abdominal      Other findings: Poor dentition. Multiple missing teeth.  Tongue deviates to the right when asked to stick out          Anesthesia Plan    History of general anesthesia?: yes  History of complications of general anesthesia?: no    ASA 3     MAC   (Discussed GA ETT with video laryngoscope as well  )  intravenous induction   Anesthetic plan and risks discussed with patient and spouse.

## 2024-11-22 NOTE — ANESTHESIA POSTPROCEDURE EVALUATION
Patient: Amalia Roa    Procedure Summary       Date: 11/22/24 Room / Location: [facility]    Anesthesia Start: 1520 Anesthesia Stop: 1813    Procedure: IR CVC PORT PLACEMENT Diagnosis:       Cancer of base of tongue (Multi)      (58 yo with newly diagnosed metastatic head and neck cancer, requests port for access as difficult to access)    Scheduled Providers: Leonor Horton MD; DALIA Santiago Responsible Provider: Leonor Horton MD    Anesthesia Type: MAC ASA Status: 3            Anesthesia Type: MAC    Vitals Value Taken Time   /76 11/22/24 1806   Temp 36.1 11/22/24 1814   Pulse 76 11/22/24 1812   Resp 13 11/22/24 1812   SpO2 100 % 11/22/24 1812   Vitals shown include unfiled device data.    Anesthesia Post Evaluation    Patient location during evaluation: PACU  Patient participation: complete - patient participated  Level of consciousness: awake  Pain management: adequate  Airway patency: patent  Cardiovascular status: acceptable  Respiratory status: acceptable and face mask  Hydration status: acceptable  Postoperative Nausea and Vomiting: none        There were no known notable events for this encounter.

## 2024-11-23 NOTE — CONSULTS
Ear Nose & Throat Consult Note  Reason For Consult  Difficult intubation    History Of Present Illness  Amalia Roa is a 59 y.o. 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  ENT called intraprocedure due to difficulty intubating.  Per anesthesia team patient hemodynamically stable and easily mass couple.  However when attempting intubation unable to get any view of the glottis secondary to large tumor involving the base of tongue.  Patient underwent a triple endoscopy with biopsy and PEG placement on 10/14 and at that point in time the grade 2 view was able to be obtained.  It seems the mass had progressed causing increased difficulty with intubation.     Past Medical History  She has a past medical history of Asthma, CHF (congestive heart failure), Coronary artery disease, Depression, Dysphagia, Heart valve disease, Hyperlipidemia, Hypertension, Myocardial infarction (Multi), PTSD (post-traumatic stress disorder), and Sleep apnea.    She has no past medical history of Cerebral vascular accident (Multi), Chronic kidney disease, Disease of thyroid gland, GERD (gastroesophageal reflux disease), PONV (postoperative nausea and vomiting), Seizure disorder (Multi), TIA (transient ischemic attack), or Type 2 diabetes mellitus.    Surgical History  She has a past surgical history that includes Mouth surgery; Mitral valve repair; Coronary angioplasty with stent; Gastrostomy tube placement; and biopsy (10/14/2024).     Social History  She reports that she has quit smoking. Her smoking use included cigarettes. She started smoking about 10 years ago. She has a 10.9 pack-year smoking history. She has never used smokeless tobacco. She reports that she does not currently use alcohol. She reports that she does not use drugs.    Family History  Family History   Problem Relation Name Age of Onset    Breast  "cancer Mother      Colon cancer Sister      Cervical cancer Other Neice         Allergies  Cyclobenzaprine    Review of Systems  Limited due to to patient currently sedated     Physical Exam  CONSTITUTIONAL: Sedated  VOICE: Unable to be evaluated  RESPIRATION:  easily mask ventilated  CV: Normal rate and rhythm on monitor  NEURO: Sedated  HEAD AND FACE:  Symmetric facial features,  EARS:  Normal external ears  NOSE:  External nose midline  ORAL CAVITY/OROPHARYNX/LIPS:  Normal mucous membranes, normal floor of mouth, large oropharyngeal mass involving the base of tongue, very firm and mobile base of tongue  NECK/LYMPH:  trachea midline  SKIN:  Neck skin is without scar or injury     Last Recorded Vitals  Blood pressure 157/77, pulse 82, temperature 36 °C (96.8 °F), resp. rate 17, height 1.6 m (5' 3\"), weight 71.7 kg (158 lb), SpO2 96%.    Relevant Results      Not relevant to presenting complaint    Procedure note: Intubation  ENT team called by anesthesia colleagues for assistance with intubation due to large base of tongue tumor.  The tumor was encompassing most of the base of tongue causing the tongue to be firm with limited mobility.  Direct laryngoscopy with the low-profile glide scope blade was attempted first.  The tongue was unable to be elevated enough with the glide scope for the glottis to be visualized.  The Dedo laryngoscope was then used to perform direct laryngoscopy.  The arytenoids were able to be visualized and a 6.0 ET tube was able to be advanced through the cords under direct visualization.    Assessment/Plan     Amalia Roa is a 59 y.o. 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  ENT assisted with intubation using Dedo laryngoscope due to obstructive base of tongue lesion.  Okay to extubate after procedure.  Mass involving base of tongue but does not involve glottis and " it is anticipated patient would not have any respiratory distress when awake and able to protect their own airway.  Consider waking up with nasal trumpet.    Lyudmila Alonzo MD - PGY2  Otolaryngology - Head and Neck Surgery    ENT Head & Neck phone: [phone]  ENT Consults pager: [phone]   ENT Pediatrics  pager: [phone]  ENT Subspecialty (otology, rhinology, laryngology, plastics, sleep):   M-F 6am-5pm- EpicChat or page the resident who wrote the daily note   Nights & weekends- [phone]  ENT Outpatient [phone]     I am the day time consult resident. I can only be contacted 6am-5pm. Please contact the teams listed above with any questions or concerns outside of these hours.       ATTENDING NOTE:  I saw and evaluated the patient. I personally obtained the key and critical portions of the history and physical exam or was physically present for key and critical portions performed by the resident/fellow. I reviewed the resident/fellow's documentation and discussed the patient with the resident/fellow. I agree with the resident/fellow's medical decision making as documented in the note.  - I personally performed the direct laryngoscopy  - Intraoperative consultation due to difficult intubation  - Large BOT mass on right - VC not involved as described above       *NOTE PATIENT IS A DIFFICULT INTUBATION DUE TO CURRENT UNTREATED CANCER - In the future recommend AWAKE FIBEROPTIC INTUBATION    Chloe Hicks MD

## 2024-11-26 RX ORDER — ATORVASTATIN CALCIUM 40 MG/1
TABLET, FILM COATED ORAL
Qty: 90 TABLET | Refills: 3 | Status: SHIPPED | OUTPATIENT
Start: 2024-11-26

## 2024-12-02 ENCOUNTER — LAB (OUTPATIENT)
Dept: LAB | Facility: LAB | Age: 59
End: 2024-12-02
Payer: MEDICAID

## 2024-12-02 DIAGNOSIS — C01 CANCER OF BASE OF TONGUE (MULTI): ICD-10-CM

## 2024-12-02 LAB
ALBUMIN SERPL BCP-MCNC: 3.8 G/DL (ref 3.4–5)
ALP SERPL-CCNC: 71 U/L (ref 33–136)
ALT SERPL W P-5'-P-CCNC: 11 U/L (ref 7–45)
ANION GAP SERPL CALC-SCNC: 13 MMOL/L (ref 10–20)
AST SERPL W P-5'-P-CCNC: 14 U/L (ref 9–39)
BASOPHILS # BLD AUTO: 0.03 X10*3/UL (ref 0–0.1)
BASOPHILS NFR BLD AUTO: 0.4 %
BILIRUB SERPL-MCNC: 0.4 MG/DL (ref 0–1.2)
BUN SERPL-MCNC: 22 MG/DL (ref 6–23)
CALCIUM SERPL-MCNC: 9.4 MG/DL (ref 8.6–10.6)
CHLORIDE SERPL-SCNC: 100 MMOL/L (ref 98–107)
CO2 SERPL-SCNC: 32 MMOL/L (ref 21–32)
CREAT SERPL-MCNC: 0.9 MG/DL (ref 0.5–1.05)
EGFRCR SERPLBLD CKD-EPI 2021: 73 ML/MIN/1.73M*2
EOSINOPHIL # BLD AUTO: 0.19 X10*3/UL (ref 0–0.7)
EOSINOPHIL NFR BLD AUTO: 2.5 %
ERYTHROCYTE [DISTWIDTH] IN BLOOD BY AUTOMATED COUNT: 13.9 % (ref 11.5–14.5)
GLUCOSE SERPL-MCNC: 65 MG/DL (ref 74–99)
HCT VFR BLD AUTO: 38.7 % (ref 36–46)
HGB BLD-MCNC: 12.2 G/DL (ref 12–16)
IMM GRANULOCYTES # BLD AUTO: 0.02 X10*3/UL (ref 0–0.7)
IMM GRANULOCYTES NFR BLD AUTO: 0.3 % (ref 0–0.9)
LYMPHOCYTES # BLD AUTO: 1.23 X10*3/UL (ref 1.2–4.8)
LYMPHOCYTES NFR BLD AUTO: 16.4 %
MCH RBC QN AUTO: 31.4 PG (ref 26–34)
MCHC RBC AUTO-ENTMCNC: 31.5 G/DL (ref 32–36)
MCV RBC AUTO: 100 FL (ref 80–100)
MONOCYTES # BLD AUTO: 0.81 X10*3/UL (ref 0.1–1)
MONOCYTES NFR BLD AUTO: 10.8 %
NEUTROPHILS # BLD AUTO: 5.23 X10*3/UL (ref 1.2–7.7)
NEUTROPHILS NFR BLD AUTO: 69.6 %
NRBC BLD-RTO: 0 /100 WBCS (ref 0–0)
PLATELET # BLD AUTO: 311 X10*3/UL (ref 150–450)
POTASSIUM SERPL-SCNC: 4.5 MMOL/L (ref 3.5–5.3)
PROT SERPL-MCNC: 6.3 G/DL (ref 6.4–8.2)
RBC # BLD AUTO: 3.89 X10*6/UL (ref 4–5.2)
SODIUM SERPL-SCNC: 140 MMOL/L (ref 136–145)
WBC # BLD AUTO: 7.5 X10*3/UL (ref 4.4–11.3)

## 2024-12-02 PROCEDURE — 85025 COMPLETE CBC W/AUTO DIFF WBC: CPT

## 2024-12-02 PROCEDURE — 80053 COMPREHEN METABOLIC PANEL: CPT

## 2024-12-03 ENCOUNTER — HOSPITAL ENCOUNTER (INPATIENT)
Facility: HOSPITAL | Age: 59
End: 2024-12-03
Attending: STUDENT IN AN ORGANIZED HEALTH CARE EDUCATION/TRAINING PROGRAM
Payer: MEDICAID

## 2024-12-03 ENCOUNTER — CLINICAL SUPPORT (OUTPATIENT)
Dept: EMERGENCY MEDICINE | Facility: HOSPITAL | Age: 59
End: 2024-12-03
Payer: MEDICAID

## 2024-12-03 ENCOUNTER — NUTRITION (OUTPATIENT)
Dept: HEMATOLOGY/ONCOLOGY | Facility: HOSPITAL | Age: 59
End: 2024-12-03

## 2024-12-03 ENCOUNTER — OFFICE VISIT (OUTPATIENT)
Dept: HEMATOLOGY/ONCOLOGY | Facility: HOSPITAL | Age: 59
End: 2024-12-03
Payer: MEDICAID

## 2024-12-03 ENCOUNTER — APPOINTMENT (OUTPATIENT)
Dept: RADIOLOGY | Facility: HOSPITAL | Age: 59
End: 2024-12-03
Payer: MEDICAID

## 2024-12-03 ENCOUNTER — TELEPHONE (OUTPATIENT)
Dept: PALLIATIVE MEDICINE | Facility: HOSPITAL | Age: 59
End: 2024-12-03

## 2024-12-03 ENCOUNTER — INFUSION (OUTPATIENT)
Dept: HEMATOLOGY/ONCOLOGY | Facility: HOSPITAL | Age: 59
End: 2024-12-03
Payer: MEDICAID

## 2024-12-03 ENCOUNTER — CLINICAL SUPPORT (OUTPATIENT)
Dept: NUTRITION | Facility: CLINIC | Age: 59
End: 2024-12-03
Payer: MEDICAID

## 2024-12-03 VITALS
TEMPERATURE: 97.7 F | DIASTOLIC BLOOD PRESSURE: 54 MMHG | BODY MASS INDEX: 27.47 KG/M2 | WEIGHT: 155.1 LBS | RESPIRATION RATE: 16 BRPM | OXYGEN SATURATION: 100 % | SYSTOLIC BLOOD PRESSURE: 116 MMHG | HEART RATE: 60 BPM

## 2024-12-03 VITALS — WEIGHT: 155.09 LBS | BODY MASS INDEX: 27.47 KG/M2

## 2024-12-03 DIAGNOSIS — J18.9 PNEUMONIA OF RIGHT MIDDLE LOBE DUE TO INFECTIOUS ORGANISM: ICD-10-CM

## 2024-12-03 DIAGNOSIS — R55 VASOVAGAL SYNCOPE: Primary | ICD-10-CM

## 2024-12-03 DIAGNOSIS — G89.3 NEOPLASM RELATED PAIN: ICD-10-CM

## 2024-12-03 DIAGNOSIS — K59.01 SLOW TRANSIT CONSTIPATION: ICD-10-CM

## 2024-12-03 DIAGNOSIS — Z51.12 ENCOUNTER FOR ANTINEOPLASTIC IMMUNOTHERAPY: ICD-10-CM

## 2024-12-03 DIAGNOSIS — C01 CANCER OF BASE OF TONGUE (MULTI): ICD-10-CM

## 2024-12-03 DIAGNOSIS — C01 CANCER OF BASE OF TONGUE (MULTI): Primary | ICD-10-CM

## 2024-12-03 DIAGNOSIS — R78.81 GRAM-POSITIVE BACTEREMIA: ICD-10-CM

## 2024-12-03 DIAGNOSIS — F32.A DEPRESSION, UNSPECIFIED DEPRESSION TYPE: ICD-10-CM

## 2024-12-03 LAB
ALBUMIN SERPL BCP-MCNC: 4.1 G/DL (ref 3.4–5)
ALP SERPL-CCNC: 71 U/L (ref 33–136)
ALT SERPL W P-5'-P-CCNC: 10 U/L (ref 7–45)
ANION GAP SERPL CALC-SCNC: 12 MMOL/L (ref 10–20)
APTT PPP: 30 SECONDS (ref 27–38)
AST SERPL W P-5'-P-CCNC: 16 U/L (ref 9–39)
BASOPHILS # BLD AUTO: 0.04 X10*3/UL (ref 0–0.1)
BASOPHILS NFR BLD AUTO: 0.4 %
BILIRUB SERPL-MCNC: 0.4 MG/DL (ref 0–1.2)
BNP SERPL-MCNC: 58 PG/ML (ref 0–99)
BUN SERPL-MCNC: 23 MG/DL (ref 6–23)
CALCIUM SERPL-MCNC: 9.4 MG/DL (ref 8.6–10.6)
CARDIAC TROPONIN I PNL SERPL HS: 4 NG/L (ref 0–34)
CHLORIDE SERPL-SCNC: 99 MMOL/L (ref 98–107)
CO2 SERPL-SCNC: 32 MMOL/L (ref 21–32)
CREAT SERPL-MCNC: 0.91 MG/DL (ref 0.5–1.05)
EGFRCR SERPLBLD CKD-EPI 2021: 72 ML/MIN/1.73M*2
EOSINOPHIL # BLD AUTO: 0.32 X10*3/UL (ref 0–0.7)
EOSINOPHIL NFR BLD AUTO: 3.4 %
ERYTHROCYTE [DISTWIDTH] IN BLOOD BY AUTOMATED COUNT: 13.7 % (ref 11.5–14.5)
GLUCOSE SERPL-MCNC: 98 MG/DL (ref 74–99)
HCT VFR BLD AUTO: 41.3 % (ref 36–46)
HGB BLD-MCNC: 13.7 G/DL (ref 12–16)
IMM GRANULOCYTES # BLD AUTO: 0.03 X10*3/UL (ref 0–0.7)
IMM GRANULOCYTES NFR BLD AUTO: 0.3 % (ref 0–0.9)
INR PPP: 0.9 (ref 0.9–1.1)
LYMPHOCYTES # BLD AUTO: 1.65 X10*3/UL (ref 1.2–4.8)
LYMPHOCYTES NFR BLD AUTO: 17.4 %
MAGNESIUM SERPL-MCNC: 2.21 MG/DL (ref 1.6–2.4)
MCH RBC QN AUTO: 31.3 PG (ref 26–34)
MCHC RBC AUTO-ENTMCNC: 33.2 G/DL (ref 32–36)
MCV RBC AUTO: 94 FL (ref 80–100)
MONOCYTES # BLD AUTO: 0.64 X10*3/UL (ref 0.1–1)
MONOCYTES NFR BLD AUTO: 6.8 %
NEUTROPHILS # BLD AUTO: 6.78 X10*3/UL (ref 1.2–7.7)
NEUTROPHILS NFR BLD AUTO: 71.7 %
NRBC BLD-RTO: 0 /100 WBCS (ref 0–0)
PLATELET # BLD AUTO: 301 X10*3/UL (ref 150–450)
POTASSIUM SERPL-SCNC: 4.2 MMOL/L (ref 3.5–5.3)
PROT SERPL-MCNC: 7 G/DL (ref 6.4–8.2)
PROTHROMBIN TIME: 10.6 SECONDS (ref 9.8–12.8)
RBC # BLD AUTO: 4.38 X10*6/UL (ref 4–5.2)
SODIUM SERPL-SCNC: 139 MMOL/L (ref 136–145)
WBC # BLD AUTO: 9.5 X10*3/UL (ref 4.4–11.3)

## 2024-12-03 PROCEDURE — 96413 CHEMO IV INFUSION 1 HR: CPT

## 2024-12-03 PROCEDURE — 83880 ASSAY OF NATRIURETIC PEPTIDE: CPT

## 2024-12-03 PROCEDURE — 71275 CT ANGIOGRAPHY CHEST: CPT

## 2024-12-03 PROCEDURE — 3078F DIAST BP <80 MM HG: CPT | Performed by: NURSE PRACTITIONER

## 2024-12-03 PROCEDURE — 85025 COMPLETE CBC W/AUTO DIFF WBC: CPT

## 2024-12-03 PROCEDURE — 99215 OFFICE O/P EST HI 40 MIN: CPT | Performed by: NURSE PRACTITIONER

## 2024-12-03 PROCEDURE — 99285 EMERGENCY DEPT VISIT HI MDM: CPT | Mod: 25 | Performed by: STUDENT IN AN ORGANIZED HEALTH CARE EDUCATION/TRAINING PROGRAM

## 2024-12-03 PROCEDURE — 85610 PROTHROMBIN TIME: CPT

## 2024-12-03 PROCEDURE — 93010 ELECTROCARDIOGRAM REPORT: CPT | Performed by: STUDENT IN AN ORGANIZED HEALTH CARE EDUCATION/TRAINING PROGRAM

## 2024-12-03 PROCEDURE — 96366 THER/PROPH/DIAG IV INF ADDON: CPT

## 2024-12-03 PROCEDURE — 2500000004 HC RX 250 GENERAL PHARMACY W/ HCPCS (ALT 636 FOR OP/ED): Mod: SE | Performed by: NURSE PRACTITIONER

## 2024-12-03 PROCEDURE — 3074F SYST BP LT 130 MM HG: CPT | Performed by: NURSE PRACTITIONER

## 2024-12-03 PROCEDURE — 96365 THER/PROPH/DIAG IV INF INIT: CPT

## 2024-12-03 PROCEDURE — 80053 COMPREHEN METABOLIC PANEL: CPT

## 2024-12-03 PROCEDURE — 99285 EMERGENCY DEPT VISIT HI MDM: CPT | Performed by: STUDENT IN AN ORGANIZED HEALTH CARE EDUCATION/TRAINING PROGRAM

## 2024-12-03 PROCEDURE — 71275 CT ANGIOGRAPHY CHEST: CPT | Performed by: STUDENT IN AN ORGANIZED HEALTH CARE EDUCATION/TRAINING PROGRAM

## 2024-12-03 PROCEDURE — 99215 OFFICE O/P EST HI 40 MIN: CPT | Mod: 25 | Performed by: NURSE PRACTITIONER

## 2024-12-03 PROCEDURE — 2500000004 HC RX 250 GENERAL PHARMACY W/ HCPCS (ALT 636 FOR OP/ED): Mod: SE | Performed by: STUDENT IN AN ORGANIZED HEALTH CARE EDUCATION/TRAINING PROGRAM

## 2024-12-03 PROCEDURE — 2550000001 HC RX 255 CONTRASTS: Mod: SE | Performed by: STUDENT IN AN ORGANIZED HEALTH CARE EDUCATION/TRAINING PROGRAM

## 2024-12-03 PROCEDURE — 84484 ASSAY OF TROPONIN QUANT: CPT

## 2024-12-03 PROCEDURE — 83735 ASSAY OF MAGNESIUM: CPT

## 2024-12-03 PROCEDURE — 93005 ELECTROCARDIOGRAM TRACING: CPT

## 2024-12-03 RX ORDER — FAMOTIDINE 10 MG/ML
20 INJECTION INTRAVENOUS ONCE AS NEEDED
Status: DISCONTINUED | OUTPATIENT
Start: 2024-12-03 | End: 2024-12-03 | Stop reason: HOSPADM

## 2024-12-03 RX ORDER — EPINEPHRINE 0.3 MG/.3ML
0.3 INJECTION SUBCUTANEOUS EVERY 5 MIN PRN
Status: DISCONTINUED | OUTPATIENT
Start: 2024-12-03 | End: 2024-12-03 | Stop reason: HOSPADM

## 2024-12-03 RX ORDER — ALBUTEROL SULFATE 0.83 MG/ML
3 SOLUTION RESPIRATORY (INHALATION) AS NEEDED
Status: DISCONTINUED | OUTPATIENT
Start: 2024-12-03 | End: 2024-12-03 | Stop reason: HOSPADM

## 2024-12-03 RX ORDER — PROCHLORPERAZINE EDISYLATE 5 MG/ML
10 INJECTION INTRAMUSCULAR; INTRAVENOUS EVERY 6 HOURS PRN
Status: DISCONTINUED | OUTPATIENT
Start: 2024-12-03 | End: 2024-12-03 | Stop reason: HOSPADM

## 2024-12-03 RX ORDER — SENNOSIDES 8.8 MG/5ML
5 LIQUID ORAL 2 TIMES DAILY
Qty: 240 ML | Refills: 3 | Status: ON HOLD | OUTPATIENT
Start: 2024-12-03 | End: 2024-12-13

## 2024-12-03 RX ORDER — PROCHLORPERAZINE EDISYLATE 5 MG/ML
10 INJECTION INTRAMUSCULAR; INTRAVENOUS EVERY 6 HOURS PRN
Status: CANCELLED | OUTPATIENT
Start: 2024-12-03

## 2024-12-03 RX ORDER — DIPHENHYDRAMINE HYDROCHLORIDE 50 MG/ML
50 INJECTION INTRAMUSCULAR; INTRAVENOUS AS NEEDED
Status: CANCELLED | OUTPATIENT
Start: 2024-12-03

## 2024-12-03 RX ORDER — HEPARIN SODIUM,PORCINE/PF 10 UNIT/ML
50 SYRINGE (ML) INTRAVENOUS AS NEEDED
OUTPATIENT
Start: 2024-12-03

## 2024-12-03 RX ORDER — HEPARIN 100 UNIT/ML
500 SYRINGE INTRAVENOUS AS NEEDED
Status: DISCONTINUED | OUTPATIENT
Start: 2024-12-03 | End: 2024-12-03 | Stop reason: HOSPADM

## 2024-12-03 RX ORDER — PROCHLORPERAZINE MALEATE 10 MG
10 TABLET ORAL EVERY 6 HOURS PRN
Status: CANCELLED | OUTPATIENT
Start: 2024-12-03

## 2024-12-03 RX ORDER — OXYCODONE HYDROCHLORIDE 5 MG/1
5 TABLET ORAL EVERY 6 HOURS PRN
Status: ON HOLD | COMMUNITY
Start: 2024-10-25

## 2024-12-03 RX ORDER — ALBUTEROL SULFATE 0.83 MG/ML
3 SOLUTION RESPIRATORY (INHALATION) AS NEEDED
Status: CANCELLED | OUTPATIENT
Start: 2024-12-03

## 2024-12-03 RX ORDER — EPINEPHRINE 0.3 MG/.3ML
0.3 INJECTION SUBCUTANEOUS EVERY 5 MIN PRN
Status: CANCELLED | OUTPATIENT
Start: 2024-12-03

## 2024-12-03 RX ORDER — FAMOTIDINE 10 MG/ML
20 INJECTION INTRAVENOUS ONCE AS NEEDED
Status: CANCELLED | OUTPATIENT
Start: 2024-12-03

## 2024-12-03 RX ORDER — DIPHENHYDRAMINE HYDROCHLORIDE 50 MG/ML
50 INJECTION INTRAMUSCULAR; INTRAVENOUS AS NEEDED
Status: DISCONTINUED | OUTPATIENT
Start: 2024-12-03 | End: 2024-12-03 | Stop reason: HOSPADM

## 2024-12-03 RX ORDER — PROCHLORPERAZINE MALEATE 10 MG
10 TABLET ORAL EVERY 6 HOURS PRN
Status: DISCONTINUED | OUTPATIENT
Start: 2024-12-03 | End: 2024-12-03 | Stop reason: HOSPADM

## 2024-12-03 RX ORDER — HEPARIN 100 UNIT/ML
500 SYRINGE INTRAVENOUS AS NEEDED
OUTPATIENT
Start: 2024-12-03

## 2024-12-03 ASSESSMENT — ENCOUNTER SYMPTOMS
CARDIOVASCULAR NEGATIVE: 1
HEMOPTYSIS: 1
CONSTIPATION: 1
CONSTITUTIONAL NEGATIVE: 1

## 2024-12-03 ASSESSMENT — PAIN SCALES - GENERAL
PAINLEVEL_OUTOF10: 0-NO PAIN
PAINLEVEL_OUTOF10: 0 - NO PAIN

## 2024-12-03 ASSESSMENT — PAIN - FUNCTIONAL ASSESSMENT: PAIN_FUNCTIONAL_ASSESSMENT: 0-10

## 2024-12-03 ASSESSMENT — COLUMBIA-SUICIDE SEVERITY RATING SCALE - C-SSRS
1. IN THE PAST MONTH, HAVE YOU WISHED YOU WERE DEAD OR WISHED YOU COULD GO TO SLEEP AND NOT WAKE UP?: NO
6. HAVE YOU EVER DONE ANYTHING, STARTED TO DO ANYTHING, OR PREPARED TO DO ANYTHING TO END YOUR LIFE?: NO
2. HAVE YOU ACTUALLY HAD ANY THOUGHTS OF KILLING YOURSELF?: NO

## 2024-12-03 NOTE — PROGRESS NOTES
"NUTRITION Follow-Up NOTE    Nutrition Assessment     Reason for Visit:  Amalia Roa is a 59 y.o. female who presents for base of tongue cancer.   PEG placed 10/18/24  [place] Pembrolizumab start 11/12/24    12/3- FUV. Pt seen with  in infusion. C2  Tolerated C1 well    Lab Results   Component Value Date/Time    GLUCOSE 65 (L) 12/02/2024 1409     12/02/2024 1409    K 4.5 12/02/2024 1409     12/02/2024 1409    CO2 32 12/02/2024 1409    ANIONGAP 13 12/02/2024 1409    BUN 22 12/02/2024 1409    CREATININE 0.90 12/02/2024 1409    EGFR 73 12/02/2024 1409    CALCIUM 9.4 12/02/2024 1409    ALBUMIN 3.8 12/02/2024 1409    ALKPHOS 71 12/02/2024 1409    PROT 6.3 (L) 12/02/2024 1409    AST 14 12/02/2024 1409    BILITOT 0.4 12/02/2024 1409    ALT 11 12/02/2024 1409     No results found for: \"VITD25\"    Anthropometrics:  Anthropometrics  Weight: 70.4 kg (155 lb 1.5 oz)  BMI (Calculated): 27.72  IBW/kg (Dietitian Calculated): 52.2 kg  Weight Change  Weight History / % Weight Change: weight down 3# over past 3 weeks/last cycle        Wt Readings from Last 10 Encounters:   12/03/24 70.4 kg (155 lb 1.5 oz)   12/03/24 70.4 kg (155 lb 1.6 oz)   11/22/24 71.7 kg (158 lb)   11/12/24 72 kg (158 lb 11.7 oz)   11/12/24 72 kg (158 lb 11.7 oz)   10/25/24 71.4 kg (157 lb 6.4 oz)   10/25/24 71.7 kg (158 lb)   10/22/24 71.8 kg (158 lb 6.4 oz)   10/14/24 73.3 kg (161 lb 8 oz)   10/03/24 74.4 kg (164 lb)   UBW ~ 158-160#     Food And Nutrient Intake:  Food and Nutrient History  Energy Intake: Good > 75 % (enteral + small amount oral intake some days)  Fluid Intake: good - oral fluid intake water/sweet tea + FWF + free water in enteral feeds. Pt feels she drinks adequately  Food Allergy: no food allergies  GI Symptoms: constipation (senna via PEG)  Oral Problems: dysphagia, dysgeusia     Food Intake  Amount of Food: pleasure foods - intake varies from day to day. Some days is able to eat other days she cannot. Taste is off which also " affects oral intake    Food Preparation  Cooking: Patient, Spouse/Significant Other  Grocery Shopping: Patient, Spouse/Significant Other              Enteral Nutrition Intake  Enteral Nutrition Formula/Solution: Isosource 1.5 4 cartons per day. She increased enteral feeds as suggested to 1 1/3 carton three times daily to total four cartons/day. Tolerating. Not feeling fullness like she was and actually had some hunger pains                        Food Supplement Intake  Oral Nutrition Supplements:  (none, used to drink [place], but doesn't like taste now)           Nutrition Focused Physical Exam Findings:                          Energy Needs  Estimated Energy Needs  Total Energy Estimated Needs (kCal): 1875 kCal  Total Estimated Energy Need per Day (kCal/kg): 26 kCal/kg  Estimated Fluid Needs  Total Fluid Estimated Needs (mL): 2150 mL  Total Fluid Estimated Needs (mL/kg): 30 mL/kg  Estimated Protein Needs  Total Protein Estimated Needs (g): 86 g  Total Protein Estimated Needs (g/kg): 1.2 g/kg        Nutrition Diagnosis   Malnutrition Diagnosis  Patient has Malnutrition Diagnosis: No    Nutrition Diagnosis  Patient has Nutrition Diagnosis: Yes  Diagnosis Status (1): Ongoing  Nutrition Diagnosis 1: Inadequate oral intake  Related to (1): chronic condition  As Evidenced by (1): diagnosis, need for PEG  Additional Nutrition Diagnosis: Diagnosis 2  Diagnosis Status (2): Ongoing  Nutrition Diagnosis 2: Swallowing difficulity  Related to (2): diagnosis  As Evidenced by (2): dysphagia, need for PEG    Nutrition Interventions/Recommendations   Nutrition Prescription  Individualized Nutrition Prescription Provided for : enteral prescription - soft diet as tolerated for pleasure    Food and Nutrition Delivery  Food and Nutrition Delivery  Meals & Snacks: Texture-Modified Diet  Goals: soft HAROON pleasure foods, encouraged oral diet as tolerated to maintain swallow function. Chop/puree as needed, add moisture to foods as  needed to aide swallowing. Chew food throroughly. Avoid dry rough foods, acidic foods  Enteral Nutrition: Modify volume of enteral nutrition  Total Nutrition Provided: Pt has lost some weight and is early in treatment. Oral intake minimal and variable. 4 cartons isosource 1.5 not meeting est needs. Discussed with pt increasing to 5 cartons/day. Pt was in agreement. Spoke to [facility], pt is ready for reorder Friday 12/6. I informed them change order coming. Signed enteral ordersorders obtained and will be faxed to [facility]. Pt encouraged to start gradually increasing today/tomorrow and that new shipment will be forthcoming  Goals: provides:1875 calories, 85 grams protein, 955 ml free water   continue with FWF 30  ml before and after each feed    Nutrition Education       Coordination of Care  Coordination of Nutrition Care by a Nutrition Professional  Collaboration and referral of nutrition care: Collaboration by nutrition professional with other providers  Goals: Ora Ruiz CNP         DME:Joseph  TF: Isosource 1.5  GOAL RATE: 4 cartons per day  PROVIDES: 1500 calories 68 grams protein 764 ml free water  METHOD: bolus  COMMENTS:   12/3- change order faxed to [facility] for Isosource 1.5 5 cartons/day to provide   1875 calories, 85 grams protein, 955 ml free water      Nutrition Monitoring and Evaluation   Food/Nutrient Related History Monitoring  Monitoring and Evaluation Plan: Meal/snack pattern  Meal/Snack Pattern: Food variety  Criteria: soft diet as tolerated for pleasure  Enteral and Parenteral Nutrition Intake: Enteral nutrition intake  Criteria: tolerate TF at goal  Body Composition/Growth/Weight History  Monitoring and Evaluation Plan: Weight  Weight: Measured weight  Criteria: maintain weight  Nutrition Focused Physical Findings  Monitoring and Evaluation Plan: Digestive System  Digestive System: Constipation  Criteria: manage symptoms/adhere to prescribed regime    Following through  treatment      Time Spent  Prep time on day of patient encounter: 5 minutes  Time spent directly with patient, family or caregiver: 20 minutes  Additional Time Spent on Patient Care Activities: 15 minutes  Documentation Time: 20 minutes  Other Time Spent: 0 minutes  Total: 60 minutes

## 2024-12-03 NOTE — PROGRESS NOTES
Food For Life  Diet Recommendation 1: Heart Healthy  Diet Recommendation 2: Healthy Eating  Diet Recommendation 3: Calories, High  Food Intolerance Avoidance: NKFA  Nutrition Goals Stated: Currently trying to maintain weight during cancer treatments. Has feeding tube but also eats by mouth.  Household Size: 2 Family Members  Interventions: Referral Number: 1st 6 Mo Referral 6 Mos  Interventions: Visit Number: 1 of 6 Visits - Max 6 Visits/Referral Each 6 Mo Period  Education Today: MyPlate Meals  Grains: 0-25% Whole  Fruit: 0-25% Fresh  Vegetables: 50-75% Fresh  Proteins: 1-2 Plant-based Items  Dairy: 50-75% Lowfat  Originating Site of Referral Order: Pauline Garcia MD  Initials of RD Assisting Today: ANGELY

## 2024-12-03 NOTE — SIGNIFICANT EVENT

## 2024-12-03 NOTE — TELEPHONE ENCOUNTER
1st attempt made to contact patient after referral made to supportive oncology by Ora Marcial NP.  No answer.  Voicemail message left for patient to call office to schedule appointment.

## 2024-12-03 NOTE — PROGRESS NOTES
Pt present today for C2D1.  Pt saw provider prior to treatment.  See provider's note for full assessment.  Pembrolizumab regimen infused with no issues.  Pt discharged to home in stable condition.

## 2024-12-03 NOTE — PROGRESS NOTES
[facility] - Medical Oncology Follow-Up Visit    Patient ID: Amalia Roa is a 59 y.o. female with oropharyngeal squamous cell ca     Current therapy: methylPREDNISolone sod succinate (SOLU-Medrol) 40 mg/mL injection 40 mg, 40 mg, intravenous, As needed, 2 of 17 cycles        pembrolizumab (Keytruda) 200 mg in sodium chloride 0.9% 118 mL IV, 200 mg, intravenous, Once, 2 of 17 cycles    Administration: 200 mg (11/12/2024), 200 mg (12/3/2024)       Chief Concern: Readiness to treat - C2    Oncologic History:   DIAGNOSIS  Oropharyngeal squamous cell ca     STAGING  T4N2M1      CURRENT SITES OF DISEASE  R oropharynx, tongue, bilateral lungs, L adrenal gland     MOLECULAR GENOMICS  P16+   CPS 10     PRIOR THERAPY        CURRENT THERAPY  Pembrolizumab 11/12/24 -      CURRENT ONCOLOGICAL PROBLEMS           HISTORY OF PRESENT ILLNESS  Ms. Roa is a 60 yo with PMH significant for multiple medical comorbidities including HFrEF, CAD s/p JOHN, mitral regurgitation, and MEÑO who had had trouble swallowing for at least a year with throat discomfort, particularly on the R side. CT of the cervical spine at OSH was concerning for 3.6 x 2.0 necrotic level 2 lymph node. She was referred to Dr. Montanez who she met on 9/20/24 due to concerns for a mass. Flex laryngoscopy showed a prominence at the base of the tongue of the R and an exophytic mass of the R piriform sinus, with evidence of inga disease on the L. CT of the neck on 10/8/24 revealed large, irregular mass of the R oropharynx measuring at least 4 x 3.2 x 7.6 cm, with involvement of the R lateral wall/tonsillar region, base of tongue/glossal tonsillar sulcus, soft palate, and abutment of the epiglottis with extension into the R parapharyngeal fat and  space. There appeared to be extension into the R tongue and within the floor of the mouth and oral cavity. Also noted was thrombosis or occlusion of the RIJ, bilateral cervical  lymphadenopathy with levels 2-4 noted on the R and on the left, and multiple pulmonary nodules. She was admitted to the hospital and underwent panendoscopy, biopsy, and PEG placement on 10/14/24. CT chest also on 10/14 was notable for multiple parenchymal bilateral lung nodules and enlarged mediastinal and hilar lymph nodes, as well as suspicious L adrenal gland nodule. She was discussed in tumor board, with PD-L1 added on CPS 10. PET/CT 10/22/24 confirmed FDG avidity of R oropharynx/oral cavity/hypopharynx mass, L palatine tonsil, R parotid gland, multiple cervical nodes bilaterally, multiple mediastinal and hilar lymph  nodes, pulmonary nodules, small L pleural effusion, and L adrenal gland. Decided to start pembrolizumab monotherapy, to start 11/12/24 after port placement.     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 Lives at home with her . Has two grandsons who are 6 and 2 yo, and two granddaughters who are 3 yo and 10 months.    Tob: quit smoking 2014. 40 years x 1/2 ppd  EtOH: none  Illicits: none     FAMILY HISTORY  Mother - breast cancer dx 50s or 60s  Sister - colon ca dx 56 yo  Niece - cervical cancer dx 30s    HPI   Present for readiness to treat visit.  Her  is present for visit.  Tolerated C1 well.  Breathing feels fine.  Intermittent hemoptysis - no pattern.   Denies fatigue.  No cough with intake - food or liquid.  Continues with routine TF - 4 cartons/day.  At times able to enjoy pleasure foods. Able to eat everything on Thanksgiving.  Denies n/v/d.  +Constipated, straining.  Miralax taken.  BM this morning.   Requesting senna elixir Rx - sent.  Pain 0/10 at time of visit.  Oxy - not taken routinely.  Taken for intermittent severe right ear pain.  Reports when  severe, 5mg tab brings pain down from 10/10 to 7-8/10.  Tylenol 1000 mg taken routinely BID.  Denies fevers, chills, or CP.  Labs WNL.  Ready for treatment today.      Meds (Current):    Current Outpatient Medications:     acetaminophen (Tylenol) 325 mg tablet, Take 1 tablet (325 mg) by mouth every 8 hours if needed., Disp: , Rfl:     albuterol 90 mcg/actuation inhaler, Inhale 2 puffs every 4 hours if needed for wheezing., Disp: , Rfl:     ALPRAZolam (Xanax) 0.5 mg tablet, Take 1 tablet (0.5 mg) by mouth 2 times a day as needed for anxiety., Disp: , Rfl:     aspirin 81 mg chewable tablet, Chew 1 tablet (81 mg) once daily., Disp: , Rfl:     atorvastatin (Lipitor) 40 mg tablet, Take 1 tablet (40 mg) by mouth once daily., Disp: , Rfl:     fluticasone (Flovent) 44 mcg/actuation inhaler, Inhale 2 puffs 2 times a day., Disp: , Rfl:     lidocaine (Lidoderm) 5 % patch, Place 1 patch over 12 hours on the skin once daily. Remove & discard patch within 12 hours or as directed by MD., Disp: 14 patch, Rfl: 0    metoprolol tartrate (Lopressor) 25 mg tablet, Take 0.5 tablets (12.5 mg) by mouth 2 times a day., Disp: 60 tablet, Rfl: 5    prochlorperazine (Compazine) 10 mg tablet, Take 1 tablet (10 mg) by mouth every 6 hours if needed for nausea or vomiting. Do not fill before November 11, 2024., Disp: 30 tablet, Rfl: 5    senna (Senokot) 8.8 mg/5 mL syrup, Take 5 mL by mouth 2 times a day for 10 days., Disp: 240 mL, Rfl: 3  No current facility-administered medications for this visit.    Review of Systems   Constitutional: Negative.    HENT:  Negative.          Intermittent right ear pain.   Respiratory:  Positive for hemoptysis.    Cardiovascular: Negative.    Gastrointestinal:  Positive for constipation.   Skin: Negative.    All other systems reviewed and are negative.       Objective   BSA: 1.77 meters squared  Wt Readings from Last 5 Encounters:   12/03/24 70.4 kg (155 lb 1.5 oz)   12/03/24 70.4 kg (155 lb 1.6 oz)   11/22/24 71.7  kg (158 lb)   11/12/24 72 kg (158 lb 11.7 oz)   11/12/24 72 kg (158 lb 11.7 oz)     /54 (BP Location: Left arm, Patient Position: Sitting)   Pulse 60   Temp 36.5 °C (97.7 °F) (Temporal)   Resp 16   Wt 70.4 kg (155 lb 1.6 oz)   SpO2 100%   BMI 27.47 kg/m²     ECOG Score: 0- Fully active, able to carry on all pre-disease performance w/o restriction.      Physical Exam  Vitals reviewed.   Constitutional:       Appearance: Normal appearance.   HENT:      Head: Normocephalic.      Nose: Nose normal.      Mouth/Throat:      Mouth: Mucous membranes are moist.      Pharynx: Oropharynx is clear.   Eyes:      Extraocular Movements: Extraocular movements intact.      Conjunctiva/sclera: Conjunctivae normal.      Pupils: Pupils are equal, round, and reactive to light.   Cardiovascular:      Rate and Rhythm: Normal rate and regular rhythm.      Pulses: Normal pulses.      Heart sounds: Normal heart sounds.   Pulmonary:      Breath sounds: Normal breath sounds.   Abdominal:      General: Bowel sounds are normal.      Palpations: Abdomen is soft.   Musculoskeletal:         General: Normal range of motion.      Cervical back: Normal range of motion and neck supple.   Skin:     General: Skin is warm and dry.   Neurological:      General: No focal deficit present.      Mental Status: She is alert and oriented to person, place, and time.   Psychiatric:         Mood and Affect: Mood normal.         Behavior: Behavior normal.         Thought Content: Thought content normal.         Judgment: Judgment normal.          Results:  Labs:  Lab Results   Component Value Date    WBC 7.5 12/02/2024    HGB 12.2 12/02/2024    HCT 38.7 12/02/2024     12/02/2024     12/02/2024      Lab Results   Component Value Date    NEUTROABS 5.23 12/02/2024      Lab Results   Component Value Date    GLUCOSE 65 (L) 12/02/2024    CALCIUM 9.4 12/02/2024     12/02/2024    K 4.5 12/02/2024    CO2 32 12/02/2024     12/02/2024     BUN 22 12/02/2024    CREATININE 0.90 12/02/2024    MG 2.31 10/18/2024     Lab Results   Component Value Date    ALT 11 12/02/2024    AST 14 12/02/2024    ALKPHOS 71 12/02/2024    BILITOT 0.4 12/02/2024      Lab Results   Component Value Date    ACTH 14.3 11/06/2024    CORTISOL 18.3 11/06/2024    TSH 1.09 11/06/2024       Imaging:  No new imaging.      No results found for this or any previous visit.      Assessment/Plan      Amalia Roa is a 59 y.o. female here for follow up of oropharyngeal squamous cell ca.    # Oropharyngeal squamous cell ca  - T4N2M1  - p16+  - CPS 10   - discussed that with metastatic disease would recommend systemic therapy, and depending on CPS would recommend chemotherapy+immunotherapy or immunotherapy monotherapy. Also potentially eligible for clinical trial based on CPS  - given CPS, discussed pembrolizumab monotherapy, and consented  - she requested port placement due to poor venous access - Port placed.  Port placed 11/22/24   - plan to start pembrolizumab after port placement  - previously discussed with Dr. Nolan consideration for consolidation radiation pending response to pembro  - RTC to start treatment, plan on CT scans after C4. CT chest & neck scheduled 1/10/25.   - C1 11/12/24   - RTC in 3 weeks for C3.       # Pain   - Oxycodone 5mg tab (#120 10/25/24) Rx provided by Dr. Nolan.  No routine admin.  Intermittent severe 10/10 right ear pain. 5mg PRN dose brings pain down from 10/10 - 7-8/10.  Okay received for pt to take two tabs for those episodes.   Routine tylenol 1000mg BID.    - Supportive onc referral placed.  Med onc will provide Rx until supportive onc visit.     # Anxiety   - Routine HS Alprazolam     # Constipation (slow-transit)  - Rx sent for senna elixir. 5mg BID.  Pt to titrate med to produce routine, soft BM's.  Can also add miralax PRN.

## 2024-12-04 ENCOUNTER — APPOINTMENT (OUTPATIENT)
Dept: CARDIOLOGY | Facility: HOSPITAL | Age: 59
End: 2024-12-04
Payer: MEDICAID

## 2024-12-04 DIAGNOSIS — C01 CANCER OF BASE OF TONGUE (MULTI): Primary | ICD-10-CM

## 2024-12-04 PROBLEM — R55 VASOVAGAL SYNCOPE: Status: ACTIVE | Noted: 2024-12-04

## 2024-12-04 LAB
ALBUMIN SERPL BCP-MCNC: 3.6 G/DL (ref 3.4–5)
ALP SERPL-CCNC: 61 U/L (ref 33–136)
ALT SERPL W P-5'-P-CCNC: 9 U/L (ref 7–45)
ANION GAP SERPL CALC-SCNC: 10 MMOL/L (ref 10–20)
AORTIC VALVE PEAK VELOCITY: 1.41 M/S
APPEARANCE UR: CLEAR
AST SERPL W P-5'-P-CCNC: 13 U/L (ref 9–39)
ATRIAL RATE: 70 BPM
AV PEAK GRADIENT: 8 MMHG
AVA (PEAK VEL): 2.65 CM2
BASOPHILS # BLD AUTO: 0.03 X10*3/UL (ref 0–0.1)
BASOPHILS NFR BLD AUTO: 0.2 %
BILIRUB SERPL-MCNC: 0.4 MG/DL (ref 0–1.2)
BILIRUB UR STRIP.AUTO-MCNC: NEGATIVE MG/DL
BUN SERPL-MCNC: 20 MG/DL (ref 6–23)
CALCIUM SERPL-MCNC: 9.1 MG/DL (ref 8.6–10.6)
CHLORIDE SERPL-SCNC: 103 MMOL/L (ref 98–107)
CO2 SERPL-SCNC: 30 MMOL/L (ref 21–32)
COLOR UR: ABNORMAL
CREAT SERPL-MCNC: 0.8 MG/DL (ref 0.5–1.05)
EGFRCR SERPLBLD CKD-EPI 2021: 84 ML/MIN/1.73M*2
EJECTION FRACTION APICAL 4 CHAMBER: 55
EJECTION FRACTION: 48 %
EOSINOPHIL # BLD AUTO: 0.07 X10*3/UL (ref 0–0.7)
EOSINOPHIL NFR BLD AUTO: 0.5 %
ERYTHROCYTE [DISTWIDTH] IN BLOOD BY AUTOMATED COUNT: 13.7 % (ref 11.5–14.5)
FLUAV RNA RESP QL NAA+PROBE: NOT DETECTED
FLUBV RNA RESP QL NAA+PROBE: NOT DETECTED
GLUCOSE BLD MANUAL STRIP-MCNC: 106 MG/DL (ref 74–99)
GLUCOSE BLD MANUAL STRIP-MCNC: 109 MG/DL (ref 74–99)
GLUCOSE SERPL-MCNC: 106 MG/DL (ref 74–99)
GLUCOSE UR STRIP.AUTO-MCNC: NORMAL MG/DL
HCT VFR BLD AUTO: 34 % (ref 36–46)
HGB BLD-MCNC: 11.5 G/DL (ref 12–16)
HOLD SPECIMEN: NORMAL
IMM GRANULOCYTES # BLD AUTO: 0.19 X10*3/UL (ref 0–0.7)
IMM GRANULOCYTES NFR BLD AUTO: 1.4 % (ref 0–0.9)
KETONES UR STRIP.AUTO-MCNC: NEGATIVE MG/DL
LEFT ATRIUM VOLUME AREA LENGTH INDEX BSA: 40.5 ML/M2
LEFT VENTRICLE INTERNAL DIMENSION DIASTOLE: 5.38 CM (ref 3.5–6)
LEFT VENTRICULAR OUTFLOW TRACT DIAMETER: 2.04 CM
LEUKOCYTE ESTERASE UR QL STRIP.AUTO: NEGATIVE
LYMPHOCYTES # BLD AUTO: 0.92 X10*3/UL (ref 1.2–4.8)
LYMPHOCYTES NFR BLD AUTO: 6.9 %
MAGNESIUM SERPL-MCNC: 2.09 MG/DL (ref 1.6–2.4)
MCH RBC QN AUTO: 31 PG (ref 26–34)
MCHC RBC AUTO-ENTMCNC: 33.8 G/DL (ref 32–36)
MCV RBC AUTO: 92 FL (ref 80–100)
MITRAL VALVE E/A RATIO: 1.03
MONOCYTES # BLD AUTO: 0.61 X10*3/UL (ref 0.1–1)
MONOCYTES NFR BLD AUTO: 4.6 %
NEUTROPHILS # BLD AUTO: 11.53 X10*3/UL (ref 1.2–7.7)
NEUTROPHILS NFR BLD AUTO: 86.4 %
NITRITE UR QL STRIP.AUTO: NEGATIVE
NRBC BLD-RTO: 0 /100 WBCS (ref 0–0)
P AXIS: 48 DEGREES
P OFFSET: 207 MS
P ONSET: 146 MS
PH UR STRIP.AUTO: 7.5 [PH]
PLATELET # BLD AUTO: 261 X10*3/UL (ref 150–450)
POTASSIUM SERPL-SCNC: 4.3 MMOL/L (ref 3.5–5.3)
PR INTERVAL: 138 MS
PROCALCITONIN SERPL-MCNC: 0.04 NG/ML
PROT SERPL-MCNC: 6.1 G/DL (ref 6.4–8.2)
PROT UR STRIP.AUTO-MCNC: ABNORMAL MG/DL
Q ONSET: 215 MS
QRS COUNT: 12 BEATS
QRS DURATION: 88 MS
QT INTERVAL: 408 MS
QTC CALCULATION(BAZETT): 440 MS
QTC FREDERICIA: 429 MS
R AXIS: -4 DEGREES
RBC # BLD AUTO: 3.71 X10*6/UL (ref 4–5.2)
RBC # UR STRIP.AUTO: NEGATIVE /UL
RBC #/AREA URNS AUTO: NORMAL /HPF
RIGHT VENTRICLE FREE WALL PEAK S': 13 CM/S
RIGHT VENTRICLE PEAK SYSTOLIC PRESSURE: 26.9 MMHG
SARS-COV-2 RNA RESP QL NAA+PROBE: NOT DETECTED
SODIUM SERPL-SCNC: 139 MMOL/L (ref 136–145)
SP GR UR STRIP.AUTO: 1.05
SQUAMOUS #/AREA URNS AUTO: NORMAL /HPF
T AXIS: 0 DEGREES
T OFFSET: 419 MS
TRICUSPID ANNULAR PLANE SYSTOLIC EXCURSION: 3.1 CM
UROBILINOGEN UR STRIP.AUTO-MCNC: NORMAL MG/DL
VENTRICULAR RATE: 70 BPM
WBC # BLD AUTO: 13.4 X10*3/UL (ref 4.4–11.3)
WBC #/AREA URNS AUTO: NORMAL /HPF

## 2024-12-04 PROCEDURE — 36415 COLL VENOUS BLD VENIPUNCTURE: CPT

## 2024-12-04 PROCEDURE — 85025 COMPLETE CBC W/AUTO DIFF WBC: CPT

## 2024-12-04 PROCEDURE — 87040 BLOOD CULTURE FOR BACTERIA: CPT

## 2024-12-04 PROCEDURE — 1200000003 HC ONCOLOGY  ROOM WITH TELEMETRY DAILY

## 2024-12-04 PROCEDURE — 87636 SARSCOV2 & INF A&B AMP PRB: CPT | Performed by: STUDENT IN AN ORGANIZED HEALTH CARE EDUCATION/TRAINING PROGRAM

## 2024-12-04 PROCEDURE — 2500000001 HC RX 250 WO HCPCS SELF ADMINISTERED DRUGS (ALT 637 FOR MEDICARE OP): Mod: SE

## 2024-12-04 PROCEDURE — 84145 PROCALCITONIN (PCT): CPT

## 2024-12-04 PROCEDURE — 83735 ASSAY OF MAGNESIUM: CPT

## 2024-12-04 PROCEDURE — 99223 1ST HOSP IP/OBS HIGH 75: CPT

## 2024-12-04 PROCEDURE — 92526 ORAL FUNCTION THERAPY: CPT | Mod: GN | Performed by: SPEECH-LANGUAGE PATHOLOGIST

## 2024-12-04 PROCEDURE — 84075 ASSAY ALKALINE PHOSPHATASE: CPT

## 2024-12-04 PROCEDURE — 82947 ASSAY GLUCOSE BLOOD QUANT: CPT

## 2024-12-04 PROCEDURE — 2500000001 HC RX 250 WO HCPCS SELF ADMINISTERED DRUGS (ALT 637 FOR MEDICARE OP)

## 2024-12-04 PROCEDURE — 2500000004 HC RX 250 GENERAL PHARMACY W/ HCPCS (ALT 636 FOR OP/ED): Mod: SE

## 2024-12-04 PROCEDURE — 92610 EVALUATE SWALLOWING FUNCTION: CPT | Mod: GN | Performed by: SPEECH-LANGUAGE PATHOLOGIST

## 2024-12-04 PROCEDURE — 2500000004 HC RX 250 GENERAL PHARMACY W/ HCPCS (ALT 636 FOR OP/ED)

## 2024-12-04 PROCEDURE — 87077 CULTURE AEROBIC IDENTIFY: CPT

## 2024-12-04 PROCEDURE — C8929 TTE W OR WO FOL WCON,DOPPLER: HCPCS

## 2024-12-04 PROCEDURE — 87899 AGENT NOS ASSAY W/OPTIC: CPT

## 2024-12-04 PROCEDURE — 3E0G76Z INTRODUCTION OF NUTRITIONAL SUBSTANCE INTO UPPER GI, VIA NATURAL OR ARTIFICIAL OPENING: ICD-10-PCS

## 2024-12-04 PROCEDURE — 81001 URINALYSIS AUTO W/SCOPE: CPT

## 2024-12-04 PROCEDURE — 87449 NOS EACH ORGANISM AG IA: CPT

## 2024-12-04 PROCEDURE — 2500000005 HC RX 250 GENERAL PHARMACY W/O HCPCS

## 2024-12-04 PROCEDURE — 93306 TTE W/DOPPLER COMPLETE: CPT | Performed by: INTERNAL MEDICINE

## 2024-12-04 RX ORDER — SODIUM CHLORIDE, SODIUM LACTATE, POTASSIUM CHLORIDE, CALCIUM CHLORIDE 600; 310; 30; 20 MG/100ML; MG/100ML; MG/100ML; MG/100ML
50 INJECTION, SOLUTION INTRAVENOUS CONTINUOUS
Status: DISCONTINUED | OUTPATIENT
Start: 2024-12-04 | End: 2024-12-04

## 2024-12-04 RX ORDER — BUPROPION HYDROCHLORIDE 150 MG/1
150 TABLET, EXTENDED RELEASE ORAL DAILY
Status: ON HOLD | COMMUNITY

## 2024-12-04 RX ORDER — BUPROPION HYDROCHLORIDE 150 MG/1
150 TABLET, EXTENDED RELEASE ORAL DAILY
OUTPATIENT
Start: 2024-12-04

## 2024-12-04 RX ORDER — PROCHLORPERAZINE MALEATE 10 MG
10 TABLET ORAL EVERY 6 HOURS PRN
Status: DISPENSED | OUTPATIENT
Start: 2024-12-04

## 2024-12-04 RX ORDER — ALPRAZOLAM 0.5 MG/1
0.5 TABLET ORAL 2 TIMES DAILY PRN
Status: DISPENSED | OUTPATIENT
Start: 2024-12-04

## 2024-12-04 RX ORDER — LIDOCAINE 560 MG/1
1 PATCH PERCUTANEOUS; TOPICAL; TRANSDERMAL DAILY
Status: DISPENSED | OUTPATIENT
Start: 2024-12-04

## 2024-12-04 RX ORDER — NAPROXEN SODIUM 220 MG/1
81 TABLET, FILM COATED ORAL DAILY
Status: DISPENSED | OUTPATIENT
Start: 2024-12-04

## 2024-12-04 RX ORDER — AZITHROMYCIN 250 MG/1
250 TABLET, FILM COATED ORAL DAILY
Status: DISCONTINUED | OUTPATIENT
Start: 2024-12-05 | End: 2024-12-04

## 2024-12-04 RX ORDER — OXYCODONE HYDROCHLORIDE 5 MG/1
5 TABLET ORAL EVERY 6 HOURS PRN
Status: DISCONTINUED | OUTPATIENT
Start: 2024-12-04 | End: 2024-12-08

## 2024-12-04 RX ORDER — ALBUTEROL SULFATE 90 UG/1
2 INHALANT RESPIRATORY (INHALATION) EVERY 4 HOURS PRN
Status: DISPENSED | OUTPATIENT
Start: 2024-12-04

## 2024-12-04 RX ORDER — METOPROLOL TARTRATE 25 MG/1
12.5 TABLET, FILM COATED ORAL 2 TIMES DAILY
Status: DISCONTINUED | OUTPATIENT
Start: 2024-12-04 | End: 2024-12-05

## 2024-12-04 RX ORDER — ACETAMINOPHEN 325 MG/1
325 TABLET ORAL EVERY 8 HOURS PRN
Status: DISCONTINUED | OUTPATIENT
Start: 2024-12-04 | End: 2024-12-05

## 2024-12-04 RX ORDER — SENNOSIDES 8.8 MG/5ML
5 LIQUID ORAL 2 TIMES DAILY
Status: DISCONTINUED | OUTPATIENT
Start: 2024-12-04 | End: 2024-12-05

## 2024-12-04 RX ORDER — LIDOCAINE AND PRILOCAINE 25; 25 MG/G; MG/G
CREAM TOPICAL ONCE
Qty: 30 G | Refills: 2 | Status: ON HOLD | OUTPATIENT
Start: 2024-12-04 | End: 2024-12-04

## 2024-12-04 RX ORDER — AZITHROMYCIN 500 MG/1
500 TABLET, FILM COATED ORAL ONCE
Status: COMPLETED | OUTPATIENT
Start: 2024-12-04 | End: 2024-12-04

## 2024-12-04 RX ORDER — ATORVASTATIN CALCIUM 40 MG/1
40 TABLET, FILM COATED ORAL DAILY
Status: DISPENSED | OUTPATIENT
Start: 2024-12-04

## 2024-12-04 RX ORDER — ENOXAPARIN SODIUM 100 MG/ML
40 INJECTION SUBCUTANEOUS EVERY 24 HOURS
Status: DISPENSED | OUTPATIENT
Start: 2024-12-04

## 2024-12-04 SDOH — ECONOMIC STABILITY: HOUSING INSECURITY: IN THE PAST 12 MONTHS, HOW MANY TIMES HAVE YOU MOVED WHERE YOU WERE LIVING?: 0

## 2024-12-04 SDOH — ECONOMIC STABILITY: INCOME INSECURITY: IN THE PAST 12 MONTHS HAS THE ELECTRIC, GAS, OIL, OR WATER COMPANY THREATENED TO SHUT OFF SERVICES IN YOUR HOME?: NO

## 2024-12-04 SDOH — ECONOMIC STABILITY: FOOD INSECURITY: WITHIN THE PAST 12 MONTHS, THE FOOD YOU BOUGHT JUST DIDN'T LAST AND YOU DIDN'T HAVE MONEY TO GET MORE.: NEVER TRUE

## 2024-12-04 SDOH — ECONOMIC STABILITY: HOUSING INSECURITY: IN THE LAST 12 MONTHS, WAS THERE A TIME WHEN YOU WERE NOT ABLE TO PAY THE MORTGAGE OR RENT ON TIME?: NO

## 2024-12-04 SDOH — HEALTH STABILITY: MENTAL HEALTH: HOW OFTEN DO YOU HAVE SIX OR MORE DRINKS ON ONE OCCASION?: NEVER

## 2024-12-04 SDOH — HEALTH STABILITY: MENTAL HEALTH: HOW OFTEN DO YOU HAVE A DRINK CONTAINING ALCOHOL?: NEVER

## 2024-12-04 SDOH — ECONOMIC STABILITY: HOUSING INSECURITY: AT ANY TIME IN THE PAST 12 MONTHS, WERE YOU HOMELESS OR LIVING IN A SHELTER (INCLUDING NOW)?: NO

## 2024-12-04 SDOH — SOCIAL STABILITY: SOCIAL INSECURITY: WITHIN THE LAST YEAR, HAVE YOU BEEN HUMILIATED OR EMOTIONALLY ABUSED IN OTHER WAYS BY YOUR PARTNER OR EX-PARTNER?: NO

## 2024-12-04 SDOH — SOCIAL STABILITY: SOCIAL INSECURITY: WITHIN THE LAST YEAR, HAVE YOU BEEN AFRAID OF YOUR PARTNER OR EX-PARTNER?: NO

## 2024-12-04 SDOH — ECONOMIC STABILITY: FOOD INSECURITY: WITHIN THE PAST 12 MONTHS, YOU WORRIED THAT YOUR FOOD WOULD RUN OUT BEFORE YOU GOT THE MONEY TO BUY MORE.: NEVER TRUE

## 2024-12-04 SDOH — ECONOMIC STABILITY: TRANSPORTATION INSECURITY: IN THE PAST 12 MONTHS, HAS LACK OF TRANSPORTATION KEPT YOU FROM MEDICAL APPOINTMENTS OR FROM GETTING MEDICATIONS?: NO

## 2024-12-04 SDOH — ECONOMIC STABILITY: FOOD INSECURITY: HOW HARD IS IT FOR YOU TO PAY FOR THE VERY BASICS LIKE FOOD, HOUSING, MEDICAL CARE, AND HEATING?: NOT HARD AT ALL

## 2024-12-04 SDOH — SOCIAL STABILITY: SOCIAL INSECURITY: WERE YOU ABLE TO COMPLETE ALL THE BEHAVIORAL HEALTH SCREENINGS?: YES

## 2024-12-04 SDOH — HEALTH STABILITY: MENTAL HEALTH: HOW MANY DRINKS CONTAINING ALCOHOL DO YOU HAVE ON A TYPICAL DAY WHEN YOU ARE DRINKING?: PATIENT DOES NOT DRINK

## 2024-12-04 ASSESSMENT — COGNITIVE AND FUNCTIONAL STATUS - GENERAL
MOBILITY SCORE: 24
DAILY ACTIVITIY SCORE: 24
DAILY ACTIVITIY SCORE: 24
PATIENT BASELINE BEDBOUND: NO
MOBILITY SCORE: 24

## 2024-12-04 ASSESSMENT — ACTIVITIES OF DAILY LIVING (ADL)
FEEDING YOURSELF: INDEPENDENT
WALKS IN HOME: INDEPENDENT
HEARING - LEFT EAR: FUNCTIONAL
DRESSING YOURSELF: INDEPENDENT
HEARING - RIGHT EAR: FUNCTIONAL
TOILETING: INDEPENDENT
GROOMING: INDEPENDENT
ADEQUATE_TO_COMPLETE_ADL: YES
BATHING: INDEPENDENT
LACK_OF_TRANSPORTATION: NO
JUDGMENT_ADEQUATE_SAFELY_COMPLETE_DAILY_ACTIVITIES: YES
PATIENT'S MEMORY ADEQUATE TO SAFELY COMPLETE DAILY ACTIVITIES?: YES

## 2024-12-04 ASSESSMENT — LIFESTYLE VARIABLES
SKIP TO QUESTIONS 9-10: 1
EVER FELT BAD OR GUILTY ABOUT YOUR DRINKING: NO
EVER HAD A DRINK FIRST THING IN THE MORNING TO STEADY YOUR NERVES TO GET RID OF A HANGOVER: NO
HAVE PEOPLE ANNOYED YOU BY CRITICIZING YOUR DRINKING: NO
TOTAL SCORE: 0
AUDIT-C TOTAL SCORE: 0
HAVE YOU EVER FELT YOU SHOULD CUT DOWN ON YOUR DRINKING: NO

## 2024-12-04 ASSESSMENT — PAIN SCALES - GENERAL
PAINLEVEL_OUTOF10: 0 - NO PAIN
PAINLEVEL_OUTOF10: 3
PAINLEVEL_OUTOF10: 5 - MODERATE PAIN

## 2024-12-04 ASSESSMENT — PAIN - FUNCTIONAL ASSESSMENT
PAIN_FUNCTIONAL_ASSESSMENT: 0-10

## 2024-12-04 NOTE — H&P
History Of Present Illness  Amalia Roa is a 59 y.o. female presenting with pmh oropharyngeal squamous cell carcinoma (dx 10/2024, currently receiving pembro s/p C2 12/3/24), depression, anxiety, PTSD, panic attacks, hypertension, heart failure with reduced EF, CAD, MI status post stents, mitral regurgitation status postrepair, asthma, MEÑO on CPAP, PEG tube dependency who presented to the ED (12/3) for a syncopal episode after chemo infusion. Patient states she was sitting in a chair at [facility] when she felt nauseous and lightheaded. A bystander said she passed out for a few seconds, did not fall or hit her head, and then when she came to she vomited. She currently denies any HA, vision changes, nausea, dizziness, vomiting, abdominal pain, chest pain, shortness of breath. She denies any history of blood clots. She denies any tremors, shaking, history of seizures. She denies any blood thinner use. She had a chemotherapy infusion today with no issues during C1. Pt states she took 2 of her home oxycodone instead of 1 as instructed by her otpt providers for pain prior to her syncopal episode. ROS: A complete review of systems was performed and is negative except for as mentioned above in the HPI     ED course:  VS: T 37.1, HR 73, RR 18, /79, SpO2 98% on RA  Labs: All labs WNL (12/3)  EKG (12/3) NSR  CTPE (12/3) neg for PE, GGO in R middle lobe c/f bronchopneumonia vs aspiration pneumonitis, stable metastatic disease burden  Interventions: s/p PO azitho 500mg once, IV unasyn 3g    Past Medical History  She has a past medical history of Asthma, CHF (congestive heart failure), Coronary artery disease, Depression, Dysphagia, Heart valve disease, Hyperlipidemia, Hypertension, Myocardial infarction (Multi), PTSD (post-traumatic stress disorder), and Sleep apnea.    She has no past medical history of Cerebral vascular accident (Multi), Chronic kidney disease, Disease of thyroid gland, GERD (gastroesophageal reflux  "disease), PONV (postoperative nausea and vomiting), Seizure disorder (Multi), TIA (transient ischemic attack), or Type 2 diabetes mellitus.    Surgical History  She has a past surgical history that includes Mouth surgery; Mitral valve repair; Coronary angioplasty with stent; Gastrostomy tube placement; and biopsy (10/14/2024).    Oncology History   Cancer of base of tongue (Multi)   10/14/2024 Initial Diagnosis    Cancer of base of tongue (Multi)     11/12/2024 -  Chemotherapy    Pembrolizumab, 21 Day Cycles          Social History  She reports that she has quit smoking. Her smoking use included cigarettes. She started smoking about 10 years ago. She has a 10.9 pack-year smoking history. She has never used smokeless tobacco. She reports that she does not currently use alcohol. She reports that she does not use drugs.     Allergies  Cyclobenzaprine     Physical Exam  HENT:      Nose: Nose normal.      Mouth/Throat:      Mouth: Mucous membranes are moist.   Eyes:      Pupils: Pupils are equal, round, and reactive to light.   Cardiovascular:      Rate and Rhythm: Normal rate.   Pulmonary:      Effort: Pulmonary effort is normal.   Abdominal:      General: Abdomen is flat.      Comments: PEG tube in place   Musculoskeletal:         General: Normal range of motion.      Cervical back: Normal range of motion.   Neurological:      General: No focal deficit present.      Mental Status: She is alert.     Last Recorded Vitals  Blood pressure 134/79, pulse 73, temperature 37.1 °C (98.7 °F), temperature source Oral, resp. rate 18, height 1.6 m (5' 3\"), weight 70.3 kg (155 lb), SpO2 99%.    Relevant Results  CT angio chest for pulmonary embolism    Result Date: 12/4/2024  Interpreted By:  Gregory De Jesus  and Yolis Means STUDY: CT ANGIO CHEST FOR PULMONARY EMBOLISM;  12/3/2024 10:34 pm   INDICATION: Signs/Symptoms:syncope, hx of malignancy.   COMPARISON: CTA chest 10/14/2024   ACCESSION NUMBER(S): [id number]   ORDERING " CLINICIAN: ELDER QUIGLEY   TECHNIQUE: Helical data acquisition of the chest was obtained after intravenous administration of 80 ML Omnipaque 350, as per PE protocol. Images were reformatted in coronal and sagittal planes. Axial and coronal maximum intensity projection (MIP) images were created and reviewed.   FINDINGS: POTENTIAL LIMITATIONS OF THE STUDY: None   HEART AND VESSELS: There are no discrete filling defects within main pulmonary artery and its branches to suggest acute pulmonary embolism. Main pulmonary artery and its branches are normal in caliber.   The thoracic aorta normal in course and caliber. There is mild scattered atherosclerosis present. Although, the study is not tailored for evaluation of aorta, there is no definite evidence of acute aortic pathology. Moderate coronary artery calcifications are seen. Please note,the study is not optimized for evaluation of coronary arteries.   Mild cardiomegaly. There are no findings to suggest right heart strain. Postsurgical changes of mitral valve prosthesis. There is no pericardial effusion seen.   MEDIASTINUM AND HEIDY, LOWER NECK AND AXILLA: The visualized thyroid gland is within normal limits. Similar to minimal increase in size of the metastatic hilar and mediastinal lymph nodes. For example a subcarinal node measures 1.2 cm in short axis, previously 0.8 cm, a conglomerate of right hilar nodes measures 2.8 x 1.5 cm, previously 2.5 x 1.3 cm, and a conglomerate of left hilar nodes measures 2.6 x 1.5 cm, previously 2.1 x 1.4 cm. The esophagus is unremarkable.   LUNGS AND AIRWAYS: The trachea and central airways are patent. No endobronchial lesion is seen.Mild bronchial wall thickening and endoluminal debris in the right middle lobar bronchial branches.   There is increased size of a small left pleural effusion compared with the prior exam. Nodular tree-in-bud ground-glass nodules are present throughout the right middle lobe are new from the prior exam.  There is resolution of the previously visualized ground-glass opacities in the posterior right lower lobe. Redemonstration of multiple rounded solid pulmonary nodules, which appear overall similar when compared with the prior exam, for example a paraspinal superior right lower lobe nodule measures 1.1 cm and a left lower lobe nodule measures 1.3 cm. There are additional nodules along the left lateral aspect of the aorta which measures 1.5 x 0.7 cm and at the left lung base measuring 0.8 x 0.8 cm, not previously visualized, however may have been obscured by atelectasis on the prior exam. Otherwise no new or enlarging pulmonary nodules are appreciated.   Please note that the far inferior costophrenic angles are not entirely included within the field of view and therefore can not be assessed.   UPPER ABDOMEN: The visualized subdiaphragmatic structures demonstrate no remarkable findings.   CHEST WALL AND OSSEOUS STRUCTURES: Right chest port with catheter tip in the right atrium. No acute osseous pathology. There are no suspicious osseous lesions.       1. No evidence of acute pulmonary embolism to the level of the proximal segmental pulmonary arteries. 2. Tree-in-bud ground-glass nodules in the right middle lobe likely represent sequelae of bronchopneumonia or aspiration pneumonitis. Previously noted right lower lobe opacities have resolved. 3. Minimal increase in size of the left pleural effusion. 4. Similar to minimal increase in size of the metastatic hilar and mediastinal lymph nodes 5. Stable appearance of metastatic pulmonary nodules, a few of which were not previously visualized, however are believed to have been obscured by atelectasis at that time.   I personally reviewed the image(s)/study and resident interpretation as stated by Dr. Clary Lagos MD. I agree with the findings as stated. This study was interpreted at [facility], [place], [place].   MACRO: None   Signed by:  Gregory De Jesus 12/4/2024 12:09 AM Dictation workstation:   [id number]        Assessment/Plan   Assessment & Plan  Vasovagal syncope    Amalia Roa is a 59 y.o. female presenting with pmh oropharyngeal squamous cell carcinoma (dx 10/2024, currently receiving pembro s/p C2 12/3/24), depression, anxiety, PTSD, panic attacks, hypertension, heart failure with reduced EF, CAD, MI status post stents, mitral regurgitation status postrepair, asthma, MEÑO on CPAP, PEG tube dependency who presented to the ED (12/3) for a syncopal episode after chemo infusion. Of note, pt took an increased dose of home oxycodone for the first time prior to syncopal episode. EKG NSR in ED (12/3). CTPE (12/3) neg for PE, showed c/f aspiration pneumonia. Started IV zosyn and PO azithromycin in ED. Admitted for further management.     #Syncopal episode  - likely vasovagal vs opioid induced vs infectious  - pt took an increased dose of her home oxycodone for the first time prior to syncopal episode  - Per bystander, pt lost consciousness for a few seconds, pt did not fall or hit her head  - EKG NSR in ED (12/3)  - Echo pending (12/4)  - CTPE (12/3) neg for PE, GGO in R middle lobe c/f bronchopneumonia vs aspiration pneumonitis  - blood cultures x2 (12/4) pending  - UA pending (12/4)  - Orthos pending (12/4)  - of note, pt had a syncopal episode in September. Echo (9/5) showed EF of 43% and Stress test (9/6) showed moderate sized, severe intensity fixed defect of the inferior-lateral wall with associated hypokinesis consistent with infarction in the circumflex distribution. There is no ischemia.   - tele ordered on admit  - start IV LR 50 ml/hr while NPO x 24 hours, caution with extra fluids I/s/o of pleural effusion    #c/f bronchopneumonia vs Aspiration pneumonitis  - CTPE (12/3) neg for PE, GGO in R middle lobe c/f bronchopneumonia vs aspiration pneumonitis  - Pt with no new O2 requirements  - pt currently on TF, states she eats at home when  able  - Strep pneumo, legionella, MRSA pending (12/4)  - Influenza A/B, Covid pending (12/4)  - blood cultures x 2 (12/4) pending  - UA (12/4) pending  - s/p 500mg PO azithromycin once in ED  - (12/4-current) c/w IV unasyn 3g q6h and PO azithromycin 250mg daily  - pt made NPO on admission with c/f aspiration, SLP consulted for swallow eval  - POCT glucose 4x daily while NPO  - TF ordered per most recent nutrition note (12/3), nutrition consulted on admission    #Oropharyngeal squamous cell carcinoma (dx 10/2024)  - follows with Dr. Garcia (email in AM)  - T4N2M1, p16+, CPS 10  - currently receiving pembro - s/p C2 12/3/24  - hold home opiods I/s/o syncopal episode  - c/w home ativan 0.5mg PRN for anxiety  - PT/OT in AM    #CAD/ HTN  - c/w home ASA, atorvastatin  - c/w metoprolol 12.5 mg BID    #Asthma  - c/w home inhalers    #Prophy  - lovenox, SCDs    #Dispo  - Full code confirmed on admit  - DC pending syncope workup  - Emergency Melvin Roa () [phone]    I spent >75 minutes in the professional and overall care of this patient.    Kimberly Chavez PA-C

## 2024-12-04 NOTE — ED PROVIDER NOTES
CC: Syncope     History provided by: Patient  Limitations to History: None    HPI:  Patient is a 59-year-old female with history of depression, anxiety, PTSD, panic attacks, hypertension, heart failure with reduced EF, CAD, MI status post stents, mitral regurgitation status postrepair, asthma, MEÑO on CPAP, PEG tube dependency, oropharyngeal squamous cell carcinoma who presents with syncopal episode.  Patient states she was sitting in a chair prior to arrival when she felt nauseous and lightheaded and bystander said she passed out for a few seconds and then when she came to she vomited.  Was nonbloody, nonbilious emesis.  She currently denies any nausea, vomiting, abdominal pain, chest pain, shortness of breath.  She denies any history of blood clots.  She denies any chest pain, palpitations, shortness of breath, headache, vision changes prior to this event.  She denies any tremors, shaking, history of seizures.  She denies any blood thinner use.  She states she was sitting in the chair and did not hit her head or fall.  She had a chemotherapy infusion today but has gotten this medication before without any reactions.  She states she took 2 pain medications around 4 PM and believes this may have contributed.  Denies any new medications.    I reviewed discharge summary from ENT from November 18, 2024 when patient was seen for triple endoscopy, PEG and tongue biopsy.  I also reviewed oncology RN note from December 3, 2024 when patient had pembrolizumab infusion.    External Records Reviewed:  I reviewed prior ED visits, Care Everywhere, discharge summaries and outpatient records as appropriate.   ???????????????????????????????????????????????????????????????  Triage Vitals:  T 37.1 °C (98.7 °F)  HR 73  /79  RR 18  O2 98 %      Physical Exam  Vitals and nursing note reviewed.   Constitutional:       General: She is not in acute distress.     Appearance: Normal appearance.   HENT:      Head: Normocephalic and  atraumatic.   Eyes:      Conjunctiva/sclera: Conjunctivae normal.   Cardiovascular:      Rate and Rhythm: Normal rate and regular rhythm.   Pulmonary:      Effort: Pulmonary effort is normal. No respiratory distress.      Breath sounds: Normal breath sounds.   Abdominal:      General: Abdomen is flat.      Palpations: Abdomen is soft.      Comments: PEG tube in place, abdomen is soft, nontender, nondistended   Musculoskeletal:         General: No swelling or tenderness. Normal range of motion.      Cervical back: Normal range of motion and neck supple.   Skin:     General: Skin is warm and dry.   Neurological:      General: No focal deficit present.      Mental Status: She is alert and oriented to person, place, and time. Mental status is at baseline.      Comments: 5 out of 5 strength in bilateral upper and lower extremities, no facial droop, normal speech, no dysmetria.,  Visual acuity grossly intact   Psychiatric:         Mood and Affect: Mood normal.         Behavior: Behavior normal.        ???????????????????????????????????????????????????????????????  ED Course/Treatment/Medical Decision Making  MDM:  Patient is a 59-year-old female who presents with syncopal episode.  Vital signs are stable.  No focal neurologic deficits on examination.  Differential include cardiac arrhythmia, symptomatic aortic stenosis, MI, PE, orthostatic hypotension, hypoglycemia, vertigo, seizure, TIA , or CVA.  Given history of malignancy I did obtain CT PE study.  Based on presentation I suspect vasovagal episode.  She has no weakness, numbness, tingling, chest pain, shortness of breath at this time.  She states she is at her baseline mental status.  She does not appear to have a seizure, is not postictal.  Denies any fevers, chills, recent illnesses. Patient will require ambulation trial and re-evaluation prior to disposition. Labwork reassuring.      ED Course:  ED Course as of 12/03/24 2149   Tue Dec 03, 2024   2057 EKG reviewed  normal sinus rhythm rate 70, NV interval 130 ms, QRS 88 ms, QTc 440 ms, no acute ST segment elevations or depressions, normal axis, overall appears similar when compared to October 2024 [SA]   2142 CBC, CMP, mag wnl, troponin and BNP wnl [SA]      ED Course User Index  [SA] Elaine Pizano DO         Diagnoses as of 12/03/24 2149   Vasovagal syncope         EKG Interpretation:  See ED Course/Below:    Independent Interpretation of Studies:  I independently interpreted labs/imaging as stated in ED Course or below.    Differential diagnoses considered include but are not limited to: See MDM/Below:    Social Determinants Limiting Care:  None identified The following actions were taken to address these social determinants:      Discussion of Management with Other Providers: See MDM/Below:    Disposition:  Patient was signed out at 2300 pending completion of their work-up.  Please see the next provider's transition of care note for the remainder of the patient's care.       SANDRO Sharif, PGY-3    I reviewed the case with the attending ED physician. The attending ED physician agrees with the plan. Patient and/or patient´s representative was counseled regarding labs, imaging, likely diagnosis, and plan. All questions were answered.    Disclaimer: This note was dictated by speech recognition.  Attempt at proofreading was made to minimize errors.  Errors in transcription may be present.  Please call if questions.    Procedures ? Smart[employer] last updated 12/3/2024 9:49 PM        Elaine Pizano DO  Resident  12/03/24 2149

## 2024-12-04 NOTE — PROGRESS NOTES
Speech-Language Pathology  Adult Inpatient Clinical Bedside Swallow Evaluation    Patient Name: Amalia Roa  MRN: [id number]  Today's Date: 12/4/2024   Start Time: 901  Stop Time: 927  Time Calculation (min): 26    History of Present Illness:   Amalia Roa is a 59 y.o. female presenting with pmh oropharyngeal squamous cell carcinoma (dx 10/2024, currently receiving pembro s/p C2 12/3/24), depression, anxiety, PTSD, panic attacks, hypertension, heart failure with reduced EF, CAD, MI status post stents, mitral regurgitation status postrepair, asthma, MEÑO on CPAP, PEG tube dependency who presented to the ED (12/3) for a syncopal episode after chemo infusion. Patient states she was sitting in a chair at [facility] when she felt nauseous and lightheaded. A bystander said she passed out for a few seconds, did not fall or hit her head, and then when she came to she vomited. She currently denies any HA, vision changes, nausea, dizziness, vomiting, abdominal pain, chest pain, shortness of breath. She denies any history of blood clots. She denies any tremors, shaking, history of seizures. She denies any blood thinner use. She had a chemotherapy infusion today with no issues during C1. Pt states she took 2 of her home oxycodone instead of 1 as instructed by her otpt providers for pain prior to her syncopal episode. ROS: A complete review of systems was performed and is negative except for as mentioned above in the HPI     Assessment:   Clinical bedside swallow evaluation completed. Pt well known to this SLP from prior admission. MBSS completed on 10/15 (Pt presents with mild  oropharyngeal dysphagia upon completion of modified barium swallow study this date. Swallowing physiology is detailed above. Impairments most impacting swallowing safety and efficiency include decrease base of tongue paired with reduced laryngeal elevation/anterior shift and decrease epiglottis inversion result in post swallow residue along the base of  tongue and within the valleculae. Mistiming of laryngeal vestibular closure result in penetration during and aspiration after of thin, mildly/moderately thick liquids. Patient demonstrated ability to cough and clear majority of penetrated/aspirated material from airway. Due to her ability to use safe swallow strategies, mobile and the fact she has not had a pneumonia to date significantly mitigate further risk for development of pneumonia at this period in time. )    Pt endorsed eating more especially for Thanksgiving dinner. Pt further endorsed use of compensatory  strategies for safe swallow. Pt A&O x4 with Oral musculature remarkable for tongue deviation to R upon protrusion with decrease strength evident. Labial and buccal structures WFL. Multiple missing dentition evident.     Pt tolerated sips of water x12 without overt s/s of aspiration. Manipulated puree and masticated solids using liquid wash and throat clear/cough every few bites/sips without further difficulty. Pt to return to previous regular diet/thin liquids using safe swallow strategies. If there is a change in respiratory/medical condition or increase difficulty swallowing please contact SLP and order a  repeat MBSS.     Recommendations:  - Regular (IDDSI Level 7)  - Thin liquids (IDDSI Level 0)  Patient to continue baseline diet of regular consistencies and thin liquids following swallow strategies listed below:     STRATEGIES:  - Small bites  - Small, single sips  - Medications whole in puree or as best tolerated  -Cough every few bites/sips      Dysphagia therapy: SLP reviewed H&N excercises to ensure pt completing correctly. Pt completed each exercise x10 with only min verbal cueing initially. Continue SLP services until discharge.     Plan:  SLP Services Indicated: Yes  Frequency: x1/wk  Discussed POC with patient  SLP - OK to Discharge    Pain:   0-10  0 = No pain.     Inpatient Education:  Extensive education provided to patient regarding  current swallow function, recommendations/results, and POC.      Consultations/Referrals/Coordination of Services:   N/A

## 2024-12-04 NOTE — PROGRESS NOTES
Emergency Medicine Transition of Care Note.    I received this patient during signout.  Please see previous provider's note for detailed H&P, labs and imaging.    In brief, Amalia Roa is an 59 y.o. female presenting for Syncope.  history of depression, anxiety, PTSD, panic attacks, hypertension, heart failure with reduced EF, CAD, MI status post stents, mitral regurgitation status postrepair, asthma, MEÑO on CPAP, PEG tube dependency, oropharyngeal squamous cell carcinoma who presents with syncopal episode.   Review of labs show CBC without leukocytosis or anemia, CMP without severe electrolyte derangements, troponin and BNP within normal limits and coagulation screen reassuring.    Plan at the time of signout was await CT PE imaging and disposition accordingly.    Under my care, CT imaging was completed demonstrating no evidence for pulmonary embolism but does show right middle lobar pneumonia.  Patient states that she has been incorporating more p.o. foods into her diet and as a result has been having more aspirations.  As a result, patient was treated for aspiration pneumonia with Unasyn and azithromycin.  Poor score calculated to be 100 with 8-9% mortality.  As a result, patient was admitted for further treatment.      ED Course as of 12/04/24 0047   Tue Dec 03, 2024   2057 EKG reviewed normal sinus rhythm rate 70, AK interval 130 ms, QRS 88 ms, QTc 440 ms, no acute ST segment elevations or depressions, normal axis, overall appears similar when compared to October 2024 [SA]   2142 CBC, CMP, mag wnl, troponin and BNP wnl [SA]   2306 Reviewed preop anesthesia cardiac clearance note from 10/3/2024.  It documents that the patient had a syncopal episode in September which is when her cancer was diagnosed.  She had stress test and echo at that time as a part of a syncope workup, results below:    STRESS TEST WITH MYOCARDIAL PERFUSION 09-   The patient had no chest discomfort or significant  electrocardiographic abnormalities during regadenoson infusion. The SPECT perfusion images are abnormal.  There is a moderate sized, severe intensity fixed defect of the inferior-lateral wall with associated hypokinesis consistent with infarction in the circumflex distribution. There is no ischemia.  The EF is calculated at 44%.      TRANSTHORACIC ECHO (TTE) COMPLETE 9/5/2024   Borderline dilated left ventricle with a hypokinetic inferior wall  and mildly   decreased systolic function.   The calculated left ventricular ejection fraction    is 43%.  Paradoxical septal motion consistent with post-operative septal   motion.  Normal right ventricle size.  The right ventricle has normal function.    There is evidence of a prior ring repair of the mitral valve. The mitral valve   leaflets are mildly thickened with preserved leaflet motion. There is mildly   elevated gradients (mean gradient 5 mmHg) through the mitral valve (HR 65 bpm).   There is at least mild, anteriorly directed mitral regurgitation.  The mitral   regurgitant jet is eccentric.  The left atrium is mildly enlarged.  Diastolic   function could not be assessed due to a mitral prosthesis.  As compared to   06/09/2016: Left ventricular function is mildly worse compared to the prior   report (cannot view images) with similar reported wall motion abnormalities.      [SH]   Wed Dec 04, 2024   0043 Emergency Medicine Attending Attestation:     My assessment reveals 59-year-old female with history of squamous cell oropharyngeal cancer, HFrEF, prior MI presenting to the emergency department after syncopal episode earlier today.  Reports she was sitting, suddenly felt lightheaded, flushed, nauseous and then passed out.  Reportedly had episode of emesis after passing out.  Vital signs stable on arrival here and she feels normal.  Presentation initially concerning for cardiogenic syncope given her history, but after review of chart, she has had this recently in the  past in September, had full cardiac workup at outside hospital which is documented below.  She feels well here and is ambulatory without difficulty so we considered discharge.  We obtained a CT PE given her history of malignancy and her syncopal episode.  This did not show a PE, but did demonstrate possible aspiration pneumonia and pleural effusion.  She is a class IV PSI score and therefore we recommended admission which patient was agreeable to.  Started on antibiotics for possible aspiration pneumonia with also coverage for community-acquired pneumonia.  Admitted in stable condition. [SH]      ED Course User Index  [SA] Elaine Pizano DO  [SH] Luis Eduardo Haywood MD         Diagnoses as of 12/04/24 0047   Vasovagal syncope   Pneumonia of right middle lobe due to infectious organism       Final diagnoses:   [R55] Vasovagal syncope           Procedure  Procedures

## 2024-12-04 NOTE — PROGRESS NOTES
Amalia Robledo is a 59 y.o. female on day 0 of admission presenting with Vasovagal syncope.      Subjective   Patient is doing well and has no complaints. She denies any other episodes of syncope, lightheadedness, dizziness, or nausea.       Objective     Last Recorded Vitals  /79   Pulse 74   Temp 37.1 °C (98.8 °F) (Oral)   Resp 18   Wt 70.3 kg (155 lb)   SpO2 95%   Intake/Output last 3 Shifts:  No intake or output data in the 24 hours ending 12/04/24 1753    Admission Weight  Weight: 70.3 kg (155 lb) (12/03/24 2052)    Daily Weight  12/03/24 : 70.3 kg (155 lb)    Image Results  Transthoracic Echo (TTE) Complete     [facility], [address] [place] [address]                 Tel [phone] and Fax [phone]    TRANSTHORACIC ECHOCARDIOGRAM REPORT       Patient Name:       AMALIA ROBLEDO       Reading Physician:    [id number] Jacques Gutierrez MD  Study Date:         12/4/2024           Ordering Provider:    [id number] TERRI BUSTILLOS  MRN/PID:            [id number]            Fellow:  Accession#:         [id number]        Nurse:                Mehrdad Santana                                                                RN,BSN  Date of Birth/Age:  1965 / 59      Sonographer:          BELLA Aviles RDCS  Gender assigned at  F                   Additional Staff:  Birth:  Height:             160.02 cm           Admit Date:           12/3/2024  Weight:             70.31 kg            Admission Status:     Inpatient -                                                                Routine  BSA / BMI:          1.74 m2 / 27.46     Encounter#:           [id number]                      kg/m2  Blood Pressure:     118/71 mmHg         Department Location:  [facility]                                                                 Non Invasive    Study Type:    TRANSTHORACIC ECHO (TTE) COMPLETE  Diagnosis/ICD: Syncope and collapse-R55  Indication:    Syncope  CPT Code:      Echo Complete w Full Doppler-96015    Patient History:  Pertinent History: HTN, Syncope, CAD, Murmur and CHF. MI, abnormal EKG, MEÑO,                     tongue cancer,.    Study Detail: The following Echo studies were performed: 2D, M-Mode, Doppler and                color flow. Definity used as a contrast agent for endocardial                border definition. Total contrast used for this procedure was 2 mL                via IV push.       PHYSICIAN INTERPRETATION:  Left Ventricle: Left ventricular ejection fraction is mildly decreased, by visual estimate at 45-50%. There is mild eccentric left ventricular hypertrophy. The left ventricular cavity size is normal. There is normal septal and normal posterior left ventricular wall thickness. Abnormal (paradoxical) septal motion consistent with post-operative status. Left ventricular diastolic filling is indeterminate.  LV Wall Scoring:  The apical cap appears dyskinetic. The basal and mid inferolateral wall is  hypokinetic.    Left Atrium: The left atrium is mildly dilated.  Right Ventricle: The right ventricle is normal in size. There is normal right ventricular global systolic function.  Right Atrium: The right atrium is mildly dilated.  Aortic Valve: The aortic valve is probably trileaflet. There is trace aortic valve regurgitation. The peak instantaneous gradient of the aortic valve is 8 mmHg.  Mitral Valve: Status post mitral valve repair. The peak instantaneous gradient of the mitral valve is 9 mmHg. There is mild mitral valve regurgitation.  Tricuspid Valve: The tricuspid valve is structurally normal. There is trace to mild tricuspid regurgitation.  Pulmonic Valve: The pulmonic valve is not well visualized. There is trace pulmonic valve regurgitation.  Pericardium: Trivial pericardial  effusion.  Aorta: The aortic root is normal. There is no dilatation of the ascending aorta. There is no dilatation of the aortic root.  Systemic Veins: The inferior vena cava appears normal in size.       CONCLUSIONS:   1. Left ventricular ejection fraction is mildly decreased, by visual estimate at 45-50%.   2. [place] and basal and mid inferolateral wall appear abnormal.   3. Abnormal septal motion consistent with post-operative status.   4. There is normal right ventricular global systolic function.   5. The left atrium is mildly dilated.   6. Status post mitral valve repair.   7. Compared to echo report from 8/2/18 from [facility], similar findings.    QUANTITATIVE DATA SUMMARY:     2D MEASUREMENTS:          Normal Ranges:  Ao Root d:       2.90 cm  (2.0-3.7cm)  LAs:             4.16 cm  (2.7-4.0cm)  IVSd:            0.90 cm  (0.6-1.1cm)  LVPWd:           0.94 cm  (0.6-1.1cm)  LVIDd:           5.38 cm  (3.9-5.9cm)  LVIDs:           4.63 cm  LV Mass Index:   106 g/m2  LVEDV Index:     49 ml/m2  LV % FS          14.0 %       LA VOLUME:                    Normal Ranges:  LA Vol A4C:        49.7 ml    (22+/-6mL/m2)  LA Vol A2C:        92.8 ml  LA Vol BP:         70.3 ml  LA Vol Index A4C:  28.7ml/m2  LA Vol Index A2C:  53.5 ml/m2  LA Vol Index BP:   40.5 ml/m2  LA Area A4C:       18.1 cm2  LA Area A2C:       25.6 cm2  LA Major Axis A4C: 5.6 cm  LA Major Axis A2C: 6.0 cm  LA Volume Index:   40.5 ml/m2       RA VOLUME BY A/L METHOD:            Normal Ranges:  RA Vol A4C:              70.4 ml    (8.3-19.5ml)  RA Vol Index A4C:        40.6 ml/m2  RA Area A4C:             22.3 cm2  RA Major Axis A4C:       6.0 cm       AORTA MEASUREMENTS:         Normal Ranges:  Asc Ao, d:          2.90 cm (2.1-3.4cm)       LV SYSTOLIC FUNCTION BY 2D PLANIMETRY (MOD):                       Normal Ranges:  EF-A4C View:    55 % (>=55%)  EF-A2C View:    51 %  EF-Biplane:     59 %  EF-Visual:      48 %  LV EF Reported: 48 %       LV DIASTOLIC  FUNCTION:            Normal Ranges:  MV Peak E:             1.45 m/s   (0.7-1.2 m/s)  MV Peak A:             1.41 m/s   (0.42-0.7 m/s)  E/A Ratio:             1.03       (1.0-2.2)  MV e'                  0.080 m/s  (>8.0)  MV lateral e'          0.10 m/s  MV medial e'           0.06 m/s  MV A Dur:              85.35 msec  E/e' Ratio:            18.08      (<8.0)  MV DT:                 188 msec   (150-240 msec)       MITRAL VALVE:          Normal Ranges:  MV Vmax:      1.48 m/s (<=1.3m/s)  MV peak P.7 mmHg (<5mmHg)  MV mean P.1 mmHg (<2mmHg)  MV VTI:       48.98 cm (10-13cm)  MV DT:        188 msec (150-240msec)  MV PHT:       92 msec  (30-60msec)  MVA by PHT:   2.39 cm2 (4-6cm2)       AORTIC VALVE:           Normal Ranges:  AoV Vmax:      1.41 m/s (<=1.7m/s)  AoV Peak P.9 mmHg (<20mmHg)  LVOT Max [place]:  1.14 m/s (<=1.1m/s)  LVOT VTI:      22.92 cm  LVOT Diameter: 2.04 cm  (1.8-2.4cm)  AoV Area,Vmax: 2.65 cm2 (2.5-4.5cm2)       RIGHT VENTRICLE:  RV Basal 4.50 cm  RV Mid   2.80 cm  RV Major 7.2 cm  TAPSE:   31.0 mm  RV s'    0.13 m/s       TRICUSPID VALVE/RVSP:          Normal Ranges:  Peak TR Velocity:     2.44 m/s  RV Syst Pressure:     27 mmHg  (< 30mmHg)  IVC Diam:             1.80 cm       PULMONIC VALVE:          Normal Ranges:  PV Max Duane:     1.3 m/s  (0.6-0.9m/s)  PV Max P.6 mmHg       [id number] Jacques Gutierrez MD  Electronically signed on 2024 at 3:02:36 PM       Wall Scoring       ** Final **  ECG 12 lead  Normal sinus rhythm  Inferior infarct (cited on or before 03-OCT-2024)  Anterior infarct (cited on or before 03-OCT-2024)  Abnormal ECG  When compared with ECG of 03-OCT-2024 14:27,  No significant change was found  See ED provider note for full interpretation and clinical correlation  Confirmed by Abhijit Mathias ([id number]) on 2024 1:49:07 AM  CT angio chest for pulmonary embolism  Narrative: Interpreted By:  Gregory De Jesus and [place] Lesley   STUDY:  CT ANGIO CHEST FOR  PULMONARY EMBOLISM;  12/3/2024 10:34 pm      INDICATION:  Signs/Symptoms:syncope, hx of malignancy.      COMPARISON:  CTA chest 10/14/2024      ACCESSION NUMBER(S):  [id number]      ORDERING CLINICIAN:  ELDER QUIGLEY      TECHNIQUE:  Helical data acquisition of the chest was obtained after intravenous  administration of 80 ML Omnipaque 350, as per PE protocol. Images  were reformatted in coronal and sagittal planes. Axial and coronal  maximum intensity projection (MIP) images were created and reviewed.      FINDINGS:  POTENTIAL LIMITATIONS OF THE STUDY: None      HEART AND VESSELS:  There are no discrete filling defects within main pulmonary artery  and its branches to suggest acute pulmonary embolism. Main pulmonary  artery and its branches are normal in caliber.      The thoracic aorta normal in course and caliber. There is mild  scattered atherosclerosis present. Although, the study is not  tailored for evaluation of aorta, there is no definite evidence of  acute aortic pathology. Moderate coronary artery calcifications are  seen. Please note,the study is not optimized for evaluation of  coronary arteries.      Mild cardiomegaly. There are no findings to suggest right heart  strain. Postsurgical changes of mitral valve prosthesis.  There is no pericardial effusion seen.      MEDIASTINUM AND HEIDY, LOWER NECK AND AXILLA:  The visualized thyroid gland is within normal limits.  Similar to minimal increase in size of the metastatic hilar and  mediastinal lymph nodes. For example a subcarinal node measures 1.2  cm in short axis, previously 0.8 cm, a conglomerate of right hilar  nodes measures 2.8 x 1.5 cm, previously 2.5 x 1.3 cm, and a  conglomerate of left hilar nodes measures 2.6 x 1.5 cm, previously  2.1 x 1.4 cm. The esophagus is unremarkable.      LUNGS AND AIRWAYS:  The trachea and central airways are patent. No endobronchial lesion  is seen.Mild bronchial wall thickening and endoluminal debris in  the  right middle lobar bronchial branches.      There is increased size of a small left pleural effusion compared  with the prior exam. Nodular tree-in-bud ground-glass nodules are  present throughout the right middle lobe are new from the prior exam.  There is resolution of the previously visualized ground-glass  opacities in the posterior right lower lobe. Redemonstration of  multiple rounded solid pulmonary nodules, which appear overall  similar when compared with the prior exam, for example a paraspinal  superior right lower lobe nodule measures 1.1 cm and a left lower  lobe nodule measures 1.3 cm. There are additional nodules along the  left lateral aspect of the aorta which measures 1.5 x 0.7 cm and at  the left lung base measuring 0.8 x 0.8 cm, not previously visualized,  however may have been obscured by atelectasis on the prior exam.  Otherwise no new or enlarging pulmonary nodules are appreciated.      Please note that the far inferior costophrenic angles are not  entirely included within the field of view and therefore can not be  assessed.      UPPER ABDOMEN:  The visualized subdiaphragmatic structures demonstrate no remarkable  findings.      CHEST WALL AND OSSEOUS STRUCTURES:  Right chest port with catheter tip in the right atrium.  No acute osseous pathology. There are no suspicious osseous lesions.      Impression: 1. No evidence of acute pulmonary embolism to the level of the  proximal segmental pulmonary arteries.  2. Tree-in-bud ground-glass nodules in the right middle lobe likely  represent sequelae of bronchopneumonia or aspiration pneumonitis.  Previously noted right lower lobe opacities have resolved.  3. Minimal increase in size of the left pleural effusion.  4. Similar to minimal increase in size of the metastatic hilar and  mediastinal lymph nodes  5. Stable appearance of metastatic pulmonary nodules, a few of which  were not previously visualized, however are believed to have  been  obscured by atelectasis at that time.      I personally reviewed the image(s)/study and resident interpretation  as stated by Dr. Clary Lagos MD. I agree with the findings as  stated. This study was interpreted at [facility], [place], [place].      MACRO:  None      Signed by: Gregory De Jesus 12/4/2024 12:09 AM  Dictation workstation:   [id number]      Physical Exam  Constitutional:       General: She is not in acute distress.     Appearance: Normal appearance.   HENT:      Head: Normocephalic and atraumatic.   Eyes:      Extraocular Movements: Extraocular movements intact.      Conjunctiva/sclera: Conjunctivae normal.      Pupils: Pupils are equal, round, and reactive to light.   Cardiovascular:      Rate and Rhythm: Normal rate and regular rhythm.      Pulses: Normal pulses.      Heart sounds: Normal heart sounds. No murmur heard.     No gallop.   Pulmonary:      Effort: Pulmonary effort is normal. No respiratory distress.      Breath sounds: Normal breath sounds. No wheezing.   Abdominal:      General: Abdomen is flat. There is no distension.      Palpations: Abdomen is soft.      Tenderness: There is no abdominal tenderness.   Neurological:      General: No focal deficit present.      Mental Status: She is alert and oriented to person, place, and time. Mental status is at baseline.   Psychiatric:         Mood and Affect: Mood normal.         Behavior: Behavior normal.         Relevant Results  Results for orders placed or performed during the hospital encounter of 12/03/24 (from the past 24 hours)   ECG 12 lead   Result Value Ref Range    Ventricular Rate 70 BPM    Atrial Rate 70 BPM    TX Interval 138 ms    QRS Duration 88 ms    QT Interval 408 ms    QTC Calculation(Bazett) 440 ms    P Axis 48 degrees    R Axis -4 degrees    T Axis 0 degrees    QRS Count 12 beats    Q Onset 215 ms    P Onset 146 ms    P Offset 207 ms    T Offset 419 ms    QTC Fredericia 429 ms    CBC and Auto Differential   Result Value Ref Range    WBC 9.5 4.4 - 11.3 x10*3/uL    nRBC 0.0 0.0 - 0.0 /100 WBCs    RBC 4.38 4.00 - 5.20 x10*6/uL    Hemoglobin 13.7 12.0 - 16.0 g/dL    Hematocrit 41.3 36.0 - 46.0 %    MCV 94 80 - 100 fL    MCH 31.3 26.0 - 34.0 pg    MCHC 33.2 32.0 - 36.0 g/dL    RDW 13.7 11.5 - 14.5 %    Platelets 301 150 - 450 x10*3/uL    Neutrophils % 71.7 40.0 - 80.0 %    Immature Granulocytes %, Automated 0.3 0.0 - 0.9 %    Lymphocytes % 17.4 13.0 - 44.0 %    Monocytes % 6.8 2.0 - 10.0 %    Eosinophils % 3.4 0.0 - 6.0 %    Basophils % 0.4 0.0 - 2.0 %    Neutrophils Absolute 6.78 1.20 - 7.70 x10*3/uL    Immature Granulocytes Absolute, Automated 0.03 0.00 - 0.70 x10*3/uL    Lymphocytes Absolute 1.65 1.20 - 4.80 x10*3/uL    Monocytes Absolute 0.64 0.10 - 1.00 x10*3/uL    Eosinophils Absolute 0.32 0.00 - 0.70 x10*3/uL    Basophils Absolute 0.04 0.00 - 0.10 x10*3/uL   Comprehensive metabolic panel   Result Value Ref Range    Glucose 98 74 - 99 mg/dL    Sodium 139 136 - 145 mmol/L    Potassium 4.2 3.5 - 5.3 mmol/L    Chloride 99 98 - 107 mmol/L    Bicarbonate 32 21 - 32 mmol/L    Anion Gap 12 10 - 20 mmol/L    Urea Nitrogen 23 6 - 23 mg/dL    Creatinine 0.91 0.50 - 1.05 mg/dL    eGFR 72 >60 mL/min/1.73m*2    Calcium 9.4 8.6 - 10.6 mg/dL    Albumin 4.1 3.4 - 5.0 g/dL    Alkaline Phosphatase 71 33 - 136 U/L    Total Protein 7.0 6.4 - 8.2 g/dL    AST 16 9 - 39 U/L    Bilirubin, Total 0.4 0.0 - 1.2 mg/dL    ALT 10 7 - 45 U/L   Magnesium   Result Value Ref Range    Magnesium 2.21 1.60 - 2.40 mg/dL   Coagulation Screen   Result Value Ref Range    Protime 10.6 9.8 - 12.8 seconds    INR 0.9 0.9 - 1.1    aPTT 30 27 - 38 seconds   Troponin I, High Sensitivity   Result Value Ref Range    Troponin I, High Sensitivity (CMC) 4 0 - 34 ng/L   B-Type Natriuretic Peptide   Result Value Ref Range    BNP 58 0 - 99 pg/mL   Blood Culture    Specimen: Peripheral Venipuncture; Blood culture   Result Value Ref Range     Blood Culture Loaded on Instrument - Culture in progress    Blood Culture    Specimen: Peripheral Venipuncture; Blood culture   Result Value Ref Range    Blood Culture Loaded on Instrument - Culture in progress    CBC and Auto Differential   Result Value Ref Range    WBC 13.4 (H) 4.4 - 11.3 x10*3/uL    nRBC 0.0 0.0 - 0.0 /100 WBCs    RBC 3.71 (L) 4.00 - 5.20 x10*6/uL    Hemoglobin 11.5 (L) 12.0 - 16.0 g/dL    Hematocrit 34.0 (L) 36.0 - 46.0 %    MCV 92 80 - 100 fL    MCH 31.0 26.0 - 34.0 pg    MCHC 33.8 32.0 - 36.0 g/dL    RDW 13.7 11.5 - 14.5 %    Platelets 261 150 - 450 x10*3/uL    Neutrophils % 86.4 40.0 - 80.0 %    Immature Granulocytes %, Automated 1.4 (H) 0.0 - 0.9 %    Lymphocytes % 6.9 13.0 - 44.0 %    Monocytes % 4.6 2.0 - 10.0 %    Eosinophils % 0.5 0.0 - 6.0 %    Basophils % 0.2 0.0 - 2.0 %    Neutrophils Absolute 11.53 (H) 1.20 - 7.70 x10*3/uL    Immature Granulocytes Absolute, Automated 0.19 0.00 - 0.70 x10*3/uL    Lymphocytes Absolute 0.92 (L) 1.20 - 4.80 x10*3/uL    Monocytes Absolute 0.61 0.10 - 1.00 x10*3/uL    Eosinophils Absolute 0.07 0.00 - 0.70 x10*3/uL    Basophils Absolute 0.03 0.00 - 0.10 x10*3/uL   Comprehensive Metabolic Panel   Result Value Ref Range    Glucose 106 (H) 74 - 99 mg/dL    Sodium 139 136 - 145 mmol/L    Potassium 4.3 3.5 - 5.3 mmol/L    Chloride 103 98 - 107 mmol/L    Bicarbonate 30 21 - 32 mmol/L    Anion Gap 10 10 - 20 mmol/L    Urea Nitrogen 20 6 - 23 mg/dL    Creatinine 0.80 0.50 - 1.05 mg/dL    eGFR 84 >60 mL/min/1.73m*2    Calcium 9.1 8.6 - 10.6 mg/dL    Albumin 3.6 3.4 - 5.0 g/dL    Alkaline Phosphatase 61 33 - 136 U/L    Total Protein 6.1 (L) 6.4 - 8.2 g/dL    AST 13 9 - 39 U/L    Bilirubin, Total 0.4 0.0 - 1.2 mg/dL    ALT 9 7 - 45 U/L   Magnesium   Result Value Ref Range    Magnesium 2.09 1.60 - 2.40 mg/dL   Sars-CoV-2 PCR   Result Value Ref Range    Coronavirus 2019, PCR Not Detected Not Detected   Influenza A, and B PCR   Result Value Ref Range    Flu A Result Not  Detected Not Detected    Flu B Result Not Detected Not Detected   Urinalysis with Reflex Culture and Microscopic   Result Value Ref Range    Color, Urine Light-Yellow Light-Yellow, Yellow, Dark-Yellow    Appearance, Urine Clear Clear    Specific Gravity, Urine 1.050 (N) 1.005 - 1.035    pH, Urine 7.5 5.0, 5.5, 6.0, 6.5, 7.0, 7.5, 8.0    Protein, Urine 10 (TRACE) NEGATIVE, 10 (TRACE), 20 (TRACE) mg/dL    Glucose, Urine Normal Normal mg/dL    Blood, Urine NEGATIVE NEGATIVE    Ketones, Urine NEGATIVE NEGATIVE mg/dL    Bilirubin, Urine NEGATIVE NEGATIVE    Urobilinogen, Urine Normal Normal mg/dL    Nitrite, Urine NEGATIVE NEGATIVE    Leukocyte Esterase, Urine NEGATIVE NEGATIVE   Urinalysis Microscopic   Result Value Ref Range    WBC, Urine NONE 1-5, NONE /HPF    RBC, Urine NONE NONE, 1-2, 3-5 /HPF    Squamous Epithelial Cells, Urine 1-9 (SPARSE) Reference range not established. /HPF   POCT GLUCOSE   Result Value Ref Range    POCT Glucose 109 (H) 74 - 99 mg/dL   POCT GLUCOSE   Result Value Ref Range    POCT Glucose 106 (H) 74 - 99 mg/dL   Transthoracic Echo (TTE) Complete   Result Value Ref Range    AV pk nico 1.41 m/s    LVOT diam 2.04 cm    MV E/A ratio 1.03     LA vol index A/L 40.5 ml/m2    Tricuspid annular plane systolic excursion 3.1 cm    LV EF 48 %    RV free wall pk S' 13.00 cm/s    LVIDd 5.38 cm    RVSP 26.9 mmHg    Aortic Valve Area by Continuity of Peak Velocity 2.65 cm2    AV pk grad 8 mmHg    LV A4C EF 55.0    Procalcitonin   Result Value Ref Range    Procalcitonin 0.04 <=0.07 ng/mL            Assessment/Plan        Assessment & Plan  Vasovagal syncope    Amalia Roa is a 59 y.o. 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     Patient is doing well without complaints. Her cardiac work-up for cardiogenic causes is negative with EKG NSR and echo results similar to previous studies.  The likely cause of her syncopal episode is vasovagal vs. Increased dose of oxycodone taken before chemo infusion.      In regards to her notable CTPE findings for GGO in the right middle lobe, this is likely due to aspiration pneumonitis from emesis after her syncopal episode and she has increased aspiration risk due to the location of her cancer. She also lacks symptoms consistent with pneumonia (cough, fever), making aspiration pneumonitis even more probable. With this being said, we will discontinue abx for treatment of aspiration pneumonia since it is unlikely.     #Syncopal episode likely 2/2 vasovagal vs. Opiod-induced   - likely vasovagal vs opioid induced - pt took an increased dose of her home oxycodone for the first time prior to syncopal episode  ::EKG NSR in ED (12/3)  :: TTE 12/4:   Left ventricular ejection fraction is mildly decreased, by visual estimate at 45-50%.   [place] and basal and mid inferolateral wall appear abnormal.   Abnormal septal motion consistent with post-operative status.   There is normal right ventricular global systolic function.   The left atrium is mildly dilated.   Status post mitral valve repair.   Compared to echo report from 8/2/18 from OSH, similar findings  Plan:   - Continue to monitor symptoms      #Ground glass opacity in right lower lobe likely 2/2 aspiration pneumonitis, unlikely infectious cause   :: CTPE (12/3):  neg for PE, GGO in R middle lobe c/f bronchopneumonia vs aspiration pneumonitis  - Strep pneumo, legionella, MRSA pending (12/4)  - Influenza A/B, COVID negative   - blood cultures x 2 (12/4) pending  - UA (12/4) pending  - s/p 500mg PO azithromycin once in ED and IV Unasyn. Discontinued Abx due to unlikely to have pneumonia, consistent  with procalcitonin 0.04  Plan:   - DC Azithromycin and Unasyn   - Follow-up Bcx   - Follow-up strep pneumo, MRSA   - Follow-up UA      #Oropharyngeal squamous cell carcinoma (dx 10/2024)  - follows with Dr. Garcia (email in AM)  - T4N2M1, p16+, CPS 10  - currently receiving pembro - s/p C2 12/3/24  Plan:  - Hold home opioids iso of syncopal episode  -Continue home Ativan for anxiety     #CAD/ HTN  -  Continue home ASA, atorvastatin  - Continue metoprolol 12.5 mg BID     #Asthma  - Continue home inhalers    #FEN  #PEG tube dependence   - Has a PEG tube for feeds - Isosource 1.5 5 cartons/day. FWF 30 ml before and after feeds   - SLP eval - able to eat regular diet and thin liquids     F: PRN   E: replete PRN  N: Tube feeds; regular diet   A: PIV, port   DVT ppx: Lovenox     Patient was seen and discussed with attending Dr. Henrique Wooten MD  Internal Medicine/Pediatrics, PGY-1

## 2024-12-04 NOTE — CARE PLAN
Problem: Pain - Adult  Goal: Verbalizes/displays adequate comfort level or baseline comfort level  Outcome: Progressing     Problem: Safety - Adult  Goal: Free from fall injury  Outcome: Progressing     Problem: Discharge Planning  Goal: Discharge to home or other facility with appropriate resources  Outcome: Progressing     Problem: Chronic Conditions and Co-morbidities  Goal: Patient's chronic conditions and co-morbidity symptoms are monitored and maintained or improved  Outcome: Progressing    The clinical goals for the shift include PT will become oriented to unit

## 2024-12-04 NOTE — DISCHARGE INSTRUCTIONS
Dear Amalia Roa,    You were admitted to [facility] from 12/3 to 12/10 for an episode of passing out. We got a workup which included a CT scan that showed a possible pneumonia so you were started on antibiotics. We got inflammatory markers which were negative so we stopped the antibiotics as it was unlikely you have pneumonia.    You had a few more episodes of passing out while you were in the hospital. Because of these episodes and the location of your cancer, we consulted neurology for their input on your passing out. They believed that your tumor could be pressing on nerves and structures that are causing you to pass out. We discussed your case with ENT and they said the location of your tumor is inoperable. We wanted to rule out if there was anything in your brain that could be causing you to pass out so we did an MRI of your brain. The MRI was unremarkable for any brain causes for your passing out.     We ultimately think the cause of your passing out is the location of your tumor around nerves in your neck. Right now there is nothing inpatient we can do to fix this so you have to be extremely careful once you leave the hospital. You CANNOT drive and should be supervised at all time to make sure someone is around if you pass out. You should NOT do any heavy lifting or operate any heavy machinery. To prevent passing out, we recommended increasing your fluid and salt intake and wearing compression stockings. In addition, we are going to hold your blood pressure medications for now since you are still passing out. You will need to follow-up with a cardiologist outpatient in order to restart these medications.     Other problem while you were in the hospital was you were found to have a blood infection from a bacteria called Streptococcus Thermophilus. You were started antibiotic called Ceftriaxone and  you will continue that at home until 12/19 through your mediport. We will not access your port before you leave,  but you will get your port accessed at [facility]. .     Thank you for choosing  for your care,   It was a pleasure taking care of you,       Medication changes: ***  -START:  -CHANGE:  -STOP:    Please follow-up with the following providers:   - Hematology/oncology - 12/20 with Ora Marcial   - Cardiology - you have an appointment on 02/25/25 however we recommend you go see a cardiologist sooner

## 2024-12-04 NOTE — PROGRESS NOTES
Pharmacy Medication History Review    Amalia Roa is a 59 y.o. female admitted for Vasovagal syncope. Pharmacy reviewed the patient's [id number] medications and allergies for accuracy.    The list below reflects the updated PTA list.   Prior to Admission Medications   Prescriptions Last Dose Informant   ALPRAZolam (Xanax) 0.5 mg tablet 12/3/2024 Self   Sig: Take 1 tablet (0.5 mg) by mouth 2 times a day as needed for anxiety.   acetaminophen (Tylenol) 325 mg tablet 12/3/2024 Self   Sig: Take 1 tablet (325 mg) by mouth every 8 hours if needed.   albuterol 90 mcg/actuation inhaler Past Week Self   Sig: Inhale 2 puffs every 4 hours if needed for wheezing.   aspirin 81 mg chewable tablet 12/3/2024 Morning Self   Sig: Chew 1 tablet (81 mg) once daily.   atorvastatin (Lipitor) 40 mg tablet Past Week Self   Sig: Take 1 tablet (40 mg) by mouth once daily.   buPROPion SR (Wellbutrin SR) 150 mg 12 hr tablet 12/3/2024 Self   Sig: Take 1 tablet (150 mg) by mouth once daily. Do not crush, chew, or split.   fluticasone (Flovent) 44 mcg/actuation inhaler 12/3/2024 Self   Sig: Inhale 2 puffs 2 times a day.   lidocaine (Lidoderm) 5 % patch 12/3/2024 Self   Sig: Place 1 patch over 12 hours on the skin once daily. Remove & discard patch within 12 hours or as directed by MD.   lidocaine-prilocaine (Emla) 2.5-2.5 % cream Unknown Self   Sig: Apply topically 1 time for 1 dose. Apply thin layer to mediport site 30-45 minutes prior to access.  Cover with dressing/film.   metoprolol tartrate (Lopressor) 25 mg tablet 12/3/2024 Self   Sig: Take 0.5 tablets (12.5 mg) by mouth 2 times a day.   oxyCODONE (Roxicodone) 5 mg immediate release tablet 12/3/2024 Self   Sig: Take 1 tablet (5 mg) by mouth every 6 hours if needed for severe pain (7 - 10).   prochlorperazine (Compazine) 10 mg tablet Past Week Self   Sig: Take 1 tablet (10 mg) by mouth every 6 hours if needed for nausea or vomiting. Do not fill before November 11, 2024.   senna  (Senokot) 8.8 mg/5 mL syrup  Self   Sig: Take 5 mL by mouth 2 times a day for 10 days.      Facility-Administered Medications: None        The list below reflects the updated allergy list. Please review each documented allergy for additional clarification and justification.  Allergies  Reviewed by Marques Sierra RN on 12/3/2024        Severity Reactions Comments    Cyclobenzaprine High Anaphylaxis             Patient accepts M2B at discharge. Pharmacy has been updated to [facility].    Sources used to complete the med history include:    [facility]  Pharmacy dispense history  Patient interview Good historian  Chart Review  Care Everywhere   10/29/24 office visit hematology oncology Dr. Garcia    Below are additional concerns with the patient's PTA list.      Medications ADDED:  buPROPion SR (Wellbutrin SR) 150 mg 12 hr tablet    Medications CHANGED:  None    Medications REMOVED:   None      Russel Nguyễn [facility].   Transitions of Care Pharmacist  [facility]  Please reach out via Secure Chat for questions, or if no response call [facility] or [facility]

## 2024-12-05 ENCOUNTER — APPOINTMENT (OUTPATIENT)
Dept: RADIOLOGY | Facility: HOSPITAL | Age: 59
End: 2024-12-05
Payer: MEDICAID

## 2024-12-05 LAB
ALBUMIN SERPL BCP-MCNC: 3.5 G/DL (ref 3.4–5)
ANION GAP SERPL CALC-SCNC: 14 MMOL/L (ref 10–20)
BASOPHILS # BLD AUTO: 0.03 X10*3/UL (ref 0–0.1)
BASOPHILS NFR BLD AUTO: 0.5 %
BUN SERPL-MCNC: 18 MG/DL (ref 6–23)
CALCIUM SERPL-MCNC: 9 MG/DL (ref 8.6–10.6)
CHLORIDE SERPL-SCNC: 103 MMOL/L (ref 98–107)
CO2 SERPL-SCNC: 29 MMOL/L (ref 21–32)
CREAT SERPL-MCNC: 0.8 MG/DL (ref 0.5–1.05)
EGFRCR SERPLBLD CKD-EPI 2021: 84 ML/MIN/1.73M*2
EOSINOPHIL # BLD AUTO: 0.19 X10*3/UL (ref 0–0.7)
EOSINOPHIL NFR BLD AUTO: 2.9 %
ERYTHROCYTE [DISTWIDTH] IN BLOOD BY AUTOMATED COUNT: 13.9 % (ref 11.5–14.5)
GLUCOSE BLD MANUAL STRIP-MCNC: 100 MG/DL (ref 74–99)
GLUCOSE BLD MANUAL STRIP-MCNC: 122 MG/DL (ref 74–99)
GLUCOSE BLD MANUAL STRIP-MCNC: 128 MG/DL (ref 74–99)
GLUCOSE BLD MANUAL STRIP-MCNC: 166 MG/DL (ref 74–99)
GLUCOSE BLD MANUAL STRIP-MCNC: 92 MG/DL (ref 74–99)
GLUCOSE SERPL-MCNC: 82 MG/DL (ref 74–99)
HCT VFR BLD AUTO: 34.8 % (ref 36–46)
HGB BLD-MCNC: 11.1 G/DL (ref 12–16)
IMM GRANULOCYTES # BLD AUTO: 0.01 X10*3/UL (ref 0–0.7)
IMM GRANULOCYTES NFR BLD AUTO: 0.2 % (ref 0–0.9)
LEGIONELLA AG UR QL: NEGATIVE
LYMPHOCYTES # BLD AUTO: 0.91 X10*3/UL (ref 1.2–4.8)
LYMPHOCYTES NFR BLD AUTO: 14 %
MAGNESIUM SERPL-MCNC: 2.09 MG/DL (ref 1.6–2.4)
MCH RBC QN AUTO: 31.1 PG (ref 26–34)
MCHC RBC AUTO-ENTMCNC: 31.9 G/DL (ref 32–36)
MCV RBC AUTO: 98 FL (ref 80–100)
MONOCYTES # BLD AUTO: 0.48 X10*3/UL (ref 0.1–1)
MONOCYTES NFR BLD AUTO: 7.4 %
NEUTROPHILS # BLD AUTO: 4.9 X10*3/UL (ref 1.2–7.7)
NEUTROPHILS NFR BLD AUTO: 75 %
NRBC BLD-RTO: 0 /100 WBCS (ref 0–0)
PHOSPHATE SERPL-MCNC: 3.6 MG/DL (ref 2.5–4.9)
PLATELET # BLD AUTO: 234 X10*3/UL (ref 150–450)
POTASSIUM SERPL-SCNC: 4.2 MMOL/L (ref 3.5–5.3)
RBC # BLD AUTO: 3.57 X10*6/UL (ref 4–5.2)
S PNEUM AG UR QL: NEGATIVE
SODIUM SERPL-SCNC: 142 MMOL/L (ref 136–145)
WBC # BLD AUTO: 6.5 X10*3/UL (ref 4.4–11.3)

## 2024-12-05 PROCEDURE — 83735 ASSAY OF MAGNESIUM: CPT

## 2024-12-05 PROCEDURE — 99255 IP/OBS CONSLTJ NEW/EST HI 80: CPT | Performed by: STUDENT IN AN ORGANIZED HEALTH CARE EDUCATION/TRAINING PROGRAM

## 2024-12-05 PROCEDURE — 2500000004 HC RX 250 GENERAL PHARMACY W/ HCPCS (ALT 636 FOR OP/ED)

## 2024-12-05 PROCEDURE — 70491 CT SOFT TISSUE NECK W/DYE: CPT

## 2024-12-05 PROCEDURE — 36415 COLL VENOUS BLD VENIPUNCTURE: CPT

## 2024-12-05 PROCEDURE — 2500000001 HC RX 250 WO HCPCS SELF ADMINISTERED DRUGS (ALT 637 FOR MEDICARE OP)

## 2024-12-05 PROCEDURE — 36416 COLLJ CAPILLARY BLOOD SPEC: CPT

## 2024-12-05 PROCEDURE — 82947 ASSAY GLUCOSE BLOOD QUANT: CPT

## 2024-12-05 PROCEDURE — 99233 SBSQ HOSP IP/OBS HIGH 50: CPT

## 2024-12-05 PROCEDURE — 87040 BLOOD CULTURE FOR BACTERIA: CPT

## 2024-12-05 PROCEDURE — 80069 RENAL FUNCTION PANEL: CPT

## 2024-12-05 PROCEDURE — 1200000003 HC ONCOLOGY  ROOM WITH TELEMETRY DAILY

## 2024-12-05 PROCEDURE — 85025 COMPLETE CBC W/AUTO DIFF WBC: CPT

## 2024-12-05 PROCEDURE — 99223 1ST HOSP IP/OBS HIGH 75: CPT | Performed by: NURSE PRACTITIONER

## 2024-12-05 RX ORDER — METOPROLOL SUCCINATE 25 MG/1
25 TABLET, EXTENDED RELEASE ORAL DAILY
Status: CANCELLED | OUTPATIENT
Start: 2024-12-06

## 2024-12-05 RX ORDER — GABAPENTIN 300 MG/1
300 CAPSULE ORAL NIGHTLY
Status: DISCONTINUED | OUTPATIENT
Start: 2024-12-05 | End: 2024-12-07

## 2024-12-05 RX ORDER — BUPROPION HYDROCHLORIDE 150 MG/1
150 TABLET ORAL EVERY MORNING
Status: DISCONTINUED | OUTPATIENT
Start: 2024-12-06 | End: 2024-12-06

## 2024-12-05 RX ORDER — METOPROLOL SUCCINATE 25 MG/1
25 TABLET, EXTENDED RELEASE ORAL DAILY
Status: DISCONTINUED | OUTPATIENT
Start: 2024-12-05 | End: 2024-12-08

## 2024-12-05 RX ORDER — SENNOSIDES 8.8 MG/5ML
5 LIQUID ORAL 2 TIMES DAILY PRN
Status: ACTIVE | OUTPATIENT
Start: 2024-12-05

## 2024-12-05 RX ORDER — DOXEPIN HYDROCHLORIDE 10 MG/ML
10 SOLUTION ORAL NIGHTLY
Status: DISPENSED | OUTPATIENT
Start: 2024-12-05

## 2024-12-05 RX ORDER — ACETAMINOPHEN 325 MG/1
1000 TABLET ORAL 2 TIMES DAILY
Status: DISCONTINUED | OUTPATIENT
Start: 2024-12-05 | End: 2024-12-07

## 2024-12-05 RX ORDER — MICAFUNGIN SODIUM 100 MG/5ML
100 INJECTION, POWDER, LYOPHILIZED, FOR SOLUTION INTRAVENOUS EVERY 24 HOURS
Status: DISCONTINUED | OUTPATIENT
Start: 2024-12-05 | End: 2024-12-07

## 2024-12-05 ASSESSMENT — COGNITIVE AND FUNCTIONAL STATUS - GENERAL
MOBILITY SCORE: 24
DAILY ACTIVITIY SCORE: 24
MOBILITY SCORE: 24
DAILY ACTIVITIY SCORE: 24

## 2024-12-05 ASSESSMENT — ACTIVITIES OF DAILY LIVING (ADL): LACK_OF_TRANSPORTATION: NO

## 2024-12-05 ASSESSMENT — PAIN SCALES - GENERAL
PAINLEVEL_OUTOF10: 0 - NO PAIN
PAINLEVEL_OUTOF10: 3
PAINLEVEL_OUTOF10: 2

## 2024-12-05 ASSESSMENT — PAIN DESCRIPTION - ORIENTATION: ORIENTATION: RIGHT

## 2024-12-05 ASSESSMENT — PAIN DESCRIPTION - LOCATION: LOCATION: EAR

## 2024-12-05 ASSESSMENT — PAIN - FUNCTIONAL ASSESSMENT: PAIN_FUNCTIONAL_ASSESSMENT: 0-10

## 2024-12-05 NOTE — PROGRESS NOTES
12/05/24 1500   Discharge Planning   Living Arrangements Spouse/significant other   Support Systems Spouse/significant other   Assistance Needed none   Type of Residence Private residence   Number of Stairs to Enter Residence 3   Number of Stairs Within Residence 6   Do you have animals or pets at home? No   Who is requesting discharge planning? Provider   Home or Post Acute Services None   Expected Discharge Disposition Home   Does the patient need discharge transport arranged? No   Financial Resource Strain   How hard is it for you to pay for the very basics like food, housing, medical care, and heating? Not hard   Housing Stability   In the last 12 months, was there a time when you were not able to pay the mortgage or rent on time? N   In the past 12 months, how many times have you moved where you were living? 0   At any time in the past 12 months, were you homeless or living in a shelter (including now)? N   Transportation Needs   In the past 12 months, has lack of transportation kept you from medical appointments or from getting medications? no   In the past 12 months, has lack of transportation kept you from meetings, work, or from getting things needed for daily living? No     Care Transitions Note    Plan per Medical/Surgical Team: oropharyngeal squamous cell carcinoma (dx 10/2024, currently receiving pembro s/p C2 12/3/24), who presented to the ED (12/3) for a syncopal episode after chemo infusion   Status: Inpatient   [facility]or Source: [employer] [facility] [employer]   Discharge disposition: Home   Expected date of discharge: TBD   Barriers: None at this time   PCP / Primary Oncologist: Whitney Garcia MA   Preferred Pharmacy: [facility]    [id number] [place] [address]  Preferred home care agency: None at this time     TCC spoke with patient and spouse at bedside. Patient admitted for syncopal episode after chemotherapy treatment. Currently lives with  that assists when needed. Has no needs at  this time. Demographics and insurance verifed with patient. Will continue to follow for any discharge planning needs if they arise. Madie Rios RN,TCC

## 2024-12-05 NOTE — CONSULTS
"Nutrition Initial Assessment:   Nutrition Assessment    --->Reason for Assessment: Enteral assessment/recommendation (TF)    This writer documenting remotely. I am in compliance with HIPAA regulations.    Patient is a 59 y.o. female with Oropharyngeal Squamous Cell Carcinoma (diagnosed in 10/2024, PEG placed 10/18/24), admitted d/t syncopal episode after chemo infusion.     SLP evaluated pt on 12/04 with recs for Regular Diet with thins.    Nutrition History:  RDN reviewed EMR--pt being followed by outpatient RDN, most recently assessed on 12/03/24. Pt takes in PO by mouth, mainly for pleasure. TF regimen originally started as bolus feeds of 4 cartons of Isosource 1.5/day, increased to 5 cartons/day on 12/03/24 d/t c/f wt losses early in treatment regimen. DME is Joseph. No malnutrition noted a this time.    This writer spoke to pt via phone this am. Per pt, her primary source of nutrition is through her PEG. Started out as using 4 cartons of Isosource 1.5/day and recently increased to 5 cartons/day. Feels she tolerates the TF regimen well, denied any issues with n/v/d, feelings of abdominal fullness, or abdominal pains. Does experience constipation intermittently, for which she is on a bowel regimen.    Does eat by mouth. What and how much just depends on the day and how she is feeling. Most of the time only takes in very small amounts of food at a time.    --Vitamin/Herbal Supplement Use: none  --Food Allergies/Intolerances: none       Anthropometrics:  Height: 160 cm (5' 3\")   Weight: 70.3 kg (155 lb)   BMI (Calculated): 27.46  IBW/kg (Dietitian Calculated): 52.3 kg  Percent of IBW: 134 %       Weight History:   --pt reports since the placement of her PEG ~2 mos ago, wt has been fairly stable    Wt Readings per review of EMR:   12/03/24 70.3 kg (155 lb) (current/most recent wt)   12/03/24 70.4 kg (155 lb 1.5 oz)   12/03/24 70.4 kg (155 lb 1.6 oz)   11/22/24 71.7 kg (158 lb)-->2% wt loss from this date to present " (not significant)   11/12/24 72 kg (158 lb 11.7 oz)   11/12/24 72 kg (158 lb 11.7 oz)   10/25/24 71.4 kg (157 lb 6.4 oz)   10/25/24 71.7 kg (158 lb)   10/22/24 71.8 kg (158 lb 6.4 oz)   10/14/24 73.3 kg (161 lb 8 oz)--->4% wt loss from this date to present (not significant)   10/03/24 74.4 kg (164 lb)   09/20/24 75.3 kg (166 lb)--->6.6% wt loss from this date to present (not significant)     Nutrition Focused Physical Exam Findings:  defer: this writer working remote at present  Edema:  Edema: none  Physical Findings:  Skin: Negative    Nutrition Significant Labs:  CBC Trend:   Results from last 7 days   Lab Units 12/05/24  0805 12/04/24 0518 12/03/24 2053 12/02/24  1409   WBC AUTO x10*3/uL 6.5 13.4* 9.5 7.5   RBC AUTO x10*6/uL 3.57* 3.71* 4.38 3.89*   HEMOGLOBIN g/dL 11.1* 11.5* 13.7 12.2   HEMATOCRIT % 34.8* 34.0* 41.3 38.7   MCV fL 98 92 94 100   PLATELETS AUTO x10*3/uL 234 261 301 311   BMP Trend:   Results from last 7 days   Lab Units 12/04/24 0518 12/03/24 2053 12/02/24  1409   GLUCOSE mg/dL 106* 98 65*   CALCIUM mg/dL 9.1 9.4 9.4   SODIUM mmol/L 139 139 140   POTASSIUM mmol/L 4.3 4.2 4.5   CO2 mmol/L 30 32 32   CHLORIDE mmol/L 103 99 100   BUN mg/dL 20 23 22   CREATININE mg/dL 0.80 0.91 0.90   BG POCT trend:   Results from last 7 days   Lab Units 12/05/24  1014 12/05/24  0010 12/04/24  1005 12/04/24  0658   POCT GLUCOSE mg/dL 166* 100* 106* 109*   Liver Function Trend:   Results from last 7 days   Lab Units 12/04/24 0518 12/03/24 2053 12/02/24  1409   ALK PHOS U/L 61 71 71   AST U/L 13 16 14   ALT U/L 9 10 11   BILIRUBIN TOTAL mg/dL 0.4 0.4 0.4   Renal Lab Trend:   Results from last 7 days   Lab Units 12/04/24  0518 12/03/24 2053 12/02/24  1409 12/02/24  1409   POTASSIUM mmol/L 4.3 4.2  --  4.5   SODIUM mmol/L 139 139  --  140   MAGNESIUM mg/dL 2.09 2.21   < >  --    EGFR mL/min/1.73m*2 84 72  --  73   BUN mg/dL 20 23  --  22   CREATININE mg/dL 0.80 0.91  --  0.90    < > = values in this interval not  displayed.     Medications:  Scheduled medications  aspirin, 81 mg, g-tube, Daily  atorvastatin, 40 mg, g-tube, Daily  enoxaparin, 40 mg, subcutaneous, q24h  lidocaine, 1 patch, transdermal, Daily  metoprolol succinate XL, 25 mg, oral, Daily  mometasone, 1 puff, inhalation, Daily  sacubitriL-valsartan, 1 tablet, oral, BID  senna, 5 mL, g-tube, BID    PRN medications  PRN medications: acetaminophen, albuterol, ALPRAZolam, [Held by provider] oxyCODONE, prochlorperazine    I/O:   Last BM Date: 12/03/24    Dietary Orders (From admission, onward)       Start     Ordered    12/04/24 1955  Enteral feeding WITH diet order Diet type: Regular; Tube feeding formula: Isosource 1.5; 30 (flush before and after each feed)  Continuous        Comments: TF: Isosource 1.5  GOAL RATE: 5 cartons per day  PROVIDES: 1875 calories, 85 grams protein, 955 ml free water  METHOD: bolus   Question Answer Comment   Diet type Regular    Tube feeding formula: Isosource 1.5    Tube feeding flush (mL): 30 flush before and after each feed       12/04/24 1955 12/04/24 1825  May Participate in Room Service  ( ROOM SERVICE MAY PARTICIPATE)  Once        Question:  .  Answer:  Yes    12/04/24 1824                Estimated Needs:   Total Energy Estimated Needs (kCal): ~1800  Method for Estimating Needs: 52 (IBW) x ~35+  Total Protein Estimated Needs (g): 75+  Method for Estimating Needs: 52 (IBW) x ~1.5g/kg+  Total Fluid Estimated Needs (mL): 1ml/kcal or per MD/team        Nutrition Diagnosis   Malnutrition Diagnosis  Patient has Malnutrition Diagnosis--Based on information available at present, do not feel malnutrition present at this time--has been recieving TF + no significant wt losses noted.    Nutrition Diagnosis  Patient has Nutrition Diagnosis: Yes  Diagnosis Status (1): New  Nutrition Diagnosis 1: Increased nutrient needs  Related to (1): increased metabolic demand  As Evidenced by (1): pt with CA, undergoing treatment       Nutrition  Interventions/Recommendations     1. Please continue current TF regimen, as ordered--Isosource 1.5, total of 5 cartons/day. If split over 4 bolus feeds is desired, would do ~310 mls Isosource 1.5, 4 times/day, only as tolerated.  --Flush each feed with 60 mls pre and post feed with additional FWF, per MD/team (TF + recommended FWF would provide a total of 1435 mls free water/day).  --5 cartons Isosource 1.5/day provides a total of 1875 kcals, 85 g pro--meets 100% estimated kcal and pro needs    TF Monitoring:  --RFP + mag daily  --Accuchecks Q6H or per team  --Daily weights (standing preferred, if feasible)    2. PO diet, only as tolerated/as medically appropriate, per SLP recs        Nutrition Prescription for Enteral Nutrition: 1875 kcals/day, 85 g pro/day        Nutrition Interventions:   Interventions: Enteral intake  Enteral Intake: Other--continue bolus TF via PEG)  Goal: Isosource 1.5, 5 cartons/day=1875 kcals, 85 g pro    Nutrition Education: Pt denied any questions for RDN at this time.       Nutrition Monitoring and Evaluation   Food/Nutrient Related History Monitoring  Monitoring and Evaluation Plan: Enteral and parenteral nutrition intake  Enteral and Parenteral Nutrition Intake: Enteral nutrition intake  Criteria: tolerate bolus TF regimen    Body Composition/Growth/Weight History  Monitoring and Evaluation Plan: Weight  Weight: Measured weight  Criteria: maintain stable wt    Biochemical Data, Medical Tests and Procedures  Monitoring and Evaluation Plan: Electrolyte/renal panel, Glucose/endocrine profile  Electrolyte and Renal Panel: Magnesium, Phosphorus, Potassium, Sodium  Criteria: WNL  Glucose/Endocrine Profile: Glucose, casual, Glucose, fasting  Criteria: WNL          Time Spent (min): 60 minutes

## 2024-12-05 NOTE — HOSPITAL COURSE
Amalia Roa is a 59 y.o. female presenting with pmh oropharyngeal squamous cell carcinoma (dx 10/2024, currently receiving pembro s/p C2 12/3/24), depression, anxiety, PTSD, panic attacks, hypertension, heart failure with reduced EF, CAD, MI status post stents, mitral regurgitation status postrepair, asthma, MEÑO on CPAP, PEG tube dependency who presented to the ED (12/3) for a syncopal episode after chemo infusion.     In the ED, vital signs were stable, basic labs were wnl. Due to concern for cardiogenic causes of syncope, EKG and echo were ordered. EKG was found to be NSR and echo showed findings similar to previous study in 2018. A CT PE was ordered which was negative for PE but did show a ground glass opacity in the right middle lobe concerning for bronchopneumonia vs aspiration pneumonitis. Because of these findings, she was started on Azithromycin and Unasyn for treatment of possible pneumonia and blood cultures were sent.  Testing for Flu and COVID were ultimately negative.    Patient noted that she has felt fine since the syncopal episode and denies any symptoms concerning for pneumonia (cough, fever, SOB). Given that she did vomit after she woke up and does have an increased risk for aspiration given the location of her cancer, we believed that the most probable cause for her findings on CT were aspiration pneumonitis. To confirm our suspicion, a procalcitonin was collected which was 0.04, making it more likely that she doesn't have a pneumonia. Because of this, antibiotics were stopped.     However, on 12/5 blood cultures collected from the ED resulted and it was found that the aerobic bottle was positive for gram positive cocci in pairs and chains that later speciated to Strep. Thermophilus on 1 blood culture as well as Yeast. These results were concerning for Fungemia and bacteremia so IV Unasyn and Micafungin were started and ID was consulted. ID recommended to continue current antibiotics and to  follow cultures. On 12/7, lab updated blood cultures drawn on 12/7 to show that the culture was not growing yeast. ID was notified and recommended discontinuing Micafungin and switching Unasyn to Ceftriaxone for a 14 day course of antibiotics ending on 12/19.  She will receive her antibiotics via her port that will be accessed for the duration of her antibiotic treatment.     Cardio and infectious workup were negative so the most likely cause of her syncope is vasovagal. Her daughter noted that there has been previous imaging that has showed involvement of the carotid arteries. With approval of her primary oncologist, repeat CT soft tissue neck was ordered, which showed redemonstration of encasement of the right common carotid and internal jugular vein.     Overnight on 12/7, patient had an episode of syncope while having a bowel movement. Due to another episode of syncope and the recent information of the location of her tumor in relation to her right carotid artery, neurology was consulted iso of possible nerve compression leading to syncope. Neurology confirmed that it is possible that the location of her tumor is compressing the vagus nerve and causing recurrent syncope. However, there was no further neurological work-up needed. Subsequently, surgical oncology was consulted for recommendations but directed the team to ENT who informed the team that given the location of her tumor, it is inoperable.     On 12/9, a rapid response was called due to patient falling while in the bathroom. This episode was similar to previous episodes but she was found to not be orthostatic. An MRI brain was ordered to rule out any metastatic disease. The study did not show any evidence of metastatic disease. At this time, we believe the most likely cause of her syncopal episodes is vasovagal likely due to nerve compression, however, there are no interventions we can do inpatient. It is the hope that with chemotherapy and/or radiation  the tumor will shrink and these episodes will decrease. The patient was instructed to not drive and be supervised at all times due to her likelihood for her to have a syncopal episode at any time. In addition, she was encouraged to increase fluid and salt intake and to wear compression socks. She will need to follow-up with cardiology closely for the management of her symptoms.     On day of discharge , patient is doing well and has no complaints. She has been informed of the necessary changes she needs to make due to her recurrent syncopal episodes. She will need to follow-up with oncology for further treatment of her cancer and with cardiology for syncope, heart failure, and other co-morbidities.

## 2024-12-05 NOTE — CARE PLAN
Problem: Pain - Adult  Goal: Verbalizes/displays adequate comfort level or baseline comfort level  Outcome: Progressing     Problem: Safety - Adult  Goal: Free from fall injury  Outcome: Progressing     Problem: Discharge Planning  Goal: Discharge to home or other facility with appropriate resources  Outcome: Progressing     Problem: Chronic Conditions and Co-morbidities  Goal: Patient's chronic conditions and co-morbidity symptoms are monitored and maintained or improved  Outcome: Progressing    The clinical goals for the shift include pt will remain HDS and VSS throughhout shift

## 2024-12-05 NOTE — CARE PLAN
Pt will remain HDS and VSS by 12/5/24 at 0700      Problem: Pain - Adult  Goal: Verbalizes/displays adequate comfort level or baseline comfort level  Outcome: Progressing     Problem: Safety - Adult  Goal: Free from fall injury  Outcome: Progressing     Problem: Discharge Planning  Goal: Discharge to home or other facility with appropriate resources  Outcome: Progressing     Problem: Chronic Conditions and Co-morbidities  Goal: Patient's chronic conditions and co-morbidity symptoms are monitored and maintained or improved  Outcome: Progressing

## 2024-12-05 NOTE — PROGRESS NOTES
Physical Therapy                 Therapy Communication Note    Patient Name: Amalia Roa  MRN: [id number]  Department: [facility]  Room: [facility]4009-A  Today's Date: 12/5/2024     Discipline: Physical Therapy    Missed Visit Reason: Missed Visit Reason: Other (Comment) (PT orders discontinued; per DrEmperatriz Failing pt has not PT needs/is mobilizing independently. RN informed as well.)    Missed Time: Attempt    Comment: 9:23am

## 2024-12-05 NOTE — CONSULTS
Inpatient consult to Infectious Diseases  Consult performed by: Kusum Craig MD  Consult ordered by: Sterling Bauer MD MPH        Primary MD: CHARAN SNIDER MA  Reason For Consult Fungemia     History Of Present Illness  Amalia Roa is a 59 y.o. female presenting with PMHx of oropharyngeal squamous cell carcinoma (dx 10/2024, currently receiving pembro s/p C2 12/3/24 via Port), MI status post stents, mitral regurgitation status post repair(prosthetic valve), and PEG tube dependency who presented to the ED (12/3) for a syncopal episode after chemo infusion.     From the chart review and history from patient/her , patient were doing well when she felt nauseous and lightheaded after C2 chemotherapy 12/3. A bystander reported she passed out for a few seconds, did not fall or hit her head, and then when she came to she vomited. Patient had a chemotherapy infusion without any issues during C1. Patient was afebrile with leukocytosis.     Patient denied any history of endocarditis or bacteremia in the past.       Past Medical History  She has a past medical history of Asthma, CHF (congestive heart failure), Coronary artery disease, Depression, Dysphagia, Heart valve disease, Hyperlipidemia, Hypertension, Myocardial infarction (Multi), PTSD (post-traumatic stress disorder), and Sleep apnea.    She has no past medical history of Cerebral vascular accident (Multi), Chronic kidney disease, Disease of thyroid gland, GERD (gastroesophageal reflux disease), PONV (postoperative nausea and vomiting), Seizure disorder (Multi), TIA (transient ischemic attack), or Type 2 diabetes mellitus.    Surgical History  She has a past surgical history that includes Mouth surgery; Mitral valve repair; Coronary angioplasty with stent; Gastrostomy tube placement; and biopsy (10/14/2024).   Allergies  Cyclobenzaprine      Objective  Range of Vitals (last 24 hours)  Heart Rate:  [61-81]   Temp:  [36.7 °C (98.1 °F)-36.9 °C (98.4 °F)]   Resp:   [18]   BP: (100-135)/(60-83)   SpO2:  [95 %-100 %]   Daily Weight  12/03/24 : 70.3 kg (155 lb)    Body mass index is 27.46 kg/m².     Physical Exam   General: alert, able to answer questions  HEENT: no conjunctival injection. anicteric. Poor dental hygiene   NECK: No tenderness to palpate   CVS: RRR. Normal S1 and S2. No m/r/g.   RESP: ctab no w/r/r, no increased wob  Abd: Soft and lax. ND.   Ext: No swelling of the LE b/l.   Neuro: Answers questions appropriately.  Integumentary: Importable port (R chest) without erythema or tenderness   Rheumatologic: No joint swelling or edema  Relevant Results    Labs  Results from last 72 hours   Lab Units 12/05/24  0805 12/04/24  0518 12/03/24  2053   WBC AUTO x10*3/uL 6.5 13.4* 9.5   HEMOGLOBIN g/dL 11.1* 11.5* 13.7   HEMATOCRIT % 34.8* 34.0* 41.3   PLATELETS AUTO x10*3/uL 234 261 301   NEUTROS PCT AUTO % 75.0 86.4 71.7   LYMPHS PCT AUTO % 14.0 6.9 17.4   MONOS PCT AUTO % 7.4 4.6 6.8   EOS PCT AUTO % 2.9 0.5 3.4   Imaging   IMPRESSION:  1. No evidence of acute pulmonary embolism to the level of the  proximal segmental pulmonary arteries.  2. Tree-in-bud ground-glass nodules in the right middle lobe likely  represent sequelae of bronchopneumonia or aspiration pneumonitis.  Previously noted right lower lobe opacities have resolved.  3. Minimal increase in size of the left pleural effusion.  4. Similar to minimal increase in size of the metastatic hilar and  mediastinal lymph nodes  5. Stable appearance of metastatic pulmonary nodules, a few of which  were not previously visualized, however are believed to have been  obscured by atelectasis at that time.  Microbiology  12/4 Blood Yeast, Strep. thermophilus  12/5  Blood   Antimicrobial agents   12/3- Ampicillin/sulbactam     12/3- Micafungin     Assessment/Plan  # Fungemia   # Sterp. Thermophilus bacteremia   # Possible aspiration pneumonitis     A 60 yo female with oropharyngeal SCC currently on chemotherapy 12/3/24 via port)  and prosthetic mitral valve and PEG tube dependency developed Fungemia (Yeast) and Sterp. Thermophilus bacteremia. No neutropenic status. No local sign of port infection or worsening sign/finding of oropharyngeal SCC (CT pending). Poor oral hygiene.     Unknown etiology of a syncopal episode is still under evaluation. Initial blood culture was positive for Yeast/Sterp. Thermophilus. Potential source of infection would be oral given poor hygiene or port related blood stream infection. Patient has been afebrile with down trending leukocytosis without antifungal therapy but need to treat both. Yeast (probably Candida spp) can rarely cause endocarditis but would be still concerned for prosthetic valve. TTE was unremarkable on 12/4.     Will see how repeated blood cultures are, which would decide on further step such as removal of port and CLAUDINE. Need to discuss with the team depending on clinical course and the result of blood culture 12/5 before micafugin initiation. Would recommend ampicillin/sulbactam for possible aspiration pneumonitis and Strep. Thermophilus bacteremia. Would start micafungin for fungemia.    Recommendations  -Continue IV Ampicillin/sulbactam 3g q6h  -Continue IV Micafungin 100mg daily  -Will follow repeated blood culture 12/5     Discussed with Dr. Robison. Please text me via Epic chat if you have any questions or concerns regarding this patient. ID will continue to follow up this patient.    Kusum Craig MD  ID [id number]  Team A   ID pager [phone]

## 2024-12-05 NOTE — PROGRESS NOTES
Amalia Robledo is a 59 y.o. female on day 1 of admission presenting with Vasovagal syncope.      Subjective   Patient was seen at bedside and is doing well. She currently has no complaints and denies any lightheadedness or dizziness.     Objective     Last Recorded Vitals  /65 (BP Location: Right arm, Patient Position: Lying)   Pulse 68   Temp 36.7 °C (98.1 °F) (Temporal)   Resp 18   Wt 70.3 kg (155 lb)   SpO2 96%   Intake/Output last 3 Shifts:    Intake/Output Summary (Last 24 hours) at 12/5/2024 1347  Last data filed at 12/5/2024 0006  Gross per 24 hour   Intake 75 ml   Output --   Net 75 ml       Admission Weight  Weight: 70.3 kg (155 lb) (12/03/24 2052)    Daily Weight  12/03/24 : 70.3 kg (155 lb)    Image Results  Transthoracic Echo (TTE) Complete     [facility], [address] [place] [address]                 Tel [phone] and Fax [phone]    TRANSTHORACIC ECHOCARDIOGRAM REPORT       Patient Name:       AMALIA ROBLEDO       Reading Physician:    [id number] Jacques Gutierrez MD  Study Date:         12/4/2024           Ordering Provider:    [id number] TERRI BUSTILLOS  MRN/PID:            [id number]            Fellow:  Accession#:         [id number]        Nurse:                Mehrdad Santana RN,BSN  Date of Birth/Age:  1965 / 59      Sonographer:          BELLA Aviles RDCS  Gender assigned at  F                   Additional Staff:  Birth:  Height:             160.02 cm           Admit Date:           12/3/2024  Weight:             70.31 kg            Admission Status:     Inpatient -                                                                Routine  BSA / BMI:          1.74 m2 / 27.46     Encounter#:           [id number]                       kg/m2  Blood Pressure:     118/71 mmHg         Department Location:  [facility]                                                                Non Invasive    Study Type:    TRANSTHORACIC ECHO (TTE) COMPLETE  Diagnosis/ICD: Syncope and collapse-R55  Indication:    Syncope  CPT Code:      Echo Complete w Full Doppler-21107    Patient History:  Pertinent History: HTN, Syncope, CAD, Murmur and CHF. MI, abnormal EKG, MEÑO,                     tongue cancer,.    Study Detail: The following Echo studies were performed: 2D, M-Mode, Doppler and                color flow. Definity used as a contrast agent for endocardial                border definition. Total contrast used for this procedure was 2 mL                via IV push.       PHYSICIAN INTERPRETATION:  Left Ventricle: Left ventricular ejection fraction is mildly decreased, by visual estimate at 45-50%. There is mild eccentric left ventricular hypertrophy. The left ventricular cavity size is normal. There is normal septal and normal posterior left ventricular wall thickness. Abnormal (paradoxical) septal motion consistent with post-operative status. Left ventricular diastolic filling is indeterminate.  LV Wall Scoring:  The apical cap appears dyskinetic. The basal and mid inferolateral wall is  hypokinetic.    Left Atrium: The left atrium is mildly dilated.  Right Ventricle: The right ventricle is normal in size. There is normal right ventricular global systolic function.  Right Atrium: The right atrium is mildly dilated.  Aortic Valve: The aortic valve is probably trileaflet. There is trace aortic valve regurgitation. The peak instantaneous gradient of the aortic valve is 8 mmHg.  Mitral Valve: Status post mitral valve repair. The peak instantaneous gradient of the mitral valve is 9 mmHg. There is mild mitral valve regurgitation.  Tricuspid Valve: The tricuspid valve is structurally normal. There is trace to mild tricuspid  regurgitation.  Pulmonic Valve: The pulmonic valve is not well visualized. There is trace pulmonic valve regurgitation.  Pericardium: Trivial pericardial effusion.  Aorta: The aortic root is normal. There is no dilatation of the ascending aorta. There is no dilatation of the aortic root.  Systemic Veins: The inferior vena cava appears normal in size.       CONCLUSIONS:   1. Left ventricular ejection fraction is mildly decreased, by visual estimate at 45-50%.   2. [place] and basal and mid inferolateral wall appear abnormal.   3. Abnormal septal motion consistent with post-operative status.   4. There is normal right ventricular global systolic function.   5. The left atrium is mildly dilated.   6. Status post mitral valve repair.   7. Compared to echo report from 8/2/18 from [facility], similar findings.    QUANTITATIVE DATA SUMMARY:     2D MEASUREMENTS:          Normal Ranges:  Ao Root d:       2.90 cm  (2.0-3.7cm)  LAs:             4.16 cm  (2.7-4.0cm)  IVSd:            0.90 cm  (0.6-1.1cm)  LVPWd:           0.94 cm  (0.6-1.1cm)  LVIDd:           5.38 cm  (3.9-5.9cm)  LVIDs:           4.63 cm  LV Mass Index:   106 g/m2  LVEDV Index:     49 ml/m2  LV % FS          14.0 %       LA VOLUME:                    Normal Ranges:  LA Vol A4C:        49.7 ml    (22+/-6mL/m2)  LA Vol A2C:        92.8 ml  LA Vol BP:         70.3 ml  LA Vol Index A4C:  28.7ml/m2  LA Vol Index A2C:  53.5 ml/m2  LA Vol Index BP:   40.5 ml/m2  LA Area A4C:       18.1 cm2  LA Area A2C:       25.6 cm2  LA Major Axis A4C: 5.6 cm  LA Major Axis A2C: 6.0 cm  LA Volume Index:   40.5 ml/m2       RA VOLUME BY A/L METHOD:            Normal Ranges:  RA Vol A4C:              70.4 ml    (8.3-19.5ml)  RA Vol Index A4C:        40.6 ml/m2  RA Area A4C:             22.3 cm2  RA Major Axis A4C:       6.0 cm       AORTA MEASUREMENTS:         Normal Ranges:  Asc Ao, d:          2.90 cm (2.1-3.4cm)       LV SYSTOLIC FUNCTION BY 2D PLANIMETRY (MOD):                       Normal  Ranges:  EF-A4C View:    55 % (>=55%)  EF-A2C View:    51 %  EF-Biplane:     59 %  EF-Visual:      48 %  LV EF Reported: 48 %       LV DIASTOLIC FUNCTION:            Normal Ranges:  MV Peak E:             1.45 m/s   (0.7-1.2 m/s)  MV Peak A:             1.41 m/s   (0.42-0.7 m/s)  E/A Ratio:             1.03       (1.0-2.2)  MV e'                  0.080 m/s  (>8.0)  MV lateral e'          0.10 m/s  MV medial e'           0.06 m/s  MV A Dur:              85.35 msec  E/e' Ratio:            18.08      (<8.0)  MV DT:                 188 msec   (150-240 msec)       MITRAL VALVE:          Normal Ranges:  MV Vmax:      1.48 m/s (<=1.3m/s)  MV peak P.7 mmHg (<5mmHg)  MV mean P.1 mmHg (<2mmHg)  MV VTI:       48.98 cm (10-13cm)  MV DT:        188 msec (150-240msec)  MV PHT:       92 msec  (30-60msec)  MVA by PHT:   2.39 cm2 (4-6cm2)       AORTIC VALVE:           Normal Ranges:  AoV Vmax:      1.41 m/s (<=1.7m/s)  AoV Peak P.9 mmHg (<20mmHg)  LVOT Max Duane:  1.14 m/s (<=1.1m/s)  LVOT VTI:      22.92 cm  LVOT Diameter: 2.04 cm  (1.8-2.4cm)  AoV Area,Vmax: 2.65 cm2 (2.5-4.5cm2)       RIGHT VENTRICLE:  RV Basal 4.50 cm  RV Mid   2.80 cm  RV Major 7.2 cm  TAPSE:   31.0 mm  RV s'    0.13 m/s       TRICUSPID VALVE/RVSP:          Normal Ranges:  Peak TR Velocity:     2.44 m/s  RV Syst Pressure:     27 mmHg  (< 30mmHg)  IVC Diam:             1.80 cm       PULMONIC VALVE:          Normal Ranges:  PV Max Duane:     1.3 m/s  (0.6-0.9m/s)  PV Max P.6 mmHg       [id number] Jacques Gutierrez MD  Electronically signed on 2024 at 3:02:36 PM       Wall Scoring       ** Final **  ECG 12 lead  Normal sinus rhythm  Inferior infarct (cited on or before 03-OCT-2024)  Anterior infarct (cited on or before 03-OCT-2024)  Abnormal ECG  When compared with ECG of 03-OCT-2024 14:27,  No significant change was found  See ED provider note for full interpretation and clinical correlation  Confirmed by Abhijit Mathias ([id number]) on 2024  1:49:07 AM  CT angio chest for pulmonary embolism  Narrative: Interpreted By:  Gregory De Jesus and [place] Lesley   STUDY:  CT ANGIO CHEST FOR PULMONARY EMBOLISM;  12/3/2024 10:34 pm      INDICATION:  Signs/Symptoms:syncope, hx of malignancy.      COMPARISON:  CTA chest 10/14/2024      ACCESSION NUMBER(S):  [id number]      ORDERING CLINICIAN:  ELDER QUIGLEY      TECHNIQUE:  Helical data acquisition of the chest was obtained after intravenous  administration of 80 ML Omnipaque 350, as per PE protocol. Images  were reformatted in coronal and sagittal planes. Axial and coronal  maximum intensity projection (MIP) images were created and reviewed.      FINDINGS:  POTENTIAL LIMITATIONS OF THE STUDY: None      HEART AND VESSELS:  There are no discrete filling defects within main pulmonary artery  and its branches to suggest acute pulmonary embolism. Main pulmonary  artery and its branches are normal in caliber.      The thoracic aorta normal in course and caliber. There is mild  scattered atherosclerosis present. Although, the study is not  tailored for evaluation of aorta, there is no definite evidence of  acute aortic pathology. Moderate coronary artery calcifications are  seen. Please note,the study is not optimized for evaluation of  coronary arteries.      Mild cardiomegaly. There are no findings to suggest right heart  strain. Postsurgical changes of mitral valve prosthesis.  There is no pericardial effusion seen.      MEDIASTINUM AND HEIDY, LOWER NECK AND AXILLA:  The visualized thyroid gland is within normal limits.  Similar to minimal increase in size of the metastatic hilar and  mediastinal lymph nodes. For example a subcarinal node measures 1.2  cm in short axis, previously 0.8 cm, a conglomerate of right hilar  nodes measures 2.8 x 1.5 cm, previously 2.5 x 1.3 cm, and a  conglomerate of left hilar nodes measures 2.6 x 1.5 cm, previously  2.1 x 1.4 cm. The esophagus is unremarkable.      LUNGS AND  AIRWAYS:  The trachea and central airways are patent. No endobronchial lesion  is seen.Mild bronchial wall thickening and endoluminal debris in the  right middle lobar bronchial branches.      There is increased size of a small left pleural effusion compared  with the prior exam. Nodular tree-in-bud ground-glass nodules are  present throughout the right middle lobe are new from the prior exam.  There is resolution of the previously visualized ground-glass  opacities in the posterior right lower lobe. Redemonstration of  multiple rounded solid pulmonary nodules, which appear overall  similar when compared with the prior exam, for example a paraspinal  superior right lower lobe nodule measures 1.1 cm and a left lower  lobe nodule measures 1.3 cm. There are additional nodules along the  left lateral aspect of the aorta which measures 1.5 x 0.7 cm and at  the left lung base measuring 0.8 x 0.8 cm, not previously visualized,  however may have been obscured by atelectasis on the prior exam.  Otherwise no new or enlarging pulmonary nodules are appreciated.      Please note that the far inferior costophrenic angles are not  entirely included within the field of view and therefore can not be  assessed.      UPPER ABDOMEN:  The visualized subdiaphragmatic structures demonstrate no remarkable  findings.      CHEST WALL AND OSSEOUS STRUCTURES:  Right chest port with catheter tip in the right atrium.  No acute osseous pathology. There are no suspicious osseous lesions.      Impression: 1. No evidence of acute pulmonary embolism to the level of the  proximal segmental pulmonary arteries.  2. Tree-in-bud ground-glass nodules in the right middle lobe likely  represent sequelae of bronchopneumonia or aspiration pneumonitis.  Previously noted right lower lobe opacities have resolved.  3. Minimal increase in size of the left pleural effusion.  4. Similar to minimal increase in size of the metastatic hilar and  mediastinal lymph  nodes  5. Stable appearance of metastatic pulmonary nodules, a few of which  were not previously visualized, however are believed to have been  obscured by atelectasis at that time.      I personally reviewed the image(s)/study and resident interpretation  as stated by Dr. Clary Lagos MD. I agree with the findings as  stated. This study was interpreted at [facility], [place], [place].      MACRO:  None      Signed by: Gregory De Jesus 12/4/2024 12:09 AM  Dictation workstation:   [id number]      Physical Exam  Constitutional:       General: She is not in acute distress.     Appearance: Normal appearance.   HENT:      Head: Normocephalic and atraumatic.   Eyes:      Extraocular Movements: Extraocular movements intact.      Conjunctiva/sclera: Conjunctivae normal.      Pupils: Pupils are equal, round, and reactive to light.   Cardiovascular:      Rate and Rhythm: Normal rate and regular rhythm.      Pulses: Normal pulses.      Heart sounds: Normal heart sounds. No murmur heard.     No gallop.   Pulmonary:      Effort: Pulmonary effort is normal. No respiratory distress.      Breath sounds: Normal breath sounds. No wheezing.   Abdominal:      General: Abdomen is flat. There is no distension.      Palpations: Abdomen is soft.      Tenderness: There is no abdominal tenderness.   Neurological:      General: No focal deficit present.      Mental Status: She is alert and oriented to person, place, and time. Mental status is at baseline.   Psychiatric:         Mood and Affect: Mood normal.         Behavior: Behavior normal.         Relevant Results  Results for orders placed or performed during the hospital encounter of 12/03/24 (from the past 24 hours)   POCT GLUCOSE   Result Value Ref Range    POCT Glucose 100 (H) 74 - 99 mg/dL   Renal Function Panel   Result Value Ref Range    Glucose 82 74 - 99 mg/dL    Sodium 142 136 - 145 mmol/L    Potassium 4.2 3.5 - 5.3 mmol/L    Chloride 103 98  - 107 mmol/L    Bicarbonate 29 21 - 32 mmol/L    Anion Gap 14 10 - 20 mmol/L    Urea Nitrogen 18 6 - 23 mg/dL    Creatinine 0.80 0.50 - 1.05 mg/dL    eGFR 84 >60 mL/min/1.73m*2    Calcium 9.0 8.6 - 10.6 mg/dL    Phosphorus 3.6 2.5 - 4.9 mg/dL    Albumin 3.5 3.4 - 5.0 g/dL   Magnesium   Result Value Ref Range    Magnesium 2.09 1.60 - 2.40 mg/dL   CBC and Auto Differential   Result Value Ref Range    WBC 6.5 4.4 - 11.3 x10*3/uL    nRBC 0.0 0.0 - 0.0 /100 WBCs    RBC 3.57 (L) 4.00 - 5.20 x10*6/uL    Hemoglobin 11.1 (L) 12.0 - 16.0 g/dL    Hematocrit 34.8 (L) 36.0 - 46.0 %    MCV 98 80 - 100 fL    MCH 31.1 26.0 - 34.0 pg    MCHC 31.9 (L) 32.0 - 36.0 g/dL    RDW 13.9 11.5 - 14.5 %    Platelets 234 150 - 450 x10*3/uL    Neutrophils % 75.0 40.0 - 80.0 %    Immature Granulocytes %, Automated 0.2 0.0 - 0.9 %    Lymphocytes % 14.0 13.0 - 44.0 %    Monocytes % 7.4 2.0 - 10.0 %    Eosinophils % 2.9 0.0 - 6.0 %    Basophils % 0.5 0.0 - 2.0 %    Neutrophils Absolute 4.90 1.20 - 7.70 x10*3/uL    Immature Granulocytes Absolute, Automated 0.01 0.00 - 0.70 x10*3/uL    Lymphocytes Absolute 0.91 (L) 1.20 - 4.80 x10*3/uL    Monocytes Absolute 0.48 0.10 - 1.00 x10*3/uL    Eosinophils Absolute 0.19 0.00 - 0.70 x10*3/uL    Basophils Absolute 0.03 0.00 - 0.10 x10*3/uL   POCT GLUCOSE   Result Value Ref Range    POCT Glucose 166 (H) 74 - 99 mg/dL   POCT GLUCOSE   Result Value Ref Range    POCT Glucose 122 (H) 74 - 99 mg/dL            Assessment/Plan        Assessment & Plan  Vasovagal syncope    Amalia Roa is a 59 y.o. 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         UPDATES  12/5:   - Ordered CT soft tissue with IV contrast due to encasement of right carotid artery by cancer to rule out this as a cause of her syncope  - Bcx came back positive positive for gram-positive cocci in pairs and chains and yeast.  Started micafungin and Unasyn and consulted ID.  -Supportive oncology consulted, appreciate recs    #Syncopal episode likely 2/2 vasovagal   ::EKG NSR in ED (12/3)  :: TTE 12/4:   Left ventricular ejection fraction is mildly decreased, by visual estimate at 45-50%.   [place] and basal and mid inferolateral wall appear abnormal.   Abnormal septal motion consistent with post-operative status.   There is normal right ventricular global systolic function.   The left atrium is mildly dilated.   Status post mitral valve repair.   Compared to echo report from 8/2/18 from [facility], similar findings  - Patient's daughter mentioned that previous imaging showed encasement of right carotid artery by her cancer. It is possible that the location of her cancer is causing these vasovagal episodes, as this is not an isolated event. Discussed getting repeat CT soft tissue neck before C4 as planned by her oncologist, which they were okay with.   Plan:   - Continue to monitor symptoms   - Order CT soft tissue neck with contrast     #Ground glass opacity in right lower lobe likely 2/2 aspiration pneumonitis, unlikely infectious cause   :: CTPE (12/3):  neg for PE, GGO in R middle lobe c/f bronchopneumonia vs aspiration pneumonitis  - Bcx 12/4 no growth 1 day   - Bcx 12/4 Gram positive cocci, pairs and chains, yeast. --> repeat Bcx ordered   - Due to positive blood culture, restarted abx with Micafungin and Unasyn  - ID consulted, appreciate recs   - Strep pneumo, legionella, MRSA pending (12/4)  - Influenza A/B, COVID negative   - UA 12/4 negative   Plan:   - Start Micafungin 100 mg q20 hours   - Start Unasyn 3g q6 hours   - ID consulted appreciated recs   - Follow-up Bcx   - Follow-up strep pneumo, MRSA      #Oropharyngeal squamous cell carcinoma (dx 10/2024)  - follows with Dr. Garcia (email in AM)  - T4N2M1, p16+, CPS 10  - currently receiving pembro - s/p C2 12/3/24  - Supportive oncology consulted:   Change Acetaminophen 1000 mg BID   Change Senna to BID PRN   Start Gabapentin 300 mg liquid nightly   Bupropion 150 mg IR qAM  Doxepin 10 mg nightly   Restart oxycodone 5 mg PEG q4 PRN  Plan:  - Pain control as follows: Tylenol 1000 mg BID, Gabapentin 300 mg liquid nightly, Oxycodone 5 mg PEG q4 PRN  - Supportive oncology consulted   - Continue home mood medications: Buproprion 150 mg IR qAM, Doxepin 10 mg nightly   -Continue home Ativan for anxiety     #CAD/ HTN  -  Continue home ASA, atorvastatin  - Continue metoprolol 12.5 mg BID     #Asthma  - Continue home inhalers    #FEN  #PEG tube dependence   - Has a PEG tube for feeds - Isosource 1.5 5 cartons/day. FWF 30 ml before and after feeds   - SLP eval - able to eat regular diet and thin liquids     F: PRN   E: replete PRN  N: Tube feeds; regular diet   A: PIV, port   DVT ppx: Lovenox     Patient was seen and discussed with attending Dr. Henrique Wooten MD  Internal Medicine/Pediatrics, PGY-1
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
## 2024-12-06 LAB
ALBUMIN SERPL BCP-MCNC: 3.4 G/DL (ref 3.4–5)
ANION GAP SERPL CALC-SCNC: 12 MMOL/L (ref 10–20)
BASOPHILS # BLD AUTO: 0.03 X10*3/UL (ref 0–0.1)
BASOPHILS NFR BLD AUTO: 0.4 %
BUN SERPL-MCNC: 22 MG/DL (ref 6–23)
CALCIUM SERPL-MCNC: 8.9 MG/DL (ref 8.6–10.6)
CHLORIDE SERPL-SCNC: 103 MMOL/L (ref 98–107)
CO2 SERPL-SCNC: 29 MMOL/L (ref 21–32)
CREAT SERPL-MCNC: 0.81 MG/DL (ref 0.5–1.05)
EGFRCR SERPLBLD CKD-EPI 2021: 83 ML/MIN/1.73M*2
EOSINOPHIL # BLD AUTO: 0.25 X10*3/UL (ref 0–0.7)
EOSINOPHIL NFR BLD AUTO: 3.3 %
ERYTHROCYTE [DISTWIDTH] IN BLOOD BY AUTOMATED COUNT: 13.7 % (ref 11.5–14.5)
GLUCOSE BLD MANUAL STRIP-MCNC: 114 MG/DL (ref 74–99)
GLUCOSE BLD MANUAL STRIP-MCNC: 117 MG/DL (ref 74–99)
GLUCOSE BLD MANUAL STRIP-MCNC: 121 MG/DL (ref 74–99)
GLUCOSE BLD MANUAL STRIP-MCNC: 89 MG/DL (ref 74–99)
GLUCOSE SERPL-MCNC: 114 MG/DL (ref 74–99)
HCT VFR BLD AUTO: 32.6 % (ref 36–46)
HGB BLD-MCNC: 10.6 G/DL (ref 12–16)
IMM GRANULOCYTES # BLD AUTO: 0.01 X10*3/UL (ref 0–0.7)
IMM GRANULOCYTES NFR BLD AUTO: 0.1 % (ref 0–0.9)
LYMPHOCYTES # BLD AUTO: 1.2 X10*3/UL (ref 1.2–4.8)
LYMPHOCYTES NFR BLD AUTO: 16 %
MAGNESIUM SERPL-MCNC: 2.01 MG/DL (ref 1.6–2.4)
MCH RBC QN AUTO: 31.4 PG (ref 26–34)
MCHC RBC AUTO-ENTMCNC: 32.5 G/DL (ref 32–36)
MCV RBC AUTO: 96 FL (ref 80–100)
MONOCYTES # BLD AUTO: 0.62 X10*3/UL (ref 0.1–1)
MONOCYTES NFR BLD AUTO: 8.2 %
NEUTROPHILS # BLD AUTO: 5.41 X10*3/UL (ref 1.2–7.7)
NEUTROPHILS NFR BLD AUTO: 72 %
NRBC BLD-RTO: 0 /100 WBCS (ref 0–0)
PHOSPHATE SERPL-MCNC: 4.1 MG/DL (ref 2.5–4.9)
PLATELET # BLD AUTO: 244 X10*3/UL (ref 150–450)
POTASSIUM SERPL-SCNC: 4.2 MMOL/L (ref 3.5–5.3)
RBC # BLD AUTO: 3.38 X10*6/UL (ref 4–5.2)
SODIUM SERPL-SCNC: 140 MMOL/L (ref 136–145)
WBC # BLD AUTO: 7.5 X10*3/UL (ref 4.4–11.3)

## 2024-12-06 PROCEDURE — 70491 CT SOFT TISSUE NECK W/DYE: CPT | Performed by: RADIOLOGY

## 2024-12-06 PROCEDURE — 99231 SBSQ HOSP IP/OBS SF/LOW 25: CPT | Performed by: INTERNAL MEDICINE

## 2024-12-06 PROCEDURE — 94640 AIRWAY INHALATION TREATMENT: CPT

## 2024-12-06 PROCEDURE — 84100 ASSAY OF PHOSPHORUS: CPT

## 2024-12-06 PROCEDURE — 83735 ASSAY OF MAGNESIUM: CPT

## 2024-12-06 PROCEDURE — 2550000001 HC RX 255 CONTRASTS: Performed by: EMERGENCY MEDICINE

## 2024-12-06 PROCEDURE — 2500000004 HC RX 250 GENERAL PHARMACY W/ HCPCS (ALT 636 FOR OP/ED)

## 2024-12-06 PROCEDURE — 99233 SBSQ HOSP IP/OBS HIGH 50: CPT

## 2024-12-06 PROCEDURE — 80069 RENAL FUNCTION PANEL: CPT

## 2024-12-06 PROCEDURE — 82947 ASSAY GLUCOSE BLOOD QUANT: CPT

## 2024-12-06 PROCEDURE — 2500000001 HC RX 250 WO HCPCS SELF ADMINISTERED DRUGS (ALT 637 FOR MEDICARE OP)

## 2024-12-06 PROCEDURE — 87081 CULTURE SCREEN ONLY: CPT

## 2024-12-06 PROCEDURE — 99233 SBSQ HOSP IP/OBS HIGH 50: CPT | Performed by: NURSE PRACTITIONER

## 2024-12-06 PROCEDURE — 2500000005 HC RX 250 GENERAL PHARMACY W/O HCPCS

## 2024-12-06 PROCEDURE — 1200000003 HC ONCOLOGY  ROOM WITH TELEMETRY DAILY

## 2024-12-06 PROCEDURE — 85025 COMPLETE CBC W/AUTO DIFF WBC: CPT

## 2024-12-06 RX ORDER — BUPROPION HYDROCHLORIDE 150 MG/1
150 TABLET, EXTENDED RELEASE ORAL DAILY
Status: DISPENSED | OUTPATIENT
Start: 2024-12-07

## 2024-12-06 ASSESSMENT — COGNITIVE AND FUNCTIONAL STATUS - GENERAL
DAILY ACTIVITIY SCORE: 24
MOBILITY SCORE: 24
MOBILITY SCORE: 24
DAILY ACTIVITIY SCORE: 24

## 2024-12-06 ASSESSMENT — PAIN SCALES - GENERAL
PAINLEVEL_OUTOF10: 0 - NO PAIN
PAINLEVEL_OUTOF10: 0 - NO PAIN

## 2024-12-06 NOTE — PROGRESS NOTES
Amalia Robledo is a 59 y.o. female on day 2 of admission presenting with Vasovagal syncope.      Subjective   Patient was seen at bedside and is doing well. She is currently feeling well and has not developed any new symptoms concerning for new-onset infection.     Objective     Last Recorded Vitals  /60   Pulse 61   Temp 36.4 °C (97.5 °F) (Temporal)   Resp 16   Wt 70.3 kg (155 lb)   SpO2 100%   Intake/Output last 3 Shifts:    Intake/Output Summary (Last 24 hours) at 12/6/2024 0647  Last data filed at 12/6/2024 0151  Gross per 24 hour   Intake 600 ml   Output --   Net 600 ml       Admission Weight  Weight: 70.3 kg (155 lb) (12/03/24 2052)    Daily Weight  12/03/24 : 70.3 kg (155 lb)    Image Results  Transthoracic Echo (TTE) Complete    Result Date: 12/4/2024   [facility], [address] [place] [address]                Tel [phone] and Fax [phone] TRANSTHORACIC ECHOCARDIOGRAM REPORT  Patient Name:       AMALIA ROBLEDO       Reading Physician:    [id number] Jacques Gutierrez MD Study Date:         12/4/2024           Ordering Provider:    [id number] TERRI BUSTILLOS MRN/PID:            [id number]            Fellow: Accession#:         [id number]        Nurse:                Mehrdad Santana RN,BSN Date of Birth/Age:  1965 / 59      Sonographer:          BELLA Aviles RDCS Gender assigned at  F                   Additional Staff: Birth: Height:             160.02 cm           Admit Date:           12/3/2024 Weight:             70.31 kg            Admission Status:     Inpatient -                                                               Routine BSA / BMI:          1.74 m2 / 27.46     Encounter#:           [id number]                      kg/m2 Blood Pressure:     118/71 mmHg         Department Location:  [facility]                                                               Non Invasive Study Type:    TRANSTHORACIC ECHO (TTE) COMPLETE Diagnosis/ICD: Syncope and collapse-R55 Indication:    Syncope CPT Code:      Echo Complete w Full Doppler-[id number] Patient History: Pertinent History: HTN, Syncope, CAD, Murmur and CHF. MI, abnormal EKG, MEÑO,                    tongue cancer,. Study Detail: The following Echo studies were performed: 2D, M-Mode, Doppler and               color flow. Definity used as a contrast agent for endocardial               border definition. Total contrast used for this procedure was 2 mL               via IV push.  PHYSICIAN INTERPRETATION: Left Ventricle: Left ventricular ejection fraction is mildly decreased, by visual estimate at 45-50%. There is mild eccentric left ventricular hypertrophy. The left ventricular cavity size is normal. There is normal septal and normal posterior left ventricular wall thickness. Abnormal (paradoxical) septal motion consistent with post-operative status. Left ventricular diastolic filling is indeterminate. LV Wall Scoring: The apical cap appears dyskinetic. The basal and mid inferolateral wall is hypokinetic. Left Atrium: The left atrium is mildly dilated. Right Ventricle: The right ventricle is normal in size. There is normal right ventricular global systolic function. Right Atrium: The right atrium is mildly dilated. Aortic Valve: The aortic valve is probably trileaflet. There is trace aortic valve regurgitation. The peak instantaneous gradient of the aortic valve is 8 mmHg. Mitral Valve: Status post mitral valve repair. The peak instantaneous gradient of the mitral valve is 9 mmHg. There is mild mitral valve regurgitation. Tricuspid Valve: The tricuspid valve is structurally normal. There is trace to mild tricuspid regurgitation. Pulmonic Valve: The pulmonic valve is not well visualized.  There is trace pulmonic valve regurgitation. Pericardium: Trivial pericardial effusion. Aorta: The aortic root is normal. There is no dilatation of the ascending aorta. There is no dilatation of the aortic root. Systemic Veins: The inferior vena cava appears normal in size.  CONCLUSIONS:  1. Left ventricular ejection fraction is mildly decreased, by visual estimate at 45-50%.  2. [place] and basal and mid inferolateral wall appear abnormal.  3. Abnormal septal motion consistent with post-operative status.  4. There is normal right ventricular global systolic function.  5. The left atrium is mildly dilated.  6. Status post mitral valve repair.  7. Compared to echo report from 8/2/18 from [facility], similar findings. QUANTITATIVE DATA SUMMARY:  2D MEASUREMENTS:          Normal Ranges: Ao Root d:       2.90 cm  (2.0-3.7cm) LAs:             4.16 cm  (2.7-4.0cm) IVSd:            0.90 cm  (0.6-1.1cm) LVPWd:           0.94 cm  (0.6-1.1cm) LVIDd:           5.38 cm  (3.9-5.9cm) LVIDs:           4.63 cm LV Mass Index:   106 g/m2 LVEDV Index:     49 ml/m2 LV % FS          14.0 %  LA VOLUME:                    Normal Ranges: LA Vol A4C:        49.7 ml    (22+/-6mL/m2) LA Vol A2C:        92.8 ml LA Vol BP:         70.3 ml LA Vol Index A4C:  28.7ml/m2 LA Vol Index A2C:  53.5 ml/m2 LA Vol Index BP:   40.5 ml/m2 LA Area A4C:       18.1 cm2 LA Area A2C:       25.6 cm2 LA Major Axis A4C: 5.6 cm LA Major Axis A2C: 6.0 cm LA Volume Index:   40.5 ml/m2  RA VOLUME BY A/L METHOD:            Normal Ranges: RA Vol A4C:              70.4 ml    (8.3-19.5ml) RA Vol Index A4C:        40.6 ml/m2 RA Area A4C:             22.3 cm2 RA Major Axis A4C:       6.0 cm  AORTA MEASUREMENTS:         Normal Ranges: Asc Ao, d:          2.90 cm (2.1-3.4cm)  LV SYSTOLIC FUNCTION BY 2D PLANIMETRY (MOD):                      Normal Ranges: EF-A4C View:    55 % (>=55%) EF-A2C View:    51 % EF-Biplane:     59 % EF-Visual:      48 % LV EF Reported: 48 %  LV DIASTOLIC  FUNCTION:            Normal Ranges: MV Peak E:             1.45 m/s   (0.7-1.2 m/s) MV Peak A:             1.41 m/s   (0.42-0.7 m/s) E/A Ratio:             1.03       (1.0-2.2) MV e'                  0.080 m/s  (>8.0) MV lateral e'          0.10 m/s MV medial e'           0.06 m/s MV A Dur:              85.35 msec E/e' Ratio:            18.08      (<8.0) MV DT:                 188 msec   (150-240 msec)  MITRAL VALVE:          Normal Ranges: MV Vmax:      1.48 m/s (<=1.3m/s) MV peak P.7 mmHg (<5mmHg) MV mean P.1 mmHg (<2mmHg) MV VTI:       48.98 cm (10-13cm) MV DT:        188 msec (150-240msec) MV PHT:       92 msec  (30-60msec) MVA by PHT:   2.39 cm2 (4-6cm2)  AORTIC VALVE:           Normal Ranges: AoV Vmax:      1.41 m/s (<=1.7m/s) AoV Peak P.9 mmHg (<20mmHg) LVOT Max [place]:  1.14 m/s (<=1.1m/s) LVOT VTI:      22.92 cm LVOT Diameter: 2.04 cm  (1.8-2.4cm) AoV Area,Vmax: 2.65 cm2 (2.5-4.5cm2)  RIGHT VENTRICLE: RV Basal 4.50 cm RV Mid   2.80 cm RV Major 7.2 cm TAPSE:   31.0 mm RV s'    0.13 m/s  TRICUSPID VALVE/RVSP:          Normal Ranges: Peak TR Velocity:     2.44 m/s RV Syst Pressure:     27 mmHg  (< 30mmHg) IVC Diam:             1.80 cm  PULMONIC VALVE:          Normal Ranges: PV Max [place]:     1.3 m/s  (0.6-0.9m/s) PV Max P.6 mmHg  [id number] Jacques Gutierrez MD Electronically signed on 2024 at 3:02:36 PM  Wall Scoring  ** Final **          Physical Exam  Constitutional:       General: She is not in acute distress.     Appearance: Normal appearance.   HENT:      Head: Normocephalic and atraumatic.   Eyes:      Extraocular Movements: Extraocular movements intact.      Conjunctiva/sclera: Conjunctivae normal.      Pupils: Pupils are equal, round, and reactive to light.   Cardiovascular:      Rate and Rhythm: Normal rate and regular rhythm.      Pulses: Normal pulses.      Heart sounds: Normal heart sounds. No murmur heard.     No gallop.   Pulmonary:      Effort: Pulmonary effort is normal. No  respiratory distress.      Breath sounds: Normal breath sounds. No wheezing.   Neurological:      General: No focal deficit present.      Mental Status: She is alert and oriented to person, place, and time. Mental status is at baseline.   Psychiatric:         Mood and Affect: Mood normal.         Behavior: Behavior normal.       Relevant Results  Results for orders placed or performed during the hospital encounter of 12/03/24 (from the past 24 hours)   Renal Function Panel   Result Value Ref Range    Glucose 82 74 - 99 mg/dL    Sodium 142 136 - 145 mmol/L    Potassium 4.2 3.5 - 5.3 mmol/L    Chloride 103 98 - 107 mmol/L    Bicarbonate 29 21 - 32 mmol/L    Anion Gap 14 10 - 20 mmol/L    Urea Nitrogen 18 6 - 23 mg/dL    Creatinine 0.80 0.50 - 1.05 mg/dL    eGFR 84 >60 mL/min/1.73m*2    Calcium 9.0 8.6 - 10.6 mg/dL    Phosphorus 3.6 2.5 - 4.9 mg/dL    Albumin 3.5 3.4 - 5.0 g/dL   Magnesium   Result Value Ref Range    Magnesium 2.09 1.60 - 2.40 mg/dL   CBC and Auto Differential   Result Value Ref Range    WBC 6.5 4.4 - 11.3 x10*3/uL    nRBC 0.0 0.0 - 0.0 /100 WBCs    RBC 3.57 (L) 4.00 - 5.20 x10*6/uL    Hemoglobin 11.1 (L) 12.0 - 16.0 g/dL    Hematocrit 34.8 (L) 36.0 - 46.0 %    MCV 98 80 - 100 fL    MCH 31.1 26.0 - 34.0 pg    MCHC 31.9 (L) 32.0 - 36.0 g/dL    RDW 13.9 11.5 - 14.5 %    Platelets 234 150 - 450 x10*3/uL    Neutrophils % 75.0 40.0 - 80.0 %    Immature Granulocytes %, Automated 0.2 0.0 - 0.9 %    Lymphocytes % 14.0 13.0 - 44.0 %    Monocytes % 7.4 2.0 - 10.0 %    Eosinophils % 2.9 0.0 - 6.0 %    Basophils % 0.5 0.0 - 2.0 %    Neutrophils Absolute 4.90 1.20 - 7.70 x10*3/uL    Immature Granulocytes Absolute, Automated 0.01 0.00 - 0.70 x10*3/uL    Lymphocytes Absolute 0.91 (L) 1.20 - 4.80 x10*3/uL    Monocytes Absolute 0.48 0.10 - 1.00 x10*3/uL    Eosinophils Absolute 0.19 0.00 - 0.70 x10*3/uL    Basophils Absolute 0.03 0.00 - 0.10 x10*3/uL   POCT GLUCOSE   Result Value Ref Range    POCT Glucose 166 (H) 74 -  99 mg/dL   Blood Culture    Specimen: Peripheral Venipuncture; Blood culture   Result Value Ref Range    Blood Culture Loaded on Instrument - Culture in progress    Blood Culture    Specimen: Peripheral Venipuncture; Blood culture   Result Value Ref Range    Blood Culture Loaded on Instrument - Culture in progress    POCT GLUCOSE   Result Value Ref Range    POCT Glucose 122 (H) 74 - 99 mg/dL   POCT GLUCOSE   Result Value Ref Range    POCT Glucose 92 74 - 99 mg/dL   POCT GLUCOSE   Result Value Ref Range    POCT Glucose 128 (H) 74 - 99 mg/dL            Assessment/Plan        Assessment & Plan  Vasovagal syncope    Amalia Roa is a 59 y.o. 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 Active issues include fungemia and possible strep. Thermophilus bacteremia as seen on blood cultures. Repeated blood cultures still pending and ID is following.     There was some concern about the location of her cancer involvement around the right carotid artery being a cause of her syncopal episode. Repeat CT soft tissue neck ultimately showed re demonstration of similar findings previously seen. At this time, it is likely not a cause of her syncopal episode, but we will continue to monitor her symptoms.        UPDATES 12/5:   - CT soft tissue neck showed redemonstration of previous findings   - Bcx 12/5 pending     #Syncopal episode likely 2/2 vasovagal   ::EKG NSR in ED (12/3)  :: TTE 12/4:   Left ventricular ejection fraction is mildly decreased, by visual estimate at 45-50%.   [place] and basal and mid inferolateral wall appear abnormal.   Abnormal septal motion consistent  "with post-operative status.   There is normal right ventricular global systolic function.   The left atrium is mildly dilated.   Status post mitral valve repair.   Compared to echo report from 8/2/18 from OSH, similar findings  - Patient's daughter mentioned that previous imaging showed encasement of right carotid artery by her cancer. It is possible that the location of her cancer is causing these vasovagal episodes, as this is not an isolated event. Discussed getting repeat CT soft tissue neck before C4 as planned by her oncologist, which they were okay with.   :: CT soft tissue neck w/ contrast 12/6\"   Redemonstration of infiltrative soft tissue extending inferiorly along the right carotid sheath to the level of the thyroid. Encasement of the right common carotid and internal carotid artery. 8mm segment of focal ectasia of the right ICA measuring 5 mm in diameter.     Plan:   - Continue to monitor symptoms      #Ground glass opacity in right lower lobe likely 2/2 aspiration pneumonitis, unlikely infectious cause  #Fungemia   #Strep Thermophilus bacteremia    :: CTPE (12/3):  neg for PE, GGO in R middle lobe c/f bronchopneumonia vs aspiration pneumonitis  - CULTURES  - Bcx 12/4 no growth 1 day   - Bcx 12/4 Gram positive cocci, pairs and chains, yeast.   - Bcx 12/5 pending   - Bcx 12/5 pending   - ANTIMICROBIALS  Azithromycin 12/3-12/4  IV Unasyn 12/3-12/4; 12/5-present  IV Micafungin 12/4-present  - ID consulted and following:   Continue IV Unasyn and Micafungin   Follow Bcx  - Strep pneumo, legionella - negative   - Influenza A/B, COVID negative   - UA 12/4 negative   Plan:   - Continue IV Micafungin 100 mg q20 hours   - Continue IV Unasyn 3g q6 hours   - Follow-up Bcx 12/5  - ID consulted appreciated recs      #Oropharyngeal squamous cell carcinoma (dx 10/2024)  - follows with Dr. Garcia (email in AM)  - T4N2M1, p16+, CPS 10  - currently receiving pembro - s/p C2 12/3/24  - Supportive oncology consulted:   Change " Acetaminophen 1000 mg BID   Change Senna to BID PRN   Start Gabapentin 300 mg liquid nightly   Bupropion 150 mg IR qAM  Doxepin 10 mg nightly   Restart oxycodone 5 mg PEG q4 PRN  Plan:  - Pain control as follows: Tylenol 1000 mg BID, Gabapentin 300 mg liquid nightly, Oxycodone 5 mg PEG q4 PRN  - Supportive oncology consulted   - Continue home mood medications: Buproprion 150 mg IR qAM, Doxepin 10 mg nightly   -Continue home Ativan for anxiety   - Supportive oncology consulted     #CAD/ HTN  -  Continue home ASA, atorvastatin  - Continue metoprolol 12.5 mg BID     #Asthma  - Continue home inhalers    #FEN  #PEG tube dependence   - Has a PEG tube for feeds - Isosource 1.5 5 cartons/day. FWF 30 ml before and after feeds   - SLP eval - able to eat regular diet and thin liquids     F: PRN   E: replete PRN  N: Tube feeds; regular diet   A: PIV, port   DVT ppx: Lovenox     Patient was seen and discussed with attending Dr. Henrique Wooten MD  Internal Medicine/Pediatrics, PGY-1

## 2024-12-06 NOTE — PROGRESS NOTES
SUPPORTIVE AND PALLIATIVE ONCOLOGY PROGRESS NOTE      SERVICE DATE: 12/6/2024      SUBJECTIVE:  Interval Events:  Gabapentin and scheduled acetaminophen started yesterday. Reports tolerated well. Pain is controlled today.  Awaiting culture results to know about discharging home    CTA neck results pending    Pain Assessment:  Location: Right head, neck, and throat and ear  Duration: Intermittent  Characteristics:   Rating: Mild   Descriptors: sharp and radiating     Aggravating: none    Relieving: Analgesics     Interference with Function: None    Opioid Requirements  Past 24 h opioid requirements (12/6/24 at 0800 to 12/06/24 at 0800):   Patient has not used any opioids     Total 24h OME use:  0    Symptom Assessment:  Nausea none      Information obtained from: chart review and interview of patient  ______________________________________________________________________        Objective       Lab Results   Component Value Date    WBC 6.5 12/05/2024    HGB 11.1 (L) 12/05/2024    HCT 34.8 (L) 12/05/2024    MCV 98 12/05/2024     12/05/2024      Lab Results   Component Value Date    GLUCOSE 82 12/05/2024    CALCIUM 9.0 12/05/2024     12/05/2024    K 4.2 12/05/2024    CO2 29 12/05/2024     12/05/2024    BUN 18 12/05/2024    CREATININE 0.80 12/05/2024     Lab Results   Component Value Date    ALT 9 12/04/2024    AST 13 12/04/2024    ALKPHOS 61 12/04/2024    BILITOT 0.4 12/04/2024     Estimated Creatinine Clearance: 70.4 mL/min (by C-G formula based on SCr of 0.8 mg/dL).       Scheduled medications   acetaminophen, 975 mg, g-tube, BID  ampicillin-sulbactam, 3 g, intravenous, q6h  aspirin, 81 mg, g-tube, Daily  atorvastatin, 40 mg, g-tube, Daily  buPROPion XL, 150 mg, oral, q AM  doxepin, 10 mg, g-tube, Nightly  enoxaparin, 40 mg, subcutaneous, q24h  gabapentin, 300 mg, oral, Nightly  lidocaine, 1 patch, transdermal, Daily  [Held by provider] metoprolol succinate XL, 25 mg, oral, Daily  micafungin, 100  mg, intravenous, q24h  mometasone, 1 puff, inhalation, Daily  [Held by provider] sacubitriL-valsartan, 1 tablet, oral, BID      Continuous medications     PRN medications  albuterol, 2 puff, q4h PRN  ALPRAZolam, 0.5 mg, BID PRN  oxyCODONE, 5 mg, q6h PRN  prochlorperazine, 10 mg, q6h PRN  senna, 5 mL, BID PRN                    PHYSICAL EXAMINATION:  Vital Signs:   Vital signs reviewed  Vitals:    12/06/24 0438   BP: 101/60   Pulse: 61   Resp: 16   Temp: 36.4 °C (97.5 °F)   SpO2: 100%     Pain Score: 0 - No pain     Physical Exam  Vitals reviewed.   Constitutional:       General: She is not in acute distress.  HENT:      Head: Normocephalic.      Comments: Voice hoarse     Mouth/Throat:      Mouth: Mucous membranes are moist.   Eyes:      Conjunctiva/sclera: Conjunctivae normal.   Pulmonary:      Effort: Pulmonary effort is normal. No respiratory distress.   Abdominal:      General: There is no distension.   Neurological:      Mental Status: She is alert and oriented to person, place, and time.   Psychiatric:         Mood and Affect: Mood normal.         Behavior: Behavior normal.         Thought Content: Thought content normal.         Cognition and Memory: Cognition normal.         Judgment: Judgment normal.         ASSESSMENT/PLAN:  Amalia Roa is a 59 y.o. 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 Garcia). PMH significant fordepression, anxiety, PTSD, panic attacks, hypertension, heart failure with reduced EF, CAD, MI status post stents, mitral regurgitation status postrepair, asthma, MEÑO on CPAP, PEG tube dependency who presented to the ED (12/3) Admitted 12/3/2024 for further evaluation and management of syncopal episode after immunotherapy infusion. Course complicated by cancer related pain. Supportive and Palliative Oncology is consulted for  pain management and Introduction to Supportive and Palliative Oncology Services.      Symptom Management Plan:  Recommended changes are bolded     Pain:  Cancer related pain: right throat, neck, head pain related to oropharyngeal cancer , somatic and neuropathic, sub-optimally controlled  Home regimen:  Acetaminophen 1000 mg BID and Oxycodone 5-10 mg prn  Intolerances/previously tried: none  Oxycodone unlikely cause of syncope given previous syncopal episodes prior to ever using oxycodone  Continue Oxycodone IR 5 mg PO q4 h prn severe pain, consider increasing to 10 mg while inpatient to assess tolerance  Continue Acetaminophen 975 mg PO q12h scheduled  Continue Gabapentin 300 mg PO at bedtime       Nausea:  At risk for nausea with vomiting related to opioids and tube feeds    Home regimen:  Prochlorperazine , ondansetron ineffective  QTc:  within normal limits   Denies  Continue prochlorperazine 10 mg PEG q6h prn nausea      Constipation  At risk for constipation related to medication side effects (including opioids), currently not constipated  Usual bowel pattern: every day  Home regimen:  Senna and Miralax prn* has not needed with tube feeds  LBM 12/6 x2  Continue Senna 5 ml BID PRN  Goal to have BM without straining q48-72h, adjust regimen as needed     Altered Mood:  Chronic anxiety related to  history of PTSD, panic attacks    controlled with home regimen   Home regimen: alprazolam 0.5 mg BID prn, Wellbutrin 150 mgqAM, and Doxepin 10 mg PO at bedtime   Continue alprazolam 0.5 mg PEG BID PRN  Change Buproprion to immediate release tablet 150 mg PEG qAM,   Continue Doxepin 10 mg PO at bedtime   Pt has been crushing Buproprion SR at home, advised her not to crush sustained release, will need prescribed IR at discharge     Sleeping Difficulty:  Impaired sleep related to pain, anxiety, and hospital environment  Home regimen:   Alprazolam  Pain management as above, gabapentin will likely help  Continue home  alprazolam and Doxepin      Medical Decision Making/Goals of Care/Advance Care Planning:  Patient's current clinical condition, including diagnosis, prognosis, and management plan, and goals of care were discussed.   Life limiting disease: metastatic malignancy  Family: Supportive , children  Performance status: Minor limitations due to pain  Joys/meaning/strength: Family and frequently visits and babysits her grandchildren  Understanding of health: Demonstrates good prognostic understanding of disease process, understands plan for CT neck to evaluate carotids, plan for ongoing immunotherapy  Information:Wants full disclosure  Goals: symptom control and cancer directed therapy  Worries and fears now and future:  not being able to drive so that she can visit her grandchildren    Code status discussion:  Full     Advance Directives  Existence of Advance Directives:No - not interested  Decision maker: Surrogate decision maker is   Code Status: Full code     Supportive and Palliative Oncology encounter:  Spoke with patient at bedside  Emotional support provided  Coordination of care:  home-going prescription recommendations and arranging outpatient follow-up appointment     Disposition:  Please  start the process of having prior authorization with meds to beds deliver medications to patient prior to discharge via [facility] pharmacy. Prescriptions will need to be sent 48-72 hours prior to discharge so that a prior authorization can be completed.      Discharge date: unknown pending acute issues  Has an appointment with Outpatient Supportive Oncology at [facility] [place] 12/24 JORGE Phillips CNP    Discharge recommendations:  Last fill of oxycodone was 10/25 will need prescription at discharge  Utilize meds to beds  Oxycodone 5 mg tablets take 1-2 tablets q4 h prn moderate to severe pain #112 for 14 days  Gabapentin liquid 300 mg PEG at bedtime 30 day supply  Buproprion immediate release tablet 150 mg PEG qAM     Signature and  billing:    Medical complexity was high level due to due to complexity of problems, extensive data review, and high risk of management/treatment.      DATA   Diagnostic tests and information reviewed for today's visit:  Conversation with primary team, Most recent labs, Medications       Some elements copied from my note on 12/5/24, the elements have been updated and all reflect current decision making from today, 12/6/2024.    Plan of Care discussed with: Provider and Patient    Thank you for asking Supportive and Palliative Oncology to assist with care of this patient.  Recommendations will be communicated back to the consulting service by way of shared electronic medical record/secure chat/email or face-to-face.   We will continue to follow.  Please contact us for additional questions or concerns.      SIGNATURE: LAURIE Rodriguez-CNP, DNP  PAGER/CONTACT:  Contact information:  Supportive and Palliative Oncology  Monday-Friday 8 AM-5 PM  Epic Secure chat or pager [phone].  After hours and weekends:  pager [phone]

## 2024-12-06 NOTE — CARE PLAN
Pt will remain HDS and VSS by 12/6/24 at 0700      Problem: Pain - Adult  Goal: Verbalizes/displays adequate comfort level or baseline comfort level  Outcome: Progressing     Problem: Safety - Adult  Goal: Free from fall injury  Outcome: Progressing     Problem: Discharge Planning  Goal: Discharge to home or other facility with appropriate resources  Outcome: Progressing     Problem: Chronic Conditions and Co-morbidities  Goal: Patient's chronic conditions and co-morbidity symptoms are monitored and maintained or improved  Outcome: Progressing

## 2024-12-07 LAB
ALBUMIN SERPL BCP-MCNC: 3.3 G/DL (ref 3.4–5)
ALBUMIN SERPL BCP-MCNC: 3.5 G/DL (ref 3.4–5)
ANION GAP SERPL CALC-SCNC: 11 MMOL/L (ref 10–20)
ANION GAP SERPL CALC-SCNC: 14 MMOL/L (ref 10–20)
BACTERIA BLD AEROBE CULT: ABNORMAL
BACTERIA BLD AEROBE CULT: ABNORMAL
BACTERIA BLD CULT: ABNORMAL
BACTERIA BLD CULT: ABNORMAL
BASOPHILS # BLD AUTO: 0.03 X10*3/UL (ref 0–0.1)
BASOPHILS NFR BLD AUTO: 0.4 %
BUN SERPL-MCNC: 17 MG/DL (ref 6–23)
BUN SERPL-MCNC: 19 MG/DL (ref 6–23)
CALCIUM SERPL-MCNC: 8.7 MG/DL (ref 8.6–10.6)
CALCIUM SERPL-MCNC: 8.9 MG/DL (ref 8.6–10.6)
CHLORIDE SERPL-SCNC: 103 MMOL/L (ref 98–107)
CHLORIDE SERPL-SCNC: 103 MMOL/L (ref 98–107)
CO2 SERPL-SCNC: 29 MMOL/L (ref 21–32)
CO2 SERPL-SCNC: 29 MMOL/L (ref 21–32)
CREAT SERPL-MCNC: 0.76 MG/DL (ref 0.5–1.05)
CREAT SERPL-MCNC: 0.78 MG/DL (ref 0.5–1.05)
EGFRCR SERPLBLD CKD-EPI 2021: 87 ML/MIN/1.73M*2
EGFRCR SERPLBLD CKD-EPI 2021: 90 ML/MIN/1.73M*2
EOSINOPHIL # BLD AUTO: 0.23 X10*3/UL (ref 0–0.7)
EOSINOPHIL NFR BLD AUTO: 3.2 %
ERYTHROCYTE [DISTWIDTH] IN BLOOD BY AUTOMATED COUNT: 13.7 % (ref 11.5–14.5)
GLUCOSE BLD MANUAL STRIP-MCNC: 153 MG/DL (ref 74–99)
GLUCOSE SERPL-MCNC: 105 MG/DL (ref 74–99)
GLUCOSE SERPL-MCNC: 95 MG/DL (ref 74–99)
GRAM STAIN: ABNORMAL
GRAM STN SPEC: ABNORMAL
GRAM STN SPEC: ABNORMAL
HCT VFR BLD AUTO: 33.8 % (ref 36–46)
HGB BLD-MCNC: 10.8 G/DL (ref 12–16)
IMM GRANULOCYTES # BLD AUTO: 0.06 X10*3/UL (ref 0–0.7)
IMM GRANULOCYTES NFR BLD AUTO: 0.8 % (ref 0–0.9)
LYMPHOCYTES # BLD AUTO: 0.88 X10*3/UL (ref 1.2–4.8)
LYMPHOCYTES NFR BLD AUTO: 12.2 %
MAGNESIUM SERPL-MCNC: 1.98 MG/DL (ref 1.6–2.4)
MAGNESIUM SERPL-MCNC: 2.07 MG/DL (ref 1.6–2.4)
MCH RBC QN AUTO: 31.3 PG (ref 26–34)
MCHC RBC AUTO-ENTMCNC: 32 G/DL (ref 32–36)
MCV RBC AUTO: 98 FL (ref 80–100)
MONOCYTES # BLD AUTO: 0.49 X10*3/UL (ref 0.1–1)
MONOCYTES NFR BLD AUTO: 6.8 %
NEUTROPHILS # BLD AUTO: 5.54 X10*3/UL (ref 1.2–7.7)
NEUTROPHILS NFR BLD AUTO: 76.6 %
NRBC BLD-RTO: 0 /100 WBCS (ref 0–0)
PHOSPHATE SERPL-MCNC: 3.4 MG/DL (ref 2.5–4.9)
PHOSPHATE SERPL-MCNC: 3.8 MG/DL (ref 2.5–4.9)
PLATELET # BLD AUTO: 222 X10*3/UL (ref 150–450)
POTASSIUM SERPL-SCNC: 3.7 MMOL/L (ref 3.5–5.3)
POTASSIUM SERPL-SCNC: 4.3 MMOL/L (ref 3.5–5.3)
RBC # BLD AUTO: 3.45 X10*6/UL (ref 4–5.2)
SODIUM SERPL-SCNC: 139 MMOL/L (ref 136–145)
SODIUM SERPL-SCNC: 142 MMOL/L (ref 136–145)
STAPHYLOCOCCUS SPEC CULT: NORMAL
WBC # BLD AUTO: 7.2 X10*3/UL (ref 4.4–11.3)

## 2024-12-07 PROCEDURE — 85025 COMPLETE CBC W/AUTO DIFF WBC: CPT

## 2024-12-07 PROCEDURE — 2500000004 HC RX 250 GENERAL PHARMACY W/ HCPCS (ALT 636 FOR OP/ED)

## 2024-12-07 PROCEDURE — 99233 SBSQ HOSP IP/OBS HIGH 50: CPT

## 2024-12-07 PROCEDURE — 80069 RENAL FUNCTION PANEL: CPT

## 2024-12-07 PROCEDURE — 82947 ASSAY GLUCOSE BLOOD QUANT: CPT

## 2024-12-07 PROCEDURE — 2500000001 HC RX 250 WO HCPCS SELF ADMINISTERED DRUGS (ALT 637 FOR MEDICARE OP)

## 2024-12-07 PROCEDURE — 99232 SBSQ HOSP IP/OBS MODERATE 35: CPT | Performed by: INTERNAL MEDICINE

## 2024-12-07 PROCEDURE — 2500000005 HC RX 250 GENERAL PHARMACY W/O HCPCS

## 2024-12-07 PROCEDURE — 36415 COLL VENOUS BLD VENIPUNCTURE: CPT

## 2024-12-07 PROCEDURE — 83735 ASSAY OF MAGNESIUM: CPT

## 2024-12-07 PROCEDURE — 1200000003 HC ONCOLOGY  ROOM WITH TELEMETRY DAILY

## 2024-12-07 RX ORDER — CEFTRIAXONE 2 G/1
2 INJECTION, POWDER, FOR SOLUTION INTRAMUSCULAR; INTRAVENOUS DAILY
Qty: 18 G | Refills: 0 | Status: SHIPPED
Start: 2024-12-07 | End: 2024-12-08

## 2024-12-07 RX ORDER — CEFTRIAXONE 2 G/50ML
2 INJECTION, SOLUTION INTRAVENOUS EVERY 24 HOURS
Status: DISPENSED | OUTPATIENT
Start: 2024-12-07

## 2024-12-07 RX ORDER — GABAPENTIN 250 MG/5ML
300 SOLUTION ORAL NIGHTLY
Status: DISPENSED | OUTPATIENT
Start: 2024-12-07

## 2024-12-07 RX ORDER — POTASSIUM CHLORIDE 20 MEQ/1
40 TABLET, EXTENDED RELEASE ORAL ONCE
Status: DISCONTINUED | OUTPATIENT
Start: 2024-12-08 | End: 2024-12-08

## 2024-12-07 RX ORDER — ACETAMINOPHEN 160 MG/5ML
1000 SOLUTION ORAL 2 TIMES DAILY
Status: DISPENSED | OUTPATIENT
Start: 2024-12-07

## 2024-12-07 RX ORDER — MAGNESIUM SULFATE HEPTAHYDRATE 40 MG/ML
2 INJECTION, SOLUTION INTRAVENOUS ONCE
Status: COMPLETED | OUTPATIENT
Start: 2024-12-08 | End: 2024-12-08

## 2024-12-07 ASSESSMENT — PAIN - FUNCTIONAL ASSESSMENT
PAIN_FUNCTIONAL_ASSESSMENT: 0-10

## 2024-12-07 ASSESSMENT — COGNITIVE AND FUNCTIONAL STATUS - GENERAL
DAILY ACTIVITIY SCORE: 24
MOBILITY SCORE: 23
DAILY ACTIVITIY SCORE: 24
MOBILITY SCORE: 23
CLIMB 3 TO 5 STEPS WITH RAILING: A LITTLE
CLIMB 3 TO 5 STEPS WITH RAILING: A LITTLE

## 2024-12-07 ASSESSMENT — PAIN SCALES - GENERAL
PAINLEVEL_OUTOF10: 4
PAINLEVEL_OUTOF10: 1
PAINLEVEL_OUTOF10: 0 - NO PAIN

## 2024-12-07 NOTE — SIGNIFICANT EVENT
I was paged at around 1:20 am for a fall. The patient was on the toilet and had a BM when she felt dizzy and lost consciousness. She slouched on the toilet but was assisted by staff and did not fall off the toilet or hit her head. Just after the episode, BP 68/40 and HR 73. She was helped back to her bed and a few minutes later /59 and HR 79. When I arrived the patient stated she no longer felt dizzy. Her blood pressure in bed was lower than it had been in the morning, so I ordered a 500 ml bolus to ensure adequate volume. The episode was likely vasovagal, possibly secondary to having a BM.

## 2024-12-07 NOTE — CARE PLAN
The clinical goals for the shift include Pt will remain HDS and VSS by 12/7/24 at 0700      Problem: Safety - Adult  Goal: Free from fall injury  Outcome: Not Progressing     Problem: Pain - Adult  Goal: Verbalizes/displays adequate comfort level or baseline comfort level  Outcome: Progressing     Problem: Discharge Planning  Goal: Discharge to home or other facility with appropriate resources  Outcome: Progressing     Problem: Chronic Conditions and Co-morbidities  Goal: Patient's chronic conditions and co-morbidity symptoms are monitored and maintained or improved  Outcome: Progressing

## 2024-12-07 NOTE — CARE PLAN
The patient's goals for the shift include  updated information from ID on plan of care.    The clinical goals for the shift include pt will remain free from falls throughout shift [id number] 12/7/24    Over the shift, the patient did make progress toward the following goals. Patient instructed on falls precautions and need for assistance when ambulating to prevent fall. Patient demonstrated ability to notify staff before ambulation and remained free from fall and injury.
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
## 2024-12-07 NOTE — PROGRESS NOTES
Amalia Roa is a 59 y.o. female on day 2 of admission presenting with Vasovagal syncope.    Subjective   Interval History: No new complaint.   WBC 6.5  CT neck 12/4   IMPRESSION:  Redemonstration of necrotic ulcerative mass centered in the right  oropharynx measuring approximately 4.0 x 3.5 cm in transverse  dimension extending to the right oral cavity and right hypopharynx.  Redemonstration of infiltrative soft tissue extending inferiorly  along the right carotid sheath to the level of the thyroid.  Encasement of the right common carotid and internal carotid artery. 8  mm segment of focal ectasia of the right ICA measuring 5 mm in  diameter.      Bilateral necrotic lymph nodes, largest in left level 2 measuring up  to 3.7 cm, also unchanged from previous CT neck 10/08/2024.      Extensive pulmonary metastases unchanged from chest CT 10/14/2024.    Review of Systems    Objective   Range of Vitals (last 24 hours)  Heart Rate:  [61-78]   Temp:  [36.3 °C (97.3 °F)-37.3 °C (99.1 °F)]   Resp:  [16-18]   BP: (101-132)/(60-78)   SpO2:  [95 %-100 %]   Daily Weight  12/03/24 : 70.3 kg (155 lb)    Body mass index is 27.46 kg/m².    Antibiotics  ampicillin-sulbactam - 3 gram/100 mL  micafungin - 100 mg/100 mL    Relevant Results  Labs  Results from last 72 hours   Lab Units 12/05/24  0805 12/04/24 0518 12/03/24 2053   WBC AUTO x10*3/uL 6.5 13.4* 9.5   HEMOGLOBIN g/dL 11.1* 11.5* 13.7   HEMATOCRIT % 34.8* 34.0* 41.3   PLATELETS AUTO x10*3/uL 234 261 301   NEUTROS PCT AUTO % 75.0 86.4 71.7   LYMPHS PCT AUTO % 14.0 6.9 17.4   MONOS PCT AUTO % 7.4 4.6 6.8   EOS PCT AUTO % 2.9 0.5 3.4     Results from last 72 hours   Lab Units 12/05/24  0805 12/04/24 0518 12/03/24 2053   SODIUM mmol/L 142 139 139   POTASSIUM mmol/L 4.2 4.3 4.2   CHLORIDE mmol/L 103 103 99   CO2 mmol/L 29 30 32   BUN mg/dL 18 20 23   CREATININE mg/dL 0.80 0.80 0.91   GLUCOSE mg/dL 82 106* 98   CALCIUM mg/dL 9.0 9.1 9.4   ANION GAP mmol/L 14 10 12   EGFR  "mL/min/1.73m*2 84 84 72   PHOSPHORUS mg/dL 3.6  --   --      Results from last 72 hours   Lab Units 12/05/24  0805 12/04/24  0518 12/03/24 2053   ALK PHOS U/L  --  61 71   BILIRUBIN TOTAL mg/dL  --  0.4 0.4   PROTEIN TOTAL g/dL  --  6.1* 7.0   ALT U/L  --  9 10   AST U/L  --  13 16   ALBUMIN g/dL 3.5 3.6 4.1     Estimated Creatinine Clearance: 70.4 mL/min (by C-G formula based on SCr of 0.8 mg/dL).  No results found for: \"CRP\"  Microbiology  Susceptibility data from last 14 days.  Collected Specimen Info Organism   12/04/24 Blood culture from Peripheral Venipuncture Streptococcus thermophilus   Assessment/Plan  # Fungemia   # Sterp. Thermophilus bacteremia   # Possible aspiration pneumonitis  # Hx of mitral prosthetic valve     A 60 yo female with oropharyngeal SCC currently on chemotherapy 12/3/24 via port) and prosthetic mitral valve and PEG tube dependency developed Fungemia (Yeast) and Sterp. Thermophilus bacteremia. No neutropenic status. No local sign of port infection or worsening sign/finding of oropharyngeal SCC (CT pending). Poor oral hygiene. TTE was unremarkable on 12/4.     Not identified yeast. No growth from repeated blood cultures. Clinically, fungemia would be from oral or cancer lesion. However, patient has high risk of developing port related blood stream infection seeding from potential oral lesion. Would recommend remove port given fungemia in setting of port (device) and mitral prosthetic valve.       Recommendations  -Consider pulling out port   -Continue IV Ampicillin/sulbactam 3g q6h  -Continue IV Micafungin 100mg daily  -Will follow repeated blood culture 12/5      Discussed with Dr. Richardson Please text me via Epic chat if you have any questions or concerns regarding this patient. ID will continue to follow up this patient.     Kusum Craig MD  ID fellow [id number]  Team A   ID pager [phone]  "

## 2024-12-07 NOTE — PROGRESS NOTES
Amalia Roa is a 59 y.o. female on day 3 of admission presenting with Vasovagal syncope.      Subjective   Overnight, patient passed out in the bathroom during a BM. She states that she began to feel pain in her jaw and started to not feel well, so she went to the bathroom and subsequently passed out. She says she felt completely fine afterwards and denies any confusion after the episode.     This morning, she is feeling well and has no complaints. She denies any more episodes of syncope.     Objective     Last Recorded Vitals  /65 (BP Location: Left arm, Patient Position: Lying)   Pulse 78   Temp 36.7 °C (98.1 °F) (Temporal)   Resp 18   Wt 70.3 kg (155 lb)   SpO2 98%   Intake/Output last 3 Shifts:    Intake/Output Summary (Last 24 hours) at 12/7/2024 1719  Last data filed at 12/7/2024 0252  Gross per 24 hour   Intake 630 ml   Output --   Net 630 ml       Admission Weight  Weight: 70.3 kg (155 lb) (12/03/24 2052)    Daily Weight  12/03/24 : 70.3 kg (155 lb)    Image Results  CT soft tissue neck w IV contrast    Result Date: 12/6/2024  Interpreted By:  Massimo Blanco, STUDY: CT SOFT TISSUE NECK W IV CONTRAST   INDICATION: Signs/Symptoms:Syncopal episode and previous studies show encasement of cancer around right carotid artery   COMPARISON: CT neck 10/08/2024. PET-CT 10/22/2024:   ACCESSION NUMBER(S): [id number]   ORDERING CLINICIAN: JOVAN AUGUST   TECHNIQUE: Helically acquired axial CT images of the neck after administration of intravenous contrast, with sagittal and coronal reformats.   FINDINGS: SOFT TISSUES: No evidence of any solid or cystic masses in the neck.   ORAL CAVITY/PHARYNX/LARYNX: Necrotic mass centered within the right oropharynx measuring at least 4.0 x 3.4 cm in axial dimension (series 201, image 29). There is anterior extension and ulceration of the right base of tongue and right lateral oral tongue. There is likely involvement of the right lateral aspect of the soft palate. There is  encasement of the right external auditory canal. Questionable partial encasement of the right ICA with focal dilatation of the right ICA measuring 5 mm in diameter and 8 mm in extent (series 205, image 58). Possible extension into the right retromolar trigone and right floor of mouth. Inferiorly, there is extension to involve the epiglottis and right aryepiglottic fold (series 201, image 50). There is infiltration of the pre-epiglottic fat. Confluent infiltrative soft tissue extends inferiorly along the right carotid sheath extending to the level of the thyroid. Hypoenhancing lesion within the right strap musculature measuring 1.2 x 0.8 cm (series 201, image 51), which may represent tumor versus necrotic lymph node.     SALIVARY GLANDS: The bilateral parotid, submandibular, and sublingual glands appear unremarkable.   LYMPH NODES: Redemonstration of necrotic left level 2 lymph node conglomerate measuring 3.7 x 1.7 cm (series 201, image 42). 9 x 7 mm left level 3 lymph node (series 201, image 54). 1.5 x 0.8 cm right level 2B/3 lymph node (series 201, image 48). 1.1 x 0.8 cm left level 3 lymph node (series 201, image 49).   THYROID GLAND: Slight hypoenhancement in the superior aspect of the right thyroid lobe (series 201, image 56), for which tumor invasion can not be excluded.   VASCULATURE: Effacement of the right internal jugular vein. Focal dilatation of the right IJ measuring 5 mm in diameter as above.   VISUALIZED BRAIN: Limited evaluation demonstrates no acute abnormalities.   ORBITS: Unremarkable.   BONES: Visualized osseous structures demonstrate no acute abnormalities. The visualized paranasal sinuses, tympanic cavities, and mastoid air cells are clear.   UPPER CHEST: Multiple pulmonary nodules unchanged from chest CT 10/14/2024.  mediastinal and hilar lymphadenopathy is again similar to previous chest CT.       Redemonstration of necrotic ulcerative mass centered in the right oropharynx measuring  approximately 4.0 x 3.5 cm in transverse dimension extending to the right oral cavity and right hypopharynx. Redemonstration of infiltrative soft tissue extending inferiorly along the right carotid sheath to the level of the thyroid. Encasement of the right common carotid and internal carotid artery. 8 mm segment of focal ectasia of the right ICA measuring 5 mm in diameter.   Bilateral necrotic lymph nodes, largest in left level 2 measuring up to 3.7 cm, also unchanged from previous CT neck 10/08/2024.   Extensive pulmonary metastases unchanged from chest CT 10/14/2024.   Signed by: Massimo Blanco 12/6/2024 2:34 PM Dictation workstation:   [id number]         Physical Exam  Constitutional:       General: She is not in acute distress.     Appearance: Normal appearance.   HENT:      Head: Normocephalic and atraumatic.   Eyes:      Extraocular Movements: Extraocular movements intact.      Conjunctiva/sclera: Conjunctivae normal.      Pupils: Pupils are equal, round, and reactive to light.   Cardiovascular:      Rate and Rhythm: Normal rate and regular rhythm.      Pulses: Normal pulses.      Heart sounds: Normal heart sounds. No murmur heard.     No gallop.   Pulmonary:      Effort: Pulmonary effort is normal. No respiratory distress.      Breath sounds: Normal breath sounds. No wheezing.   Neurological:      General: No focal deficit present.      Mental Status: She is alert and oriented to person, place, and time. Mental status is at baseline.   Psychiatric:         Mood and Affect: Mood normal.         Behavior: Behavior normal.         Relevant Results  Results for orders placed or performed during the hospital encounter of 12/03/24 (from the past 24 hours)   CBC and Auto Differential   Result Value Ref Range    WBC 7.5 4.4 - 11.3 x10*3/uL    nRBC 0.0 0.0 - 0.0 /100 WBCs    RBC 3.38 (L) 4.00 - 5.20 x10*6/uL    Hemoglobin 10.6 (L) 12.0 - 16.0 g/dL    Hematocrit 32.6 (L) 36.0 - 46.0 %    MCV 96 80 - 100 fL    MCH 31.4  26.0 - 34.0 pg    MCHC 32.5 32.0 - 36.0 g/dL    RDW 13.7 11.5 - 14.5 %    Platelets 244 150 - 450 x10*3/uL    Neutrophils % 72.0 40.0 - 80.0 %    Immature Granulocytes %, Automated 0.1 0.0 - 0.9 %    Lymphocytes % 16.0 13.0 - 44.0 %    Monocytes % 8.2 2.0 - 10.0 %    Eosinophils % 3.3 0.0 - 6.0 %    Basophils % 0.4 0.0 - 2.0 %    Neutrophils Absolute 5.41 1.20 - 7.70 x10*3/uL    Immature Granulocytes Absolute, Automated 0.01 0.00 - 0.70 x10*3/uL    Lymphocytes Absolute 1.20 1.20 - 4.80 x10*3/uL    Monocytes Absolute 0.62 0.10 - 1.00 x10*3/uL    Eosinophils Absolute 0.25 0.00 - 0.70 x10*3/uL    Basophils Absolute 0.03 0.00 - 0.10 x10*3/uL   Renal Function Panel   Result Value Ref Range    Glucose 114 (H) 74 - 99 mg/dL    Sodium 140 136 - 145 mmol/L    Potassium 4.2 3.5 - 5.3 mmol/L    Chloride 103 98 - 107 mmol/L    Bicarbonate 29 21 - 32 mmol/L    Anion Gap 12 10 - 20 mmol/L    Urea Nitrogen 22 6 - 23 mg/dL    Creatinine 0.81 0.50 - 1.05 mg/dL    eGFR 83 >60 mL/min/1.73m*2    Calcium 8.9 8.6 - 10.6 mg/dL    Phosphorus 4.1 2.5 - 4.9 mg/dL    Albumin 3.4 3.4 - 5.0 g/dL   Magnesium   Result Value Ref Range    Magnesium 2.01 1.60 - 2.40 mg/dL   POCT GLUCOSE   Result Value Ref Range    POCT Glucose 114 (H) 74 - 99 mg/dL   Magnesium   Result Value Ref Range    Magnesium 2.07 1.60 - 2.40 mg/dL   Renal Function Panel   Result Value Ref Range    Glucose 95 74 - 99 mg/dL    Sodium 139 136 - 145 mmol/L    Potassium 4.3 3.5 - 5.3 mmol/L    Chloride 103 98 - 107 mmol/L    Bicarbonate 29 21 - 32 mmol/L    Anion Gap 11 10 - 20 mmol/L    Urea Nitrogen 17 6 - 23 mg/dL    Creatinine 0.76 0.50 - 1.05 mg/dL    eGFR 90 >60 mL/min/1.73m*2    Calcium 8.7 8.6 - 10.6 mg/dL    Phosphorus 3.8 2.5 - 4.9 mg/dL    Albumin 3.3 (L) 3.4 - 5.0 g/dL   CBC and Auto Differential   Result Value Ref Range    WBC 7.2 4.4 - 11.3 x10*3/uL    nRBC 0.0 0.0 - 0.0 /100 WBCs    RBC 3.45 (L) 4.00 - 5.20 x10*6/uL    Hemoglobin 10.8 (L) 12.0 - 16.0 g/dL     Hematocrit 33.8 (L) 36.0 - 46.0 %    MCV 98 80 - 100 fL    MCH 31.3 26.0 - 34.0 pg    MCHC 32.0 32.0 - 36.0 g/dL    RDW 13.7 11.5 - 14.5 %    Platelets 222 150 - 450 x10*3/uL    Neutrophils % 76.6 40.0 - 80.0 %    Immature Granulocytes %, Automated 0.8 0.0 - 0.9 %    Lymphocytes % 12.2 13.0 - 44.0 %    Monocytes % 6.8 2.0 - 10.0 %    Eosinophils % 3.2 0.0 - 6.0 %    Basophils % 0.4 0.0 - 2.0 %    Neutrophils Absolute 5.54 1.20 - 7.70 x10*3/uL    Immature Granulocytes Absolute, Automated 0.06 0.00 - 0.70 x10*3/uL    Lymphocytes Absolute 0.88 (L) 1.20 - 4.80 x10*3/uL    Monocytes Absolute 0.49 0.10 - 1.00 x10*3/uL    Eosinophils Absolute 0.23 0.00 - 0.70 x10*3/uL    Basophils Absolute 0.03 0.00 - 0.10 x10*3/uL            Assessment/Plan        Assessment & Plan  Vasovagal syncope    Amalia Roa is a 59 y.o. 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 Active issues include fungemia and possible strep. Thermophilus bacteremia as seen on blood cultures. Repeat blood cultures show no growth for 2 days and original blood cultures were updated to show no growth of yeast. Therefore, there is no fungemia but there is still evidence of streptococcus Thermophilus bacteremia. This is likely due to oral dhaval and we will treat according to ID recommendations.     Mrs. Roa had another episode of syncope overnight, which causes the location of her cancer around the right carotid artery to become more of a concern. Given that her cardiac work-up (EKG, echo) have been ultimately negative, there is a possibility her syncopal episodes are caused by compression of the  "tumor on the vagus nerve, causing these syncopal episodes. Because of this, neurology was consulted for their recommendations. However, we will continue to monitor for more syncopal episodes.       UPDATES 12/7:   - Bcx from 12/4 updated to show no growth of yeast. Bcx 12/5 show no growth for 2 days. ID aware and has updated recs to switch to Ceftriaxone 2g daily until 12/18 and to DC Micafungin   - Tele showed ectopy and an episode of sustained v-tach as noted by nursing. Repeat RFP and Mag ordered, will follow   - Neurology consulted, no recs     #Syncopal episode likely 2/2 vasovagal vs. Autonomic dysfunction or vagus nerve compression   ::EKG NSR in ED (12/3)  :: TTE 12/4:   Left ventricular ejection fraction is mildly decreased, by visual estimate at 45-50%.   [place] and basal and mid inferolateral wall appear abnormal.   Abnormal septal motion consistent with post-operative status.   There is normal right ventricular global systolic function.   The left atrium is mildly dilated.   Status post mitral valve repair.   Compared to echo report from 8/2/18 from OS, similar findings  - Patient's daughter mentioned that previous imaging showed encasement of right carotid artery by her cancer. It is possible that the location of her cancer is causing these vasovagal episodes, as this is not an isolated event. Discussed getting repeat CT soft tissue neck before C4 as planned by her oncologist, which they were okay with.   :: CT soft tissue neck w/ contrast 12/6\"   Redemonstration of infiltrative soft tissue extending inferiorly along the right carotid sheath to the level of the thyroid. Encasement of the right common carotid and internal carotid artery. 8mm segment of focal ectasia of the right ICA measuring 5 mm in diameter.   :: Tele showed ectopy and episodes of non-sustained v-tach --> repeat RFP and Mag ordered   - Neurology consulted for autonomic cause of syncope    \"Any involvement of the carotid sinus or " "involvement of CN IX,X from [id number] masses puts the patient at risk for recurrent syncope, especially if the patient has other vasovagal triggers (having a bowel movement, bearing down, etc). There are quiet a few case reports discussing carotid sinus syndrome in head and neck cancers.\"   Do not recommend other neuro-imaging or EEG   If concern for dysautonomia still persists, can order autonomic testing outpatient   Plan:   - Continue to monitor symptoms   - Follow-up RFP and Mag --> Mag > 2, K > 4     #Ground glass opacity in right lower lobe likely 2/2 aspiration pneumonitis, unlikely infectious cause  #Strep Thermophilus bacteremia    :: CTPE (12/3):  neg for PE, GGO in R middle lobe c/f bronchopneumonia vs aspiration pneumonitis  - CULTURES  - Bcx 12/4 no growth 1 day   - Bcx 12/4 Gram positive cocci, pairs and chains, yeast.   - Bcx 12/5 pending   - Bcx 12/5 pending   - ANTIMICROBIALS  Azithromycin 12/3-12/4  IV Unasyn 12/3-12/4; 12/5-12/7  IV Micafungin 12/4-12/7  Ceftriaxone 2 g qdaily   - ID consulted and following:   DC Micafungin and Unasyn   Start Ceftriaxone 2 g daily (end date: 12/18)   - Strep pneumo, legionella - negative   - Influenza A/B, COVID negative   - UA 12/4 negative   Plan:   - DC Micafungin, Unasyn   - Start Ceftriaxone 2g daily (end date: 12/18)  - ID consulted appreciated recs      #Oropharyngeal squamous cell carcinoma (dx 10/2024)  - follows with Dr. Garcia (email in AM)  - T4N2M1, p16+, CPS 10  - currently receiving pembro - s/p C2 12/3/24  - Supportive oncology consulted:   Change Acetaminophen 1000 mg BID   Change Senna to BID PRN   Start Gabapentin 300 mg liquid nightly   Bupropion 150 mg IR qAM  Doxepin 10 mg nightly   Restart oxycodone 5 mg PEG q4 PRN  Plan:  - Pain control as follows: Tylenol 1000 mg BID, Gabapentin 300 mg liquid nightly, Oxycodone 5 mg PEG q4 PRN  - Supportive oncology consulted   - Continue home mood medications: Buproprion 150 mg IR qAM, Doxepin 10 " mg nightly   -Continue home Ativan for anxiety   - Supportive oncology consulted     #CAD/ HTN  -  Continue home ASA, atorvastatin  - Continue metoprolol 12.5 mg BID     #Asthma  - Continue home inhalers    #FEN  #PEG tube dependence   - Has a PEG tube for feeds - Isosource 1.5 5 cartons/day. FWF 30 ml before and after feeds   - SLP eval - able to eat regular diet and thin liquids     F: PRN   E: replete PRN  N: Tube feeds; regular diet   A: PIV, port   DVT ppx: Lovenox     Patient was seen and discussed with attending Dr. Henrique Wooten MD  Internal Medicine/Pediatrics, PGY-1

## 2024-12-07 NOTE — PROGRESS NOTES
"Amalia Roa is a 59 y.o. female on day 3 of admission presenting with Vasovagal syncope.    Subjective   Interval History: No new complaint.   WBC 7.2 Afebrile   Microbiology corrected the report (blood cultures), no growth of Yeast, which means no fungemia.   Blood cultures 12/5 remains negative for 48 hours.     Review of Systems    Objective   Range of Vitals (last 24 hours)  Heart Rate:  [70-87]   Temp:  [36 °C (96.8 °F)-37 °C (98.6 °F)]   Resp:  [16-18]   BP: ()/(40-86)   SpO2:  [95 %-100 %]   Daily Weight  12/03/24 : 70.3 kg (155 lb)    Body mass index is 27.46 kg/m².    Antibiotics  cefTRIAXone - 2 gram, 2 gram/50 mL    Relevant Results  Labs  Results from last 72 hours   Lab Units 12/07/24  0710 12/06/24  2015 12/05/24  0805   WBC AUTO x10*3/uL 7.2 7.5 6.5   HEMOGLOBIN g/dL 10.8* 10.6* 11.1*   HEMATOCRIT % 33.8* 32.6* 34.8*   PLATELETS AUTO x10*3/uL 222 244 234   NEUTROS PCT AUTO % 76.6 72.0 75.0   LYMPHS PCT AUTO % 12.2 16.0 14.0   MONOS PCT AUTO % 6.8 8.2 7.4   EOS PCT AUTO % 3.2 3.3 2.9     Results from last 72 hours   Lab Units 12/07/24  0710 12/06/24  2015 12/05/24  0805   SODIUM mmol/L 139 140 142   POTASSIUM mmol/L 4.3 4.2 4.2   CHLORIDE mmol/L 103 103 103   CO2 mmol/L 29 29 29   BUN mg/dL 17 22 18   CREATININE mg/dL 0.76 0.81 0.80   GLUCOSE mg/dL 95 114* 82   CALCIUM mg/dL 8.7 8.9 9.0   ANION GAP mmol/L 11 12 14   EGFR mL/min/1.73m*2 90 83 84   PHOSPHORUS mg/dL 3.8 4.1 3.6     Results from last 72 hours   Lab Units 12/07/24  0710 12/06/24  2015 12/05/24  0805   ALBUMIN g/dL 3.3* 3.4 3.5     Estimated Creatinine Clearance: 74.1 mL/min (by C-G formula based on SCr of 0.76 mg/dL).  No results found for: \"CRP\"  Microbiology  Susceptibility data from last 14 days.  Collected Specimen Info Organism Ceftriaxone Penicillin Tetracycline Vancomycin   12/04/24 Blood culture from Peripheral Venipuncture Streptococcus thermophilus       12/04/24 Blood culture from Peripheral Venipuncture Streptococcus " thermophilus  S  S  S  S     Streptococcus sanguinis       Assessment/Plan  # Sterp. Thermophilus bacteremia from oral lesion   # Hx of mitral prosthetic valve  # Mediport for chemotherapy for oropharyngeal SCC     A 60 yo female with oropharyngeal SCC currently on chemotherapy 12/3/24 via port) and prosthetic mitral valve and PEG tube dependency developed Fungemia (Yeast) and Sterp. Thermophilus bacteremia. No neutropenic status. No local sign of port infection or worsening sign/finding of oropharyngeal SCC (CT pending). Poor oral hygiene. TTE was unremarkable on 12/4.     Microbiology report was corrected, which means no fungemia. Can stop micafungin. No need to pull out mediport. Now her clinical scenario would be consist with oral Strep bacteremia from oral area in setting of oropharyngeal SCC. Ceftriaxone can cover oral pathogens. Would recommend complete a 14 day course with IV ceftriaxone 2g daily via her mediport after the clearance (12/5) given her mitral prosthetic valve in setting of ongoing chemotherapy for oropharyngeal SCC. End date 12/18. Can stop Ampicillin/sulbactam    Recommendations  -Stop micafungin and ampicillin/sulbactam   -Start IV ceftriaxone 2g daily for 14 days, end date 12/18   -No need to follow up with ID   -Please arrange home health care for home IV therapy     Discussed with patient, the team, and Dr. Richardson Please text me via Epic chat if you have any questions or concerns regarding this patient. ID will sign off.      Kusum Craig MD  ID fellow [id number]  Team A   ID pager [phone]

## 2024-12-08 VITALS
DIASTOLIC BLOOD PRESSURE: 76 MMHG | HEIGHT: 63 IN | BODY MASS INDEX: 27.46 KG/M2 | SYSTOLIC BLOOD PRESSURE: 117 MMHG | RESPIRATION RATE: 18 BRPM | TEMPERATURE: 98.1 F | WEIGHT: 155 LBS | OXYGEN SATURATION: 98 % | HEART RATE: 66 BPM

## 2024-12-08 LAB
ALBUMIN SERPL BCP-MCNC: 3.7 G/DL (ref 3.4–5)
ANION GAP SERPL CALC-SCNC: 13 MMOL/L (ref 10–20)
BACTERIA BLD AEROBE CULT: ABNORMAL
BACTERIA BLD CULT: ABNORMAL
BACTERIA BLD CULT: NORMAL
BACTERIA BLD CULT: NORMAL
BASOPHILS # BLD AUTO: 0.02 X10*3/UL (ref 0–0.1)
BASOPHILS NFR BLD AUTO: 0.3 %
BUN SERPL-MCNC: 17 MG/DL (ref 6–23)
CALCIUM SERPL-MCNC: 9.3 MG/DL (ref 8.6–10.6)
CHLORIDE SERPL-SCNC: 104 MMOL/L (ref 98–107)
CO2 SERPL-SCNC: 30 MMOL/L (ref 21–32)
CREAT SERPL-MCNC: 0.79 MG/DL (ref 0.5–1.05)
EGFRCR SERPLBLD CKD-EPI 2021: 86 ML/MIN/1.73M*2
EOSINOPHIL # BLD AUTO: 0.35 X10*3/UL (ref 0–0.7)
EOSINOPHIL NFR BLD AUTO: 4.8 %
ERYTHROCYTE [DISTWIDTH] IN BLOOD BY AUTOMATED COUNT: 14 % (ref 11.5–14.5)
GLUCOSE SERPL-MCNC: 92 MG/DL (ref 74–99)
GRAM STN SPEC: ABNORMAL
HCT VFR BLD AUTO: 37.2 % (ref 36–46)
HGB BLD-MCNC: 11.4 G/DL (ref 12–16)
IMM GRANULOCYTES # BLD AUTO: 0.02 X10*3/UL (ref 0–0.7)
IMM GRANULOCYTES NFR BLD AUTO: 0.3 % (ref 0–0.9)
LYMPHOCYTES # BLD AUTO: 0.86 X10*3/UL (ref 1.2–4.8)
LYMPHOCYTES NFR BLD AUTO: 11.9 %
MAGNESIUM SERPL-MCNC: 2.68 MG/DL (ref 1.6–2.4)
MCH RBC QN AUTO: 30.7 PG (ref 26–34)
MCHC RBC AUTO-ENTMCNC: 30.6 G/DL (ref 32–36)
MCV RBC AUTO: 100 FL (ref 80–100)
MONOCYTES # BLD AUTO: 0.45 X10*3/UL (ref 0.1–1)
MONOCYTES NFR BLD AUTO: 6.2 %
NEUTROPHILS # BLD AUTO: 5.54 X10*3/UL (ref 1.2–7.7)
NEUTROPHILS NFR BLD AUTO: 76.5 %
NRBC BLD-RTO: 0 /100 WBCS (ref 0–0)
PHOSPHATE SERPL-MCNC: 3.6 MG/DL (ref 2.5–4.9)
PLATELET # BLD AUTO: 251 X10*3/UL (ref 150–450)
POTASSIUM SERPL-SCNC: 4.9 MMOL/L (ref 3.5–5.3)
RBC # BLD AUTO: 3.71 X10*6/UL (ref 4–5.2)
SODIUM SERPL-SCNC: 142 MMOL/L (ref 136–145)
WBC # BLD AUTO: 7.2 X10*3/UL (ref 4.4–11.3)

## 2024-12-08 PROCEDURE — 36415 COLL VENOUS BLD VENIPUNCTURE: CPT

## 2024-12-08 PROCEDURE — 2500000004 HC RX 250 GENERAL PHARMACY W/ HCPCS (ALT 636 FOR OP/ED)

## 2024-12-08 PROCEDURE — 99255 IP/OBS CONSLTJ NEW/EST HI 80: CPT | Performed by: STUDENT IN AN ORGANIZED HEALTH CARE EDUCATION/TRAINING PROGRAM

## 2024-12-08 PROCEDURE — 2500000001 HC RX 250 WO HCPCS SELF ADMINISTERED DRUGS (ALT 637 FOR MEDICARE OP)

## 2024-12-08 PROCEDURE — 80069 RENAL FUNCTION PANEL: CPT

## 2024-12-08 PROCEDURE — 83735 ASSAY OF MAGNESIUM: CPT

## 2024-12-08 PROCEDURE — 99222 1ST HOSP IP/OBS MODERATE 55: CPT | Performed by: OTOLARYNGOLOGY

## 2024-12-08 PROCEDURE — 1200000003 HC ONCOLOGY  ROOM WITH TELEMETRY DAILY

## 2024-12-08 PROCEDURE — 2500000005 HC RX 250 GENERAL PHARMACY W/O HCPCS

## 2024-12-08 PROCEDURE — 99233 SBSQ HOSP IP/OBS HIGH 50: CPT

## 2024-12-08 PROCEDURE — 85025 COMPLETE CBC W/AUTO DIFF WBC: CPT

## 2024-12-08 RX ORDER — POTASSIUM CHLORIDE 1.5 G/1.58G
40 POWDER, FOR SOLUTION ORAL ONCE
Status: COMPLETED | OUTPATIENT
Start: 2024-12-08 | End: 2024-12-08

## 2024-12-08 RX ORDER — CEFTRIAXONE 2 G/1
2 INJECTION, POWDER, FOR SOLUTION INTRAMUSCULAR; INTRAVENOUS DAILY
Qty: 22 G | Refills: 0 | Status: SHIPPED
Start: 2024-12-08 | End: 2024-12-19

## 2024-12-08 RX ORDER — OXYCODONE HCL 5 MG/5 ML
5 SOLUTION, ORAL ORAL EVERY 6 HOURS PRN
Status: DISPENSED | OUTPATIENT
Start: 2024-12-08

## 2024-12-08 RX ORDER — METOPROLOL TARTRATE 25 MG/1
12.5 TABLET, FILM COATED ORAL 2 TIMES DAILY
Status: DISPENSED | OUTPATIENT
Start: 2024-12-08

## 2024-12-08 ASSESSMENT — COGNITIVE AND FUNCTIONAL STATUS - GENERAL
CLIMB 3 TO 5 STEPS WITH RAILING: A LITTLE
CLIMB 3 TO 5 STEPS WITH RAILING: A LITTLE
DAILY ACTIVITIY SCORE: 24
DAILY ACTIVITIY SCORE: 24
MOBILITY SCORE: 23
MOBILITY SCORE: 23

## 2024-12-08 ASSESSMENT — PAIN SCALES - GENERAL
PAINLEVEL_OUTOF10: 7
PAINLEVEL_OUTOF10: 4
PAINLEVEL_OUTOF10: 8
PAINLEVEL_OUTOF10: 0 - NO PAIN
PAINLEVEL_OUTOF10: 8
PAINLEVEL_OUTOF10: 4

## 2024-12-08 ASSESSMENT — PAIN - FUNCTIONAL ASSESSMENT: PAIN_FUNCTIONAL_ASSESSMENT: 0-10

## 2024-12-08 NOTE — PROGRESS NOTES
Discharge Planning Note:      Amalia Roa is a 59 y.o. female on day 4 of admission presenting with Vasovagal syncope.      Patient pending discharge early next week will need iV atb Rocephin until 12/19. [place] referrals sent to homecare and infusion company for review.       Out of the zip code range for [facility] [address]. Will confirm with patient once accepted agency responds.        Nesha Michaud RN

## 2024-12-08 NOTE — PROGRESS NOTES
Amalia Roa is a 59 y.o. female on day 4 of admission presenting with Vasovagal syncope.      Subjective   No acute events overnight. Patient is doing well this morning without any complaints.     Objective     Last Recorded Vitals  /70 (BP Location: Left arm, Patient Position: Lying)   Pulse 78   Temp 36 °C (96.8 °F) (Temporal)   Resp 16   Wt 70.3 kg (155 lb)   SpO2 96%   Intake/Output last 3 Shifts:    Intake/Output Summary (Last 24 hours) at 12/8/2024 1359  Last data filed at 12/8/2024 1213  Gross per 24 hour   Intake 170 ml   Output --   Net 170 ml       Admission Weight  Weight: 70.3 kg (155 lb) (12/03/24 2052)    Daily Weight  12/03/24 : 70.3 kg (155 lb)    Image Results  No results found.        Physical Exam  Constitutional:       General: She is not in acute distress.     Appearance: Normal appearance.   HENT:      Head: Normocephalic and atraumatic.   Eyes:      Extraocular Movements: Extraocular movements intact.      Conjunctiva/sclera: Conjunctivae normal.      Pupils: Pupils are equal, round, and reactive to light.   Cardiovascular:      Rate and Rhythm: Normal rate and regular rhythm.      Pulses: Normal pulses.      Heart sounds: Normal heart sounds. No murmur heard.     No gallop.   Pulmonary:      Effort: Pulmonary effort is normal. No respiratory distress.      Breath sounds: Normal breath sounds. No wheezing.   Neurological:      General: No focal deficit present.      Mental Status: She is alert and oriented to person, place, and time. Mental status is at baseline.   Psychiatric:         Mood and Affect: Mood normal.         Behavior: Behavior normal.         Relevant Results  Results for orders placed or performed during the hospital encounter of 12/03/24 (from the past 24 hours)   Renal function panel   Result Value Ref Range    Glucose 105 (H) 74 - 99 mg/dL    Sodium 142 136 - 145 mmol/L    Potassium 3.7 3.5 - 5.3 mmol/L    Chloride 103 98 - 107 mmol/L    Bicarbonate 29 21 - 32  mmol/L    Anion Gap 14 10 - 20 mmol/L    Urea Nitrogen 19 6 - 23 mg/dL    Creatinine 0.78 0.50 - 1.05 mg/dL    eGFR 87 >60 mL/min/1.73m*2    Calcium 8.9 8.6 - 10.6 mg/dL    Phosphorus 3.4 2.5 - 4.9 mg/dL    Albumin 3.5 3.4 - 5.0 g/dL   Magnesium   Result Value Ref Range    Magnesium 1.98 1.60 - 2.40 mg/dL   POCT GLUCOSE   Result Value Ref Range    POCT Glucose 153 (H) 74 - 99 mg/dL   Magnesium   Result Value Ref Range    Magnesium 2.68 (H) 1.60 - 2.40 mg/dL   Renal Function Panel   Result Value Ref Range    Glucose 92 74 - 99 mg/dL    Sodium 142 136 - 145 mmol/L    Potassium 4.9 3.5 - 5.3 mmol/L    Chloride 104 98 - 107 mmol/L    Bicarbonate 30 21 - 32 mmol/L    Anion Gap 13 10 - 20 mmol/L    Urea Nitrogen 17 6 - 23 mg/dL    Creatinine 0.79 0.50 - 1.05 mg/dL    eGFR 86 >60 mL/min/1.73m*2    Calcium 9.3 8.6 - 10.6 mg/dL    Phosphorus 3.6 2.5 - 4.9 mg/dL    Albumin 3.7 3.4 - 5.0 g/dL   CBC and Auto Differential   Result Value Ref Range    WBC 7.2 4.4 - 11.3 x10*3/uL    nRBC 0.0 0.0 - 0.0 /100 WBCs    RBC 3.71 (L) 4.00 - 5.20 x10*6/uL    Hemoglobin 11.4 (L) 12.0 - 16.0 g/dL    Hematocrit 37.2 36.0 - 46.0 %     80 - 100 fL    MCH 30.7 26.0 - 34.0 pg    MCHC 30.6 (L) 32.0 - 36.0 g/dL    RDW 14.0 11.5 - 14.5 %    Platelets 251 150 - 450 x10*3/uL    Neutrophils % 76.5 40.0 - 80.0 %    Immature Granulocytes %, Automated 0.3 0.0 - 0.9 %    Lymphocytes % 11.9 13.0 - 44.0 %    Monocytes % 6.2 2.0 - 10.0 %    Eosinophils % 4.8 0.0 - 6.0 %    Basophils % 0.3 0.0 - 2.0 %    Neutrophils Absolute 5.54 1.20 - 7.70 x10*3/uL    Immature Granulocytes Absolute, Automated 0.02 0.00 - 0.70 x10*3/uL    Lymphocytes Absolute 0.86 (L) 1.20 - 4.80 x10*3/uL    Monocytes Absolute 0.45 0.10 - 1.00 x10*3/uL    Eosinophils Absolute 0.35 0.00 - 0.70 x10*3/uL    Basophils Absolute 0.02 0.00 - 0.10 x10*3/uL            Assessment/Plan        Assessment & Plan  Vasovagal syncope    Amalia DENEEN Roa is a 59 y.o. 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 Other active issues include Strep Thermophilus bacteremia currently being treated for antibiotics.     Neurology has been consulted regarding compression of vagus nerve and have confirmed that this is a possibility, however, there is no further work-up needed. We talked with surgical oncology about surgical intervention who directed us to ENT who said due to the involvement of RCA, it is inoperable. Because of this, we will continue to monitor her symptoms.      UPDATES 12/8:   - Neurology consulted - confirmed that there is no more neurological work-up recommended talking to surgical oncology or ENT   - Talked to surgical oncology who said that ENT would be a better fit for this case. ENT said that they have seen her before and the location of her cancer involvement cannot be resected.   - Restarted Metoprolol, will start Entresto tomorrow     #Syncopal episode likely 2/2 vasovagal vs. Autonomic dysfunction or vagus nerve compression   ::EKG NSR in ED (12/3)  :: TTE 12/4:   Left ventricular ejection fraction is mildly decreased, by visual estimate at 45-50%.   Clifton and basal and mid inferolateral wall appear abnormal.   Abnormal septal motion consistent with post-operative status.   There is normal right ventricular global systolic function.   The left atrium is mildly dilated.   Status post mitral valve repair.   Compared to echo report from 8/2/18 from OSH, similar findings  - Patient's daughter mentioned that previous imaging showed encasement of right carotid artery by her cancer. It is  "possible that the location of her cancer is causing these vasovagal episodes, as this is not an isolated event. Discussed getting repeat CT soft tissue neck before C4 as planned by her oncologist, which they were okay with.   :: CT soft tissue neck w/ contrast 12/6\"   Redemonstration of infiltrative soft tissue extending inferiorly along the right carotid sheath to the level of the thyroid. Encasement of the right common carotid and internal carotid artery. 8mm segment of focal ectasia of the right ICA measuring 5 mm in diameter.   :: Tele showed ectopy and episodes of non-sustained v-tach --> repeat RFP and Mag ordered   - Neurology consulted for autonomic cause of syncope    \"Any involvement of the carotid sinus or involvement of CN IX,X from tmdm-[id number] masses puts the patient at risk for recurrent syncope, especially if the patient has other vasovagal triggers (having a bowel movement, bearing down, etc). There are quiet a few case reports discussing carotid sinus syndrome in head and neck cancers.\"   Do not recommend other neuro-imaging or EEG   If concern for dysautonomia still persists, can order autonomic testing outpatient   - Also discussed her case with surgical oncology who recommended ENT. ENT consulted for possible surgical intervention of tumor since compressing the RCA. They have seen the patient before and said that since her tumor in encasing the RCA it cannot be operated on.   Plan:   - Continue to monitor symptoms      #Ground glass opacity in right lower lobe likely 2/2 aspiration pneumonitis, unlikely infectious cause  #Strep Thermophilus bacteremia    :: CTPE (12/3):  neg for PE, GGO in R middle lobe c/f bronchopneumonia vs aspiration pneumonitis  - CULTURES  - Bcx 12/4 no growth 1 day   - Bcx 12/4 Gram positive cocci, pairs and chains, yeast.   - Bcx 12/5 pending   - Bcx 12/5 pending   - ANTIMICROBIALS  Azithromycin 12/3-12/4  IV Unasyn 12/3-12/4; 12/5-12/7  IV Micafungin " 12/4-12/7  Ceftriaxone 2 g qdaily - 12/7 - present  - ID consulted and following:     Ceftriaxone 2 g daily (end date: 12/18)   - Strep pneumo, legionella - negative   - Influenza A/B, COVID negative   - UA 12/4 negative   Plan:   - Continue Ceftriaxone 2g daily (end date: 12/18)  - ID consulted appreciated recs      #Oropharyngeal squamous cell carcinoma (dx 10/2024)  - follows with Dr. Garcia (email in AM)  - T4N2M1, p16+, CPS 10  - currently receiving pembro - s/p C2 12/3/24  - Supportive oncology consulted:   Change Acetaminophen 1000 mg BID   Change Senna to BID PRN   Start Gabapentin 300 mg liquid nightly   Bupropion 150 mg IR qAM  Doxepin 10 mg nightly   Restart oxycodone 5 mg PEG q4 PRN  Plan:  - Pain control as follows: Tylenol 1000 mg BID, Gabapentin 300 mg liquid nightly, Oxycodone 5 mg PEG q4 PRN  - Supportive oncology consulted   - Continue home mood medications: Buproprion 150 mg IR qAM, Doxepin 10 mg nightly   -Continue home Ativan for anxiety   - Supportive oncology consulted     #CAD/ HTN  -  Continue home ASA, atorvastatin  - Continue metoprolol 12.5 mg BID  - Start GDMT with Entresto tomorrow 12/9      #Asthma  - Continue home inhalers    #FEN  #PEG tube dependence   - Has a PEG tube for feeds - Isosource 1.5 5 cartons/day. FWF 30 ml before and after feeds   - SLP eval - able to eat regular diet and thin liquids     F: PRN   E: replete PRN  N: Tube feeds; regular diet   A: PIV, port   DVT ppx: Lovenox     Patient was seen and discussed with attending Dr. Henrique Wooten MD  Internal Medicine/Pediatrics, PGY-1

## 2024-12-08 NOTE — CARE PLAN
Problem: Pain - Adult  Goal: Verbalizes/displays adequate comfort level or baseline comfort level  Outcome: Progressing     Problem: Safety - Adult  Goal: Free from fall injury  Outcome: Progressing     Problem: Discharge Planning  Goal: Discharge to home or other facility with appropriate resources  Outcome: Progressing     Problem: Chronic Conditions and Co-morbidities  Goal: Patient's chronic conditions and co-morbidity symptoms are monitored and maintained or improved  Outcome: Progressing   The patient's goals for the shift include      The clinical goals for the shift include pt will remain free from falls throughout shift [id number] 12/7/24

## 2024-12-08 NOTE — CARE PLAN
Problem: Pain - Adult  Goal: Verbalizes/displays adequate comfort level or baseline comfort level  Outcome: Progressing     Problem: Safety - Adult  Goal: Free from fall injury  Outcome: Progressing     Problem: Discharge Planning  Goal: Discharge to home or other facility with appropriate resources  Outcome: Progressing     Problem: Chronic Conditions and Co-morbidities  Goal: Patient's chronic conditions and co-morbidity symptoms are monitored and maintained or improved  Outcome: Progressing     The clinical goals for the shift include Pt will be safe and free from injury throughout shift.

## 2024-12-08 NOTE — CONSULTS
"  Ear Nose & Throat Consult Note    Reason For Consult  Tumor causing vasovagal syncope    History Of Present Illness  Amalia Roa is a 59 y.o. female  with T4N2M1 right oropharyngeal squamous cell carcinoma involving the base of tongue.  Patient is well-known to the head and neck service and is followed by Dr. Montanez  She is currently undergoing immunotherapy for her cancer.     She is admitted currently for recurrent syncopal episodes.  Cardiac workup has been unrevealing.  Neurological workup also negative for seizures and stroke as cause of syncopal episodes.  Neurology recommended evaluation by ENT for consideration of vasovagal syncope caused by compression of the carotid sinus or stimulation of vagus nerve from tumor.    Patient describes that these events have been happening more frequently over the past couple weeks.  Her most recent one happened yesterday while she was admitted.  She says that these events are preceded by right sided neck pain, commonly vision changes such as zigzags or white out, and generally feeling \"off\".  She comes to quickly after fainting.     Past Medical History  She has a past medical history of Asthma, CHF (congestive heart failure), Coronary artery disease, Depression, Dysphagia, Heart valve disease, Hyperlipidemia, Hypertension, Myocardial infarction (Multi), PTSD (post-traumatic stress disorder), and Sleep apnea.    She has no past medical history of Cerebral vascular accident (Multi), Chronic kidney disease, Disease of thyroid gland, GERD (gastroesophageal reflux disease), PONV (postoperative nausea and vomiting), Seizure disorder (Multi), TIA (transient ischemic attack), or Type 2 diabetes mellitus.    Surgical History  She has a past surgical history that includes Mouth surgery; Mitral valve repair; Coronary angioplasty with stent; Gastrostomy tube placement; and biopsy (10/14/2024).     Social History  She reports that she has quit smoking. Her smoking use included " "cigarettes. She started smoking about 10 years ago. She has a 10.9 pack-year smoking history. She has never used smokeless tobacco. She reports that she does not currently use alcohol. She reports that she does not use drugs.    Family History  Family History   Problem Relation Name Age of Onset    Breast cancer Mother      Colon cancer Sister      Cervical cancer Other Neice         Allergies  Cyclobenzaprine    Review of Systems  Negative except per HPI     Physical Exam  CONSTITUTIONAL:  No acute distress  VOICE:  No hoarseness or other abnormality  RESPIRATION:  Breathing comfortably, no stridor  CV:  No clubbing/cyanosis/edema in hands  EYES:  EOM intact, sclera normal  NEURO:  Alert and oriented times 3, Cranial nerves II-XII grossly intact and symmetric bilaterally  HEAD AND FACE:  Symmetric facial features, no masses or lesions, sinuses non-tender to palpation  EARS:  Normal external ears  NOSE:  External nose midline, anterior rhinoscopy is normal with limited visualization to the anterior aspect of the interior turbinates, no bleeding or drainage, no lesions  ORAL CAVITY/OROPHARYNX/LIPS:  Normal mucous membranes, normal floor of mouth/tongue/OP, no masses or lesions  NECK/LYMPH: Bilateral lymphadenopathy, no thyroid masses, trachea midline  SKIN:  Neck skin is without scar or injury  PSYCH:  Alert and oriented with appropriate mood and affect       Last Recorded Vitals  Blood pressure 115/70, pulse 78, temperature 36 °C (96.8 °F), temperature source Temporal, resp. rate 16, height 1.6 m (5' 3\"), weight 70.3 kg (155 lb), SpO2 96%.    Relevant Results      CT Neck 12/6  Known tumor of the right oropharynx with encasement of R internal and common carotid artery and bilateral necrotic LAD       Assessment/Plan     Amalia Roa is a 59 y.o. female with T4N2M1 right oropharyngeal squamous cell carcinoma admitted for syncope.  Workup unrevealing for cardiac or neurologic causes of syncopal episodes.  Leading " diagnosis is vasovagal syncope caused by stimulation of carotid sinus or vagus nerve caused by compression from tumor.  This is a rare entity but there are case reports of this occurring.     -Patient's case was discussed at head and neck multidisciplinary tumor board on 10/25.  At that time patient's tumor was deemed to be inoperable due to encasement of the carotid artery.  Chemotherapy versus immunotherapy if eligible was recommended.  Patient has now been started on immunotherapy with plans for possible radiation to follow.  Patient's cancer remains inoperable.  There is no surgery that ENT would offer for removal of tumor off the carotid artery  -If the vasovagal syncope is caused by stimulation of the carotid artery or vagus nerve from the tumor, if patient has a good response to treatment any tumors drinks this more than likely lead to resolution of the symptoms would recommend continuing with treatment as planned  -Recommend avoidance of medications that may increase vagal tone and awareness of other triggers    Lyudmila Alonzo MD - PGY2  Otolaryngology - Head and Neck Surgery    ENT Head & Neck phone: [phone]  ENT Consults pager: [phone]   ENT Pediatrics  pager: [phone]  ENT Subspecialty (otology, rhinology, laryngology, plastics, sleep):   M-F 6am-5pm- EpicChat or page the resident who wrote the daily note   Nights & weekends- [phone]  ENT Outpatient [phone]     I am the night resident. I can only be contacted 5pm-6am. Please contact the teams listed above with any questions or concerns outside of these hours.

## 2024-12-09 ENCOUNTER — NUTRITION (OUTPATIENT)
Dept: HEMATOLOGY/ONCOLOGY | Facility: CLINIC | Age: 59
End: 2024-12-09
Payer: MEDICAID

## 2024-12-09 ENCOUNTER — APPOINTMENT (OUTPATIENT)
Dept: RADIOLOGY | Facility: HOSPITAL | Age: 59
End: 2024-12-09
Payer: MEDICAID

## 2024-12-09 LAB
ALBUMIN SERPL BCP-MCNC: 3.5 G/DL (ref 3.4–5)
ANION GAP SERPL CALC-SCNC: 12 MMOL/L (ref 10–20)
BACTERIA BLD CULT: NORMAL
BACTERIA BLD CULT: NORMAL
BASOPHILS # BLD AUTO: 0.03 X10*3/UL (ref 0–0.1)
BASOPHILS NFR BLD AUTO: 0.4 %
BUN SERPL-MCNC: 17 MG/DL (ref 6–23)
CALCIUM SERPL-MCNC: 8.8 MG/DL (ref 8.6–10.6)
CHLORIDE SERPL-SCNC: 104 MMOL/L (ref 98–107)
CO2 SERPL-SCNC: 28 MMOL/L (ref 21–32)
CREAT SERPL-MCNC: 0.81 MG/DL (ref 0.5–1.05)
EGFRCR SERPLBLD CKD-EPI 2021: 83 ML/MIN/1.73M*2
EOSINOPHIL # BLD AUTO: 0.33 X10*3/UL (ref 0–0.7)
EOSINOPHIL NFR BLD AUTO: 4.9 %
ERYTHROCYTE [DISTWIDTH] IN BLOOD BY AUTOMATED COUNT: 13.9 % (ref 11.5–14.5)
GLUCOSE SERPL-MCNC: 85 MG/DL (ref 74–99)
HCT VFR BLD AUTO: 35.5 % (ref 36–46)
HGB BLD-MCNC: 11.1 G/DL (ref 12–16)
IMM GRANULOCYTES # BLD AUTO: 0.02 X10*3/UL (ref 0–0.7)
IMM GRANULOCYTES NFR BLD AUTO: 0.3 % (ref 0–0.9)
LYMPHOCYTES # BLD AUTO: 1 X10*3/UL (ref 1.2–4.8)
LYMPHOCYTES NFR BLD AUTO: 14.7 %
MAGNESIUM SERPL-MCNC: 2.26 MG/DL (ref 1.6–2.4)
MCH RBC QN AUTO: 31.2 PG (ref 26–34)
MCHC RBC AUTO-ENTMCNC: 31.3 G/DL (ref 32–36)
MCV RBC AUTO: 100 FL (ref 80–100)
MONOCYTES # BLD AUTO: 0.44 X10*3/UL (ref 0.1–1)
MONOCYTES NFR BLD AUTO: 6.5 %
NEUTROPHILS # BLD AUTO: 4.97 X10*3/UL (ref 1.2–7.7)
NEUTROPHILS NFR BLD AUTO: 73.2 %
NRBC BLD-RTO: 0 /100 WBCS (ref 0–0)
PHOSPHATE SERPL-MCNC: 3.4 MG/DL (ref 2.5–4.9)
PLATELET # BLD AUTO: 208 X10*3/UL (ref 150–450)
POTASSIUM SERPL-SCNC: 4.9 MMOL/L (ref 3.5–5.3)
RBC # BLD AUTO: 3.56 X10*6/UL (ref 4–5.2)
SODIUM SERPL-SCNC: 139 MMOL/L (ref 136–145)
WBC # BLD AUTO: 6.8 X10*3/UL (ref 4.4–11.3)

## 2024-12-09 PROCEDURE — 70450 CT HEAD/BRAIN W/O DYE: CPT

## 2024-12-09 PROCEDURE — 2500000004 HC RX 250 GENERAL PHARMACY W/ HCPCS (ALT 636 FOR OP/ED)

## 2024-12-09 PROCEDURE — 80069 RENAL FUNCTION PANEL: CPT

## 2024-12-09 PROCEDURE — 70450 CT HEAD/BRAIN W/O DYE: CPT | Performed by: RADIOLOGY

## 2024-12-09 PROCEDURE — 1200000003 HC ONCOLOGY  ROOM WITH TELEMETRY DAILY

## 2024-12-09 PROCEDURE — 85025 COMPLETE CBC W/AUTO DIFF WBC: CPT

## 2024-12-09 PROCEDURE — 2500000001 HC RX 250 WO HCPCS SELF ADMINISTERED DRUGS (ALT 637 FOR MEDICARE OP)

## 2024-12-09 PROCEDURE — 99233 SBSQ HOSP IP/OBS HIGH 50: CPT | Performed by: INTERNAL MEDICINE

## 2024-12-09 PROCEDURE — 83735 ASSAY OF MAGNESIUM: CPT

## 2024-12-09 RX ORDER — ACETAMINOPHEN 160 MG/5ML
650 SOLUTION ORAL EVERY 8 HOURS PRN
Status: DISCONTINUED | OUTPATIENT
Start: 2024-12-09 | End: 2024-12-09

## 2024-12-09 RX ORDER — ACETAMINOPHEN 325 MG/1
650 TABLET ORAL EVERY 8 HOURS PRN
Status: DISCONTINUED | OUTPATIENT
Start: 2024-12-09 | End: 2024-12-09

## 2024-12-09 RX ORDER — ACETAMINOPHEN 160 MG/5ML
650 SOLUTION ORAL EVERY 8 HOURS PRN
Status: DISCONTINUED | OUTPATIENT
Start: 2024-12-09 | End: 2024-12-10 | Stop reason: HOSPADM

## 2024-12-09 ASSESSMENT — COGNITIVE AND FUNCTIONAL STATUS - GENERAL
MOBILITY SCORE: 23
DAILY ACTIVITIY SCORE: 24
CLIMB 3 TO 5 STEPS WITH RAILING: A LITTLE

## 2024-12-09 ASSESSMENT — PAIN SCALES - GENERAL
PAINLEVEL_OUTOF10: 7
PAINLEVEL_OUTOF10: 4
PAINLEVEL_OUTOF10: 7
PAINLEVEL_OUTOF10: 6
PAINLEVEL_OUTOF10: 7
PAINLEVEL_OUTOF10: 6

## 2024-12-09 ASSESSMENT — PAIN - FUNCTIONAL ASSESSMENT
PAIN_FUNCTIONAL_ASSESSMENT: 0-10
PAIN_FUNCTIONAL_ASSESSMENT: 0-10

## 2024-12-09 NOTE — CARE PLAN
Problem: Pain - Adult  Goal: Verbalizes/displays adequate comfort level or baseline comfort level  Outcome: Progressing     Problem: Safety - Adult  Goal: Free from fall injury  Outcome: Progressing     Problem: Discharge Planning  Goal: Discharge to home or other facility with appropriate resources  Outcome: Progressing     Problem: Chronic Conditions and Co-morbidities  Goal: Patient's chronic conditions and co-morbidity symptoms are monitored and maintained or improved  Outcome: Progressing       The clinical goals for the shift include Pt will be safe and free from injury and fall throughout shift.

## 2024-12-09 NOTE — SIGNIFICANT EVENT
Rapid Response Nurse Note: Rapid Response    Pager time: [phone]  Arrival time: 42  Event end time: 57  Location: [facility]  [] Triage by phone or secure messaging    Rapid response initiated by:  [] Rapid response RN [] Family [] Nursing Supervisor [] Physician   [] RADAR auto page [] Sepsis auto-page [x] RN [] RT   [] NP/PA [] Other:     Primary reason for call:   [] BAT [] New CPAP/BiPAP [] Bleeding [] Change in mental status   [] Chest pain [] Code blue [] FiO2 >/= 50% [] HR </= 40 bpm   [] HR >/= 130 bpm [] Hyperglycemia [] Hypoglycemia [] RADAR    [] RR </= 8 bpm [] RR >/= 30 bpm [] SBP </= 90 mmHg [] SpO2 < 90%   [] Seizure [] Sepsis [] Shortness of breath  [x] Staff concern: see comments     Initial VS and/or RADAR VS: T 36.2 °C; HR 62; RR 18; /85; SPO2 98% on RA    Providers present at bedside (if applicable): Dr. Lopez (NACR), Dr. Montanez    Name of ICU Provider contacted (if applicable):     Interventions:  [] None [] ABG/VBG [] Assist w/ICU transfer [] BAT paged    [] Bag mask [] Blood [] Cardioversion [] Code Blue   [] Code blue for intubation [] Code status changed [] Chest x-ray [] EKG   [] IV fluid/bolus [] KUB x-ray [] Labs/cultures [] Medication   [] Nebulizer treatment [] NIPPV (CPAP/BiPAP) [] Oxygen [] Oral airway   [] Peripheral IV [] Palliative care consult [x] CT/MRI [] Sepsis protocol    [] Suctioned [] Other:     Outcome:  [] Coded and  [] Code blue for intubation [] Coded and transferred to ICU []  on division   [x] Remained on division (no change) [] Remained on division + additional monitoring [] Remained in ED [] Transferred to ED   [] Transferred to ICU [] Transferred to inpatient status [] Transferred for interventions (procedure) [] Transferred to ICU stepdown    [] Transferred to surgery [] Transferred to telemetry [] Sepsis protocol [] STEMI protocol   [] Stroke protocol [x] Bedside nurse instructed to page rapid response for any concerns or acute change in  condition/VS     Additional Comments:  Rapid response called after the patient sustained a fall in the bathroom due to a syncopal episode.   Ms. Roa is currently admitted to the hospital with recurrent syncopal episodes from compression of her carotid from a tumor.  The fall was unwitnessed and orthostatic vitals are negative. There is no visible trauma to head and Ms. Roa is a&ox3 and denies pain  or nausea.  CTH ordered.  Bedside Rn urged to call rapid response for any further concerns.

## 2024-12-09 NOTE — PROGRESS NOTES
Amalia Roa is a 59 y.o. female on day 5 of admission presenting with Vasovagal syncope.      Subjective   Overnight, a rapid response was called due to patient falling in the bathroom. Per the patient, she was sleeping and woke up with her usual jaw pain and feeling hot she has before these episodes of syncope. She then called the nurse to help her go to the bathroom. While she was in the bathroom, she lost consciousness and fell to the ground. She was found by her nurse who called for a rapid response. Orthostatic vitals were taken and were ultimately negative. A CT head was performed which was ultimately negative.     This morning, the patient is feeling fine and denies any lightheadedness or dizziness.     Objective     Last Recorded Vitals  /75 (BP Location: Left arm, Patient Position: Lying)   Pulse 68   Temp 36.1 °C (97 °F) (Temporal)   Resp 18   Wt 70.3 kg (155 lb)   SpO2 93%   Intake/Output last 3 Shifts:  No intake or output data in the 24 hours ending 12/09/24 1338      Admission Weight  Weight: 70.3 kg (155 lb) (12/03/24 2052)    Daily Weight  12/03/24 : 70.3 kg (155 lb)    Image Results  CT head wo IV contrast    Result Date: 12/9/2024  Interpreted By:  Genaro Drake and Ohs Zachary STUDY: CT HEAD WO IV CONTRAST;  12/9/2024 2:08 am   INDICATION: Signs/Symptoms:fell and unwitnessed, might have hit head.   COMPARISON: None.   ACCESSION NUMBER(S): [id number]   ORDERING CLINICIAN: JOVAN AUGUST   TECHNIQUE: Noncontrast axial CT images of head were obtained with coronal and sagittal reconstructed images.   FINDINGS: BRAIN PARENCHYMA: The mid left corona radiata has punctate lucency. No acute infarct, hemorrhage or mass is noted.   VENTRICLES and EXTRA-AXIAL SPACES:  No acute extra-axial or intraventricular hemorrhage. No effacement of cerebral sulci. Ventricles and sulci are age-concordant.   PARANASAL SINUSES/MASTOIDS:  No hemorrhage or air-fluid levels within the visualized paranasal  sinuses. The mastoids are well aerated.   CALVARIUM/ORBITS:  No skull fracture.  The orbits and globes are intact to the extent visualized.   EXTRACRANIAL SOFT TISSUES: No discernible abnormality.       No acute infarct, hemorrhage or mass is noted.   The mid left corona radiata has a punctate nonspecific lucency. This may represent a dilated perivascular space, chronic small cystic lacune not excluded.   I personally reviewed the images/study and I agree with the findings as stated by Dr. Chi Schwartz. This study was interpreted at [facility], [place], [place].   MACRO: None.   Signed by: Genaro Drake 12/9/2024 10:11 AM Dictation workstation:   [id number]         Physical Exam  Constitutional:       General: She is not in acute distress.     Appearance: Normal appearance.   HENT:      Head: Normocephalic and atraumatic.   Eyes:      Extraocular Movements: Extraocular movements intact.      Conjunctiva/sclera: Conjunctivae normal.      Pupils: Pupils are equal, round, and reactive to light.   Cardiovascular:      Rate and Rhythm: Normal rate and regular rhythm.      Pulses: Normal pulses.      Heart sounds: Normal heart sounds. No murmur heard.     No gallop.   Pulmonary:      Effort: Pulmonary effort is normal. No respiratory distress.      Breath sounds: Normal breath sounds. No wheezing.   Neurological:      General: No focal deficit present.      Mental Status: She is alert and oriented to person, place, and time. Mental status is at baseline.   Psychiatric:         Mood and Affect: Mood normal.         Behavior: Behavior normal.         Relevant Results  Results for orders placed or performed during the hospital encounter of 12/03/24 (from the past 24 hours)   Magnesium   Result Value Ref Range    Magnesium 2.26 1.60 - 2.40 mg/dL   Renal Function Panel   Result Value Ref Range    Glucose 85 74 - 99 mg/dL    Sodium 139 136 - 145 mmol/L    Potassium 4.9 3.5 - 5.3 mmol/L     Chloride 104 98 - 107 mmol/L    Bicarbonate 28 21 - 32 mmol/L    Anion Gap 12 10 - 20 mmol/L    Urea Nitrogen 17 6 - 23 mg/dL    Creatinine 0.81 0.50 - 1.05 mg/dL    eGFR 83 >60 mL/min/1.73m*2    Calcium 8.8 8.6 - 10.6 mg/dL    Phosphorus 3.4 2.5 - 4.9 mg/dL    Albumin 3.5 3.4 - 5.0 g/dL   CBC and Auto Differential   Result Value Ref Range    WBC 6.8 4.4 - 11.3 x10*3/uL    nRBC 0.0 0.0 - 0.0 /100 WBCs    RBC 3.56 (L) 4.00 - 5.20 x10*6/uL    Hemoglobin 11.1 (L) 12.0 - 16.0 g/dL    Hematocrit 35.5 (L) 36.0 - 46.0 %     80 - 100 fL    MCH 31.2 26.0 - 34.0 pg    MCHC 31.3 (L) 32.0 - 36.0 g/dL    RDW 13.9 11.5 - 14.5 %    Platelets 208 150 - 450 x10*3/uL    Neutrophils % 73.2 40.0 - 80.0 %    Immature Granulocytes %, Automated 0.3 0.0 - 0.9 %    Lymphocytes % 14.7 13.0 - 44.0 %    Monocytes % 6.5 2.0 - 10.0 %    Eosinophils % 4.9 0.0 - 6.0 %    Basophils % 0.4 0.0 - 2.0 %    Neutrophils Absolute 4.97 1.20 - 7.70 x10*3/uL    Immature Granulocytes Absolute, Automated 0.02 0.00 - 0.70 x10*3/uL    Lymphocytes Absolute 1.00 (L) 1.20 - 4.80 x10*3/uL    Monocytes Absolute 0.44 0.10 - 1.00 x10*3/uL    Eosinophils Absolute 0.33 0.00 - 0.70 x10*3/uL    Basophils Absolute 0.03 0.00 - 0.10 x10*3/uL            Assessment/Plan        Assessment & Plan  Vasovagal syncope    Amalia Roa is a 59 y.o. 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 Other active issues include Strep Thermophilus bacteremia currently being treated for antibiotics.     Patient had another syncopal episode overnight with similar prodrome (jaw pain,  "feeling hot) to her other episodes of syncope. Although, it is likely these episodes are vasovagal 2/2 tumor suppression of carotid sinus and/or vagus nerve, given that she continues to have a similar prodrome of symptoms prior to these episodes, it is sounds more like an aura and the possibility of brain metastasis needs to be explored. Therefore, MRI brain has been ordered in order to evaluate this possible cause.     UPDATES 12/9:   - Metoprolol and Entresto held due to syncopal episode   - MRI brain ordered in order r/o brain metastasis     #Syncopal episode likely 2/2 vasovagal 2/2 to vagus nerve and/or carotid sinus vs. Possible brain metes   ::EKG NSR in ED (12/3)  :: TTE 12/4:   Left ventricular ejection fraction is mildly decreased, by visual estimate at 45-50%.   [place] and basal and mid inferolateral wall appear abnormal.   Abnormal septal motion consistent with post-operative status.   There is normal right ventricular global systolic function.   The left atrium is mildly dilated.   Status post mitral valve repair.   Compared to echo report from 8/2/18 from OS, similar findings  - Patient's daughter mentioned that previous imaging showed encasement of right carotid artery by her cancer. It is possible that the location of her cancer is causing these vasovagal episodes, as this is not an isolated event. Discussed getting repeat CT soft tissue neck before C4 as planned by her oncologist, which they were okay with.   :: CT soft tissue neck w/ contrast 12/6\"   Redemonstration of infiltrative soft tissue extending inferiorly along the right carotid sheath to the level of the thyroid. Encasement of the right common carotid and internal carotid artery. 8mm segment of focal ectasia of the right ICA measuring 5 mm in diameter.   - Neurology consulted for autonomic cause of syncope    \"Any involvement of the carotid sinus or involvement of CN IX,X from [id number] masses puts the patient at risk for " "recurrent syncope, especially if the patient has other vasovagal triggers (having a bowel movement, bearing down, etc). There are quiet a few case reports discussing carotid sinus syndrome in head and neck cancers.\"   Do not recommend other neuro-imaging or EEG   If concern for dysautonomia still persists, can order autonomic testing outpatient   - ENT consulted - no surgical intervention   - MRI brain ordered to evaluate for possible brain metastasis   Plan:   - Follow-up MRI brain   - Continue to monitor symptoms      #Ground glass opacity in right lower lobe likely 2/2 aspiration pneumonitis, unlikely infectious cause  #Strep Thermophilus bacteremia    :: CTPE (12/3):  neg for PE, GGO in R middle lobe c/f bronchopneumonia vs aspiration pneumonitis  - CULTURES  - Bcx 12/4 no growth 1 day   - Bcx 12/4 Gram positive cocci, pairs and chains   - Bcx 12/5 x2 no growth at 4 days FINAL  - ANTIMICROBIALS  Azithromycin 12/3-12/4  IV Unasyn 12/3-12/4; 12/5-12/7  IV Micafungin 12/4-12/7  Ceftriaxone 2 g qdaily - 12/7 - present  - ID consulted and following:     Ceftriaxone 2 g daily (end date: 12/18)   - Strep pneumo, legionella - negative   - Influenza A/B, COVID negative   - UA 12/4 negative   Plan:   - Continue Ceftriaxone 2g daily (end date: 12/19)  - ID consulted appreciated recs      #Oropharyngeal squamous cell carcinoma (dx 10/2024)  - follows with Dr. Garcia (email in AM)  - T4N2M1, p16+, CPS 10  - currently receiving pembro - s/p C2 12/3/24  - Supportive oncology consulted:   Change Acetaminophen 1000 mg BID   Change Senna to BID PRN   Start Gabapentin 300 mg liquid nightly   Bupropion 150 mg IR qAM  Doxepin 10 mg nightly   Restart oxycodone 5 mg PEG q4 PRN  Plan:  - Pain control as follows: Tylenol 1000 mg BID, Gabapentin 300 mg liquid nightly, Oxycodone 5 mg PEG q4 PRN  - Supportive oncology consulted   - Continue home mood medications: Buproprion 150 mg IR qAM, Doxepin 10 mg nightly   -Continue home Ativan for " anxiety     #CAD/ HTN  -  Continue home ASA, atorvastatin  - Continue metoprolol 12.5 mg BID  - Start GDMT with Entresto tomorrow 12/9      #Asthma  - Continue home inhalers    #FEN  #PEG tube dependence   - Has a PEG tube for feeds - Isosource 1.5 5 cartons/day. FWF 30 ml before and after feeds   - SLP eval - able to eat regular diet and thin liquids     F: PRN   E: replete PRN  N: Tube feeds; regular diet   A: PIV, port   DVT ppx: Lovenox     Patient was seen and discussed with attending Dr. Austin Wooten MD  Internal Medicine/Pediatrics, PGY-1    TEACHING ATTENDING ATTESTATION    I saw and evaluated this patient with Resident/Fellow. I reviewed the resident/fellow's documentation and discussed the patient with the resident/fellow. I agree with the resident/fellow's medical decision making as documented in the note.     ID issues improving  Working on dispo for IV antibitics at home  Syncopal issues wont resolve - discussed palliative RT as a possibility  Likely dc home tomorrow    Adarsh Avila MD, FACP  Chief, Solid Tumor Oncology Division   Medical Oncology  Professor of Medicine and Urology  /[facility]

## 2024-12-09 NOTE — SIGNIFICANT EVENT
Rapid Response Note    A rapid response was called at 00:38 after the patient had a fall after getting up from the toilet to wash her hands. She lost consciousness and fell to the floor. When she woke up, her head was on her arm and she was on the floor. Nursing was outside of the bathroom when this occurred and did not witness the fall but found her on the floor. The patient was off telemetry at the time. Just after the event, /67. When I arrived, her vitals were T 36.2 °C; HR 62; RR 18; /85; SPO2 98% on RA. The patient was lying in bed and was alert and oriented. She states she had been getting enough nutrition and fluids through her tube. Orthostats were taken and showed lying  /84 HR 65, sitting /80 HR 65, and standing /75 HR 66. Physical exam showed RRR, clear lungs, A&O x4, CN II-XII intact, no head bumps or bruising, and muscle strength intact in all extremities.  Vasovagal syncope at top of differential due to happening after going to the bathroom and negative orthostats. Stat CT head noncontrast ordered since fall was unwitnessed and pt was unsure if she hit her head. We also advised nursing to be beside her at all times when she is going to the bathroom. The rapid response ended at 00:57.

## 2024-12-09 NOTE — NURSING NOTE
0035 Pt called out for pain medications. Rn at bedside with pain meds and pt asked for assistance to bathroom. Pt denied feeling dizzy/lightheaded. RN helped pt to bathroom and instructed pt to call out when she was done. RN left bathroom door open and was right outside of bathroom door RN heard loud noise in bathroom and found pt on floor lying on her side. RN sat pt up on the floor, assessed pt and started getting VS but pt started to lose consciousness again while sitting up right on floor. RN pressed staff assistance button for back up and rapid response. Pt helped back to bed. VSS. Orthostatis VS negative. Team called and at bedside. Pt denies pain and hitting her head. Team ordered CTH just in case since no one witnessed fall. Pt A&Ox4 and neuro exam was WDL. Rn asked pt if she wished for staff to call significant other about fall but pt stated she was calling herself.   Pt re-educated and instructed to call out for assistance when getting up. Pt is wearing yellow skid socks and has bed alarm

## 2024-12-10 ENCOUNTER — APPOINTMENT (OUTPATIENT)
Dept: RADIOLOGY | Facility: HOSPITAL | Age: 59
End: 2024-12-10
Payer: MEDICAID

## 2024-12-10 ENCOUNTER — PHARMACY VISIT (OUTPATIENT)
Dept: PHARMACY | Facility: CLINIC | Age: 59
End: 2024-12-10
Payer: MEDICAID

## 2024-12-10 VITALS
HEART RATE: 75 BPM | HEIGHT: 63 IN | WEIGHT: 155 LBS | RESPIRATION RATE: 18 BRPM | BODY MASS INDEX: 27.46 KG/M2 | SYSTOLIC BLOOD PRESSURE: 123 MMHG | TEMPERATURE: 97 F | DIASTOLIC BLOOD PRESSURE: 77 MMHG | OXYGEN SATURATION: 96 %

## 2024-12-10 LAB
ALBUMIN SERPL BCP-MCNC: 3.5 G/DL (ref 3.4–5)
ANION GAP SERPL CALC-SCNC: 12 MMOL/L (ref 10–20)
BASOPHILS # BLD AUTO: 0.04 X10*3/UL (ref 0–0.1)
BASOPHILS NFR BLD AUTO: 0.7 %
BUN SERPL-MCNC: 16 MG/DL (ref 6–23)
CALCIUM SERPL-MCNC: 9 MG/DL (ref 8.6–10.6)
CHLORIDE SERPL-SCNC: 102 MMOL/L (ref 98–107)
CO2 SERPL-SCNC: 31 MMOL/L (ref 21–32)
CREAT SERPL-MCNC: 0.79 MG/DL (ref 0.5–1.05)
EGFRCR SERPLBLD CKD-EPI 2021: 86 ML/MIN/1.73M*2
EOSINOPHIL # BLD AUTO: 0.29 X10*3/UL (ref 0–0.7)
EOSINOPHIL NFR BLD AUTO: 4.9 %
ERYTHROCYTE [DISTWIDTH] IN BLOOD BY AUTOMATED COUNT: 13.7 % (ref 11.5–14.5)
GLUCOSE SERPL-MCNC: 85 MG/DL (ref 74–99)
HCT VFR BLD AUTO: 35 % (ref 36–46)
HGB BLD-MCNC: 11.3 G/DL (ref 12–16)
IMM GRANULOCYTES # BLD AUTO: 0.02 X10*3/UL (ref 0–0.7)
IMM GRANULOCYTES NFR BLD AUTO: 0.3 % (ref 0–0.9)
LYMPHOCYTES # BLD AUTO: 0.82 X10*3/UL (ref 1.2–4.8)
LYMPHOCYTES NFR BLD AUTO: 13.8 %
MAGNESIUM SERPL-MCNC: 2.12 MG/DL (ref 1.6–2.4)
MCH RBC QN AUTO: 31 PG (ref 26–34)
MCHC RBC AUTO-ENTMCNC: 32.3 G/DL (ref 32–36)
MCV RBC AUTO: 96 FL (ref 80–100)
MONOCYTES # BLD AUTO: 0.49 X10*3/UL (ref 0.1–1)
MONOCYTES NFR BLD AUTO: 8.3 %
NEUTROPHILS # BLD AUTO: 4.27 X10*3/UL (ref 1.2–7.7)
NEUTROPHILS NFR BLD AUTO: 72 %
NRBC BLD-RTO: 0 /100 WBCS (ref 0–0)
PHOSPHATE SERPL-MCNC: 3.7 MG/DL (ref 2.5–4.9)
PLATELET # BLD AUTO: 215 X10*3/UL (ref 150–450)
POTASSIUM SERPL-SCNC: 4.4 MMOL/L (ref 3.5–5.3)
RBC # BLD AUTO: 3.64 X10*6/UL (ref 4–5.2)
SODIUM SERPL-SCNC: 141 MMOL/L (ref 136–145)
WBC # BLD AUTO: 5.9 X10*3/UL (ref 4.4–11.3)

## 2024-12-10 PROCEDURE — 94640 AIRWAY INHALATION TREATMENT: CPT

## 2024-12-10 PROCEDURE — RXMED WILLOW AMBULATORY MEDICATION CHARGE

## 2024-12-10 PROCEDURE — 2500000004 HC RX 250 GENERAL PHARMACY W/ HCPCS (ALT 636 FOR OP/ED)

## 2024-12-10 PROCEDURE — 2500000001 HC RX 250 WO HCPCS SELF ADMINISTERED DRUGS (ALT 637 FOR MEDICARE OP)

## 2024-12-10 PROCEDURE — 85025 COMPLETE CBC W/AUTO DIFF WBC: CPT

## 2024-12-10 PROCEDURE — 83735 ASSAY OF MAGNESIUM: CPT

## 2024-12-10 PROCEDURE — 80069 RENAL FUNCTION PANEL: CPT

## 2024-12-10 PROCEDURE — A9575 INJ GADOTERATE MEGLUMI 0.1ML: HCPCS | Performed by: EMERGENCY MEDICINE

## 2024-12-10 PROCEDURE — 2550000001 HC RX 255 CONTRASTS: Performed by: EMERGENCY MEDICINE

## 2024-12-10 PROCEDURE — 70553 MRI BRAIN STEM W/O & W/DYE: CPT

## 2024-12-10 PROCEDURE — 70553 MRI BRAIN STEM W/O & W/DYE: CPT | Performed by: RADIOLOGY

## 2024-12-10 PROCEDURE — 92526 ORAL FUNCTION THERAPY: CPT | Mod: GN | Performed by: SPEECH-LANGUAGE PATHOLOGIST

## 2024-12-10 PROCEDURE — 99239 HOSP IP/OBS DSCHRG MGMT >30: CPT

## 2024-12-10 RX ORDER — LIDOCAINE AND PRILOCAINE 25; 25 MG/G; MG/G
CREAM TOPICAL ONCE
Qty: 30 G | Refills: 2 | OUTPATIENT
Start: 2024-12-10 | End: 2024-12-10

## 2024-12-10 RX ORDER — LIDOCAINE AND PRILOCAINE 25; 25 MG/G; MG/G
CREAM TOPICAL ONCE
Qty: 30 G | Refills: 2 | Status: SHIPPED | OUTPATIENT
Start: 2024-12-10 | End: 2024-12-10

## 2024-12-10 RX ORDER — BUPROPION HYDROCHLORIDE 75 MG/1
150 TABLET ORAL EVERY MORNING
Qty: 60 TABLET | Refills: 0 | Status: SHIPPED | OUTPATIENT
Start: 2024-12-10 | End: 2025-01-09

## 2024-12-10 RX ORDER — GABAPENTIN 250 MG/5ML
300 SOLUTION ORAL NIGHTLY
Qty: 180 ML | Refills: 0 | Status: SHIPPED | OUTPATIENT
Start: 2024-12-10 | End: 2025-01-09

## 2024-12-10 RX ORDER — LORAZEPAM 2 MG/ML
1 INJECTION INTRAMUSCULAR ONCE
Status: COMPLETED | OUTPATIENT
Start: 2024-12-10 | End: 2024-12-10

## 2024-12-10 RX ORDER — BUPROPION HYDROCHLORIDE 100 MG/1
150 TABLET ORAL EVERY MORNING
Qty: 45 TABLET | Refills: 0 | OUTPATIENT
Start: 2024-12-10 | End: 2025-01-09

## 2024-12-10 RX ORDER — GABAPENTIN 250 MG/5ML
300 SOLUTION ORAL NIGHTLY
Qty: 180 ML | Refills: 0 | OUTPATIENT
Start: 2024-12-10 | End: 2025-01-09

## 2024-12-10 RX ORDER — GADOTERATE MEGLUMINE 376.9 MG/ML
15 INJECTION INTRAVENOUS
Status: COMPLETED | OUTPATIENT
Start: 2024-12-10 | End: 2024-12-10

## 2024-12-10 RX ORDER — DOXEPIN HYDROCHLORIDE 10 MG/ML
10 SOLUTION ORAL NIGHTLY
Qty: 120 ML | Refills: 12 | OUTPATIENT
Start: 2024-12-10

## 2024-12-10 RX ORDER — DOXEPIN HYDROCHLORIDE 10 MG/ML
10 SOLUTION ORAL NIGHTLY
Qty: 120 ML | Refills: 12 | Status: SHIPPED | OUTPATIENT
Start: 2024-12-10

## 2024-12-10 RX ORDER — ACETAMINOPHEN 160 MG/5ML
1000 SOLUTION ORAL 2 TIMES DAILY
Qty: 2436 ML | Refills: 0 | OUTPATIENT
Start: 2024-12-10 | End: 2025-01-09

## 2024-12-10 RX ORDER — OXYCODONE HCL 5 MG/5 ML
5 SOLUTION, ORAL ORAL EVERY 6 HOURS PRN
Qty: 90 ML | Refills: 0 | OUTPATIENT
Start: 2024-12-10 | End: 2024-12-17

## 2024-12-10 RX ORDER — CEFTRIAXONE 2 G/50ML
2 INJECTION, SOLUTION INTRAVENOUS EVERY 24 HOURS
Qty: 450 ML | Refills: 0 | Status: CANCELLED | OUTPATIENT
Start: 2024-12-10 | End: 2024-12-19

## 2024-12-10 RX ORDER — OXYCODONE HCL 5 MG/5 ML
5 SOLUTION, ORAL ORAL EVERY 6 HOURS PRN
Qty: 90 ML | Refills: 0 | Status: SHIPPED | OUTPATIENT
Start: 2024-12-10 | End: 2024-12-20

## 2024-12-10 RX ORDER — CEFTRIAXONE 2 G/1
2 INJECTION, POWDER, FOR SOLUTION INTRAMUSCULAR; INTRAVENOUS DAILY
Qty: 18 G | Refills: 0 | Status: SHIPPED
Start: 2024-12-10 | End: 2024-12-19

## 2024-12-10 RX ORDER — ACETAMINOPHEN 160 MG/5ML
1000 LIQUID ORAL 2 TIMES DAILY
Qty: 2436 ML | Refills: 0 | Status: SHIPPED | OUTPATIENT
Start: 2024-12-10

## 2024-12-10 ASSESSMENT — PAIN SCALES - GENERAL: PAINLEVEL_OUTOF10: 0 - NO PAIN

## 2024-12-10 ASSESSMENT — ACTIVITIES OF DAILY LIVING (ADL)
LACK_OF_TRANSPORTATION: NO
LACK_OF_TRANSPORTATION: NO

## 2024-12-10 ASSESSMENT — PAIN - FUNCTIONAL ASSESSMENT: PAIN_FUNCTIONAL_ASSESSMENT: 0-10

## 2024-12-10 NOTE — PROGRESS NOTES
12/10/24 0700   Discharge Planning   Living Arrangements Spouse/significant other   Support Systems Spouse/significant other   Type of Residence Private residence   Number of Stairs to Enter Residence 3   Number of Stairs Within Residence 6   Do you have animals or pets at home? No   Who is requesting discharge planning? Provider   Home or Post Acute Services None   Expected Discharge Disposition Home   Financial Resource Strain   How hard is it for you to pay for the very basics like food, housing, medical care, and heating? Not hard   Housing Stability   In the last 12 months, was there a time when you were not able to pay the mortgage or rent on time? N   In the past 12 months, how many times have you moved where you were living? 0   At any time in the past 12 months, were you homeless or living in a shelter (including now)? N   Transportation Needs   In the past 12 months, has lack of transportation kept you from medical appointments or from getting medications? no   In the past 12 months, has lack of transportation kept you from meetings, work, or from getting things needed for daily living? No     12/10/2024: TCC sent referral to [facility] as patient will be on Rocephin IV until 12/19/2024. Patient as well as  received teaching from [facility] at bedside on 12/09/2024. [facility] is requesting that port be accessed in the hospital and will then be de-accessed once treatment is complete. She will also receive blood draws and dressing changes at their ambulatory infusion center in [place], [place]. TCC to touch base with careteam and family this am for additional questions or concerns. PABLO Ramachandran, TCC

## 2024-12-10 NOTE — CARE PLAN
The patient's goals for the shift include      The clinical goals for the shift include patient will remain safe and free from injury this shift 12/10/24 0700      Problem: Pain - Adult  Goal: Verbalizes/displays adequate comfort level or baseline comfort level  Outcome: Progressing     Problem: Safety - Adult  Goal: Free from fall injury  Outcome: Progressing     Problem: Discharge Planning  Goal: Discharge to home or other facility with appropriate resources  Outcome: Progressing

## 2024-12-10 NOTE — DISCHARGE SUMMARY
Discharge Diagnosis  Vasovagal syncope    Issues Requiring Follow-Up  - Syncope - follow-up with cardiology for further management.   - GDMT - started GDMT (Entresto) while inpatient but held due to syncope. Cardiology will need to restart them as necessary given syncope   - Oropharyngeal SCC - treatment plan per Dr. Garcia     Discharge Meds     Medication List      START taking these medications     buPROPion 75 mg tablet; Commonly known as: Wellbutrin; Take 2 tablets   (150 mg) by mouth once daily in the morning.; Replaces: buPROPion    mg 12 hr tablet   cefTRIAXone 2 gram; Commonly known as: Rocephin; Infuse 2 g into a   venous catheter once daily for 9 days.   Children's acetaminophen 160 mg/5 mL liquid; Generic drug:   acetaminophen; 31.3 mL (1,000 mg) by g-tube route 2 times a day.;   Replaces: acetaminophen 325 mg tablet   doxepin 10 mg/mL solution; Commonly known as: SINEquan; 1 mL (10 mg) by   g-tube route once daily at bedtime.   gabapentin 50 mg/mL solution; Commonly known as: Neurontin; 6 mL (300   mg) by g-tube route once daily at bedtime.   lidocaine-prilocaine 2.5-2.5 % cream; Commonly known as: Emla; Apply   topically 1 time for 1 dose. Apply thin layer to mediport site 30-45   minutes prior to access.  Cover with dressing/film.   oxyCODONE 5 mg/5 mL solution; Commonly known as: Roxicodone; 5 mL (5 mg)   by g-tube route every 6 hours if needed for moderate pain (4 - 6) for up   to 10 days.; Replaces: oxyCODONE 5 mg immediate release tablet     CONTINUE taking these medications     albuterol 90 mcg/actuation inhaler   ALPRAZolam 0.5 mg tablet; Commonly known as: Xanax   aspirin 81 mg chewable tablet   atorvastatin 40 mg tablet; Commonly known as: Lipitor   fluticasone 44 mcg/actuation inhaler; Commonly known as: Flovent   lidocaine 5 % patch; Commonly known as: Lidoderm; Place 1 patch over 12   hours on the skin once daily. Remove & discard patch within 12 hours or as   directed by MD.    metoprolol tartrate 25 mg tablet; Commonly known as: Lopressor; Take 0.5   tablets (12.5 mg) by mouth 2 times a day.   prochlorperazine 10 mg tablet; Commonly known as: Compazine; Take 1   tablet (10 mg) by mouth every 6 hours if needed for nausea or vomiting. Do   not fill before November 11, 2024.   senna 8.8 mg/5 mL syrup; Commonly known as: Senokot; Take 5 mL by mouth   2 times a day for 10 days.     STOP taking these medications     acetaminophen 325 mg tablet; Commonly known as: Tylenol; Replaced by:   Children's acetaminophen 160 mg/5 mL liquid   buPROPion  mg 12 hr tablet; Commonly known as: Wellbutrin SR;   Replaced by: buPROPion 75 mg tablet   oxyCODONE 5 mg immediate release tablet; Commonly known as: Roxicodone;   Replaced by: oxyCODONE 5 mg/5 mL solution     ASK your doctor about these medications     alteplase 2 mg injection; Commonly known as: Cathflo Activase; 2 mL (2   mg) by intra-catheter route 1 time for 1 dose.; Ask about: Should I take   this medication?       Test Results Pending At Discharge  Pending Labs       No current pending labs.            Hospital Course  Amalia Roa is a 59 y.o. female presenting with pmh oropharyngeal squamous cell carcinoma (dx 10/2024, currently receiving pembro s/p C2 12/3/24), depression, anxiety, PTSD, panic attacks, hypertension, heart failure with reduced EF, CAD, MI status post stents, mitral regurgitation status postrepair, asthma, MEÑO on CPAP, PEG tube dependency who presented to the ED (12/3) for a syncopal episode after chemo infusion.     In the ED, vital signs were stable, basic labs were wnl. Due to concern for cardiogenic causes of syncope, EKG and echo were ordered. EKG was found to be NSR and echo showed findings similar to previous study in 2018. A CT PE was ordered which was negative for PE but did show a ground glass opacity in the right middle lobe concerning for bronchopneumonia vs aspiration pneumonitis. Because of these findings, she  was started on Azithromycin and Unasyn for treatment of possible pneumonia and blood cultures were sent.  Testing for Flu and COVID were ultimately negative.    Patient noted that she has felt fine since the syncopal episode and denies any symptoms concerning for pneumonia (cough, fever, SOB). Given that she did vomit after she woke up and does have an increased risk for aspiration given the location of her cancer, we believed that the most probable cause for her findings on CT were aspiration pneumonitis. To confirm our suspicion, a procalcitonin was collected which was 0.04, making it more likely that she doesn't have a pneumonia. Because of this, antibiotics were stopped.     However, on 12/5 blood cultures collected from the ED resulted and it was found that the aerobic bottle was positive for gram positive cocci in pairs and chains that later speciated to Strep. Thermophilus on 1 blood culture as well as Yeast. These results were concerning for Fungemia and bacteremia so IV Unasyn and Micafungin were started and ID was consulted. ID recommended to continue current antibiotics and to follow cultures. On 12/7, lab updated blood cultures drawn on 12/7 to show that the culture was not growing yeast. ID was notified and recommended discontinuing Micafungin and switching Unasyn to Ceftriaxone for a 14 day course of antibiotics ending on 12/19.  She will receive her antibiotics via her port that will be accessed for the duration of her antibiotic treatment.     Cardio and infectious workup were negative so the most likely cause of her syncope is vasovagal. Her daughter noted that there has been previous imaging that has showed involvement of the carotid arteries. With approval of her primary oncologist, repeat CT soft tissue neck was ordered, which showed redemonstration of encasement of the right common carotid and internal jugular vein.     Overnight on 12/7, patient had an episode of syncope while having a bowel  movement. Due to another episode of syncope and the recent information of the location of her tumor in relation to her right carotid artery, neurology was consulted iso of possible nerve compression leading to syncope. Neurology confirmed that it is possible that the location of her tumor is compressing the vagus nerve and causing recurrent syncope. However, there was no further neurological work-up needed. Subsequently, surgical oncology was consulted for recommendations but directed the team to ENT who informed the team that given the location of her tumor, it is inoperable.     On 12/9, a rapid response was called due to patient falling while in the bathroom. This episode was similar to previous episodes but she was found to not be orthostatic. An MRI brain was ordered to rule out any metastatic disease. The study did not show any evidence of metastatic disease. At this time, we believe the most likely cause of her syncopal episodes is vasovagal likely due to nerve compression, however, there are no interventions we can do inpatient. It is the hope that with chemotherapy and/or radiation the tumor will shrink and these episodes will decrease. The patient was instructed to not drive and be supervised at all times due to her likelihood for her to have a syncopal episode at any time. In addition, she was encouraged to increase fluid and salt intake and to wear compression socks. She will need to follow-up with cardiology closely for the management of her symptoms.     On day of discharge , patient is doing well and has no complaints. She has been informed of the necessary changes she needs to make due to her recurrent syncopal episodes. She will need to follow-up with oncology for further treatment of her cancer and with cardiology for syncope, heart failure, and other co-morbidities.         Pertinent Physical Exam At Time of Discharge  Physical Exam  Constitutional:       General: She is not in acute distress.      Appearance: Normal appearance.   HENT:      Head: Normocephalic and atraumatic.   Eyes:      Extraocular Movements: Extraocular movements intact.      Conjunctiva/sclera: Conjunctivae normal.      Pupils: Pupils are equal, round, and reactive to light.   Cardiovascular:      Rate and Rhythm: Normal rate and regular rhythm.      Pulses: Normal pulses.      Heart sounds: Normal heart sounds. No murmur heard.  Pulmonary:      Effort: Pulmonary effort is normal. No respiratory distress.      Breath sounds: Normal breath sounds. No wheezing.   Abdominal:      General: Abdomen is flat. There is no distension.      Palpations: Abdomen is soft.      Tenderness: There is no abdominal tenderness.   Neurological:      General: No focal deficit present.      Mental Status: She is alert and oriented to person, place, and time. Mental status is at baseline.      Cranial Nerves: No cranial nerve deficit.      Motor: No weakness.   Psychiatric:         Mood and Affect: Mood normal.         Behavior: Behavior normal.         Outpatient Follow-Up  Future Appointments   Date Time Provider Department Center   12/23/2024  3:00 PM [facility] CENTRAL LINE 01 [id number] Academic   12/24/2024  8:00 AM Ora Marcial APRN-CNP [id number] Academic   12/24/2024  8:30 [facility]   12/24/2024 10:00 AM Jazmin Phillips, APRN-CNP [id number] Academic   1/10/2025  7:40 AM [facility] CENTRAL LINE 02 [id number] Academic   1/10/2025  8:30 [id number]   1/10/2025 12:00 PM [facility]   1/10/2025 12:15 PM [facility] SCC CT 1 CMCSCCCT [facility]   1/14/2025  8:40 AM [facility] SCC CENTRAL LINE 01 [id number] Academic   1/14/2025  9:00 AM Pauline Garcia MD [place] [id number] Academic   1/14/2025  9:45 AM [facility]   1/14/2025 11:30 AM [facility]         Patient was seen and discussed with attending Dr. Austin Wooten MD  Internal  Medicine/Pediatrics, PGY-1

## 2024-12-10 NOTE — PROGRESS NOTES
Speech-Language Pathology  Adult Inpatient Swallow Treatment    Patient Name: Amalia Roa  MRN: [id number]  Today's Date: 12/10/2024   Start Time: 1036  Stop Time: 1056  Time Calculation (min): 20    Impression:   Dysphagia therapy complete. Upon SLP arrival pt consuming toast. Verbally states  and utilizes safe swallow strategies independently. Answered all questions from pt and . Pt made aware if swallow declines may require repeat MBSS. Pt reports completing H&N exercises independently.      Recommendations:  - Regular (IDDSI Level 7)  - Thin liquids (IDDSI Level 0)  Patient to continue baseline diet of regular consistencies and thin liquids following swallow strategies listed below:     STRATEGIES:  - Small bites  - Small, single sips  - Medications whole in puree or as best tolerated  -Cough every few bites/sips     Goals:   Pt will tolerate least restrictive diet without s/s aspiration, respiratory compromise, or overt difficulty throughout admission.  Start: 12/10/24, End: 2 weeks  Status: progressing    Pt will accurately utilize/recall recommended swallow strategies/guidelines independently in >90% opportunities.    Start: 12/10/24, End: 2 weeks              Status: progressing         Plan:  SLP Services Indicated: No  Frequency: N/A  Discussed POC with patient and spouse  SLP - OK to Discharge    Pain:   0-10  0 = No pain.     Inpatient Education:  Extensive education provided to patient and spouse regarding current swallow function, recommendations/results, and POC.      Consultations/Referrals/Coordination of Services:   N/A

## 2024-12-10 NOTE — PROGRESS NOTES
12/10/24 1500   Discharge Planning   Living Arrangements Spouse/significant other   Support Systems Spouse/significant other   Type of Residence Private residence   Number of Stairs to Enter Residence 3   Number of Stairs Within Residence 6   Do you have animals or pets at home? No   Who is requesting discharge planning? Provider   Home or Post Acute Services None   Expected Discharge Disposition Home   Financial Resource Strain   How hard is it for you to pay for the very basics like food, housing, medical care, and heating? Not hard   Housing Stability   In the last 12 months, was there a time when you were not able to pay the mortgage or rent on time? N   In the past 12 months, how many times have you moved where you were living? 0   At any time in the past 12 months, were you homeless or living in a shelter (including now)? N   Transportation Needs   In the past 12 months, has lack of transportation kept you from medical appointments or from getting medications? no   In the past 12 months, has lack of transportation kept you from meetings, work, or from getting things needed for daily living? No     12/10/2024  3:23pm:(discharge plan) : Patient willl discharge to home today. TCC unable to find home care agency as she is out of network or out of service area. Patient to discharge home with [facility] and will follow up with the [facility] [place], [place]. Patient will have port accessed at the Ambulatory Center on 12/11/2024 at 3:00pm. Patient to have port dressing changed at the Infusion Pharmacy once a week. Infusions will be complete as of 12/19/2024. TCC spoke with patient and spouse at bedside with updates in which they were receptive to the information. Delivery of medications and supplies coordinated with patient and [facility] via phone this afternoon. PABLO Ramachandran, [facility]     [facility]  [address]    [address]  [place] [address]  [phone]

## 2024-12-17 RX ORDER — FAMOTIDINE 10 MG/ML
20 INJECTION INTRAVENOUS ONCE AS NEEDED
Status: CANCELLED | OUTPATIENT
Start: 2024-12-24

## 2024-12-17 RX ORDER — PROCHLORPERAZINE MALEATE 10 MG
10 TABLET ORAL EVERY 6 HOURS PRN
Status: CANCELLED | OUTPATIENT
Start: 2024-12-24

## 2024-12-17 RX ORDER — PROCHLORPERAZINE EDISYLATE 5 MG/ML
10 INJECTION INTRAMUSCULAR; INTRAVENOUS EVERY 6 HOURS PRN
Status: CANCELLED | OUTPATIENT
Start: 2024-12-24

## 2024-12-17 RX ORDER — DIPHENHYDRAMINE HYDROCHLORIDE 50 MG/ML
50 INJECTION INTRAMUSCULAR; INTRAVENOUS AS NEEDED
Status: CANCELLED | OUTPATIENT
Start: 2024-12-24

## 2024-12-17 RX ORDER — EPINEPHRINE 0.3 MG/.3ML
0.3 INJECTION SUBCUTANEOUS EVERY 5 MIN PRN
Status: CANCELLED | OUTPATIENT
Start: 2024-12-24

## 2024-12-17 RX ORDER — ALBUTEROL SULFATE 0.83 MG/ML
3 SOLUTION RESPIRATORY (INHALATION) AS NEEDED
Status: CANCELLED | OUTPATIENT
Start: 2024-12-24

## 2024-12-23 ENCOUNTER — APPOINTMENT (OUTPATIENT)
Dept: HEMATOLOGY/ONCOLOGY | Facility: HOSPITAL | Age: 59
End: 2024-12-23
Payer: MEDICAID

## 2024-12-23 ENCOUNTER — LAB (OUTPATIENT)
Dept: LAB | Facility: HOSPITAL | Age: 59
End: 2024-12-23
Payer: MEDICAID

## 2024-12-23 DIAGNOSIS — C01 CANCER OF BASE OF TONGUE (MULTI): ICD-10-CM

## 2024-12-23 LAB
ALBUMIN SERPL BCP-MCNC: 4.1 G/DL (ref 3.4–5)
ALP SERPL-CCNC: 66 U/L (ref 33–136)
ALT SERPL W P-5'-P-CCNC: 14 U/L (ref 7–45)
ANION GAP SERPL CALC-SCNC: 12 MMOL/L (ref 10–20)
AST SERPL W P-5'-P-CCNC: 16 U/L (ref 9–39)
BASOPHILS # BLD AUTO: 0.03 X10*3/UL (ref 0–0.1)
BASOPHILS NFR BLD AUTO: 0.4 %
BILIRUB SERPL-MCNC: 0.5 MG/DL (ref 0–1.2)
BUN SERPL-MCNC: 25 MG/DL (ref 6–23)
CALCIUM SERPL-MCNC: 9.6 MG/DL (ref 8.6–10.3)
CHLORIDE SERPL-SCNC: 100 MMOL/L (ref 98–107)
CO2 SERPL-SCNC: 32 MMOL/L (ref 21–32)
CORTIS AM PEAK SERPL-MSCNC: 6.6 UG/DL (ref 5–20)
CREAT SERPL-MCNC: 0.86 MG/DL (ref 0.5–1.05)
EGFRCR SERPLBLD CKD-EPI 2021: 77 ML/MIN/1.73M*2
EOSINOPHIL # BLD AUTO: 0.37 X10*3/UL (ref 0–0.7)
EOSINOPHIL NFR BLD AUTO: 4.9 %
ERYTHROCYTE [DISTWIDTH] IN BLOOD BY AUTOMATED COUNT: 14.3 % (ref 11.5–14.5)
GLUCOSE SERPL-MCNC: 110 MG/DL (ref 74–99)
HCT VFR BLD AUTO: 37.7 % (ref 36–46)
HGB BLD-MCNC: 12.1 G/DL (ref 12–16)
IMM GRANULOCYTES # BLD AUTO: 0.02 X10*3/UL (ref 0–0.7)
IMM GRANULOCYTES NFR BLD AUTO: 0.3 % (ref 0–0.9)
LYMPHOCYTES # BLD AUTO: 1.23 X10*3/UL (ref 1.2–4.8)
LYMPHOCYTES NFR BLD AUTO: 16.2 %
MCH RBC QN AUTO: 31.5 PG (ref 26–34)
MCHC RBC AUTO-ENTMCNC: 32.1 G/DL (ref 32–36)
MCV RBC AUTO: 98 FL (ref 80–100)
MONOCYTES # BLD AUTO: 0.68 X10*3/UL (ref 0.1–1)
MONOCYTES NFR BLD AUTO: 8.9 %
NEUTROPHILS # BLD AUTO: 5.27 X10*3/UL (ref 1.2–7.7)
NEUTROPHILS NFR BLD AUTO: 69.3 %
NRBC BLD-RTO: 0 /100 WBCS (ref 0–0)
PLATELET # BLD AUTO: 202 X10*3/UL (ref 150–450)
POTASSIUM SERPL-SCNC: 4.3 MMOL/L (ref 3.5–5.3)
PROT SERPL-MCNC: 6.5 G/DL (ref 6.4–8.2)
RBC # BLD AUTO: 3.84 X10*6/UL (ref 4–5.2)
SODIUM SERPL-SCNC: 140 MMOL/L (ref 136–145)
TSH SERPL-ACNC: 1.07 MIU/L (ref 0.44–3.98)
WBC # BLD AUTO: 7.6 X10*3/UL (ref 4.4–11.3)

## 2024-12-23 PROCEDURE — 84443 ASSAY THYROID STIM HORMONE: CPT

## 2024-12-23 PROCEDURE — 82024 ASSAY OF ACTH: CPT

## 2024-12-23 PROCEDURE — 85025 COMPLETE CBC W/AUTO DIFF WBC: CPT

## 2024-12-23 PROCEDURE — 82533 TOTAL CORTISOL: CPT

## 2024-12-23 PROCEDURE — 80053 COMPREHEN METABOLIC PANEL: CPT

## 2024-12-24 ENCOUNTER — OFFICE VISIT (OUTPATIENT)
Dept: PALLIATIVE MEDICINE | Facility: HOSPITAL | Age: 59
End: 2024-12-24
Payer: MEDICAID

## 2024-12-24 ENCOUNTER — INFUSION (OUTPATIENT)
Dept: HEMATOLOGY/ONCOLOGY | Facility: HOSPITAL | Age: 59
End: 2024-12-24
Payer: MEDICAID

## 2024-12-24 ENCOUNTER — PHARMACY VISIT (OUTPATIENT)
Dept: PHARMACY | Facility: CLINIC | Age: 59
End: 2024-12-24
Payer: MEDICAID

## 2024-12-24 ENCOUNTER — OFFICE VISIT (OUTPATIENT)
Dept: HEMATOLOGY/ONCOLOGY | Facility: HOSPITAL | Age: 59
End: 2024-12-24
Payer: MEDICAID

## 2024-12-24 VITALS
SYSTOLIC BLOOD PRESSURE: 126 MMHG | BODY MASS INDEX: 27.94 KG/M2 | RESPIRATION RATE: 16 BRPM | OXYGEN SATURATION: 99 % | TEMPERATURE: 97.5 F | DIASTOLIC BLOOD PRESSURE: 61 MMHG | HEART RATE: 86 BPM | WEIGHT: 157.7 LBS

## 2024-12-24 VITALS
TEMPERATURE: 97.9 F | DIASTOLIC BLOOD PRESSURE: 63 MMHG | WEIGHT: 158.51 LBS | SYSTOLIC BLOOD PRESSURE: 114 MMHG | RESPIRATION RATE: 18 BRPM | BODY MASS INDEX: 28.08 KG/M2 | OXYGEN SATURATION: 98 % | HEART RATE: 84 BPM

## 2024-12-24 DIAGNOSIS — C01 CANCER OF BASE OF TONGUE (MULTI): ICD-10-CM

## 2024-12-24 DIAGNOSIS — G89.3 NEOPLASM RELATED PAIN: ICD-10-CM

## 2024-12-24 DIAGNOSIS — Z51.12 ENCOUNTER FOR ANTINEOPLASTIC IMMUNOTHERAPY: Primary | ICD-10-CM

## 2024-12-24 PROCEDURE — 2500000004 HC RX 250 GENERAL PHARMACY W/ HCPCS (ALT 636 FOR OP/ED): Mod: SE | Performed by: STUDENT IN AN ORGANIZED HEALTH CARE EDUCATION/TRAINING PROGRAM

## 2024-12-24 PROCEDURE — 99215 OFFICE O/P EST HI 40 MIN: CPT | Mod: 25,27 | Performed by: NURSE PRACTITIONER

## 2024-12-24 PROCEDURE — 99215 OFFICE O/P EST HI 40 MIN: CPT | Performed by: NURSE PRACTITIONER

## 2024-12-24 PROCEDURE — RXMED WILLOW AMBULATORY MEDICATION CHARGE

## 2024-12-24 PROCEDURE — 3074F SYST BP LT 130 MM HG: CPT | Performed by: NURSE PRACTITIONER

## 2024-12-24 PROCEDURE — 3078F DIAST BP <80 MM HG: CPT | Performed by: NURSE PRACTITIONER

## 2024-12-24 PROCEDURE — 99215 OFFICE O/P EST HI 40 MIN: CPT | Mod: 25 | Performed by: NURSE PRACTITIONER

## 2024-12-24 PROCEDURE — 96413 CHEMO IV INFUSION 1 HR: CPT

## 2024-12-24 RX ORDER — DIPHENHYDRAMINE HYDROCHLORIDE 50 MG/ML
50 INJECTION INTRAMUSCULAR; INTRAVENOUS AS NEEDED
Status: DISCONTINUED | OUTPATIENT
Start: 2024-12-24 | End: 2024-12-24 | Stop reason: HOSPADM

## 2024-12-24 RX ORDER — PROCHLORPERAZINE EDISYLATE 5 MG/ML
10 INJECTION INTRAMUSCULAR; INTRAVENOUS EVERY 6 HOURS PRN
Status: DISCONTINUED | OUTPATIENT
Start: 2024-12-24 | End: 2024-12-24 | Stop reason: HOSPADM

## 2024-12-24 RX ORDER — PROCHLORPERAZINE MALEATE 10 MG
10 TABLET ORAL EVERY 6 HOURS PRN
Status: DISCONTINUED | OUTPATIENT
Start: 2024-12-24 | End: 2024-12-24 | Stop reason: HOSPADM

## 2024-12-24 RX ORDER — ALBUTEROL SULFATE 0.83 MG/ML
3 SOLUTION RESPIRATORY (INHALATION) AS NEEDED
Status: DISCONTINUED | OUTPATIENT
Start: 2024-12-24 | End: 2024-12-24 | Stop reason: HOSPADM

## 2024-12-24 RX ORDER — EPINEPHRINE 0.3 MG/.3ML
0.3 INJECTION SUBCUTANEOUS EVERY 5 MIN PRN
Status: DISCONTINUED | OUTPATIENT
Start: 2024-12-24 | End: 2024-12-24 | Stop reason: HOSPADM

## 2024-12-24 RX ORDER — GABAPENTIN 250 MG/5ML
300 SOLUTION ORAL 2 TIMES DAILY
Qty: 360 ML | Refills: 0 | Status: SHIPPED | OUTPATIENT
Start: 2024-12-24 | End: 2025-01-23

## 2024-12-24 RX ORDER — OXYCODONE HCL 5 MG/5 ML
10 SOLUTION, ORAL ORAL EVERY 6 HOURS PRN
Qty: 1200 ML | Refills: 0 | Status: SHIPPED | OUTPATIENT
Start: 2024-12-24 | End: 2025-01-23

## 2024-12-24 RX ORDER — TRANSPARENT DRESSING 3.5"X4"
1 BANDAGE TOPICAL
Qty: 1 EACH | Refills: 11 | Status: SHIPPED | OUTPATIENT
Start: 2024-12-24

## 2024-12-24 RX ORDER — PROCHLORPERAZINE MALEATE 10 MG
10 TABLET ORAL EVERY 6 HOURS PRN
Qty: 60 TABLET | Refills: 0 | Status: SHIPPED | OUTPATIENT
Start: 2024-12-24

## 2024-12-24 RX ORDER — FAMOTIDINE 10 MG/ML
20 INJECTION INTRAVENOUS ONCE AS NEEDED
Status: DISCONTINUED | OUTPATIENT
Start: 2024-12-24 | End: 2024-12-24 | Stop reason: HOSPADM

## 2024-12-24 RX ORDER — LIDOCAINE 50 MG/G
1 PATCH TOPICAL DAILY
Qty: 30 PATCH | Refills: 0 | Status: SHIPPED | OUTPATIENT
Start: 2024-12-24

## 2024-12-24 ASSESSMENT — ENCOUNTER SYMPTOMS
CONSTIPATION: 0
TROUBLE SWALLOWING: 1
GASTROINTESTINAL NEGATIVE: 1
NECK PAIN: 1
APPETITE CHANGE: 1
CARDIOVASCULAR NEGATIVE: 1
HEMOPTYSIS: 0
RESPIRATORY NEGATIVE: 1

## 2024-12-24 ASSESSMENT — PAIN SCALES - GENERAL: PAINLEVEL_OUTOF10: 0-NO PAIN

## 2024-12-24 NOTE — PROGRESS NOTES
Pt present today for C3D1. Pt saw provider prior to treatment. See provider note for full assessment. Pembrolizumab regimen infused with no issues. Pt discharged to home in stable condition.

## 2024-12-24 NOTE — PROGRESS NOTES
[facility] - Medical Oncology Follow-Up Visit    Patient ID: Amalia Roa is a 59 y.o. female with oropharyngeal squamous cell ca     Current therapy: methylPREDNISolone sod succinate (SOLU-Medrol) 40 mg/mL injection 40 mg, 40 mg, intravenous, As needed, 2 of 17 cycles        pembrolizumab (Keytruda) 200 mg in sodium chloride 0.9% 118 mL IV, 200 mg, intravenous, Once, 2 of 17 cycles    Administration: 200 mg (11/12/2024), 200 mg (12/3/2024)       Chief Concern: Readiness to treat - C3    Oncologic History:   DIAGNOSIS  Oropharyngeal squamous cell ca     STAGING  T4N2M1      CURRENT SITES OF DISEASE  R oropharynx, tongue, bilateral lungs, L adrenal gland     MOLECULAR GENOMICS  P16+   CPS 10     PRIOR THERAPY        CURRENT THERAPY  Pembrolizumab 11/12/24 -      CURRENT ONCOLOGICAL PROBLEMS           HISTORY OF PRESENT ILLNESS  Ms. Roa is a 60 yo with PMH significant for multiple medical comorbidities including HFrEF, CAD s/p JOHN, mitral regurgitation, and MEÑO who had had trouble swallowing for at least a year with throat discomfort, particularly on the R side. CT of the cervical spine at OSH was concerning for 3.6 x 2.0 necrotic level 2 lymph node. She was referred to Dr. Montanez who she met on 9/20/24 due to concerns for a mass. Flex laryngoscopy showed a prominence at the base of the tongue of the R and an exophytic mass of the R piriform sinus, with evidence of inga disease on the L. CT of the neck on 10/8/24 revealed large, irregular mass of the R oropharynx measuring at least 4 x 3.2 x 7.6 cm, with involvement of the R lateral wall/tonsillar region, base of tongue/glossal tonsillar sulcus, soft palate, and abutment of the epiglottis with extension into the R parapharyngeal fat and  space. There appeared to be extension into the R tongue and within the floor of the mouth and oral cavity. Also noted was thrombosis or occlusion of the RIJ, bilateral cervical  lymphadenopathy with levels 2-4 noted on the R and on the left, and multiple pulmonary nodules. She was admitted to the hospital and underwent panendoscopy, biopsy, and PEG placement on 10/14/24. CT chest also on 10/14 was notable for multiple parenchymal bilateral lung nodules and enlarged mediastinal and hilar lymph nodes, as well as suspicious L adrenal gland nodule. She was discussed in tumor board, with PD-L1 added on CPS 10. PET/CT 10/22/24 confirmed FDG avidity of R oropharynx/oral cavity/hypopharynx mass, L palatine tonsil, R parotid gland, multiple cervical nodes bilaterally, multiple mediastinal and hilar lymph  nodes, pulmonary nodules, small L pleural effusion, and L adrenal gland. Decided to start pembrolizumab monotherapy, to start 11/12/24 after port placement.     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 Lives at home with her . Has two grandsons who are 6 and 2 yo, and two granddaughters who are 3 yo and 10 months.    Tob: quit smoking 2014. 40 years x 1/2 ppd  EtOH: none  Illicits: none     FAMILY HISTORY  Mother - breast cancer dx 50s or 60s  Sister - colon ca dx 58 yo  Niece - cervical cancer dx 30s    HPI   Present for readiness to treat visit.  Her  is present for visit.  Hospitalization 12/3 - 12/10/24 2/2 vasovagal syncope episode the evening of her C2 infusion.  ATB course completed.   Pt reports no further episodes.  Energy level is good. Denies fatigue.  Breathing feels good, no troubles.  Continues with TF - 5 cartons/day.  Oral intake as tolerated.  Limited at times d/t pain.  Pain intermittent.  From highest 9/10 to none.  Denies n/v/c.  Diarrhea from ATB.  Last dose taken on Thurs. Soft stools.  No fevers, chills, or CP.  No rashes or skin  concerns. HS gabapentin dose helping with right neck/ear pain.  No longer wakes up overnight.  Minimal relief with oxycodone.  Follows with supportive onc with visit today.  Labs WNL.  Ready for treatment today.        Meds (Current):    Current Outpatient Medications:     acetaminophen (Tylenol) 160 mg/5 mL liquid, 31.3 mL (1,000 mg) by g-tube route 2 times a day., Disp: 2436 mL, Rfl: 0    albuterol 90 mcg/actuation inhaler, Inhale 2 puffs every 4 hours if needed for wheezing., Disp: , Rfl:     ALPRAZolam (Xanax) 0.5 mg tablet, Take 1 tablet (0.5 mg) by mouth 2 times a day as needed for anxiety., Disp: , Rfl:     aspirin 81 mg chewable tablet, Chew 1 tablet (81 mg) once daily., Disp: , Rfl:     atorvastatin (Lipitor) 40 mg tablet, Take 1 tablet (40 mg) by mouth once daily., Disp: , Rfl:     buPROPion (Wellbutrin) 75 mg tablet, Take 2 tablets (150 mg) by mouth once daily in the morning., Disp: 60 tablet, Rfl: 0    doxepin (SINEquan) 10 mg/mL solution, 1 mL (10 mg) by g-tube route once daily at bedtime., Disp: 120 mL, Rfl: 12    fluticasone (Flovent) 44 mcg/actuation inhaler, Inhale 2 puffs 2 times a day., Disp: , Rfl:     gabapentin (Neurontin) 50 mg/mL solution, 6 mL (300 mg) by g-tube route once daily at bedtime., Disp: 180 mL, Rfl: 0    lidocaine (Lidoderm) 5 % patch, Place 1 patch over 12 hours on the skin once daily. Remove & discard patch within 12 hours or as directed by MD., Disp: 14 patch, Rfl: 0    metoprolol tartrate (Lopressor) 25 mg tablet, Take 0.5 tablets (12.5 mg) by mouth 2 times a day., Disp: 60 tablet, Rfl: 5    prochlorperazine (Compazine) 10 mg tablet, Take 1 tablet (10 mg) by mouth every 6 hours if needed for nausea or vomiting. Do not fill before November 11, 2024., Disp: 30 tablet, Rfl: 5    senna (Senokot) 8.8 mg/5 mL syrup, Take 5 mL by mouth 2 times a day for 10 days., Disp: 240 mL, Rfl: 3    Review of Systems   Constitutional:  Positive for appetite change.   HENT:   Positive for trouble  swallowing.         Intermittent right ear pain.   Respiratory: Negative.  Negative for hemoptysis.    Cardiovascular: Negative.    Gastrointestinal: Negative.  Negative for constipation.   Musculoskeletal:  Positive for neck pain.   Skin: Negative.    All other systems reviewed and are negative.       Objective   BSA: 1.78 meters squared  Wt Readings from Last 5 Encounters:   12/24/24 71.5 kg (157 lb 11.2 oz)   12/03/24 70.3 kg (155 lb)   12/03/24 70.4 kg (155 lb 1.5 oz)   12/03/24 70.4 kg (155 lb 1.6 oz)   11/22/24 71.7 kg (158 lb)     /61 (BP Location: Left arm, Patient Position: Sitting)   Pulse 86   Temp 36.4 °C (97.5 °F) (Temporal)   Resp 16   Wt 71.5 kg (157 lb 11.2 oz)   SpO2 99%   BMI 27.94 kg/m²     ECOG Score: 0- Fully active, able to carry on all pre-disease performance w/o restriction.      Physical Exam  Vitals reviewed.   Constitutional:       Appearance: Normal appearance.   HENT:      Head: Normocephalic.      Nose: Nose normal.      Mouth/Throat:      Mouth: Mucous membranes are moist.      Pharynx: Oropharynx is clear.   Eyes:      Extraocular Movements: Extraocular movements intact.      Conjunctiva/sclera: Conjunctivae normal.      Pupils: Pupils are equal, round, and reactive to light.   Cardiovascular:      Rate and Rhythm: Normal rate and regular rhythm.      Pulses: Normal pulses.      Heart sounds: Normal heart sounds.   Pulmonary:      Breath sounds: Normal breath sounds.   Abdominal:      General: Bowel sounds are normal.      Palpations: Abdomen is soft.      Comments: Capped peg.    Musculoskeletal:         General: Normal range of motion.      Cervical back: Normal range of motion and neck supple.   Skin:     General: Skin is warm and dry.   Neurological:      General: No focal deficit present.      Mental Status: She is alert and oriented to person, place, and time.   Psychiatric:         Mood and Affect: Mood normal.         Behavior: Behavior normal.         Thought  Content: Thought content normal.         Judgment: Judgment normal.          Results:  Labs:  Lab Results   Component Value Date    WBC 7.6 2024    HGB 12.1 2024    HCT 37.7 2024    MCV 98 2024     2024      Lab Results   Component Value Date    NEUTROABS 5.27 2024      Lab Results   Component Value Date    GLUCOSE 110 (H) 2024    CALCIUM 9.6 2024     2024    K 4.3 2024    CO2 32 2024     2024    BUN 25 (H) 2024    CREATININE 0.86 2024    MG 2.12 12/10/2024     Lab Results   Component Value Date    ALT 14 2024    AST 16 2024    ALKPHOS 66 2024    BILITOT 0.5 2024      Lab Results   Component Value Date    ACTH 14.3 2024    CORTISOL 6.6 2024    TSH 1.07 2024       Imagin/3/24 CT angio chest (-) PE:   1. No evidence of acute pulmonary embolism to the level of the proximal segmental pulmonary arteries.  2. Tree-in-bud ground-glass nodules in the right middle lobe likely represent sequelae of bronchopneumonia or aspiration pneumonitis.  Previously noted right lower lobe opacities have resolved.  3. Minimal increase in size of the left pleural effusion.  4. Similar to minimal increase in size of the metastatic hilar and  mediastinal lymph nodes  5. Stable appearance of metastatic pulmonary nodules, a few of which were not previously visualized, however are believed to have been obscured by atelectasis at that time.    2024:  CT soft tissue neck (+):  Redemonstration of necrotic ulcerative mass centered in the right oropharynx measuring approximately 4.0 x 3.5 cm in transverse dimension extending to the right oral cavity and right hypopharynx.  Redemonstration of infiltrative soft tissue extending inferiorly  along the right carotid sheath to the level of the thyroid.  Encasement of the right common carotid and internal carotid artery. 8 mm segment of focal ectasia of  the right ICA measuring 5 mm in diameter.      Bilateral necrotic lymph nodes, largest in left level 2 measuring up to 3.7 cm, also unchanged from previous CT neck 10/08/2024.  Extensive pulmonary metastases unchanged from chest CT 10/14/2024.    12/9/2024:  CT head (-):  No acute infarct, hemorrhage or mass is noted.  The mid left corona radiata has a punctate nonspecific lucency. This may represent a dilated perivascular space, chronic small cystic lacune not excluded.    12/10/24 Brain MRI (+/-):  No evidence of abnormal enhancement to indicate intracranial metastatic spread of malignancy. Partially visualized head and neck malignancy as described on recent CT neck examination.  No acute infarct or intracranial mass effect.  There is a nonenhancing 7 mm focus of GRE blooming artifact involving the superior ventral aspect of the midbrain at the midline with nonspecific appearance, possibly representing a cavernoma. There is no associated vasogenic edema. There is additional small focus within the right cerebellar tonsil as well as within the parasagittal parietal lobe posterior to the splenium of the corpus callosum that could reflect additional small cavernomas or remote microhemorrhages.  Mild chronic small vessel ischemic change.      Assessment/Plan      Amalia Roa is a 59 y.o. female here for follow up of oropharyngeal squamous cell ca.    # Oropharyngeal squamous cell ca  - T4N2M1  - p16+  - CPS 10   - discussed that with metastatic disease would recommend systemic therapy, and depending on CPS would recommend chemotherapy+immunotherapy or immunotherapy monotherapy. Also potentially eligible for clinical trial based on CPS  - given CPS, discussed pembrolizumab monotherapy, and consented  - she requested port placement due to poor venous access - Port placed.  Port placed 11/22/24   - plan to start pembrolizumab after port placement  - previously discussed with Dr. Nolan consideration for consolidation  radiation pending response to pembro  - RTC to start treatment, plan on CT scans after C4. CT chest & neck scheduled 1/10/25.   - C1 11/12/24   Hospitalization 12/3 - 12/10/24 2/2 vasovagal syncope episode the evening of her C2 infusion.   - RTC in 3 weeks for C4 with Dr. Garcia visit and scan review.       # Pain   - Oxycodone 5mg tab (#120 10/25/24) Rx provided by Dr. Nolan.  No routine admin.  Intermittent severe 10/10 right ear pain. 5mg PRN dose brings pain down from 10/10 - 7-8/10.  Okay received for pt to take two tabs for those episodes.   Routine tylenol 1000mg BID.    - Supportive onc referral placed.  Med onc will provide Rx until supportive onc visit.   - 12/24:  Gabapentin helping with neck/ear pain.  HS dose allowing her to sleep.  Minimal relief with PRN oxy - pt will discuss with supportive onc today.      # Anxiety   - Routine HS Alprazolam     # Constipation (slow-transit)  - Rx sent for senna elixir. 5mg BID.  Pt to titrate med to produce routine, soft BM's.  Can also add miralax PRN.    - 12/24:  stools soft d/t recent ATB course, ongoing monitoring.

## 2024-12-24 NOTE — PROGRESS NOTES
SUPPORTIVE AND PALLIATIVE ONCOLOGY OUTPATIENT FOLLOW-UP      SERVICE DATE: 12/24/2024    Subjective   HISTORY OF PRESENT ILLNESS: Amalia Roa is a 59 y.o. 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 Garcia).      Additional PMHx: depression, anxiety, PTSD, panic attacks, hypertension, heart failure with reduced EF, CAD, MI status post stents, mitral regurgitation status postrepair, asthma, MEÑO on CPAP, PEG tube dependency     Symptom Assessment:    Pain:somewhat    Back of throat and into and right jaw into ear  Burning, sharp, intermittent   Gabapentin helps sleep     Numbness or tingling in hands/feet/other: none  Headache: none  Lack of appetite: none - some dysphagia   Weight loss: somewhat  Pain in mouth/swallowing: a little  Dry mouth: none  Taste changes: a little  Nausea/early satiety: a little  Vomiting: none  Constipation: a little  Diarrhea: none  Lack of energy: none  Difficulty sleeping: none  Anxiety: a little  Depression: none  Shortness of breath: none  Other: none    Information obtained from: interview of patient  ______________________________________________________________________        Objective             Creatinine   Date Value Ref Range Status   12/23/2024 0.86 0.50 - 1.05 mg/dL Final     AST   Date Value Ref Range Status   12/23/2024 16 9 - 39 U/L Final     ALT   Date Value Ref Range Status   12/23/2024 14 7 - 45 U/L Final     Comment:     Patients treated with Sulfasalazine may generate falsely decreased results for ALT.     Hemoglobin   Date Value Ref Range Status   12/23/2024 12.1 12.0 - 16.0 g/dL Final     Platelets   Date Value Ref Range Status   12/23/2024 202 150 - 450 x10*3/uL Final          PHYSICAL EXAMINATION   Vital Signs:       12/9/2024    11:18 PM 12/10/2024     3:23 AM 12/10/2024     8:55 AM 12/10/2024     9:42 AM  12/10/2024    12:35 PM 12/24/2024     7:57 AM 12/24/2024     8:49 AM   Vitals   Systolic 112 111 125  123 126 114   Diastolic 69 68 73  77 61 63   BP Location Left arm Left arm Left arm  Left arm Left arm Right arm   Heart Rate 61 62 67 63 75 86 84   Temp 36 °C (96.8 °F) 36 °C (96.8 °F) 36.2 °C (97.2 °F)  36.1 °C (97 °F) 36.4 °C (97.5 °F) 36.6 °C (97.9 °F)   Resp 16 16 16 18 18 16 18   Weight (lb)      157.7 158.51   BMI      27.94 kg/m2 28.08 kg/m2   BSA (m2)      1.78 m2 1.79 m2   Visit Report      Report    Report Report    Report        Physical Exam    ASSESSMENT/PLAN    Pain  Cancer related pain and post procedure (peg)   somatic and neuropathic  Increase Oxycodone to IR 10 mg PO q4 h prn severe pain  Continue Acetaminophen 1 g BID-TID PRN (not to exceed 3g daily)  Increase Gabapentin to 300mg BID via peg  Continue lidocaine patch 5% 12 hr on 12 off to abdomen near peg site    Opioid Use  Medication Management:   - OARRS report reviewed with no aberrant behavior; consistent with  prescriptions/records and patient history  - MED 7.5.  Overdose Risk Score 400.   This has been discussed with patient.   - We will continue to closely monitor the patient for signs of prescription misuse including UDS, OARRS review and subjective reports at each visit.  - Concurrent benzodiazepine use   - I am a provider who either is or has consulted and collaborated with a provider certified in Hospice and Palliative Medicine and have conducted a face-face visit and examination for this patient.  - Routine Urine Drug Screen complete at next in person visit.   - Controlled Substance Agreement complete at next in person visit.   - Specifically discussed that controlled substance prescriptions will only be provided by our group as outlined in the completed agreement  - Prescribed naloxone: patient declined   - Red Flags: none      Nausea  At risk for nausea with vomiting related to opioids and tube feeds    Home regimen:   Prochlorperazine , ondansetron ineffective  Continue prochlorperazine 10 mg PEG q6h prn nausea      Constipation  At risk for constipation related to medication side effects (including opioids), currently not constipated  Usual bowel pattern: every day  Home regimen:  Senna and Miralax prn* has not needed with tube feeds  Continue Senna 5 ml BID PRN    Altered Mood  Chronic anxiety related to  history of PTSD, panic attacks    controlled with home regimen   Home regimen: alprazolam 0.5 mg BID prn, Wellbutrin 150 mgqAM, and Doxepin 10 mg PO at bedtime (PCP prescribes)   Continue alprazolam 0.5 mg PEG BID PRN  Continue Buproprion  mg PEG qAM  Continue Doxepin 10 mg PO at bedtime   Pt has been crushing Buproprion SR at home, advised her not to crush sustained release, will need prescribed IR at discharge     Sleeping Difficulty  Impaired sleep related to pain, anxiety  Improved with gabapentin  Continue home alprazolam and Doxepin    Weight loss   Related to malignancy  Nutrition following  -TF via peg  -Diet as tolerated     Supportive and Palliative Oncology encounter:  Spoke with patient and  at bedside  Emotional support provided  Coordination of care  We will continue to follow and address symptoms as needed    Medical Decision Making/Goals of Care/Advance Care Planning:  Patient's current clinical condition, including diagnosis, and management plan, and goals of care were discussed.   Goals: symptom control and cancer directed therapy    Advance Directives  Existence of Advance Directives:No - not interested  Decision maker: Surrogate decision maker is     Next Follow-Up Visit:  Return to clinic in 4 weeks VV    Signature and billing  Medical complexity was moderate level due to due to complexity of problems, extensive data review, and high risk of management/treatment.  Time was spent on the following: Prep Time, Time Directly with Patient/Family/Caregiver, Documentation Time. Total time  spent: 40      Data  Diagnostic tests and information reviewed for today's visit:  Most recent labs and imaging results, Medications       Some elements copied from Supportive Oncology note on 12/6/24, the elements have been updated and all reflect current decision making from today, 12/24/2024.      Plan of Care discussed with: Patient, Family/Significant Other: , and RN    SIGNATURE: Jazmin Phillips, APRN-CNP    Contact information:  Supportive and Palliative Oncology  Monday-Friday 8 AM-5 PM  Phone:  [phone], press option #5, then option #1.   Or Epic Secure Chat

## 2024-12-26 LAB — ACTH PLAS-MCNC: 10.5 PG/ML (ref 7.2–63.3)

## 2025-01-01 ENCOUNTER — HOSPITAL ENCOUNTER (EMERGENCY)
Facility: HOSPITAL | Age: 60
End: 2025-04-09
Attending: STUDENT IN AN ORGANIZED HEALTH CARE EDUCATION/TRAINING PROGRAM
Payer: MEDICAID

## 2025-01-01 ENCOUNTER — HOSPITAL ENCOUNTER (OUTPATIENT)
Dept: RADIATION ONCOLOGY | Facility: HOSPITAL | Age: 60
Setting detail: RADIATION/ONCOLOGY SERIES
Discharge: HOME | End: 2025-04-08
Payer: MEDICAID

## 2025-01-01 ENCOUNTER — APPOINTMENT (OUTPATIENT)
Dept: HEMATOLOGY/ONCOLOGY | Facility: HOSPITAL | Age: 60
End: 2025-01-01
Payer: MEDICAID

## 2025-01-01 ENCOUNTER — TELEPHONE (OUTPATIENT)
Dept: RADIATION ONCOLOGY | Facility: HOSPITAL | Age: 60
End: 2025-01-01
Payer: MEDICAID

## 2025-01-01 ENCOUNTER — PATIENT MESSAGE (OUTPATIENT)
Dept: PALLIATIVE MEDICINE | Facility: HOSPITAL | Age: 60
End: 2025-01-01
Payer: MEDICAID

## 2025-01-01 VITALS
RESPIRATION RATE: 18 BRPM | WEIGHT: 149.25 LBS | DIASTOLIC BLOOD PRESSURE: 75 MMHG | BODY MASS INDEX: 27.29 KG/M2 | OXYGEN SATURATION: 96 % | TEMPERATURE: 98.2 F | HEART RATE: 97 BPM | SYSTOLIC BLOOD PRESSURE: 112 MMHG

## 2025-01-01 VITALS — OXYGEN SATURATION: 100 %

## 2025-01-01 DIAGNOSIS — C01 CANCER OF BASE OF TONGUE (MULTI): ICD-10-CM

## 2025-01-01 DIAGNOSIS — I46.9 CARDIAC ARREST: Primary | ICD-10-CM

## 2025-01-01 DIAGNOSIS — C10.9 OROPHARYNGEAL CANCER (MULTI): Primary | ICD-10-CM

## 2025-01-01 PROCEDURE — 99285 EMERGENCY DEPT VISIT HI MDM: CPT | Performed by: STUDENT IN AN ORGANIZED HEALTH CARE EDUCATION/TRAINING PROGRAM

## 2025-01-01 PROCEDURE — 2500000005 HC RX 250 GENERAL PHARMACY W/O HCPCS: Mod: SE

## 2025-01-01 PROCEDURE — 99211 OFF/OP EST MAY X REQ PHY/QHP: CPT | Performed by: RADIOLOGY

## 2025-01-01 PROCEDURE — 92950 HEART/LUNG RESUSCITATION CPR: CPT | Performed by: STUDENT IN AN ORGANIZED HEALTH CARE EDUCATION/TRAINING PROGRAM

## 2025-01-01 PROCEDURE — 2500000004 HC RX 250 GENERAL PHARMACY W/ HCPCS (ALT 636 FOR OP/ED): Mod: SE

## 2025-01-01 PROCEDURE — 82947 ASSAY GLUCOSE BLOOD QUANT: CPT

## 2025-01-01 PROCEDURE — 31500 INSERT EMERGENCY AIRWAY: CPT | Performed by: STUDENT IN AN ORGANIZED HEALTH CARE EDUCATION/TRAINING PROGRAM

## 2025-01-01 PROCEDURE — 99285 EMERGENCY DEPT VISIT HI MDM: CPT | Mod: 25 | Performed by: STUDENT IN AN ORGANIZED HEALTH CARE EDUCATION/TRAINING PROGRAM

## 2025-01-01 PROCEDURE — 94681 O2 UPTK CO2 OUTP % O2 XTRC: CPT

## 2025-01-01 RX ORDER — LIDOCAINE HYDROCHLORIDE 20 MG/ML
1 JELLY TOPICAL ONCE
Status: COMPLETED | OUTPATIENT
Start: 2025-01-01 | End: 2025-01-01

## 2025-01-01 RX ORDER — SCOPOLAMINE 1 MG/3D
1 PATCH, EXTENDED RELEASE TRANSDERMAL
Qty: 10 PATCH | Refills: 1 | Status: SHIPPED | OUTPATIENT
Start: 2025-01-01 | End: 2025-01-01

## 2025-01-01 RX ORDER — LIDOCAINE HYDROCHLORIDE 20 MG/ML
JELLY TOPICAL
Status: COMPLETED
Start: 2025-01-01 | End: 2025-01-01

## 2025-01-01 RX ORDER — EPINEPHRINE 0.1 MG/ML
INJECTION INTRACARDIAC; INTRAVENOUS CODE/TRAUMA/SEDATION MEDICATION
Status: COMPLETED | OUTPATIENT
Start: 2025-01-01 | End: 2025-01-01

## 2025-01-01 RX ORDER — GUAIFENESIN 100 MG/5ML
400 LIQUID ORAL EVERY 6 HOURS PRN
Qty: 2400 ML | Refills: 2 | Status: SHIPPED | OUTPATIENT
Start: 2025-01-01 | End: 2025-01-01

## 2025-01-01 RX ORDER — ESOMEPRAZOLE MAGNESIUM 20 MG/1
20 GRANULE, DELAYED RELEASE ORAL
Qty: 600 MG | Refills: 0 | Status: CANCELLED | OUTPATIENT
Start: 2025-01-01 | End: 2025-05-07

## 2025-01-01 RX ADMIN — EPINEPHRINE 1 MG: 0.1 INJECTION INTRAVENOUS at 02:15

## 2025-01-01 RX ADMIN — LIDOCAINE HYDROCHLORIDE 1 APPLICATION: 20 JELLY TOPICAL at 15:55

## 2025-01-01 RX ADMIN — EPINEPHRINE 1 MG: 0.1 INJECTION INTRAVENOUS at 02:06

## 2025-01-01 RX ADMIN — EPINEPHRINE 1 MG: 0.1 INJECTION INTRAVENOUS at 02:09

## 2025-01-01 RX ADMIN — EPINEPHRINE 1 MG: 0.1 INJECTION INTRAVENOUS at 02:03

## 2025-01-01 RX ADMIN — EPINEPHRINE 1 MG: 0.1 INJECTION INTRAVENOUS at 02:12

## 2025-01-01 ASSESSMENT — ENCOUNTER SYMPTOMS
HEMOPTYSIS: 1
ENDOCRINE NEGATIVE: 1
APPETITE CHANGE: 1
GASTROINTESTINAL NEGATIVE: 1
LOSS OF SENSATION IN FEET: 0
NECK PAIN: 1
CONSTITUTIONAL NEGATIVE: 1
SPEECH DIFFICULTY: 1
NAUSEA: 1
MUSCULOSKELETAL NEGATIVE: 1
OCCASIONAL FEELINGS OF UNSTEADINESS: 0
PSYCHIATRIC NEGATIVE: 1
CONSTIPATION: 0
EYES NEGATIVE: 1
VOICE CHANGE: 1
CARDIOVASCULAR NEGATIVE: 1
HEMATOLOGIC/LYMPHATIC NEGATIVE: 1
DEPRESSION: 0
APPETITE CHANGE: 0
FATIGUE: 1
TROUBLE SWALLOWING: 1
CARDIOVASCULAR NEGATIVE: 1
RESPIRATORY NEGATIVE: 1

## 2025-01-01 ASSESSMENT — PATIENT HEALTH QUESTIONNAIRE - PHQ9
2. FEELING DOWN, DEPRESSED OR HOPELESS: NOT AT ALL
SUM OF ALL RESPONSES TO PHQ9 QUESTIONS 1 AND 2: 0
1. LITTLE INTEREST OR PLEASURE IN DOING THINGS: NOT AT ALL

## 2025-01-06 ENCOUNTER — SOCIAL WORK (OUTPATIENT)
Dept: CASE MANAGEMENT | Facility: HOSPITAL | Age: 60
End: 2025-01-06
Payer: MEDICAID

## 2025-01-06 NOTE — PROGRESS NOTES
Social Work Note    Referral Source: PABLO Wells  Identified Needs: Letter of Caregiver need   Impression and Plan: CHARLOTTE received staff message that Amalia would like a letter stating that it is medically necessary for her to have a caregiver 24/7 due to her loss of consciousness, etc. CHARLOTTE collaborated with Dr. Garcia's nurse, PABLO Patel to see if Dr. Garcia would be comfortable signing the letter. RN touching base with Dr. Garcia, will discuss tomorrow in clinic. Will call Amalia with update when CHARLOTTE is aware of more details.   Interventions Provided: Advocacy and Care Coordination

## 2025-01-07 ENCOUNTER — APPOINTMENT (OUTPATIENT)
Dept: OTOLARYNGOLOGY | Facility: HOSPITAL | Age: 60
End: 2025-01-07
Payer: MEDICAID

## 2025-01-07 ENCOUNTER — SOCIAL WORK (OUTPATIENT)
Dept: CASE MANAGEMENT | Facility: HOSPITAL | Age: 60
End: 2025-01-07
Payer: MEDICAID

## 2025-01-07 NOTE — PROGRESS NOTES
Social Work Note    Identified Needs: Letter recommending caregiver  Impression and Plan: SW made call to Amalia, no answer. Left vm to return call for preferred method of delivery for signed letter recommending caregiver. Copy was sent to medical records to be scanned into EMR.   Interventions Provided: Care Coordination    SW will continue to follow,

## 2025-01-10 ENCOUNTER — APPOINTMENT (OUTPATIENT)
Dept: RADIOLOGY | Facility: HOSPITAL | Age: 60
End: 2025-01-10
Payer: MEDICAID

## 2025-01-10 ENCOUNTER — APPOINTMENT (OUTPATIENT)
Dept: OTOLARYNGOLOGY | Facility: HOSPITAL | Age: 60
End: 2025-01-10
Payer: MEDICAID

## 2025-01-10 ENCOUNTER — APPOINTMENT (OUTPATIENT)
Dept: HEMATOLOGY/ONCOLOGY | Facility: HOSPITAL | Age: 60
End: 2025-01-10
Payer: MEDICAID

## 2025-01-10 PROCEDURE — RXMED WILLOW AMBULATORY MEDICATION CHARGE

## 2025-01-13 PROCEDURE — RXMED WILLOW AMBULATORY MEDICATION CHARGE

## 2025-01-14 ENCOUNTER — OFFICE VISIT (OUTPATIENT)
Dept: HEMATOLOGY/ONCOLOGY | Facility: HOSPITAL | Age: 60
End: 2025-01-14
Payer: MEDICAID

## 2025-01-14 ENCOUNTER — PHARMACY VISIT (OUTPATIENT)
Dept: PHARMACY | Facility: CLINIC | Age: 60
End: 2025-01-14
Payer: MEDICAID

## 2025-01-14 ENCOUNTER — APPOINTMENT (OUTPATIENT)
Dept: NUTRITION | Facility: CLINIC | Age: 60
End: 2025-01-14
Payer: MEDICAID

## 2025-01-14 ENCOUNTER — NUTRITION (OUTPATIENT)
Dept: HEMATOLOGY/ONCOLOGY | Facility: HOSPITAL | Age: 60
End: 2025-01-14

## 2025-01-14 ENCOUNTER — LAB (OUTPATIENT)
Dept: HEMATOLOGY/ONCOLOGY | Facility: HOSPITAL | Age: 60
End: 2025-01-14
Payer: MEDICAID

## 2025-01-14 ENCOUNTER — INFUSION (OUTPATIENT)
Dept: HEMATOLOGY/ONCOLOGY | Facility: HOSPITAL | Age: 60
End: 2025-01-14
Payer: MEDICAID

## 2025-01-14 VITALS
OXYGEN SATURATION: 100 % | HEART RATE: 82 BPM | RESPIRATION RATE: 18 BRPM | BODY MASS INDEX: 28.16 KG/M2 | DIASTOLIC BLOOD PRESSURE: 53 MMHG | TEMPERATURE: 97.9 F | WEIGHT: 158.95 LBS | SYSTOLIC BLOOD PRESSURE: 96 MMHG

## 2025-01-14 DIAGNOSIS — C01 CANCER OF BASE OF TONGUE (MULTI): ICD-10-CM

## 2025-01-14 DIAGNOSIS — C01 CANCER OF BASE OF TONGUE (MULTI): Primary | ICD-10-CM

## 2025-01-14 LAB
ALBUMIN SERPL BCP-MCNC: 3.6 G/DL (ref 3.4–5)
ALP SERPL-CCNC: 69 U/L (ref 33–136)
ALT SERPL W P-5'-P-CCNC: 11 U/L (ref 7–45)
ANION GAP SERPL CALC-SCNC: 11 MMOL/L (ref 10–20)
AST SERPL W P-5'-P-CCNC: 15 U/L (ref 9–39)
BASOPHILS # BLD AUTO: 0.03 X10*3/UL (ref 0–0.1)
BASOPHILS NFR BLD AUTO: 0.4 %
BILIRUB SERPL-MCNC: 0.4 MG/DL (ref 0–1.2)
BUN SERPL-MCNC: 28 MG/DL (ref 6–23)
CALCIUM SERPL-MCNC: 9.1 MG/DL (ref 8.6–10.3)
CHLORIDE SERPL-SCNC: 99 MMOL/L (ref 98–107)
CO2 SERPL-SCNC: 32 MMOL/L (ref 21–32)
CREAT SERPL-MCNC: 0.89 MG/DL (ref 0.5–1.05)
EGFRCR SERPLBLD CKD-EPI 2021: 74 ML/MIN/1.73M*2
EOSINOPHIL # BLD AUTO: 0.46 X10*3/UL (ref 0–0.7)
EOSINOPHIL NFR BLD AUTO: 6.5 %
ERYTHROCYTE [DISTWIDTH] IN BLOOD BY AUTOMATED COUNT: 13.1 % (ref 11.5–14.5)
GLUCOSE SERPL-MCNC: 133 MG/DL (ref 74–99)
HCT VFR BLD AUTO: 36 % (ref 36–46)
HGB BLD-MCNC: 11.5 G/DL (ref 12–16)
IMM GRANULOCYTES # BLD AUTO: 0.01 X10*3/UL (ref 0–0.7)
IMM GRANULOCYTES NFR BLD AUTO: 0.1 % (ref 0–0.9)
LYMPHOCYTES # BLD AUTO: 1 X10*3/UL (ref 1.2–4.8)
LYMPHOCYTES NFR BLD AUTO: 14.1 %
MCH RBC QN AUTO: 31.3 PG (ref 26–34)
MCHC RBC AUTO-ENTMCNC: 31.9 G/DL (ref 32–36)
MCV RBC AUTO: 98 FL (ref 80–100)
MONOCYTES # BLD AUTO: 0.5 X10*3/UL (ref 0.1–1)
MONOCYTES NFR BLD AUTO: 7 %
NEUTROPHILS # BLD AUTO: 5.11 X10*3/UL (ref 1.2–7.7)
NEUTROPHILS NFR BLD AUTO: 71.9 %
NRBC BLD-RTO: 0 /100 WBCS (ref 0–0)
PLATELET # BLD AUTO: 201 X10*3/UL (ref 150–450)
POTASSIUM SERPL-SCNC: 4 MMOL/L (ref 3.5–5.3)
PROT SERPL-MCNC: 5.9 G/DL (ref 6.4–8.2)
RBC # BLD AUTO: 3.68 X10*6/UL (ref 4–5.2)
SODIUM SERPL-SCNC: 138 MMOL/L (ref 136–145)
WBC # BLD AUTO: 7.1 X10*3/UL (ref 4.4–11.3)

## 2025-01-14 PROCEDURE — 3074F SYST BP LT 130 MM HG: CPT | Performed by: STUDENT IN AN ORGANIZED HEALTH CARE EDUCATION/TRAINING PROGRAM

## 2025-01-14 PROCEDURE — 2500000004 HC RX 250 GENERAL PHARMACY W/ HCPCS (ALT 636 FOR OP/ED): Mod: SE | Performed by: STUDENT IN AN ORGANIZED HEALTH CARE EDUCATION/TRAINING PROGRAM

## 2025-01-14 PROCEDURE — 3078F DIAST BP <80 MM HG: CPT | Performed by: STUDENT IN AN ORGANIZED HEALTH CARE EDUCATION/TRAINING PROGRAM

## 2025-01-14 PROCEDURE — 80053 COMPREHEN METABOLIC PANEL: CPT

## 2025-01-14 PROCEDURE — 99214 OFFICE O/P EST MOD 30 MIN: CPT | Performed by: STUDENT IN AN ORGANIZED HEALTH CARE EDUCATION/TRAINING PROGRAM

## 2025-01-14 PROCEDURE — 85025 COMPLETE CBC W/AUTO DIFF WBC: CPT

## 2025-01-14 PROCEDURE — 36591 DRAW BLOOD OFF VENOUS DEVICE: CPT

## 2025-01-14 PROCEDURE — 96413 CHEMO IV INFUSION 1 HR: CPT

## 2025-01-14 RX ORDER — HEPARIN SODIUM,PORCINE/PF 10 UNIT/ML
50 SYRINGE (ML) INTRAVENOUS AS NEEDED
OUTPATIENT
Start: 2025-01-14

## 2025-01-14 RX ORDER — PROCHLORPERAZINE MALEATE 10 MG
10 TABLET ORAL EVERY 6 HOURS PRN
Status: CANCELLED | OUTPATIENT
Start: 2025-01-14

## 2025-01-14 RX ORDER — PROCHLORPERAZINE MALEATE 10 MG
10 TABLET ORAL EVERY 6 HOURS PRN
Status: DISCONTINUED | OUTPATIENT
Start: 2025-01-14 | End: 2025-01-14 | Stop reason: HOSPADM

## 2025-01-14 RX ORDER — PROCHLORPERAZINE EDISYLATE 5 MG/ML
10 INJECTION INTRAMUSCULAR; INTRAVENOUS EVERY 6 HOURS PRN
Status: CANCELLED | OUTPATIENT
Start: 2025-01-14

## 2025-01-14 RX ORDER — ALBUTEROL SULFATE 0.83 MG/ML
3 SOLUTION RESPIRATORY (INHALATION) AS NEEDED
Status: DISCONTINUED | OUTPATIENT
Start: 2025-01-14 | End: 2025-01-14 | Stop reason: HOSPADM

## 2025-01-14 RX ORDER — DIPHENHYDRAMINE HYDROCHLORIDE 50 MG/ML
50 INJECTION INTRAMUSCULAR; INTRAVENOUS AS NEEDED
Status: DISCONTINUED | OUTPATIENT
Start: 2025-01-14 | End: 2025-01-14 | Stop reason: HOSPADM

## 2025-01-14 RX ORDER — HEPARIN 100 UNIT/ML
500 SYRINGE INTRAVENOUS AS NEEDED
Status: CANCELLED | OUTPATIENT
Start: 2025-01-14

## 2025-01-14 RX ORDER — PROCHLORPERAZINE EDISYLATE 5 MG/ML
10 INJECTION INTRAMUSCULAR; INTRAVENOUS EVERY 6 HOURS PRN
Status: DISCONTINUED | OUTPATIENT
Start: 2025-01-14 | End: 2025-01-14 | Stop reason: HOSPADM

## 2025-01-14 RX ORDER — HEPARIN 100 UNIT/ML
500 SYRINGE INTRAVENOUS AS NEEDED
OUTPATIENT
Start: 2025-01-14

## 2025-01-14 RX ORDER — HEPARIN 100 UNIT/ML
500 SYRINGE INTRAVENOUS AS NEEDED
Status: DISCONTINUED | OUTPATIENT
Start: 2025-01-14 | End: 2025-01-14 | Stop reason: HOSPADM

## 2025-01-14 RX ORDER — FAMOTIDINE 10 MG/ML
20 INJECTION INTRAVENOUS ONCE AS NEEDED
Status: CANCELLED | OUTPATIENT
Start: 2025-01-14

## 2025-01-14 RX ORDER — FAMOTIDINE 10 MG/ML
20 INJECTION INTRAVENOUS ONCE AS NEEDED
Status: DISCONTINUED | OUTPATIENT
Start: 2025-01-14 | End: 2025-01-14 | Stop reason: HOSPADM

## 2025-01-14 RX ORDER — HEPARIN SODIUM,PORCINE/PF 10 UNIT/ML
50 SYRINGE (ML) INTRAVENOUS AS NEEDED
Status: CANCELLED | OUTPATIENT
Start: 2025-01-14

## 2025-01-14 RX ORDER — ALBUTEROL SULFATE 0.83 MG/ML
3 SOLUTION RESPIRATORY (INHALATION) AS NEEDED
Status: CANCELLED | OUTPATIENT
Start: 2025-01-14

## 2025-01-14 RX ORDER — EPINEPHRINE 0.3 MG/.3ML
0.3 INJECTION SUBCUTANEOUS EVERY 5 MIN PRN
Status: CANCELLED | OUTPATIENT
Start: 2025-01-14

## 2025-01-14 RX ORDER — EPINEPHRINE 0.3 MG/.3ML
0.3 INJECTION SUBCUTANEOUS EVERY 5 MIN PRN
Status: DISCONTINUED | OUTPATIENT
Start: 2025-01-14 | End: 2025-01-14 | Stop reason: HOSPADM

## 2025-01-14 RX ORDER — DIPHENHYDRAMINE HYDROCHLORIDE 50 MG/ML
50 INJECTION INTRAMUSCULAR; INTRAVENOUS AS NEEDED
Status: CANCELLED | OUTPATIENT
Start: 2025-01-14

## 2025-01-14 RX ADMIN — HEPARIN 500 UNITS: 100 SYRINGE at 11:30

## 2025-01-14 RX ADMIN — SODIUM CHLORIDE 200 MG: 9 INJECTION, SOLUTION INTRAVENOUS at 10:48

## 2025-01-14 ASSESSMENT — PAIN SCALES - GENERAL: PAINLEVEL_OUTOF10: 0-NO PAIN

## 2025-01-14 ASSESSMENT — ENCOUNTER SYMPTOMS
APPETITE CHANGE: 1
RESPIRATORY NEGATIVE: 1
TROUBLE SWALLOWING: 1
GASTROINTESTINAL NEGATIVE: 1
HEMOPTYSIS: 0
CARDIOVASCULAR NEGATIVE: 1
NECK PAIN: 1
CONSTIPATION: 0

## 2025-01-14 NOTE — PROGRESS NOTES
[facility] - Medical Oncology Follow-Up Visit    Patient ID: Amalia Roa is a 59 y.o. female with oropharyngeal squamous cell ca     Current therapy: methylPREDNISolone sod succinate (SOLU-Medrol) 40 mg/mL injection 40 mg, 40 mg, intravenous, As needed, 3 of 17 cycles        pembrolizumab (Keytruda) 200 mg in sodium chloride 0.9% 118 mL IV, 200 mg, intravenous, Once, 3 of 17 cycles    Administration: 200 mg (11/12/2024), 200 mg (12/3/2024), 200 mg (12/24/2024)       Chief Concern: Readiness to treat - C4    Oncologic History:   DIAGNOSIS  Oropharyngeal squamous cell ca     STAGING  T4N2M1      CURRENT SITES OF DISEASE  R oropharynx, tongue, bilateral lungs, L adrenal gland     MOLECULAR GENOMICS  P16+   CPS 10     PRIOR THERAPY        CURRENT THERAPY  Pembrolizumab 11/12/24 - present     CURRENT ONCOLOGICAL PROBLEMS           HISTORY OF PRESENT ILLNESS  Ms. Roa is a 60 yo with PMH significant for multiple medical comorbidities including HFrEF, CAD s/p JOHN, mitral regurgitation, and MEÑO who had had trouble swallowing for at least a year with throat discomfort, particularly on the R side. CT of the cervical spine at OSH was concerning for 3.6 x 2.0 necrotic level 2 lymph node. She was referred to Dr. Montanez who she met on 9/20/24 due to concerns for a mass. Flex laryngoscopy showed a prominence at the base of the tongue of the R and an exophytic mass of the R piriform sinus, with evidence of inga disease on the L. CT of the neck on 10/8/24 revealed large, irregular mass of the R oropharynx measuring at least 4 x 3.2 x 7.6 cm, with involvement of the R lateral wall/tonsillar region, base of tongue/glossal tonsillar sulcus, soft palate, and abutment of the epiglottis with extension into the R parapharyngeal fat and  space. There appeared to be extension into the R tongue and within the floor of the mouth and oral cavity. Also noted was thrombosis or occlusion of the RIJ,  bilateral cervical lymphadenopathy with levels 2-4 noted on the R and on the left, and multiple pulmonary nodules. She was admitted to the hospital and underwent panendoscopy, biopsy, and PEG placement on 10/14/24. CT chest also on 10/14 was notable for multiple parenchymal bilateral lung nodules and enlarged mediastinal and hilar lymph nodes, as well as suspicious L adrenal gland nodule. She was discussed in tumor board, with PD-L1 added on CPS 10. PET/CT 10/22/24 confirmed FDG avidity of R oropharynx/oral cavity/hypopharynx mass, L palatine tonsil, R parotid gland, multiple cervical nodes bilaterally, multiple mediastinal and hilar lymph  nodes, pulmonary nodules, small L pleural effusion, and L adrenal gland. Decided to start pembrolizumab monotherapy, started 11/12/24 after port placement.     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 Lives at home with her . Has two grandsons who are 6 and 2 yo, and two granddaughters who are 3 yo and 10 months.    Tob: quit smoking 2014. 40 years x 1/2 ppd  EtOH: none  Illicits: none     FAMILY HISTORY  Mother - breast cancer dx 50s or 60s  Sister - colon ca dx 56 yo  Niece - cervical cancer dx 30s    HPI   She is here today with her . She has had no further dizzy spells. She can eat better the week of infusion. No new complaints.      Meds (Current):    Current Outpatient Medications:     acetaminophen (Tylenol) 160 mg/5 mL liquid, 31.3 mL (1,000 mg) by g-tube route 2 times a day., Disp: 2436 mL, Rfl: 0    albuterol 90 mcg/actuation inhaler, Inhale 2 puffs every 4 hours if needed for wheezing., Disp: , Rfl:     ALPRAZolam (Xanax) 0.5 mg tablet, Take 1 tablet (0.5 mg) by mouth 2 times a day as needed for anxiety., Disp: , Rfl:     aspirin  "81 mg chewable tablet, Chew 1 tablet (81 mg) once daily., Disp: , Rfl:     atorvastatin (Lipitor) 40 mg tablet, Take 1 tablet (40 mg) by mouth once daily., Disp: , Rfl:     buPROPion (Wellbutrin) 75 mg tablet, Take 2 tablets (150 mg) by mouth once daily in the morning., Disp: 60 tablet, Rfl: 0    doxepin (SINEquan) 10 mg/mL solution, 1 mL (10 mg) by g-tube route once daily at bedtime., Disp: 120 mL, Rfl: 12    fluticasone (Flovent) 44 mcg/actuation inhaler, Inhale 2 puffs 2 times a day., Disp: , Rfl:     gabapentin (Neurontin) 50 mg/mL solution, 6 mL (300 mg) by g-tube route 2 times a day., Disp: 360 mL, Rfl: 0    lidocaine (Lidoderm) 5 % patch, Place 1 patch over 12 hours on the skin once daily. Remove & discard patch within 12 hours or as directed by MD., Disp: 30 patch, Rfl: 0    metoprolol tartrate (Lopressor) 25 mg tablet, Take 0.5 tablets (12.5 mg) by mouth 2 times a day., Disp: 60 tablet, Rfl: 5    oxyCODONE (Roxicodone) 5 mg/5 mL solution, 10 mL (10 mg) by g-tube route every 6 hours if needed for moderate pain (4 - 6)., Disp: 1200 mL, Rfl: 0    prochlorperazine (Compazine) 10 mg tablet, Take 1 tablet (10 mg) by mouth every 6 hours if needed for nausea or vomiting., Disp: 60 tablet, Rfl: 0    senna (Senokot) 8.8 mg/5 mL syrup, Take 5 mL by mouth 2 times a day for 10 days., Disp: 240 mL, Rfl: 3    transparent dressings (Tegaderm) 3 1/2 X 4 \" bandage, Apply 1 each topically every 21 (twenty-one) days. Cover mediport site., Disp: 1 each, Rfl: 11    Review of Systems   Constitutional:  Positive for appetite change.   HENT:   Positive for trouble swallowing.         Intermittent right ear pain.   Respiratory: Negative.  Negative for hemoptysis.    Cardiovascular: Negative.    Gastrointestinal: Negative.  Negative for constipation.   Musculoskeletal:  Positive for neck pain.   Skin: Negative.    All other systems reviewed and are negative.       Objective   BSA: 1.79 meters squared  Wt Readings from Last 5 " Encounters:   01/14/25 72.1 kg (158 lb 15.2 oz)   12/24/24 71.9 kg (158 lb 8.2 oz)   12/24/24 71.5 kg (157 lb 11.2 oz)   12/03/24 70.3 kg (155 lb)   12/03/24 70.4 kg (155 lb 1.5 oz)     BP 96/53 (BP Location: Right arm, Patient Position: Sitting)   Pulse 82   Temp 36.6 °C (97.9 °F) (Temporal)   Resp 18   Wt 72.1 kg (158 lb 15.2 oz)   SpO2 100%   BMI 28.16 kg/m²     ECOG Score: 0- Fully active, able to carry on all pre-disease performance w/o restriction.      Physical Exam  Vitals reviewed.   Constitutional:       Appearance: Normal appearance.   HENT:      Head: Normocephalic.      Nose: Nose normal.      Mouth/Throat:      Mouth: Mucous membranes are moist.      Pharynx: Oropharynx is clear.   Eyes:      Extraocular Movements: Extraocular movements intact.      Conjunctiva/sclera: Conjunctivae normal.      Pupils: Pupils are equal, round, and reactive to light.   Cardiovascular:      Rate and Rhythm: Normal rate and regular rhythm.      Pulses: Normal pulses.      Heart sounds: Normal heart sounds.   Pulmonary:      Breath sounds: Normal breath sounds.   Abdominal:      General: Bowel sounds are normal.      Palpations: Abdomen is soft.      Comments: Capped peg.    Musculoskeletal:         General: Normal range of motion.      Cervical back: Normal range of motion and neck supple.   Skin:     General: Skin is warm and dry.   Neurological:      General: No focal deficit present.      Mental Status: She is alert and oriented to person, place, and time.   Psychiatric:         Mood and Affect: Mood normal.         Behavior: Behavior normal.         Thought Content: Thought content normal.         Judgment: Judgment normal.          Results:  Labs:  Lab Results   Component Value Date    WBC 7.6 12/23/2024    HGB 12.1 12/23/2024    HCT 37.7 12/23/2024    MCV 98 12/23/2024     12/23/2024      Lab Results   Component Value Date    NEUTROABS 5.27 12/23/2024      Lab Results   Component Value Date    GLUCOSE  110 (H) 12/23/2024    CALCIUM 9.6 12/23/2024     12/23/2024    K 4.3 12/23/2024    CO2 32 12/23/2024     12/23/2024    BUN 25 (H) 12/23/2024    CREATININE 0.86 12/23/2024    MG 2.12 12/10/2024     Lab Results   Component Value Date    ALT 14 12/23/2024    AST 16 12/23/2024    ALKPHOS 66 12/23/2024    BILITOT 0.5 12/23/2024      Lab Results   Component Value Date    ACTH 10.5 12/23/2024    CORTISOL 6.6 12/23/2024    TSH 1.07 12/23/2024       Imaging:           Assessment/Plan      Amalia Roa is a 59 y.o. female here for follow up of oropharyngeal squamous cell ca.    # Oropharyngeal squamous cell ca  - T4N2M1  - p16+  - CPS 10   - discussed that with metastatic disease would recommend systemic therapy, and depending on CPS would recommend chemotherapy+immunotherapy or immunotherapy monotherapy. Also potentially eligible for clinical trial based on CPS  - given CPS, discussed pembrolizumab monotherapy, and consented  - she requested port placement due to poor venous access - Port placed.  Port placed 11/22/24   - plan to start pembrolizumab after port placement  - previously discussed with Dr. Nolan consideration for consolidation radiation pending response to pembro  - RTC to start treatment, planned on CT scans after C4, but scanned while inpatient. Will retime scans for 2/2025  - C1 11/12/24   Hospitalization 12/3 - 12/10/24 2/2 vasovagal syncope episode the evening of her C2 infusion.   - RTC in 3 weeks for C5     # Pain   - Oxycodone 5mg tab (#120 10/25/24) Rx provided by Dr. Nolan.  No routine admin.  Intermittent severe 10/10 right ear pain. 5mg PRN dose brings pain down from 10/10 - 7-8/10.  Okay received for pt to take two tabs for those episodes.   Routine tylenol 1000mg BID.    - Supportive onc referral placed.  Med onc will provide Rx until supportive onc visit.   - 12/24:  Gabapentin helping with neck/ear pain.  HS dose allowing her to sleep.  Minimal relief with PRN oxy - pt will discuss  with supportive onc     # Anxiety   - Routine HS Alprazolam     # Constipation (slow-transit)  - Rx sent for senna elixir. 5mg BID.  Pt to titrate med to produce routine, soft BM's.  Can also add miralax PRN.    - 12/24:  stools soft d/t recent ATB course, ongoing monitoring.

## 2025-01-14 NOTE — PROGRESS NOTES
Patient was seen and examined by MD with lab results prior to treatment. Patient tolerated the treatment very well. All queries and concerns addressed. Discharged to home in stable condition accompanied by significant others.

## 2025-01-14 NOTE — PROGRESS NOTES
"NUTRITION Follow-Up NOTE    Nutrition Assessment     Reason for Visit:  Amalia Roa is a 59 y.o. female who presents for base of tongue cancer.   PEG placed 10/18/24  [place] Pembrolizumab start 11/12/24 1/14- FUV in infusion.  present. C 3 immunotherapy.     Lab Results   Component Value Date/Time    GLUCOSE 133 (H) 01/14/2025 0839     01/14/2025 0839    K 4.0 01/14/2025 0839    CL 99 01/14/2025 0839    CO2 32 01/14/2025 0839    ANIONGAP 11 01/14/2025 0839    BUN 28 (H) 01/14/2025 0839    CREATININE 0.89 01/14/2025 0839    EGFR 74 01/14/2025 0839    CALCIUM 9.1 01/14/2025 0839    ALBUMIN 3.6 01/14/2025 0839    ALKPHOS 69 01/14/2025 0839    PROT 5.9 (L) 01/14/2025 0839    AST 15 01/14/2025 0839    BILITOT 0.4 01/14/2025 0839    ALT 11 01/14/2025 0839     No results found for: \"VITD25\"    Anthropometrics:  Anthropometrics  IBW/kg (Dietitian Calculated): 52.2 kg  Weight Change  Weight History / % Weight Change: weight stable, in range of UBW        Wt Readings from Last 10 Encounters:   01/14/25 72.1 kg (158 lb 15.2 oz)   12/24/24 71.9 kg (158 lb 8.2 oz)   12/24/24 71.5 kg (157 lb 11.2 oz)   12/03/24 70.3 kg (155 lb)   12/03/24 70.4 kg (155 lb 1.5 oz)   12/03/24 70.4 kg (155 lb 1.6 oz)   11/22/24 71.7 kg (158 lb)   11/12/24 72 kg (158 lb 11.7 oz)   11/12/24 72 kg (158 lb 11.7 oz)   10/25/24 71.4 kg (157 lb 6.4 oz)   UBW ~ 158-160#     Food And Nutrient Intake:  Food and Nutrient History  Energy Intake: Good > 75 % (enteral + small amount oral intake some days)  Fluid Intake: good - oral fluid intake water/sweet tea/ a lot of ice + FWF + free water in enteral feeds. Pt feels she drinks / is adequately hydrated  Food Allergy: NKFA  GI Symptoms: none (senna via PEG as needed, has not needed lately, antiemetic as needed via PEG)  Oral Problems: dysphagia, dysgeusia     Food Intake  Amount of Food: pleasure foods - intake varies from day to day. Some days is able to eat other days she cannot. Taste is off " [facility] Internal Medicine Progress Note  Patient: Meche Dooley 79 y o  male   MRN: [id number]  PCP: Gurwinder Chambers DO  Unit/Bed#: [address] [place] [address] Encounter: [id number]  Date Of Visit: 02/23/23    Problem List:    Principal Problem:    Acute kidney injury superimposed on chronic kidney disease ([facility] )  Active Problems:    Type 2 diabetes mellitus with stage 4 chronic kidney disease and hypertension ([place] [address] )    Chronic combined systolic and diastolic congestive heart failure (HCC)    Atrial fibrillation ([facility] )    Stage 4 chronic kidney disease ([place] [address] )    Hyponatremia    Essential hypertension      Assessment & Plan:    * Acute kidney injury superimposed on chronic kidney disease ([place] [address] )  Assessment & Plan  Referred to ED with worsening of renal function on outpatient blood test, BUN/creatinine- 116/4 0  Repeat blood test in ED, BUNs/creatinine 125/3 6  UA- trace blood, 1-2 RBC  Suspect prerenal azotemia in setting of poor p o  intake, diuretic use  Nephrology following, input appreciated  Creatinine is downtrending to 3 2  · Bladder scan to monitor PVR  · Hold torsemide  · Monitor blood pressure  · IV NS  · Monitor intake output  · Avoid nephrotoxins/hypotension  · Consider ultrasound of kidneys if no improvement  · Nephrology follow-up    Hyponatremia  Assessment & Plan  Sodium 129 on admission  Improving with IV NS    Stage 4 chronic kidney disease (HCC)  Assessment & Plan  History of CKD stage IV, baseline creatinine 2  5-3  Suspected to be secondary to diabetic nephropathy, hypertensive nephrosclerosis and renovascular disease  Follows with nephrology        Atrial fibrillation [facility])  Assessment & Plan  History of chronic A-fib, controlled, continue carvedilol, Eliquis    Chronic combined systolic and diastolic congestive heart failure (HCC)  Assessment & Plan  Wt Readings from Last 3 Encounters:   02/22/23 67 1 kg (148 lb)   02/09/23 67 1 kg (148 lb)   11/07/22 70 8 kg (156 lb)     Echocardiogram-EF 40-45%, grade 2 diastolic dysfunction, moderate concentric hypertrophy  History of pericardial effusion  Currently appears close to euvolemia  · Hold torsemide  · Monitor intake output  · Daily weight      Type 2 diabetes mellitus with stage 4 chronic kidney disease and hypertension [facility])  Assessment & Plan  Lab Results   Component Value Date    HGBA1C 8 3 (H) 2023       Recent Labs     23  1132 23  1618 23  2049 23  0222   POCGLU 273* 260* 176* 93       Blood Sugar Average: Last 72 hrs:  (P) 194     Episode of hypoglycemia earlier today  Hold glimepiride, Januvia  Insulin sliding scale  Monitor p o  intake    Essential hypertension  Assessment & Plan  Stable  Monitor on carvedilol    hypo kalemia-repleted      VTE Pharmacologic Prophylaxis: VTE Score: 3 Moderate Risk (Score 3-4) - Pharmacological DVT Prophylaxis Ordered: apixaban (Eliquis)  Patient Centered Rounds: I performed bedside rounds with nursing staff today  Discussions with Specialists or Other Care Team Provider: Nephrology    Education and Discussions with Family / Patient: Updated  (son) via phone  Time Spent for Care: 55 minutes  More than 50% of total time spent on counseling and coordination of care as described above  Current Length of Stay: 1 day(s)  Current Patient Status: Inpatient   Certification Statement: The patient will continue to require additional inpatient hospital stay due to Acute kidney injury  Discharge Plan: Anticipate discharge in 48-72 hrs to discharge location to be determined pending rehab evaluations      Code Status: Level 1 - Full Code    Subjective:   Feels well, does not report any complaints  Denies pain  Noted to have bladder fullness    Noted to be hypoglycemic after receiving insulin last evening    Objective:     Vitals:   Temp (24hrs), Av 5 °F (36 4 °C), Min:97 3 °F (36 3 °C), Max:97 8 °F (36 6 °C)    Temp:  [97 3 °F (36 3 °C)-97 8 °F (36 6 °C)] 97 3 °F (36 3 °C)  HR:  [65-94] which also affects oral intake. Is able to eat more the week after immunotherapy  Food Variety:  (has been able to eat a chic jacey a sandwich (1/2) mashed potatoes, gelatin, fruit)    Food Preparation  Cooking: Patient, Spouse/Significant Other  Grocery Shopping: Patient, Spouse/Significant Other              Enteral Nutrition Intake  Enteral Nutrition Formula/Solution: Isosource 1.5 (5) cartons.Takes 2-2-1 cartons.  provides 1875 calories, 85 grams protein, 955 ml free water  Feeding Tube Flush: 60 ml FWF then meds 60 ml FWF then formula 60 ml post  ml FWF day+ 955 ml free water in 5 cartons formula = ~ 1500 ml/day  oral fluid intake                                    Nutrition Focused Physical Exam Findings:      Subcutaneous Fat Loss  Orbital Fat Pads: Well nourished (slightly bulging fat pads)  Buccal Fat Pads: Well nourished (full, rounded cheeks)  Triceps: Defer  Ribs: Defer    Muscle Wasting  Temporalis: Well nourished (well-defined muscle)  Pectoralis (Clavicular Region): Well nourished (clavicle not visible)  Deltoid/Trapezius: Well nourished (rounded appearance at arm, shoulder, neck)  Interosseous: Well nourished (muscle bulges)  Trapezius/Infraspinatus/Supraspinatus (Scapular Region): Well nourished (bones not prominent, muscle taut)  Quadriceps: Defer  Gastrocnemius: Defer         Physical Findings (Nutrition Deficiency/Toxicity)  Hair: Negative  Eyes: Negative  Mouth: Negative  Nails: Negative  Skin: Negative    Energy Needs  Estimated Energy Needs  Total Energy Estimated Needs (kCal): 1875 kCal  Total Estimated Energy Need per Day (kCal/kg): 26 kCal/kg  Estimated Fluid Needs  Total Fluid Estimated Needs (mL): 2150 mL  Total Fluid Estimated Needs (mL/kg): 30 mL/kg  Estimated Protein Needs  Total Protein Estimated Needs (g): 86 g  Total Protein Estimated Needs (g/kg): 1.2 g/kg        Nutrition Diagnosis   Malnutrition Diagnosis  Patient has Malnutrition Diagnosis: No    Nutrition  86  Resp:  [15-22] 17  BP: (105-132)/(55-85) 118/85  SpO2:  [95 %-99 %] 95 %  Body mass index is 21 86 kg/m²  Input and Output Summary (last 24 hours): Intake/Output Summary (Last 24 hours) at 2/23/2023 7098  Last data filed at 2/22/2023 2213  Gross per 24 hour   Intake 500 ml   Output 500 ml   Net 0 ml       Physical Exam:   Physical Exam  Constitutional:       General: He is not in acute distress  Comments: Elderly, frail   HENT:      Head: Normocephalic and atraumatic  Cardiovascular:      Rate and Rhythm: Normal rate  Rhythm irregular  Pulmonary:      Effort: Pulmonary effort is normal  No respiratory distress  Breath sounds: No wheezing, rhonchi or rales  Comments: Diminished bilaterally  Chest:      Chest wall: No tenderness  Abdominal:      General: Bowel sounds are normal  There is distension (Bladder fullness)  Palpations: Abdomen is soft  Tenderness: There is no abdominal tenderness  There is no guarding or rebound  Musculoskeletal:      Comments: Lower extremity chronic venous stasis   Skin:     General: Skin is warm and dry  Findings: No rash  Neurological:      Mental Status: He is alert  Mental status is at baseline  Cranial Nerves: No cranial nerve deficit  Psychiatric:         Cognition and Memory: He exhibits impaired recent memory           Additional Data:     Labs:  Results from last 7 days   Lab Units 02/23/23  0502 02/22/23  1351   WBC Thousand/uL 7 85 7 47   HEMOGLOBIN g/dL 13 9 13 7   HEMATOCRIT % 41 3 40 8   PLATELETS Thousands/uL 139* 111*   NEUTROS PCT %  --  79*   LYMPHS PCT %  --  9*   MONOS PCT %  --  12   EOS PCT %  --  0     Results from last 7 days   Lab Units 02/23/23  0502 02/22/23  1351   SODIUM mmol/L 134* 129*   POTASSIUM mmol/L 3 1* 3 5   CHLORIDE mmol/L 89* 82*   CO2 mmol/L 35* 37*   BUN mg/dL 112* 125*   CREATININE mg/dL 3 28* 3 62*   ANION GAP mmol/L 10 10   CALCIUM mg/dL 8 7 8 7   ALBUMIN g/dL  --  3 6   TOTAL BILIRUBIN Diagnosis  Patient has Nutrition Diagnosis: Yes  Diagnosis Status (1): Resolved (no longer appropriate)  Nutrition Diagnosis 1: Inadequate oral intake  Related to (1): chronic condition  As Evidenced by (1): diagnosis, need for PEG  Additional Nutrition Diagnosis: Diagnosis 3  Diagnosis Status (2): Ongoing  Nutrition Diagnosis 2: Swallowing difficulity  Related to (2): diagnosis  As Evidenced by (2): dysphagia, need for PEG  Nutrition Diagnosis 3: Increased energy expenditure  Related to (3): increased metabolic demand, chronic illness  As Evidenced by (3): cancer dx, treatment    Nutrition Interventions/Recommendations   Nutrition Prescription  Individualized Nutrition Prescription Provided for : enteral prescription - soft diet as tolerated for pleasure    Food and Nutrition Delivery  Food and Nutrition Delivery  Meals & Snacks: Texture-Modified Diet  Goals: soft HAROON pleasure foods, encouraged oral diet as tolerated to maintain swallow function. Chop/puree as needed, add moisture to foods as needed to aide swallowing. Chew food throroughly  Enteral Nutrition: Other, specify: (Continue with enteral feeds primary nutrition)  Goals: provides:1875 calories, 85 grams protein, 955 ml free water   continue    Nutrition Education       Coordination of Care            DME:[facility]  TF: Isosource 1.5  GOAL RATE: 4 cartons per day  PROVIDES: 1500 calories 68 grams protein 764 ml free water  METHOD: bolus  COMMENTS:   12/3- change order faxed to [facility] for Isosource 1.5 5 cartons/day to provide   1875 calories, 85 grams protein, 955 ml free water      Nutrition Monitoring and Evaluation   Food/Nutrient Related History Monitoring  Monitoring and Evaluation Plan: Meal/snack pattern, Energy intake, Protein intake  Energy Intake: Estimated energy intake  Criteria: meet >75% est energy needs  Meal/Snack Pattern: Food variety  Criteria: soft diet as tolerated for pleasure  Estimated protein intake: Estimated protein  intake  Criteria: meet > 75% est protein needs  Enteral and Parenteral Nutrition Intake: Enteral nutrition intake  Criteria: tolerate TF at goal  Body Composition/Growth/Weight History  Monitoring and Evaluation Plan: Weight  Weight: Measured weight  Criteria: maintain weight  Nutrition Focused Physical Findings  Monitoring and Evaluation Plan: Digestive System  Digestive System: Constipation  Criteria: manage symptoms/adhere to prescribed regime    Following as needed      Time Spent  Prep time on day of patient encounter: 10 minutes  Time spent directly with patient, family or caregiver: 25 minutes  Additional Time Spent on Patient Care Activities: 0 minutes  Documentation Time: 25 minutes  Other Time Spent: 0 minutes  Total: 60 minutes               mg/dL  --  1 42*   ALK PHOS U/L  --  149*   ALT U/L  --  17   AST U/L  --  16   GLUCOSE RANDOM mg/dL 43* 215*         Results from last 7 days   Lab Units 02/23/23  0714 02/23/23  0306 02/22/23 2045   POC GLUCOSE mg/dl 64* 133 359*               Lines/Drains:  Invasive Devices     Peripheral Intravenous Line  Duration           Peripheral IV 02/22/23 Right Forearm <1 day                      Imaging: No pertinent imaging reviewed  Recent Cultures (last 7 days):         Last 24 Hours Medication List:   Current Facility-Administered Medications   Medication Dose Route Frequency Provider Last Rate   • acetaminophen  650 mg Oral Q6H PRN Savanah Wolff MD     • allopurinol  100 mg Oral BID Savanah Wolff MD     • ALPRAZolam  0 25 mg Oral HS Savanah Wolff MD     • apixaban  2 5 mg Oral BID Savanah Wolff MD     • ascorbic acid  500 mg Oral Daily Savanah Wolff MD     • atorvastatin  40 mg Oral Daily With Lyndsey Hooks MD     • carvedilol  6 25 mg Oral BID With Meals Savanah Wolff MD     • cholecalciferol  2,000 Units Oral Daily Savanah Wolff MD     • insulin lispro  1-5 Units Subcutaneous TID AC Savanah Wolff MD     • insulin lispro  1-5 Units Subcutaneous HS Savanah Wolff MD     • isosorbide mononitrate  30 mg Oral Daily Savanah Wolff MD     • latanoprost  1 drop Both Eyes HS Savanah Wolff MD     • potassium chloride  40 mEq Oral Once Savanah Wolff MD     • sodium chloride  50 mL/hr Intravenous Continuous Savanah Wolff MD 50 mL/hr (02/22/23 2213)   • timolol  1 drop Both Eyes Daily Savanah Wolff MD     • zinc sulfate  220 mg Oral Daily Savanah Wolff MD          Today, Patient Was Seen By: Savanah Wolff MD    ** Please Note: "This note has been constructed using a voice recognition system  Therefore there may be syntax, spelling, and/or grammatical errors   Please call if you have any questions  "**

## 2025-01-20 DIAGNOSIS — C01 CANCER OF BASE OF TONGUE (MULTI): ICD-10-CM

## 2025-01-20 RX ORDER — TRANSPARENT DRESSING 3.5"X4"
1 BANDAGE TOPICAL
Qty: 1 EACH | Refills: 11 | Status: SHIPPED | OUTPATIENT
Start: 2025-01-20

## 2025-01-21 ENCOUNTER — TELEPHONE (OUTPATIENT)
Dept: ADMISSION | Facility: HOSPITAL | Age: 60
End: 2025-01-21

## 2025-01-21 ENCOUNTER — TELEMEDICINE (OUTPATIENT)
Dept: PALLIATIVE MEDICINE | Facility: HOSPITAL | Age: 60
End: 2025-01-21
Payer: MEDICAID

## 2025-01-21 DIAGNOSIS — C01 CANCER OF BASE OF TONGUE (MULTI): ICD-10-CM

## 2025-01-21 DIAGNOSIS — G89.3 CANCER ASSOCIATED PAIN: Primary | ICD-10-CM

## 2025-01-21 PROCEDURE — 99214 OFFICE O/P EST MOD 30 MIN: CPT | Performed by: NURSE PRACTITIONER

## 2025-01-21 RX ORDER — HYDROMORPHONE HYDROCHLORIDE 5 MG/5ML
4 SOLUTION ORAL EVERY 4 HOURS PRN
Qty: 360 ML | Refills: 0 | Status: SHIPPED | OUTPATIENT
Start: 2025-01-21 | End: 2025-02-05

## 2025-01-21 RX ORDER — GABAPENTIN 250 MG/5ML
300 SOLUTION ORAL 3 TIMES DAILY
Qty: 540 ML | Refills: 0 | Status: SHIPPED | OUTPATIENT
Start: 2025-01-21 | End: 2025-02-20

## 2025-01-21 RX ORDER — ACETAMINOPHEN 160 MG/5ML
1000 LIQUID ORAL 3 TIMES DAILY PRN
Qty: 2838 ML | Refills: 2 | Status: SHIPPED | OUTPATIENT
Start: 2025-01-21 | End: 2025-02-20

## 2025-01-21 RX ORDER — LIDOCAINE 50 MG/G
1 PATCH TOPICAL DAILY
Qty: 30 PATCH | Refills: 3 | Status: SHIPPED | OUTPATIENT
Start: 2025-01-21

## 2025-01-21 NOTE — TELEPHONE ENCOUNTER
[facility] called back.  Ok to fill Xanax per provider.  Dilaudid did not require a PA.  Pharmacist did say dilaudid was out of stock and would come in the next couple days.  Patient aware.

## 2025-01-21 NOTE — TELEPHONE ENCOUNTER
[facility] pharmacy requesting call back from team.   They received new script for dilaudid liquid, insurance in requiring pharmacy to contact prescriber as pt is on xanax from another provider.   Please call to verify team is aware that pt is also taking xanax.

## 2025-01-21 NOTE — PROGRESS NOTES
SUPPORTIVE AND PALLIATIVE ONCOLOGY OUTPATIENT FOLLOW-UP    Virtual or Telephone Consent     An interactive audio and video telecommunication system which permits real time communications between the patient (at the originating site) and provider (at the distant site) was utilized to provide this telehealth service.   Verbal consent was requested and obtained from Amalia Roa  on this date, 1/21/25 for a telehealth visit.     SERVICE DATE: 1/21/2025    Subjective   HISTORY OF PRESENT ILLNESS: Amalia Roa is a 59 y.o. 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 Garcia).      Additional PMHx: depression, anxiety, PTSD, panic attacks, hypertension, heart failure with reduced EF, CAD, MI status post stents, mitral regurgitation status postrepair, asthma, MEÑO on CPAP, PEG tube dependency      1/21/25: Oxycodone is not really helping her pain. Only alleviates pain for about an hour. It causes nausea/headaches more than anything. Otherwise, doing well.     Symptom Assessment:    Pain:somewhat    Back of throat and into and right jaw into ear  Burning, sharp, intermittent   Gabapentin helps sleep     Numbness or tingling in hands/feet/other: none  Headache: none  Lack of appetite: none  Weight loss: a little   Pain in mouth/swallowing: a little  Dry mouth: none  Taste changes: a little  Nausea/early satiety: a little - with pills.   Vomiting: none  Constipation: none  Diarrhea: none  Lack of energy: none  Difficulty sleeping: none  Anxiety: none  Depression: none  Shortness of breath: none  Other: none    Information obtained from: interview of patient  ______________________________________________________________________        Objective             Creatinine   Date Value Ref Range Status   01/14/2025 0.89 0.50 - 1.05 mg/dL Final     AST   Date Value Ref Range Status    01/14/2025 15 9 - 39 U/L Final     ALT   Date Value Ref Range Status   01/14/2025 11 7 - 45 U/L Final     Comment:     Patients treated with Sulfasalazine may generate falsely decreased results for ALT.     Hemoglobin   Date Value Ref Range Status   01/14/2025 11.5 (L) 12.0 - 16.0 g/dL Final     Platelets   Date Value Ref Range Status   01/14/2025 201 150 - 450 x10*3/uL Final          PHYSICAL EXAMINATION   Vital Signs:   No VS due to VV     Physical Exam  HENT:      Head: Normocephalic and atraumatic.   Eyes:      Extraocular Movements: Extraocular movements intact.      Conjunctiva/sclera: Conjunctivae normal.   Pulmonary:      Effort: Pulmonary effort is normal.   Neurological:      General: No focal deficit present.      Mental Status: She is alert. Mental status is at baseline.   Psychiatric:         Mood and Affect: Mood normal.         Thought Content: Thought content normal.         ASSESSMENT/PLAN    Pain  Cancer related pain and post procedure (peg)   somatic and neuropathic  Stop oxycodone   Start hydromorphone 4mg q4 PRN   Continue Acetaminophen 1 g BID-TID PRN (not to exceed 3g daily)  Increase Gabapentin to 300mg TID  Continue lidocaine patch 5% 12 hr on 12 off to abdomen near peg site    Opioid Use  Medication Management:   - OARRS report reviewed with no aberrant behavior; consistent with  prescriptions/records and patient history  - MED 7.5.  Overdose Risk Score 400.   This has been discussed with patient.   - We will continue to closely monitor the patient for signs of prescription misuse including UDS, OARRS review and subjective reports at each visit.  - Concurrent benzodiazepine use   - I am a provider who either is or has consulted and collaborated with a provider certified in Hospice and Palliative Medicine and have conducted a face-face visit and examination for this patient.  - Routine Urine Drug Screen complete at next in person visit.   - Controlled Substance Agreement complete at next  in person visit.   - Specifically discussed that controlled substance prescriptions will only be provided by our group as outlined in the completed agreement  - Prescribed naloxone: patient declined   - Red Flags: none      Nausea  At risk for nausea with vomiting related to opioids and tube feeds    Home regimen:  Prochlorperazine , ondansetron ineffective  Continue prochlorperazine 10 mg PEG q6h prn nausea      Constipation  At risk for constipation related to medication side effects (including opioids), currently not constipated  Usual bowel pattern: every day  Home regimen:  Senna and Miralax prn* has not needed with tube feeds  Continue Senna 5 ml BID PRN    Altered Mood  Chronic anxiety related to  history of PTSD, panic attacks    controlled with home regimen   Home regimen: alprazolam 0.5 mg BID prn, Wellbutrin 150 mgqAM, and Doxepin 10 mg PO at bedtime (PCP prescribes)   Continue alprazolam 0.5 mg PEG BID PRN  Continue Buproprion  mg PEG qAM  Continue Doxepin 10 mg PO at bedtime      Sleeping Difficulty  Impaired sleep related to pain, anxiety  Improved with gabapentin  Continue home alprazolam and Doxepin    Weight loss   Related to malignancy  Nutrition following  -TF via peg  -Diet as tolerated     Supportive and Palliative Oncology encounter:  Spoke with patient and  at bedside  Emotional support provided  Coordination of care  We will continue to follow and address symptoms as needed    Medical Decision Making/Goals of Care/Advance Care Planning:  Patient's current clinical condition, including diagnosis, and management plan, and goals of care were discussed.   Goals: symptom control and cancer directed therapy    Advance Directives  Existence of Advance Directives:No - not interested  Decision maker: Surrogate decision maker is     Next Follow-Up Visit:  Return to clinic in 4 weeks    Signature and billing  Medical complexity was moderate level due to due to complexity of problems,  extensive data review, and high risk of management/treatment.  Time was spent on the following: Prep Time, Time Directly with Patient/Family/Caregiver, Documentation Time. Total time spent: 30      Data  Diagnostic tests and information reviewed for today's visit:  Most recent labs and imaging results, Medications       Some elements copied from Supportive Oncology note on 12/24/24, the elements have been updated and all reflect current decision making from today, 1/21/2025.      Plan of Care discussed with: Patient, Family/Significant Other: , and RN    SIGNATURE: LAURIE Downs-CNP    Contact information:  Supportive and Palliative Oncology  Monday-Friday 8 AM-5 PM  Phone:  [phone], press option #5, then option #1.   Or Epic Secure Chat

## 2025-01-29 ENCOUNTER — TELEPHONE (OUTPATIENT)
Dept: HEMATOLOGY/ONCOLOGY | Facility: HOSPITAL | Age: 60
End: 2025-01-29
Payer: MEDICAID

## 2025-01-29 NOTE — TELEPHONE ENCOUNTER
RN called and left a message for Amalia in regards to her upcoming appointment on 2/4. RN let her know that she was scheduled with the wrong provider and will see Jessica at 1pm. Clinic contact information was given for questions and concerns.

## 2025-02-03 NOTE — PROGRESS NOTES
History of Present Illness    Amalia Roa was seen in September 2024 at the request of Dr. Hoffmann for the management of a lesion involving her tongue.  She has had some issues with swallowing for 2 years or so she had some fairly significant discomfort in her throat especially on the right side where it shoots to the ear.  She was found on scanning to have a very large oropharyngeal lesion.  She does have multiple neck metastasis.  She had a biopsy done that showed squamous cell carcinoma which was p16 positive.  She had a PET scan in October 2024 that confirms the presence of multiple pulmonary metastasis.  I personally reviewed that scan.  She was presented at our tumor board and it was felt that the she would be a candidate for possibly induction chemo possibly immunotherapy as well as radiation.  She has received 5 cycles at this point.  She seems to be tolerating it well.  She does not seem to have a lot of discomfort.  He had some imaging done in December 2024.  There does not seem to have been much changes from the prior scanning in October.    Physical Exam    Examination of the oral cavity and oropharynx shows this ulcerative lesion involving the right posterior tongue and floor the mouth area as well as the oropharynx.    A flexible laryngoscopy was carried out. Under topical Xylocaine and Nik-Synephrine the scope was introduced through the nostril. The nasopharynx, base of tongue, hypopharynx, and larynx are visualized.  The right vocal cord is immobile.  The airway is adequate.  There is obvious tumor involving the right AE fold and adjacent base of tongue.  The PEG site was examined.  The tube is in proper position.    Assessment and Plan    Large oropharyngeal cancer with regional and distant metastasis.  The patient has been receiving some treatment and has had 5 cycles to this point.  She still has a fairly significant tumor burden.  She will be rescanned in the near future.    I will see her in 1  month.  She was asked to make an appointment in 2 months.

## 2025-02-04 ENCOUNTER — OFFICE VISIT (OUTPATIENT)
Dept: HEMATOLOGY/ONCOLOGY | Facility: HOSPITAL | Age: 60
End: 2025-02-04
Payer: MEDICAID

## 2025-02-04 ENCOUNTER — OFFICE VISIT (OUTPATIENT)
Dept: OTOLARYNGOLOGY | Facility: HOSPITAL | Age: 60
End: 2025-02-04
Payer: MEDICAID

## 2025-02-04 ENCOUNTER — PHARMACY VISIT (OUTPATIENT)
Dept: PHARMACY | Facility: CLINIC | Age: 60
End: 2025-02-04
Payer: MEDICAID

## 2025-02-04 ENCOUNTER — LAB (OUTPATIENT)
Dept: HEMATOLOGY/ONCOLOGY | Facility: HOSPITAL | Age: 60
End: 2025-02-04
Payer: MEDICAID

## 2025-02-04 ENCOUNTER — INFUSION (OUTPATIENT)
Dept: HEMATOLOGY/ONCOLOGY | Facility: HOSPITAL | Age: 60
End: 2025-02-04
Payer: MEDICAID

## 2025-02-04 ENCOUNTER — APPOINTMENT (OUTPATIENT)
Dept: NUTRITION | Facility: CLINIC | Age: 60
End: 2025-02-04
Payer: MEDICAID

## 2025-02-04 VITALS
BODY MASS INDEX: 27.69 KG/M2 | HEART RATE: 79 BPM | RESPIRATION RATE: 16 BRPM | DIASTOLIC BLOOD PRESSURE: 63 MMHG | SYSTOLIC BLOOD PRESSURE: 128 MMHG | TEMPERATURE: 97.7 F | WEIGHT: 156.31 LBS | OXYGEN SATURATION: 98 %

## 2025-02-04 DIAGNOSIS — C10.9 MALIGNANT NEOPLASM OF OROPHARYNX: Primary | ICD-10-CM

## 2025-02-04 DIAGNOSIS — C01 CANCER OF BASE OF TONGUE (MULTI): ICD-10-CM

## 2025-02-04 DIAGNOSIS — R04.2 HEMOPTYSIS: ICD-10-CM

## 2025-02-04 DIAGNOSIS — Z51.12 ENCOUNTER FOR ANTINEOPLASTIC IMMUNOTHERAPY: Primary | ICD-10-CM

## 2025-02-04 DIAGNOSIS — R13.12 OROPHARYNGEAL DYSPHAGIA: ICD-10-CM

## 2025-02-04 DIAGNOSIS — R79.9 ELEVATED BUN: ICD-10-CM

## 2025-02-04 LAB
ALBUMIN SERPL BCP-MCNC: 3.7 G/DL (ref 3.4–5)
ALP SERPL-CCNC: 73 U/L (ref 33–136)
ALT SERPL W P-5'-P-CCNC: 12 U/L (ref 7–45)
ANION GAP SERPL CALC-SCNC: 10 MMOL/L (ref 10–20)
AST SERPL W P-5'-P-CCNC: 17 U/L (ref 9–39)
BASOPHILS # BLD AUTO: 0.04 X10*3/UL (ref 0–0.1)
BASOPHILS NFR BLD AUTO: 0.4 %
BILIRUB SERPL-MCNC: 0.3 MG/DL (ref 0–1.2)
BUN SERPL-MCNC: 28 MG/DL (ref 6–23)
CALCIUM SERPL-MCNC: 9.1 MG/DL (ref 8.6–10.3)
CHLORIDE SERPL-SCNC: 100 MMOL/L (ref 98–107)
CO2 SERPL-SCNC: 33 MMOL/L (ref 21–32)
CORTIS AM PEAK SERPL-MSCNC: 8.3 UG/DL (ref 5–20)
CREAT SERPL-MCNC: 0.84 MG/DL (ref 0.5–1.05)
EGFRCR SERPLBLD CKD-EPI 2021: 80 ML/MIN/1.73M*2
EOSINOPHIL # BLD AUTO: 0.74 X10*3/UL (ref 0–0.7)
EOSINOPHIL NFR BLD AUTO: 7.4 %
ERYTHROCYTE [DISTWIDTH] IN BLOOD BY AUTOMATED COUNT: 13.5 % (ref 11.5–14.5)
GLUCOSE SERPL-MCNC: 128 MG/DL (ref 74–99)
HCT VFR BLD AUTO: 34.4 % (ref 36–46)
HGB BLD-MCNC: 11.1 G/DL (ref 12–16)
IMM GRANULOCYTES # BLD AUTO: 0.03 X10*3/UL (ref 0–0.7)
IMM GRANULOCYTES NFR BLD AUTO: 0.3 % (ref 0–0.9)
LYMPHOCYTES # BLD AUTO: 0.86 X10*3/UL (ref 1.2–4.8)
LYMPHOCYTES NFR BLD AUTO: 8.6 %
MCH RBC QN AUTO: 31.5 PG (ref 26–34)
MCHC RBC AUTO-ENTMCNC: 32.3 G/DL (ref 32–36)
MCV RBC AUTO: 98 FL (ref 80–100)
MONOCYTES # BLD AUTO: 0.92 X10*3/UL (ref 0.1–1)
MONOCYTES NFR BLD AUTO: 9.2 %
NEUTROPHILS # BLD AUTO: 7.44 X10*3/UL (ref 1.2–7.7)
NEUTROPHILS NFR BLD AUTO: 74.1 %
NRBC BLD-RTO: 0 /100 WBCS (ref 0–0)
PLATELET # BLD AUTO: 234 X10*3/UL (ref 150–450)
POTASSIUM SERPL-SCNC: 4.2 MMOL/L (ref 3.5–5.3)
PROT SERPL-MCNC: 6.2 G/DL (ref 6.4–8.2)
RBC # BLD AUTO: 3.52 X10*6/UL (ref 4–5.2)
SODIUM SERPL-SCNC: 139 MMOL/L (ref 136–145)
TSH SERPL-ACNC: 1.12 MIU/L (ref 0.44–3.98)
WBC # BLD AUTO: 10 X10*3/UL (ref 4.4–11.3)

## 2025-02-04 PROCEDURE — RXMED WILLOW AMBULATORY MEDICATION CHARGE

## 2025-02-04 PROCEDURE — 99215 OFFICE O/P EST HI 40 MIN: CPT | Performed by: NURSE PRACTITIONER

## 2025-02-04 PROCEDURE — 3074F SYST BP LT 130 MM HG: CPT | Performed by: NURSE PRACTITIONER

## 2025-02-04 PROCEDURE — 96413 CHEMO IV INFUSION 1 HR: CPT

## 2025-02-04 PROCEDURE — 84443 ASSAY THYROID STIM HORMONE: CPT

## 2025-02-04 PROCEDURE — 1036F TOBACCO NON-USER: CPT | Performed by: NURSE PRACTITIONER

## 2025-02-04 PROCEDURE — 31575 DIAGNOSTIC LARYNGOSCOPY: CPT | Performed by: OTOLARYNGOLOGY

## 2025-02-04 PROCEDURE — 2500000004 HC RX 250 GENERAL PHARMACY W/ HCPCS (ALT 636 FOR OP/ED): Mod: SE | Performed by: NURSE PRACTITIONER

## 2025-02-04 PROCEDURE — 36591 DRAW BLOOD OFF VENOUS DEVICE: CPT

## 2025-02-04 PROCEDURE — 2500000004 HC RX 250 GENERAL PHARMACY W/ HCPCS (ALT 636 FOR OP/ED): Mod: SE | Performed by: STUDENT IN AN ORGANIZED HEALTH CARE EDUCATION/TRAINING PROGRAM

## 2025-02-04 PROCEDURE — 1036F TOBACCO NON-USER: CPT | Performed by: OTOLARYNGOLOGY

## 2025-02-04 PROCEDURE — 99215 OFFICE O/P EST HI 40 MIN: CPT | Mod: 25 | Performed by: NURSE PRACTITIONER

## 2025-02-04 PROCEDURE — 82024 ASSAY OF ACTH: CPT

## 2025-02-04 PROCEDURE — 85025 COMPLETE CBC W/AUTO DIFF WBC: CPT

## 2025-02-04 PROCEDURE — 99213 OFFICE O/P EST LOW 20 MIN: CPT | Performed by: OTOLARYNGOLOGY

## 2025-02-04 PROCEDURE — 82533 TOTAL CORTISOL: CPT

## 2025-02-04 PROCEDURE — 3078F DIAST BP <80 MM HG: CPT | Performed by: NURSE PRACTITIONER

## 2025-02-04 PROCEDURE — 80053 COMPREHEN METABOLIC PANEL: CPT

## 2025-02-04 PROCEDURE — 99213 OFFICE O/P EST LOW 20 MIN: CPT | Mod: 25,27 | Performed by: OTOLARYNGOLOGY

## 2025-02-04 RX ORDER — EPINEPHRINE 0.3 MG/.3ML
0.3 INJECTION SUBCUTANEOUS EVERY 5 MIN PRN
Status: CANCELLED | OUTPATIENT
Start: 2025-02-04

## 2025-02-04 RX ORDER — DIPHENHYDRAMINE HYDROCHLORIDE 50 MG/ML
50 INJECTION INTRAMUSCULAR; INTRAVENOUS AS NEEDED
Status: CANCELLED | OUTPATIENT
Start: 2025-02-04

## 2025-02-04 RX ORDER — FAMOTIDINE 10 MG/ML
20 INJECTION INTRAVENOUS ONCE AS NEEDED
Status: DISCONTINUED | OUTPATIENT
Start: 2025-02-04 | End: 2025-02-04 | Stop reason: HOSPADM

## 2025-02-04 RX ORDER — HEPARIN SODIUM,PORCINE/PF 10 UNIT/ML
50 SYRINGE (ML) INTRAVENOUS AS NEEDED
Status: CANCELLED | OUTPATIENT
Start: 2025-02-04

## 2025-02-04 RX ORDER — HEPARIN 100 UNIT/ML
500 SYRINGE INTRAVENOUS AS NEEDED
OUTPATIENT
Start: 2025-02-04

## 2025-02-04 RX ORDER — PROCHLORPERAZINE MALEATE 10 MG
10 TABLET ORAL EVERY 6 HOURS PRN
Status: DISCONTINUED | OUTPATIENT
Start: 2025-02-04 | End: 2025-02-04 | Stop reason: HOSPADM

## 2025-02-04 RX ORDER — DIPHENHYDRAMINE HYDROCHLORIDE 50 MG/ML
50 INJECTION INTRAMUSCULAR; INTRAVENOUS AS NEEDED
Status: DISCONTINUED | OUTPATIENT
Start: 2025-02-04 | End: 2025-02-04 | Stop reason: HOSPADM

## 2025-02-04 RX ORDER — ALBUTEROL SULFATE 0.83 MG/ML
3 SOLUTION RESPIRATORY (INHALATION) AS NEEDED
Status: DISCONTINUED | OUTPATIENT
Start: 2025-02-04 | End: 2025-02-04 | Stop reason: HOSPADM

## 2025-02-04 RX ORDER — HEPARIN 100 UNIT/ML
500 SYRINGE INTRAVENOUS AS NEEDED
Status: CANCELLED | OUTPATIENT
Start: 2025-02-04

## 2025-02-04 RX ORDER — PROCHLORPERAZINE EDISYLATE 5 MG/ML
10 INJECTION INTRAMUSCULAR; INTRAVENOUS EVERY 6 HOURS PRN
Status: DISCONTINUED | OUTPATIENT
Start: 2025-02-04 | End: 2025-02-04 | Stop reason: HOSPADM

## 2025-02-04 RX ORDER — PROCHLORPERAZINE EDISYLATE 5 MG/ML
10 INJECTION INTRAMUSCULAR; INTRAVENOUS EVERY 6 HOURS PRN
Status: CANCELLED | OUTPATIENT
Start: 2025-02-04

## 2025-02-04 RX ORDER — EPINEPHRINE 0.3 MG/.3ML
0.3 INJECTION SUBCUTANEOUS EVERY 5 MIN PRN
Status: DISCONTINUED | OUTPATIENT
Start: 2025-02-04 | End: 2025-02-04 | Stop reason: HOSPADM

## 2025-02-04 RX ORDER — PROCHLORPERAZINE MALEATE 10 MG
10 TABLET ORAL EVERY 6 HOURS PRN
Status: CANCELLED | OUTPATIENT
Start: 2025-02-04

## 2025-02-04 RX ORDER — HEPARIN SODIUM,PORCINE/PF 10 UNIT/ML
50 SYRINGE (ML) INTRAVENOUS AS NEEDED
OUTPATIENT
Start: 2025-02-04

## 2025-02-04 RX ORDER — FAMOTIDINE 10 MG/ML
20 INJECTION INTRAVENOUS ONCE AS NEEDED
Status: CANCELLED | OUTPATIENT
Start: 2025-02-04

## 2025-02-04 RX ORDER — ALBUTEROL SULFATE 0.83 MG/ML
3 SOLUTION RESPIRATORY (INHALATION) AS NEEDED
Status: CANCELLED | OUTPATIENT
Start: 2025-02-04

## 2025-02-04 RX ORDER — HEPARIN 100 UNIT/ML
500 SYRINGE INTRAVENOUS AS NEEDED
Status: DISCONTINUED | OUTPATIENT
Start: 2025-02-04 | End: 2025-02-04 | Stop reason: HOSPADM

## 2025-02-04 RX ADMIN — HEPARIN 500 UNITS: 100 SYRINGE at 15:55

## 2025-02-04 RX ADMIN — SODIUM CHLORIDE 200 MG: 9 INJECTION, SOLUTION INTRAVENOUS at 15:22

## 2025-02-04 ASSESSMENT — ENCOUNTER SYMPTOMS
NAUSEA: 1
CONSTIPATION: 0
CARDIOVASCULAR NEGATIVE: 1
CONSTITUTIONAL NEGATIVE: 1
NECK PAIN: 1
APPETITE CHANGE: 0
TROUBLE SWALLOWING: 1
HEMOPTYSIS: 1

## 2025-02-04 ASSESSMENT — PATIENT HEALTH QUESTIONNAIRE - PHQ9
SUM OF ALL RESPONSES TO PHQ9 QUESTIONS 1 AND 2: 0
2. FEELING DOWN, DEPRESSED OR HOPELESS: NOT AT ALL
1. LITTLE INTEREST OR PLEASURE IN DOING THINGS: NOT AT ALL

## 2025-02-04 ASSESSMENT — PAIN SCALES - GENERAL: PAINLEVEL_OUTOF10: 0-NO PAIN

## 2025-02-04 NOTE — PROGRESS NOTES
[facility] - Medical Oncology Follow-Up Visit    Patient ID: Amalia Roa is a 59 y.o. female with oropharyngeal squamous cell ca     Current therapy: methylPREDNISolone sod succinate (SOLU-Medrol) 40 mg/mL injection 40 mg, 40 mg, intravenous, As needed, 5 of 17 cycles        pembrolizumab (Keytruda) 200 mg in sodium chloride 0.9% 118 mL IV, 200 mg, intravenous, Once, 5 of 17 cycles    Administration: 200 mg (11/12/2024), 200 mg (12/3/2024), 200 mg (12/24/2024), 200 mg (1/14/2025), 200 mg (2/4/2025)       Chief Concern: Readiness to treat - C5    Oncologic History:   DIAGNOSIS  Oropharyngeal squamous cell ca     STAGING  T4N2M1      CURRENT SITES OF DISEASE  R oropharynx, tongue, bilateral lungs, L adrenal gland     MOLECULAR GENOMICS  P16+   CPS 10     PRIOR THERAPY        CURRENT THERAPY  Pembrolizumab 11/12/24 - present     CURRENT ONCOLOGICAL PROBLEMS           HISTORY OF PRESENT ILLNESS  Ms. Roa is a 60 yo with PMH significant for multiple medical comorbidities including HFrEF, CAD s/p JOHN, mitral regurgitation, and MEÑO who had had trouble swallowing for at least a year with throat discomfort, particularly on the R side. CT of the cervical spine at OSH was concerning for 3.6 x 2.0 necrotic level 2 lymph node. She was referred to Dr. Montanez who she met on 9/20/24 due to concerns for a mass. Flex laryngoscopy showed a prominence at the base of the tongue of the R and an exophytic mass of the R piriform sinus, with evidence of inga disease on the L. CT of the neck on 10/8/24 revealed large, irregular mass of the R oropharynx measuring at least 4 x 3.2 x 7.6 cm, with involvement of the R lateral wall/tonsillar region, base of tongue/glossal tonsillar sulcus, soft palate, and abutment of the epiglottis with extension into the R parapharyngeal fat and  space. There appeared to be extension into the R tongue and within the floor of the mouth and oral cavity. Also noted  was thrombosis or occlusion of the RIJ, bilateral cervical lymphadenopathy with levels 2-4 noted on the R and on the left, and multiple pulmonary nodules. She was admitted to the hospital and underwent panendoscopy, biopsy, and PEG placement on 10/14/24. CT chest also on 10/14 was notable for multiple parenchymal bilateral lung nodules and enlarged mediastinal and hilar lymph nodes, as well as suspicious L adrenal gland nodule. She was discussed in tumor board, with PD-L1 added on CPS 10. PET/CT 10/22/24 confirmed FDG avidity of R oropharynx/oral cavity/hypopharynx mass, L palatine tonsil, R parotid gland, multiple cervical nodes bilaterally, multiple mediastinal and hilar lymph  nodes, pulmonary nodules, small L pleural effusion, and L adrenal gland. Decided to start pembrolizumab monotherapy, started 11/12/24 after port placement.     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 Lives at home with her . Has two grandsons who are 6 and 2 yo, and two granddaughters who are 3 yo and 10 months.    Tob: quit smoking 2014. 40 years x 1/2 ppd  EtOH: none  Illicits: none     FAMILY HISTORY  Mother - breast cancer dx 50s or 60s  Sister - colon ca dx 58 yo  Niece - cervical cancer dx 30s    HPI   She is here today with her . Breathing  is fine.  Denies changes.  Reports on Saturday and Sunday hemoptysis.  Clots the size of a pencil eraser x2.  Nothing sense.  No other s/sx's bleeding.  Intake via peg - 5 TF cartons/day.  Main nutritional intake via peg.  Staying hydrated.  Intermittent nausea with pain med admin.  Takes PRN tylenol 1st.  Dilaudid taken 3-4x/wk.  TID gabapentin helping.  No fevers, chills, or CP.  Labs WNL.  Ready for treatment today.      Meds (Current):    Current  "Outpatient Medications:     acetaminophen (Tylenol) 160 mg/5 mL liquid, 31.3 mL (1,000 mg) by g-tube route 3 times a day as needed for mild pain (1 - 3) or moderate pain (4 - 6)., Disp: 2838 mL, Rfl: 2    albuterol 90 mcg/actuation inhaler, Inhale 2 puffs every 4 hours if needed for wheezing., Disp: , Rfl:     ALPRAZolam (Xanax) 0.5 mg tablet, Take 1 tablet (0.5 mg) by mouth 2 times a day as needed for anxiety., Disp: , Rfl:     aspirin 81 mg chewable tablet, Chew 1 tablet (81 mg) once daily., Disp: , Rfl:     atorvastatin (Lipitor) 40 mg tablet, Take 1 tablet (40 mg) by mouth once daily., Disp: , Rfl:     doxepin (SINEquan) 10 mg/mL solution, 1 mL (10 mg) by g-tube route once daily at bedtime., Disp: 120 mL, Rfl: 12    fluticasone (Flovent) 44 mcg/actuation inhaler, Inhale 2 puffs 2 times a day., Disp: , Rfl:     gabapentin (Neurontin) 50 mg/mL solution, 6 mL (300 mg) by g-tube route 3 times a day., Disp: 540 mL, Rfl: 0    HYDROmorphone (Dilaudid) liquid, 4 mL (4 mg) by g-tube route every 4 hours if needed for severe pain (7 - 10) for up to 15 days., Disp: 360 mL, Rfl: 0    lidocaine (Lidoderm) 5 % patch, Place 1 patch over 12 hours on the skin once daily. Remove & discard patch within 12 hours or as directed by MD., Disp: 30 patch, Rfl: 3    metoprolol tartrate (Lopressor) 25 mg tablet, Take 0.5 tablets (12.5 mg) by mouth 2 times a day., Disp: 60 tablet, Rfl: 5    prochlorperazine (Compazine) 10 mg tablet, Take 1 tablet (10 mg) by mouth every 6 hours if needed for nausea or vomiting., Disp: 60 tablet, Rfl: 0    transparent dressings (Tegaderm) 3 1/2 X 4 \" bandage, Apply 1 each topically every 21 (twenty-one) days. Cover [facility] site., Disp: 1 each, Rfl: 11    buPROPion (Wellbutrin) 75 mg tablet, Take 2 tablets (150 mg) by mouth once daily in the morning., Disp: 60 tablet, Rfl: 0    senna (Senokot) 8.8 mg/5 mL syrup, Take 5 mL by mouth 2 times a day for 10 days., Disp: 240 mL, Rfl: 3  No current " facility-administered medications for this visit.    Review of Systems   Constitutional: Negative.  Negative for appetite change.   HENT:   Positive for trouble swallowing.         Intermittent right ear pain.   Respiratory:  Positive for hemoptysis.    Cardiovascular: Negative.    Gastrointestinal:  Positive for nausea. Negative for constipation.   Musculoskeletal:  Positive for neck pain.   Skin: Negative.    All other systems reviewed and are negative.       Objective   BSA: 1.78 meters squared  Wt Readings from Last 5 Encounters:   02/04/25 70.9 kg (156 lb 4.9 oz)   01/14/25 72.1 kg (158 lb 15.2 oz)   12/24/24 71.9 kg (158 lb 8.2 oz)   12/24/24 71.5 kg (157 lb 11.2 oz)   12/03/24 70.3 kg (155 lb)     /63 (BP Location: Left arm, Patient Position: Sitting, BP Cuff Size: Adult)   Pulse 79   Temp 36.5 °C (97.7 °F) (Skin)   Resp 16   Wt 70.9 kg (156 lb 4.9 oz)   SpO2 98%   BMI 27.69 kg/m²     ECOG Score: 0- Fully active, able to carry on all pre-disease performance w/o restriction.      Physical Exam  Vitals reviewed.   Constitutional:       Appearance: Normal appearance.   HENT:      Head: Normocephalic.      Nose: Nose normal.      Mouth/Throat:      Mouth: Mucous membranes are moist.      Pharynx: Oropharynx is clear.   Eyes:      Extraocular Movements: Extraocular movements intact.      Conjunctiva/sclera: Conjunctivae normal.      Pupils: Pupils are equal, round, and reactive to light.   Cardiovascular:      Rate and Rhythm: Normal rate and regular rhythm.      Pulses: Normal pulses.      Heart sounds: Normal heart sounds.   Pulmonary:      Breath sounds: Normal breath sounds.   Abdominal:      General: Bowel sounds are normal.      Palpations: Abdomen is soft.      Comments: Capped peg.    Musculoskeletal:         General: Normal range of motion.      Cervical back: Normal range of motion and neck supple.   Skin:     General: Skin is warm and dry.   Neurological:      General: No focal deficit  present.      Mental Status: She is alert and oriented to person, place, and time.   Psychiatric:         Mood and Affect: Mood normal.         Behavior: Behavior normal.         Thought Content: Thought content normal.         Judgment: Judgment normal.          Results:  Labs:  Lab Results   Component Value Date    WBC 10.0 02/04/2025    HGB 11.1 (L) 02/04/2025    HCT 34.4 (L) 02/04/2025    MCV 98 02/04/2025     02/04/2025      Lab Results   Component Value Date    NEUTROABS 7.44 02/04/2025      Lab Results   Component Value Date    GLUCOSE 128 (H) 02/04/2025    CALCIUM 9.1 02/04/2025     02/04/2025    K 4.2 02/04/2025    CO2 33 (H) 02/04/2025     02/04/2025    BUN 28 (H) 02/04/2025    CREATININE 0.84 02/04/2025    MG 2.12 12/10/2024     Lab Results   Component Value Date    ALT 12 02/04/2025    AST 17 02/04/2025    ALKPHOS 73 02/04/2025    BILITOT 0.3 02/04/2025      Lab Results   Component Value Date    ACTH 10.5 12/23/2024    CORTISOL 8.3 02/04/2025    TSH 1.12 02/04/2025       Imaging:  No new imaging.         Assessment/Plan      Amalia Roa is a 59 y.o. female here for follow up of oropharyngeal squamous cell ca.    # Oropharyngeal squamous cell ca  - T4N2M1  - p16+  - CPS 10   - discussed that with metastatic disease would recommend systemic therapy, and depending on CPS would recommend chemotherapy+immunotherapy or immunotherapy monotherapy. Also potentially eligible for clinical trial based on CPS  - given CPS, discussed pembrolizumab monotherapy, and consented  - she requested port placement due to poor venous access - Port placed.  Port placed 11/22/24   - plan to start pembrolizumab after port placement  - previously discussed with Dr. Nolan consideration for consolidation radiation pending response to pembro  - RTC to start treatment, planned on CT scans after C4, but scanned while inpatient. Will retime scans for 2/20/25.  - C1 11/12/24   Hospitalization 12/3 - 12/10/24 2/2  vasovagal syncope episode the evening of her C2 infusion.   - RTC in 3 weeks for C6     # Pain   - Oxycodone 5mg tab (#120 10/25/24) Rx provided by Dr. Nolan.  No routine admin.  Intermittent severe 10/10 right ear pain. 5mg PRN dose brings pain down from 10/10 - 7-8/10.  Okay received for pt to take two tabs for those episodes.   Routine tylenol 1000mg BID.    - Supportive onc referral placed.  Med onc will provide Rx until supportive onc visit.   - 12/24:  Gabapentin helping with neck/ear pain.  HS dose allowing her to sleep.  Minimal relief with PRN oxy - pt will discuss with supportive onc   - 2/4/25:  Follows with supportive onc.  Pain managed with current regimen.  Oxycodone changed to PRN Dilaudid.      # Hemoptysis (occurred 2/1 & 2/2/25)  - x2, clot the size of a pencil eraser, H&H stable  - Pt instructed to report increased frequency  - Will monitor for now.      # Elevated BUN 28 2/4/25  - Push fluids, maintain free-water flushes    # Anxiety   - Routine HS Alprazolam     # Constipation (slow-transit)  - Rx sent for senna elixir. 5mg BID.  Pt to titrate med to produce routine, soft BM's.  Can also add miralax PRN.    - 12/24:  stools soft d/t recent ATB course, ongoing monitoring.

## 2025-02-04 NOTE — PROGRESS NOTES
Pt arrived ambulatory to infusion for treatment of pembrolizumab.  Denies any new or worsening symptoms. Tolerated infusion without issue. Discharged in stable condition.

## 2025-02-05 ENCOUNTER — TELEPHONE (OUTPATIENT)
Dept: HEMATOLOGY/ONCOLOGY | Facility: CLINIC | Age: 60
End: 2025-02-05
Payer: MEDICAID

## 2025-02-05 DIAGNOSIS — C01 CANCER OF BASE OF TONGUE (MULTI): ICD-10-CM

## 2025-02-05 RX ORDER — PROCHLORPERAZINE MALEATE 10 MG
10 TABLET ORAL EVERY 6 HOURS PRN
Qty: 60 TABLET | Refills: 0 | Status: SHIPPED | OUTPATIENT
Start: 2025-02-05

## 2025-02-05 NOTE — TELEPHONE ENCOUNTER
Per last visit with Jazmin Phillips continue Compazine 10 mg prn. Script last written on 12/24/24. Next visit scheduled for 2/25/25. Script pended to provider, sending to [facility]   Lidocaine 5% patch, refills remain at [facility]. Patient aware and will  refill at the end of the month

## 2025-02-06 LAB — ACTH PLAS-MCNC: 7.5 PG/ML (ref 7.2–63.3)

## 2025-02-18 ENCOUNTER — DOCUMENTATION (OUTPATIENT)
Dept: HEMATOLOGY/ONCOLOGY | Facility: HOSPITAL | Age: 60
End: 2025-02-18
Payer: MEDICAID

## 2025-02-18 NOTE — PROGRESS NOTES
Tegaderm script faxed to [facility]. RN received fax confirmation that it went through. Amalia was notified that script was sent.

## 2025-02-20 ENCOUNTER — HOSPITAL ENCOUNTER (OUTPATIENT)
Dept: RADIOLOGY | Facility: HOSPITAL | Age: 60
Discharge: HOME | End: 2025-02-20
Payer: MEDICAID

## 2025-02-20 DIAGNOSIS — C01 CANCER OF BASE OF TONGUE (MULTI): ICD-10-CM

## 2025-02-20 PROCEDURE — 71260 CT THORAX DX C+: CPT

## 2025-02-20 PROCEDURE — 2550000001 HC RX 255 CONTRASTS: Performed by: STUDENT IN AN ORGANIZED HEALTH CARE EDUCATION/TRAINING PROGRAM

## 2025-02-20 PROCEDURE — 74160 CT ABDOMEN W/CONTRAST: CPT

## 2025-02-20 PROCEDURE — 2500000004 HC RX 250 GENERAL PHARMACY W/ HCPCS (ALT 636 FOR OP/ED): Performed by: NURSE PRACTITIONER

## 2025-02-20 PROCEDURE — 70491 CT SOFT TISSUE NECK W/DYE: CPT

## 2025-02-20 RX ORDER — SODIUM CHLORIDE 0.9 % (FLUSH) 0.9 %
10 SYRINGE (ML) INJECTION EVERY 8 HOURS SCHEDULED
Status: DISCONTINUED | OUTPATIENT
Start: 2025-02-20 | End: 2025-02-21 | Stop reason: HOSPADM

## 2025-02-20 RX ORDER — HEPARIN 100 UNIT/ML
500 SYRINGE INTRAVENOUS AS NEEDED
Status: DISCONTINUED | OUTPATIENT
Start: 2025-02-20 | End: 2025-02-21 | Stop reason: HOSPADM

## 2025-02-20 RX ADMIN — IOHEXOL 75 ML: 350 INJECTION, SOLUTION INTRAVENOUS at 10:23

## 2025-02-20 RX ADMIN — Medication 500 UNITS: at 10:50

## 2025-02-20 RX ADMIN — Medication 10 ML: at 10:50

## 2025-02-25 ENCOUNTER — LAB (OUTPATIENT)
Dept: HEMATOLOGY/ONCOLOGY | Facility: HOSPITAL | Age: 60
End: 2025-02-25
Payer: MEDICAID

## 2025-02-25 ENCOUNTER — INFUSION (OUTPATIENT)
Dept: HEMATOLOGY/ONCOLOGY | Facility: HOSPITAL | Age: 60
End: 2025-02-25
Payer: MEDICAID

## 2025-02-25 ENCOUNTER — OFFICE VISIT (OUTPATIENT)
Dept: PALLIATIVE MEDICINE | Facility: HOSPITAL | Age: 60
End: 2025-02-25
Payer: MEDICAID

## 2025-02-25 ENCOUNTER — OFFICE VISIT (OUTPATIENT)
Dept: HEMATOLOGY/ONCOLOGY | Facility: HOSPITAL | Age: 60
End: 2025-02-25
Payer: MEDICAID

## 2025-02-25 ENCOUNTER — NUTRITION (OUTPATIENT)
Dept: HEMATOLOGY/ONCOLOGY | Facility: HOSPITAL | Age: 60
End: 2025-02-25

## 2025-02-25 VITALS
DIASTOLIC BLOOD PRESSURE: 63 MMHG | TEMPERATURE: 96.8 F | WEIGHT: 153.22 LBS | SYSTOLIC BLOOD PRESSURE: 120 MMHG | RESPIRATION RATE: 16 BRPM | HEART RATE: 87 BPM | OXYGEN SATURATION: 98 % | BODY MASS INDEX: 27.14 KG/M2

## 2025-02-25 DIAGNOSIS — K59.03 CONSTIPATION DUE TO PAIN MEDICATION THERAPY: ICD-10-CM

## 2025-02-25 DIAGNOSIS — Z51.81 ENCOUNTER FOR MONITORING OPIOID MAINTENANCE THERAPY: Primary | ICD-10-CM

## 2025-02-25 DIAGNOSIS — C01 CANCER OF BASE OF TONGUE (MULTI): ICD-10-CM

## 2025-02-25 DIAGNOSIS — G47.09 OTHER INSOMNIA: ICD-10-CM

## 2025-02-25 DIAGNOSIS — G89.3 CANCER ASSOCIATED PAIN: ICD-10-CM

## 2025-02-25 DIAGNOSIS — C01 CANCER OF BASE OF TONGUE (MULTI): Primary | ICD-10-CM

## 2025-02-25 DIAGNOSIS — Z79.891 ENCOUNTER FOR MONITORING OPIOID MAINTENANCE THERAPY: Primary | ICD-10-CM

## 2025-02-25 LAB
ALBUMIN SERPL BCP-MCNC: 3.7 G/DL (ref 3.4–5)
ALP SERPL-CCNC: 72 U/L (ref 33–136)
ALT SERPL W P-5'-P-CCNC: 14 U/L (ref 7–45)
AMPHETAMINES UR QL SCN: ABNORMAL
ANION GAP SERPL CALC-SCNC: 11 MMOL/L (ref 10–20)
AST SERPL W P-5'-P-CCNC: 18 U/L (ref 9–39)
BARBITURATES UR QL SCN: ABNORMAL
BASOPHILS # BLD AUTO: 0.05 X10*3/UL (ref 0–0.1)
BASOPHILS NFR BLD AUTO: 0.4 %
BENZODIAZ UR QL SCN: ABNORMAL
BILIRUB SERPL-MCNC: 0.4 MG/DL (ref 0–1.2)
BUN SERPL-MCNC: 25 MG/DL (ref 6–23)
BZE UR QL SCN: ABNORMAL
CALCIUM SERPL-MCNC: 9.1 MG/DL (ref 8.6–10.3)
CANNABINOIDS UR QL SCN: ABNORMAL
CHLORIDE SERPL-SCNC: 99 MMOL/L (ref 98–107)
CO2 SERPL-SCNC: 33 MMOL/L (ref 21–32)
CREAT SERPL-MCNC: 0.78 MG/DL (ref 0.5–1.05)
EGFRCR SERPLBLD CKD-EPI 2021: 87 ML/MIN/1.73M*2
EOSINOPHIL # BLD AUTO: 1.12 X10*3/UL (ref 0–0.7)
EOSINOPHIL NFR BLD AUTO: 9.8 %
ERYTHROCYTE [DISTWIDTH] IN BLOOD BY AUTOMATED COUNT: 13.4 % (ref 11.5–14.5)
FENTANYL+NORFENTANYL UR QL SCN: ABNORMAL
GLUCOSE SERPL-MCNC: 148 MG/DL (ref 74–99)
HCT VFR BLD AUTO: 38.6 % (ref 36–46)
HGB BLD-MCNC: 12.3 G/DL (ref 12–16)
IMM GRANULOCYTES # BLD AUTO: 0.03 X10*3/UL (ref 0–0.7)
IMM GRANULOCYTES NFR BLD AUTO: 0.3 % (ref 0–0.9)
LYMPHOCYTES # BLD AUTO: 0.86 X10*3/UL (ref 1.2–4.8)
LYMPHOCYTES NFR BLD AUTO: 7.5 %
MCH RBC QN AUTO: 31 PG (ref 26–34)
MCHC RBC AUTO-ENTMCNC: 31.9 G/DL (ref 32–36)
MCV RBC AUTO: 97 FL (ref 80–100)
METHADONE UR QL SCN: ABNORMAL
MONOCYTES # BLD AUTO: 0.76 X10*3/UL (ref 0.1–1)
MONOCYTES NFR BLD AUTO: 6.6 %
NEUTROPHILS # BLD AUTO: 8.62 X10*3/UL (ref 1.2–7.7)
NEUTROPHILS NFR BLD AUTO: 75.4 %
NRBC BLD-RTO: 0 /100 WBCS (ref 0–0)
OPIATES UR QL SCN: ABNORMAL
OXYCODONE+OXYMORPHONE UR QL SCN: ABNORMAL
PCP UR QL SCN: ABNORMAL
PLATELET # BLD AUTO: 287 X10*3/UL (ref 150–450)
POTASSIUM SERPL-SCNC: 4.2 MMOL/L (ref 3.5–5.3)
PROT SERPL-MCNC: 6.4 G/DL (ref 6.4–8.2)
RBC # BLD AUTO: 3.97 X10*6/UL (ref 4–5.2)
SODIUM SERPL-SCNC: 139 MMOL/L (ref 136–145)
WBC # BLD AUTO: 11.4 X10*3/UL (ref 4.4–11.3)

## 2025-02-25 PROCEDURE — 99214 OFFICE O/P EST MOD 30 MIN: CPT | Mod: 25,27 | Performed by: NURSE PRACTITIONER

## 2025-02-25 PROCEDURE — 96413 CHEMO IV INFUSION 1 HR: CPT

## 2025-02-25 PROCEDURE — 99215 OFFICE O/P EST HI 40 MIN: CPT | Performed by: STUDENT IN AN ORGANIZED HEALTH CARE EDUCATION/TRAINING PROGRAM

## 2025-02-25 PROCEDURE — 3078F DIAST BP <80 MM HG: CPT | Performed by: STUDENT IN AN ORGANIZED HEALTH CARE EDUCATION/TRAINING PROGRAM

## 2025-02-25 PROCEDURE — 99214 OFFICE O/P EST MOD 30 MIN: CPT | Performed by: NURSE PRACTITIONER

## 2025-02-25 PROCEDURE — 2500000004 HC RX 250 GENERAL PHARMACY W/ HCPCS (ALT 636 FOR OP/ED): Mod: SE | Performed by: STUDENT IN AN ORGANIZED HEALTH CARE EDUCATION/TRAINING PROGRAM

## 2025-02-25 PROCEDURE — 80365 DRUG SCREENING OXYCODONE: CPT | Performed by: NURSE PRACTITIONER

## 2025-02-25 PROCEDURE — 85025 COMPLETE CBC W/AUTO DIFF WBC: CPT

## 2025-02-25 PROCEDURE — 99215 OFFICE O/P EST HI 40 MIN: CPT | Mod: 25 | Performed by: STUDENT IN AN ORGANIZED HEALTH CARE EDUCATION/TRAINING PROGRAM

## 2025-02-25 PROCEDURE — 3074F SYST BP LT 130 MM HG: CPT | Performed by: STUDENT IN AN ORGANIZED HEALTH CARE EDUCATION/TRAINING PROGRAM

## 2025-02-25 PROCEDURE — 80307 DRUG TEST PRSMV CHEM ANLYZR: CPT | Performed by: NURSE PRACTITIONER

## 2025-02-25 PROCEDURE — 80346 BENZODIAZEPINES1-12: CPT | Performed by: NURSE PRACTITIONER

## 2025-02-25 PROCEDURE — 84075 ASSAY ALKALINE PHOSPHATASE: CPT

## 2025-02-25 PROCEDURE — 36591 DRAW BLOOD OFF VENOUS DEVICE: CPT

## 2025-02-25 RX ORDER — HEPARIN 100 UNIT/ML
500 SYRINGE INTRAVENOUS AS NEEDED
OUTPATIENT
Start: 2025-02-25

## 2025-02-25 RX ORDER — PROCHLORPERAZINE EDISYLATE 5 MG/ML
10 INJECTION INTRAMUSCULAR; INTRAVENOUS EVERY 6 HOURS PRN
Status: CANCELLED | OUTPATIENT
Start: 2025-02-25

## 2025-02-25 RX ORDER — HEPARIN 100 UNIT/ML
500 SYRINGE INTRAVENOUS AS NEEDED
Status: CANCELLED | OUTPATIENT
Start: 2025-02-25

## 2025-02-25 RX ORDER — LIDOCAINE AND PRILOCAINE 25; 25 MG/G; MG/G
CREAM TOPICAL ONCE
Qty: 30 G | Refills: 2 | Status: SHIPPED | OUTPATIENT
Start: 2025-02-25 | End: 2025-02-25

## 2025-02-25 RX ORDER — HEPARIN 100 UNIT/ML
500 SYRINGE INTRAVENOUS AS NEEDED
Status: DISCONTINUED | OUTPATIENT
Start: 2025-02-25 | End: 2025-02-25 | Stop reason: HOSPADM

## 2025-02-25 RX ORDER — EPINEPHRINE 0.3 MG/.3ML
0.3 INJECTION SUBCUTANEOUS EVERY 5 MIN PRN
Status: DISCONTINUED | OUTPATIENT
Start: 2025-02-25 | End: 2025-02-25 | Stop reason: HOSPADM

## 2025-02-25 RX ORDER — DIPHENHYDRAMINE HYDROCHLORIDE 50 MG/ML
50 INJECTION INTRAMUSCULAR; INTRAVENOUS AS NEEDED
Status: DISCONTINUED | OUTPATIENT
Start: 2025-02-25 | End: 2025-02-25 | Stop reason: HOSPADM

## 2025-02-25 RX ORDER — HEPARIN SODIUM,PORCINE/PF 10 UNIT/ML
50 SYRINGE (ML) INTRAVENOUS AS NEEDED
OUTPATIENT
Start: 2025-02-25

## 2025-02-25 RX ORDER — EPINEPHRINE 0.3 MG/.3ML
0.3 INJECTION SUBCUTANEOUS EVERY 5 MIN PRN
Status: CANCELLED | OUTPATIENT
Start: 2025-02-25

## 2025-02-25 RX ORDER — HYDROMORPHONE HYDROCHLORIDE 5 MG/5ML
4 SOLUTION ORAL EVERY 4 HOURS PRN
Qty: 360 ML | Refills: 0 | Status: SHIPPED | OUTPATIENT
Start: 2025-02-25 | End: 2025-02-28 | Stop reason: SDUPTHER

## 2025-02-25 RX ORDER — FAMOTIDINE 10 MG/ML
20 INJECTION, SOLUTION INTRAVENOUS ONCE AS NEEDED
Status: CANCELLED | OUTPATIENT
Start: 2025-02-25

## 2025-02-25 RX ORDER — DIPHENHYDRAMINE HYDROCHLORIDE 50 MG/ML
50 INJECTION INTRAMUSCULAR; INTRAVENOUS AS NEEDED
Status: CANCELLED | OUTPATIENT
Start: 2025-02-25

## 2025-02-25 RX ORDER — HEPARIN SODIUM,PORCINE/PF 10 UNIT/ML
50 SYRINGE (ML) INTRAVENOUS AS NEEDED
Status: CANCELLED | OUTPATIENT
Start: 2025-02-25

## 2025-02-25 RX ORDER — PROCHLORPERAZINE MALEATE 10 MG
10 TABLET ORAL EVERY 6 HOURS PRN
Status: DISCONTINUED | OUTPATIENT
Start: 2025-02-25 | End: 2025-02-25 | Stop reason: HOSPADM

## 2025-02-25 RX ORDER — ALBUTEROL SULFATE 0.83 MG/ML
3 SOLUTION RESPIRATORY (INHALATION) AS NEEDED
Status: CANCELLED | OUTPATIENT
Start: 2025-02-25

## 2025-02-25 RX ORDER — FAMOTIDINE 10 MG/ML
20 INJECTION, SOLUTION INTRAVENOUS ONCE AS NEEDED
Status: DISCONTINUED | OUTPATIENT
Start: 2025-02-25 | End: 2025-02-25 | Stop reason: HOSPADM

## 2025-02-25 RX ORDER — GABAPENTIN 250 MG/5ML
300 SOLUTION ORAL 3 TIMES DAILY
Qty: 540 ML | Refills: 2 | Status: SHIPPED | OUTPATIENT
Start: 2025-02-25 | End: 2025-05-26

## 2025-02-25 RX ORDER — ALBUTEROL SULFATE 0.83 MG/ML
3 SOLUTION RESPIRATORY (INHALATION) AS NEEDED
Status: DISCONTINUED | OUTPATIENT
Start: 2025-02-25 | End: 2025-02-25 | Stop reason: HOSPADM

## 2025-02-25 RX ORDER — PROCHLORPERAZINE EDISYLATE 5 MG/ML
10 INJECTION INTRAMUSCULAR; INTRAVENOUS EVERY 6 HOURS PRN
Status: DISCONTINUED | OUTPATIENT
Start: 2025-02-25 | End: 2025-02-25 | Stop reason: HOSPADM

## 2025-02-25 RX ORDER — PROCHLORPERAZINE MALEATE 10 MG
10 TABLET ORAL EVERY 6 HOURS PRN
Status: CANCELLED | OUTPATIENT
Start: 2025-02-25

## 2025-02-25 RX ADMIN — SODIUM CHLORIDE 200 MG: 9 INJECTION, SOLUTION INTRAVENOUS at 14:07

## 2025-02-25 RX ADMIN — Medication 500 UNITS: at 14:54

## 2025-02-25 ASSESSMENT — PAIN SCALES - GENERAL: PAINLEVEL_OUTOF10: 0-NO PAIN

## 2025-02-25 ASSESSMENT — ENCOUNTER SYMPTOMS
HEMOPTYSIS: 1
APPETITE CHANGE: 0
CONSTITUTIONAL NEGATIVE: 1
NAUSEA: 1
CARDIOVASCULAR NEGATIVE: 1
CONSTIPATION: 0
NECK PAIN: 1
TROUBLE SWALLOWING: 1

## 2025-02-25 NOTE — PROGRESS NOTES
"NUTRITION Follow-Up NOTE    Nutrition Assessment     Reason for Visit:  Amalia Roa is a 59 y.o. female who presents for base of tongue cancer.   PEG placed 10/18/24  [place] Pembrolizumab start 11/12/24 2/25- FUV in infusion. Asked to see pt as she is losing weight.     Lab Results   Component Value Date/Time    GLUCOSE 148 (H) 02/25/2025 1225     02/25/2025 1225    K 4.2 02/25/2025 1225    CL 99 02/25/2025 1225    CO2 33 (H) 02/25/2025 1225    ANIONGAP 11 02/25/2025 1225    BUN 25 (H) 02/25/2025 1225    CREATININE 0.78 02/25/2025 1225    EGFR 87 02/25/2025 1225    CALCIUM 9.1 02/25/2025 1225    ALBUMIN 3.7 02/25/2025 1225    ALKPHOS 72 02/25/2025 1225    PROT 6.4 02/25/2025 1225    AST 18 02/25/2025 1225    BILITOT 0.4 02/25/2025 1225    ALT 14 02/25/2025 1225     No results found for: \"VITD25\"    Anthropometrics:  Anthropometrics  IBW/kg (Dietitian Calculated): 52.2 kg  Weight Change  Weight History / % Weight Change: weight had been holding steady ~ 157-158# after increasing enteral feeds. Now down 3% over ~ 6 weeks  Significant Weight Loss: No        Wt Readings from Last 10 Encounters:   02/25/25 69.5 kg (153 lb 3.5 oz)   02/04/25 70.9 kg (156 lb 4.9 oz)   01/14/25 72.1 kg (158 lb 15.2 oz)   12/24/24 71.9 kg (158 lb 8.2 oz)   12/24/24 71.5 kg (157 lb 11.2 oz)   12/03/24 70.3 kg (155 lb)   12/03/24 70.4 kg (155 lb 1.5 oz)   12/03/24 70.4 kg (155 lb 1.6 oz)   11/22/24 71.7 kg (158 lb)   11/12/24 72 kg (158 lb 11.7 oz)   UBW ~ 158-160#     Food And Nutrient Intake:  Food and Nutrient History  Energy Intake: Fair 50-75 % (oral intake has declined, enteral feeds not meeting 100% estimated needs)     Food Intake  Amount of Food: has not eaten solid food in past few weeks. Tongue feels swollen, difficult to swallow. Taste is off  Fluid Intake: ~ 20 oz water orally, PEG flushes with feeds and additional PEG flushes                   Enteral Nutrition Intake  Enteral Nutrition Formula/Solution: Isosource 1.5 (5) " cartons.Takes 2-2-1 cartons.  provides 1875 calories, 85 grams protein, 955 ml free water. Has been taking consistently. Missed a few of the last feed due to nausea, meds adjusted and she is not having this issue anymore  Feeding Tube Flush: 60 ml FWF then meds 60 ml FWF then formula 60 ml post  ml FWF day+ 955 ml free water in 5 cartons formula = ~ 1500 ml/day  oral fluid intake                                    Nutrition Focused Physical Exam Findings:                          Energy Needs  Estimated Energy Needs  Total Energy Estimated Needs in 24 hours (kCal): 1950 kCal  Energy Estimated Needs per kg Body Weight in 24 hours (kCal/kg): 28 kCal/kg (28-30 actual BW used for assessment wt - recalculated)  Estimated Fluid Needs  Total Fluid Estimated Needs in 24 Hours (mL): 1950 mL  Method for Estimating 24 Hour Fluid Needs: 1 ml/kg  Estimated Protein Needs  Total Protein Estimated Needs in 24 Hours (g): 90 g  Protein Estimated Needs per kg Body Weight in 24 Hours (g/kg): 1.3 g/kg        Nutrition Diagnosis   Malnutrition Diagnosis  Patient has Malnutrition Diagnosis: No    Nutrition Diagnosis  Patient has Nutrition Diagnosis: Yes  Diagnosis Status (1): Resolved (no longer appropriate)  Nutrition Diagnosis 1: Inadequate oral intake  Related to (1): chronic condition  As Evidenced by (1): diagnosis, need for PEG  Additional Nutrition Diagnosis: Diagnosis 2  Diagnosis Status (2): Active  Nutrition Diagnosis 2: Swallowing difficulty  Related to (2): diagnosis  As Evidenced by (2): dysphagia, need for PEG  Nutrition Diagnosis 3: Increased energy expenditure  Related to (3): increased metabolic demand, chronic illness  As Evidenced by (3): cancer dx, treatment    Nutrition Interventions/Recommendations   Nutrition Prescription  Individualized Nutrition Prescription Provided for : enteral prescription - soft diet as tolerated for pleasure    Food and Nutrition Delivery  Food and Nutrition Delivery  Meals & Snacks:  Texture-Modified Diet  Goals: soft HAROON pleasure foods, encouraged oral diet as tolerated to maintain swallow function. Modify texture and consistency as needed  Enteral Nutrition: Management of volume of enteral nutrition (Continue with enteral feeds primary nutrition)  Goals: Recommend increasing enteral feeds to 5.5 cartons / 1378 ml Isosource 1.5 per day to provide 2067 calories, 93 grams protein, 1050 ml free water. Suggest  2 cartons- 2 cartons- 1 1/2 carton for daily regime    Nutrition Education       Coordination of Care  Coordination of Nutrition Care by a Nutrition Professional  Collaboration and referral of nutrition care:  (collaboration with provider)  Goals: Dr Garcia         DME:[facility]  TF: Isosource 1.5  GOAL RATE: 4 cartons per day  PROVIDES: 1500 calories 68 grams protein 764 ml free water  METHOD: bolus  COMMENTS:   12/3- change order faxed to [facility] for Isosource 1.5 5 cartons/day to provide   1875 calories, 85 grams protein, 955 ml free water    2/25- increased enteral orders to 5.5 cartons Isosource 1.5 to  provide 2067 calories, 93 grams protein,1050 ml free water. Sent to [facility] with updated clinical notes      Nutrition Monitoring and Evaluation   Food and Nutrient Intake  Monitoring and Evaluation Plan: Meal/snack pattern, Energy intake, Protein intake  Energy Intake: Estimated energy intake  Criteria: meet 100% energy needs  Meal/Snack Pattern: Food variety  Criteria: soft diet as tolerated for pleasure  Estimated protein intake: Estimated protein intake  Criteria: meet 100% protein needs  Enteral and Parenteral Nutrition Intake Determination: Enteral nutrition intake  Criteria: tolerate TF at goal rate  Anthropometric measurements  Monitoring and Evaluation Plan: Weight  Body Weight: Body weight  Criteria: maintain weight  Nutrition Focused Physical Findings  Monitoring and Evaluation Plan: Digestive System  Digestive System Finding: Constipation  Criteria: manage symptoms/adhere to  prescribed regime    Following as needed      Time Spent  Prep time on day of patient encounter: 10 minutes  Time spent directly with patient, family or caregiver: 20 minutes  Additional Time Spent on Patient Care Activities: 15 minutes  Documentation Time: 30 minutes  Other Time Spent: 0 minutes  Total: 75 minutes

## 2025-02-25 NOTE — PROGRESS NOTES
Patient seen in infusion today for Pembro, tolerated without complication. Patient sent to  to make future appts. Labs and schedule reviewed with patient. Port positive for blood return and flushed with NS and heparin per protocol prior to de-accessing. Discharged in stable condition.

## 2025-02-25 NOTE — PROGRESS NOTES
[facility] - Medical Oncology Follow-Up Visit    Patient ID: Amalia Roa is a 59 y.o. female with oropharyngeal squamous cell ca     Current therapy: methylPREDNISolone sod succinate (SOLU-Medrol) 40 mg/mL injection 40 mg, 40 mg, intravenous, As needed, 5 of 17 cycles        pembrolizumab (Keytruda) 200 mg in sodium chloride 0.9% 118 mL IV, 200 mg, intravenous, Once, 5 of 17 cycles    Administration: 200 mg (11/12/2024), 200 mg (12/3/2024), 200 mg (12/24/2024), 200 mg (1/14/2025), 200 mg (2/4/2025)       Chief Concern: Readiness to treat and review scans    Oncologic History:   DIAGNOSIS  Oropharyngeal squamous cell ca     STAGING  T4N2M1      CURRENT SITES OF DISEASE  R oropharynx, tongue, bilateral lungs, L adrenal gland     MOLECULAR GENOMICS  P16+   CPS 10     PRIOR THERAPY        CURRENT THERAPY  Pembrolizumab 11/12/24 - 2/25/25  Carboplatin/paclitaxel/pembrolizumab 3/19/25 -      CURRENT ONCOLOGICAL PROBLEMS           HISTORY OF PRESENT ILLNESS  Ms. Roa is a 60 yo with PMH significant for multiple medical comorbidities including HFrEF, CAD s/p JOHN, mitral regurgitation, and MEÑO who had had trouble swallowing for at least a year with throat discomfort, particularly on the R side. CT of the cervical spine at OSH was concerning for 3.6 x 2.0 necrotic level 2 lymph node. She was referred to Dr. Montanez who she met on 9/20/24 due to concerns for a mass. Flex laryngoscopy showed a prominence at the base of the tongue of the R and an exophytic mass of the R piriform sinus, with evidence of inga disease on the L. CT of the neck on 10/8/24 revealed large, irregular mass of the R oropharynx measuring at least 4 x 3.2 x 7.6 cm, with involvement of the R lateral wall/tonsillar region, base of tongue/glossal tonsillar sulcus, soft palate, and abutment of the epiglottis with extension into the R parapharyngeal fat and  space. There appeared to be extension into the R tongue  and within the floor of the mouth and oral cavity. Also noted was thrombosis or occlusion of the RIJ, bilateral cervical lymphadenopathy with levels 2-4 noted on the R and on the left, and multiple pulmonary nodules. She was admitted to the hospital and underwent panendoscopy, biopsy, and PEG placement on 10/14/24. CT chest also on 10/14 was notable for multiple parenchymal bilateral lung nodules and enlarged mediastinal and hilar lymph nodes, as well as suspicious L adrenal gland nodule. She was discussed in tumor board, with PD-L1 added on CPS 10. PET/CT 10/22/24 confirmed FDG avidity of R oropharynx/oral cavity/hypopharynx mass, L palatine tonsil, R parotid gland, multiple cervical nodes bilaterally, multiple mediastinal and hilar lymph  nodes, pulmonary nodules, small L pleural effusion, and L adrenal gland. Decided to start pembrolizumab monotherapy, started 11/12/24 after port placement. Overall tolerated therapy well, but unfortunately with mixed response with progression of disease in the primary site and decision made to add carboplatin/paclitaxel, starting 3/19/25.      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 Lives at home with her . Has two grandsons who are 6 and 2 yo, and two granddaughters who are 3 yo and 10 months.    Tob: quit smoking 2014. 40 years x 1/2 ppd  EtOH: none  Illicits: none     FAMILY HISTORY  Mother - breast cancer dx 50s or 60s  Sister - colon ca dx 56 yo  Niece - cervical cancer dx 30s    HPI   She is here today with her , and her daughter is on the phone. She's not eating much - feels like her tongue is swollen, making it hard to eat. Just hard to get it down. She feels like her tongue has a hook and stuff is getting stuck on it. Sometimes  "it's just easier to not try. She has just noticed this in the past 1-2 weeks, appetite down for the last two weeks. She continues to use 5 of the feeds per day.     Meds (Current):    Current Outpatient Medications:     acetaminophen (Tylenol) 160 mg/5 mL liquid, 31.3 mL (1,000 mg) by g-tube route 3 times a day as needed for mild pain (1 - 3) or moderate pain (4 - 6)., Disp: 2838 mL, Rfl: 2    albuterol 90 mcg/actuation inhaler, Inhale 2 puffs every 4 hours if needed for wheezing., Disp: , Rfl:     ALPRAZolam (Xanax) 0.5 mg tablet, Take 1 tablet (0.5 mg) by mouth 2 times a day as needed for anxiety., Disp: , Rfl:     aspirin 81 mg chewable tablet, Chew 1 tablet (81 mg) once daily., Disp: , Rfl:     atorvastatin (Lipitor) 40 mg tablet, Take 1 tablet (40 mg) by mouth once daily., Disp: , Rfl:     doxepin (SINEquan) 10 mg/mL solution, 1 mL (10 mg) by g-tube route once daily at bedtime., Disp: 120 mL, Rfl: 12    fluticasone (Flovent) 44 mcg/actuation inhaler, Inhale 2 puffs 2 times a day., Disp: , Rfl:     lidocaine (Lidoderm) 5 % patch, Place 1 patch over 12 hours on the skin once daily. Remove & discard patch within 12 hours or as directed by MD., Disp: 30 patch, Rfl: 3    metoprolol tartrate (Lopressor) 25 mg tablet, Take 0.5 tablets (12.5 mg) by mouth 2 times a day., Disp: 60 tablet, Rfl: 5    prochlorperazine (Compazine) 10 mg tablet, Take 1 tablet (10 mg) by mouth every 6 hours if needed for nausea or vomiting., Disp: 60 tablet, Rfl: 0    transparent dressings (Tegaderm) 3 1/2 X 4 \" bandage, Apply 1 each topically every 21 (twenty-one) days. Cover [facility] site., Disp: 1 each, Rfl: 11    buPROPion (Wellbutrin) 75 mg tablet, Take 2 tablets (150 mg) by mouth once daily in the morning., Disp: 60 tablet, Rfl: 0    gabapentin (Neurontin) 50 mg/mL solution, 6 mL (300 mg) by g-tube route 3 times a day., Disp: 540 mL, Rfl: 0    senna (Senokot) 8.8 mg/5 mL syrup, Take 5 mL by mouth 2 times a day for 10 days., Disp: 240 mL, " Rfl: 3    Review of Systems   Constitutional: Negative.  Negative for appetite change.   HENT:   Positive for trouble swallowing.         Intermittent right ear pain.   Respiratory:  Positive for hemoptysis.    Cardiovascular: Negative.    Gastrointestinal:  Positive for nausea. Negative for constipation.   Musculoskeletal:  Positive for neck pain.   Skin: Negative.    All other systems reviewed and are negative.       Objective   BSA: 1.76 meters squared  Wt Readings from Last 5 Encounters:   02/25/25 69.5 kg (153 lb 3.5 oz)   02/04/25 70.9 kg (156 lb 4.9 oz)   01/14/25 72.1 kg (158 lb 15.2 oz)   12/24/24 71.9 kg (158 lb 8.2 oz)   12/24/24 71.5 kg (157 lb 11.2 oz)     /63 (BP Location: Left arm, Patient Position: Sitting, BP Cuff Size: Adult)   Pulse 87   Temp 36 °C (96.8 °F) (Skin)   Resp 16   Wt 69.5 kg (153 lb 3.5 oz)   SpO2 98%   BMI 27.14 kg/m²     ECOG Score: 0- Fully active, able to carry on all pre-disease performance w/o restriction.      Physical Exam  Vitals reviewed.   Constitutional:       Appearance: Normal appearance.   HENT:      Head: Normocephalic.      Nose: Nose normal.      Mouth/Throat:      Mouth: Mucous membranes are moist.      Pharynx: Oropharynx is clear.   Eyes:      Extraocular Movements: Extraocular movements intact.      Conjunctiva/sclera: Conjunctivae normal.      Pupils: Pupils are equal, round, and reactive to light.   Cardiovascular:      Rate and Rhythm: Normal rate and regular rhythm.      Pulses: Normal pulses.      Heart sounds: Normal heart sounds.   Pulmonary:      Breath sounds: Normal breath sounds.   Abdominal:      General: Bowel sounds are normal.      Palpations: Abdomen is soft.      Comments: Capped peg.    Musculoskeletal:         General: Normal range of motion.      Cervical back: Normal range of motion and neck supple.   Skin:     General: Skin is warm and dry.   Neurological:      General: No focal deficit present.      Mental Status: She is alert  and oriented to person, place, and time.   Psychiatric:         Mood and Affect: Mood normal.         Behavior: Behavior normal.         Thought Content: Thought content normal.         Judgment: Judgment normal.          Results:  Labs:  Lab Results   Component Value Date    WBC 10.0 02/04/2025    HGB 11.1 (L) 02/04/2025    HCT 34.4 (L) 02/04/2025    MCV 98 02/04/2025     02/04/2025      Lab Results   Component Value Date    NEUTROABS 7.44 02/04/2025      Lab Results   Component Value Date    GLUCOSE 128 (H) 02/04/2025    CALCIUM 9.1 02/04/2025     02/04/2025    K 4.2 02/04/2025    CO2 33 (H) 02/04/2025     02/04/2025    BUN 28 (H) 02/04/2025    CREATININE 0.84 02/04/2025    MG 2.12 12/10/2024     Lab Results   Component Value Date    ALT 12 02/04/2025    AST 17 02/04/2025    ALKPHOS 73 02/04/2025    BILITOT 0.3 02/04/2025      Lab Results   Component Value Date    ACTH 7.5 02/04/2025    CORTISOL 8.3 02/04/2025    TSH 1.12 02/04/2025       Imaging:  I have personally reviewed the below imaging and concur with the reported findings unless otherwise stated:    CT abdomen w IV contrast    Result Date: 2/25/2025  Impression: 1. At the time of this interpretation the images for this exam are included with the patient's CT chest accession number. 2. Please see the CT chest for findings at the pulmonary bases which are not reported in this exam. 3. 2.2 cm left adrenal mass, unchanged from 10/14/2024, likely benign due to the stability. Nonetheless a dedicated CT adrenal gland is suggested for more definitive characterization. 4. Scattered diverticula descending colon. No evidence for metastatic disease to the abdomen.     MACRO: none   Signed by: Melvin Ornelas 2/25/2025 8:15 AM Dictation workstation:   [id number]    CT chest w IV contrast    Result Date: 2/21/2025  Impression: Multiple pulmonary metastases are re-identified with the majority of these unchanged in size. However, several of the larger  pulmonary metastases seen on prior study have diminished in size as outlined above but there is at least metastasis seen in the left lower lobe which is increased in size from 0.7 x 1.4 cm to 1.0 x 1.7 cm. A small left pleural effusion has slightly increased in size as well with increased pleural metastases within the left hemithorax.   Stable size of the metastatic disease to both stanley but the involve nodes now show increasing cystic or necrotic changes.   The tree-in-bud areas seen on prior study throughout right middle lobe have nearly completely resolved.   Cholelithiasis.   MACRO: None   Signed by: Lissette Phan 2/21/2025 8:56 PM Dictation workstation:   [id number]    CT soft tissue neck w IV contrast    Result Date: 2/20/2025  Impression: 1. There has been interval progression of disease compared to the prior exam 12/06/2024. Interval increased size of the extensive soft tissue mass involving the oropharynx, oral cavity, hypopharynx, right carotid space, and right  space. 2. Interval increased size of bilateral lymphadenopathy compared to the prior exam. 3. The right common and right cervical internal carotid artery are encased by tumor and mildly narrowed, however remain patent. The right internal jugular vein is occluded with reconstitution within the right sigmoid sinus. 4. Bilateral pulmonary nodules and mediastinal adenopathy. Please see CT chest performed the same day for further details.   I personally reviewed the image(s)/study and resident interpretation as stated by Dr. Clary Lagos MD. I agree with the findings as stated. This study was interpreted at [facility], [place], [place].   MACRO: None   Signed by: Oneida Lindo 2/20/2025 1:23 PM Dictation workstation:   [id number]         Assessment/Plan      Amalia Roa is a 59 y.o. female here for follow up of oropharyngeal squamous cell ca.    # Oropharyngeal squamous cell ca  - T4N2M1  - p16+  - CPS  10  - given CPS, discussed pembrolizumab monotherapy, and consented  - she requested port placement due to poor venous access - Port placed 11/22/24   - previously discussed with Dr. Nolan consideration for consolidation radiation pending response to pembro  - C1 pembrolizumab 11/12/24   - Hospitalization 12/3 - 12/10/24 2/2 vasovagal syncope episode the evening of her C2 infusion.   - personally reviewed restaging scans with mixed response - appears to have some response intrathoracically but progression on CT neck as well as clinically with worsening dysphagia  - discussed clinical trial vs addition of chemotherapy. Given distance, will plan to add carboplatin/paclitaxel to pembrolizumab   - discussed with radiation oncology for consideration of radiation to help with difficulty swallowing, and Dr. Nolan will arrange with patient    # Dysphagia  # Unintentional weight loss  - continues to lose weight despite contineud tube feeds, liekly due to worsening dysphagia  - reached out to nutrition     # Neoplasm-related Pain   - Follows with supportive onc.  Pain managed with current regimen.  Oxycodone changed to PRN Dilaudid.      # Hemoptysis (occurred 2/1 & 2/2/25)  - x2, clot the size of a pencil eraser, H&H stable  - Pt instructed to report increased frequency  - Will monitor for now.      # Anxiety   - Routine HS Alprazolam     # Constipation (slow-transit)  - Rx sent for senna elixir. 5mg BID.  Pt to titrate med to produce routine, soft BM's.  Can also add miralax PRN.    - 12/24:  stools soft d/t recent ATB course, ongoing monitoring.

## 2025-02-25 NOTE — PROGRESS NOTES
SUPPORTIVE AND PALLIATIVE ONCOLOGY OUTPATIENT FOLLOW-UP    SERVICE DATE: 2/25/2025    Subjective   HISTORY OF PRESENT ILLNESS: Amalia Roa is a 59 y.o. 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 Garcia).      Additional PMHx: depression, anxiety, PTSD, panic attacks, hypertension, heart failure with reduced EF, CAD, MI status post stents, mitral regurgitation status postrepair, asthma, MEÑO on CPAP, PEG tube dependency      1/21/25: Oxycodone is not really helping her pain. Only alleviates pain for about an hour. It causes nausea/headaches more than anything. Otherwise, doing well.     2/25/25: DP on scans. To start chemo next month. To fu with rad onc.  Pain controlled. Taking hydromorphone 4mg once per day if that.     Symptom Assessment:    Pain:somewhat    Back of throat and into and right jaw into ear, sometimes radiates to both ears  Burning, sharp, intermittent     Numbness or tingling in hands/feet/other: none  Headache: none  Lack of appetite: somewhat  Weight loss: stable - TF  Pain in mouth/swallowing: a little  Dry mouth: none  Taste changes: a little  Nausea/early satiety: none   Vomiting: none  Constipation: none  Diarrhea: none  Lack of energy: none  Difficulty sleeping: none  Anxiety: none  Depression: none  Shortness of breath: none  Other: none    Information obtained from: interview of patient  ______________________________________________________________________        Objective             Creatinine   Date Value Ref Range Status   02/04/2025 0.84 0.50 - 1.05 mg/dL Final     AST   Date Value Ref Range Status   02/04/2025 17 9 - 39 U/L Final     ALT   Date Value Ref Range Status   02/04/2025 12 7 - 45 U/L Final     Comment:     Patients treated with Sulfasalazine may generate falsely decreased results for ALT.     Hemoglobin   Date Value Ref  Range Status   02/04/2025 11.1 (L) 12.0 - 16.0 g/dL Final     Platelets   Date Value Ref Range Status   02/04/2025 234 150 - 450 x10*3/uL Final          PHYSICAL EXAMINATION       12/10/2024     9:42 AM 12/10/2024    12:35 PM 12/24/2024     7:57 AM 12/24/2024     8:49 AM 1/14/2025     8:53 AM 2/4/2025    12:48 PM 2/25/2025    12:42 PM   Vitals   Systolic  123 126 114 96 128 120   Diastolic  77 61 63 53 63 63   BP Location  Left arm Left arm Right arm Right arm Left arm Left arm   Heart Rate 63 75 86 84 82 79 87   Temp  36.1 °C (97 °F) 36.4 °C (97.5 °F) 36.6 °C (97.9 °F) 36.6 °C (97.9 °F) 36.5 °C (97.7 °F) 36 °C (96.8 °F)   Resp 18 18 16 18 18 16 16   Weight (lb)   157.7 158.51 158.95 156.31 153.22   BMI   27.94 kg/m2 28.08 kg/m2 28.16 kg/m2 27.69 kg/m2 27.14 kg/m2   BSA (m2)   1.78 m2 1.79 m2 1.79 m2 1.78 m2 1.76 m2   Visit Report   Report    Report Report    Report Report Report    Report Report    Report          Physical Exam  HENT:      Head: Normocephalic and atraumatic.   Eyes:      Extraocular Movements: Extraocular movements intact.      Conjunctiva/sclera: Conjunctivae normal.   Pulmonary:      Effort: Pulmonary effort is normal.   Neurological:      General: No focal deficit present.      Mental Status: She is alert. Mental status is at baseline.   Psychiatric:         Mood and Affect: Mood normal.         Thought Content: Thought content normal.         ASSESSMENT/PLAN    Pain  Cancer related pain and post procedure (peg)   somatic and neuropathic  Continue hydromorphone 4mg q4 PRN   Continue Acetaminophen 1 g BID-TID PRN (not to exceed 3g daily)  Increase Gabapentin to 300mg TID  Continue lidocaine patch 5% 12 hr on 12 off to abdomen near peg site    Opioid Use  Medication Management:   - OARRS report reviewed with no aberrant behavior; consistent with  prescriptions/records and patient history  - MED 7.5.  Overdose Risk Score 420.   This has been discussed with patient.   - We will continue to closely  monitor the patient for signs of prescription misuse including UDS, OARRS review and subjective reports at each visit.  - Concurrent benzodiazepine use   - I am a provider who either is or has consulted and collaborated with a provider certified in Hospice and Palliative Medicine and have conducted a face-face visit and examination for this patient.  - Routine Urine Drug Screen completed 2/25/25 and pending   - Controlled Substance Agreement completed 2/25/25  - Specifically discussed that controlled substance prescriptions will only be provided by our group as outlined in the completed agreement  - Prescribed naloxone: patient declined   - Red Flags: none      Nausea  At risk for nausea with vomiting related to opioids and tube feeds    Home regimen:  Prochlorperazine , ondansetron ineffective  Continue prochlorperazine 10 mg PEG q6h prn nausea      Constipation  At risk for constipation related to medication side effects (including opioids), currently not constipated  Usual bowel pattern: every day  Home regimen:  Senna and Miralax prn* has not needed with tube feeds  Continue Senna 5 ml BID PRN    Altered Mood  Chronic anxiety related to  history of PTSD, panic attacks    controlled with home regimen   Home regimen: alprazolam 0.5 mg BID prn, Wellbutrin 150 mgqAM, and Doxepin 10 mg PO at bedtime (PCP prescribes)   Continue alprazolam 0.5 mg PEG BID PRN  Continue Buproprion  mg PEG qAM  Continue Doxepin 10 mg PO at bedtime      Sleeping Difficulty  Impaired sleep related to pain, anxiety  Improved with gabapentin  Continue home alprazolam and Doxepin    Weight loss   Related to malignancy  Nutrition following  -TF via peg  -Diet as tolerated     Supportive and Palliative Oncology encounter:  Spoke with patient and  at bedside  Emotional support provided  Coordination of care  We will continue to follow and address symptoms as needed    Medical Decision Making/Goals of Care/Advance Care  Planning:  Patient's current clinical condition, including diagnosis, and management plan, and goals of care were discussed.   Goals: symptom control and cancer directed therapy    Advance Directives  Existence of Advance Directives:No - not interested  Decision maker: Surrogate decision maker is     Next Follow-Up Visit:  Return to clinic in 4 weeks    Signature and billing  Medical complexity was moderate level due to due to complexity of problems, extensive data review, and high risk of management/treatment.  Time was spent on the following: Prep Time, Time Directly with Patient/Family/Caregiver, Documentation Time. Total time spent: 30      Data  Diagnostic tests and information reviewed for today's visit:  Most recent labs and imaging results, Medications       Some elements copied from Supportive Oncology note on 1/21/25, the elements have been updated and all reflect current decision making from today, 2/25/2025.      Plan of Care discussed with: Patient, Family/Significant Other: , and RN    SIGNATURE: Jazmin Phillips, APRN-CNP    Contact information:  Supportive and Palliative Oncology  Monday-Friday 8 AM-5 PM  Phone:  [phone], press option #5, then option #1.   Or Epic Secure Chat

## 2025-02-26 ENCOUNTER — SOCIAL WORK (OUTPATIENT)
Dept: CASE MANAGEMENT | Facility: HOSPITAL | Age: 60
End: 2025-02-26
Payer: MEDICAID

## 2025-02-26 ENCOUNTER — TELEPHONE (OUTPATIENT)
Dept: RADIATION ONCOLOGY | Facility: HOSPITAL | Age: 60
End: 2025-02-26
Payer: MEDICAID

## 2025-02-26 RX ORDER — OLANZAPINE 5 MG/1
5 TABLET ORAL NIGHTLY
Qty: 30 TABLET | Refills: 5 | Status: SHIPPED | OUTPATIENT
Start: 2025-02-26

## 2025-02-26 RX ORDER — DEXAMETHASONE 4 MG/1
8 TABLET ORAL DAILY
Qty: 36 TABLET | Refills: 0 | Status: SHIPPED | OUTPATIENT
Start: 2025-02-26

## 2025-02-26 RX ORDER — PROCHLORPERAZINE MALEATE 10 MG
10 TABLET ORAL EVERY 6 HOURS PRN
Qty: 30 TABLET | Refills: 5 | Status: SHIPPED | OUTPATIENT
Start: 2025-02-26

## 2025-02-26 RX ORDER — ONDANSETRON HYDROCHLORIDE 8 MG/1
8 TABLET, FILM COATED ORAL EVERY 8 HOURS PRN
Qty: 30 TABLET | Refills: 5 | Status: SHIPPED | OUTPATIENT
Start: 2025-02-26

## 2025-02-26 NOTE — PROGRESS NOTES
"Social Work Note    Referral Source: [facility] rep  Meeting Location: Email  Person(s) Present: Amirah Gutierrez LSW   Identified Needs: Confirmation of treatment dates  Impression and Plan: SW received email from [facility] representative asking if following dates were correct: \"March 17-20, April 8-10, April 29-May 1, May 19-21\". SW confirmed that the dates provided by Amalia were aligned with what is reflected in her chart. SW will remain available as needed.   Interventions Provided: Advocacy and Care Coordination  Estimated Time Spent: N/A      "

## 2025-02-26 NOTE — TELEPHONE ENCOUNTER
Called pt to remind of appointment on 2/27/2025 at 12:00. Pt's phone went to voicemail left number if needs to reschedule.

## 2025-02-27 ENCOUNTER — HOSPITAL ENCOUNTER (OUTPATIENT)
Dept: RADIATION ONCOLOGY | Facility: HOSPITAL | Age: 60
Setting detail: RADIATION/ONCOLOGY SERIES
Discharge: HOME | End: 2025-02-27
Payer: MEDICAID

## 2025-02-27 LAB
1OH-MIDAZOLAM UR CFM-MCNC: <25 NG/ML
6MAM UR CFM-MCNC: <25 NG/ML
7AMINOCLONAZEPAM UR CFM-MCNC: <25 NG/ML
A-OH ALPRAZ UR CFM-MCNC: 296 NG/ML
ALPRAZ UR CFM-MCNC: 364 NG/ML
CHLORDIAZEP UR CFM-MCNC: <25 NG/ML
CLONAZEPAM UR CFM-MCNC: <25 NG/ML
CODEINE UR CFM-MCNC: <50 NG/ML
DIAZEPAM UR CFM-MCNC: <25 NG/ML
HYDROCODONE CTO UR CFM-MCNC: <25 NG/ML
HYDROMORPHONE UR CFM-MCNC: 2328 NG/ML
LORAZEPAM UR CFM-MCNC: <25 NG/ML
MIDAZOLAM UR CFM-MCNC: <25 NG/ML
MORPHINE UR CFM-MCNC: <50 NG/ML
NORDIAZEPAM UR CFM-MCNC: <25 NG/ML
NORHYDROCODONE UR CFM-MCNC: <25 NG/ML
NOROXYCODONE UR CFM-MCNC: <25 NG/ML
OXAZEPAM UR CFM-MCNC: <25 NG/ML
OXYCODONE UR CFM-MCNC: <25 NG/ML
OXYMORPHONE UR CFM-MCNC: <25 NG/ML
TEMAZEPAM UR CFM-MCNC: <25 NG/ML

## 2025-02-27 PROCEDURE — 99213 OFFICE O/P EST LOW 20 MIN: CPT | Mod: GT | Performed by: RADIOLOGY

## 2025-02-27 PROCEDURE — 99215 OFFICE O/P EST HI 40 MIN: CPT | Mod: GT | Performed by: RADIOLOGY

## 2025-02-27 PROCEDURE — 99215 OFFICE O/P EST HI 40 MIN: CPT | Performed by: RADIOLOGY

## 2025-02-27 ASSESSMENT — ENCOUNTER SYMPTOMS
PSYCHIATRIC NEGATIVE: 1
UNEXPECTED WEIGHT CHANGE: 1
TROUBLE SWALLOWING: 1
VOICE CHANGE: 1
NEUROLOGICAL NEGATIVE: 1
EYE PROBLEMS: 1
ENDOCRINE NEGATIVE: 1
SORE THROAT: 1
CARDIOVASCULAR NEGATIVE: 1
HEMATOLOGIC/LYMPHATIC NEGATIVE: 1
LOSS OF SENSATION IN FEET: 0
GASTROINTESTINAL NEGATIVE: 1
MUSCULOSKELETAL NEGATIVE: 1
RESPIRATORY NEGATIVE: 1
DEPRESSION: 0
OCCASIONAL FEELINGS OF UNSTEADINESS: 0

## 2025-02-27 ASSESSMENT — COLUMBIA-SUICIDE SEVERITY RATING SCALE - C-SSRS
6. HAVE YOU EVER DONE ANYTHING, STARTED TO DO ANYTHING, OR PREPARED TO DO ANYTHING TO END YOUR LIFE?: NO
2. HAVE YOU ACTUALLY HAD ANY THOUGHTS OF KILLING YOURSELF?: NO
1. IN THE PAST MONTH, HAVE YOU WISHED YOU WERE DEAD OR WISHED YOU COULD GO TO SLEEP AND NOT WAKE UP?: NO

## 2025-02-27 NOTE — PROGRESS NOTES
Radiation Oncology Nursing Note    Pain: The patient's current pain level was assessed.  They report currently having a pain of 1 out of 10.  They feel their pain is under control with OTC meds the use of pain medications.    Review of Systems:  Review of Systems   Constitutional:  Positive for unexpected weight change.        PEG tube placed, October 2024     HENT:   Positive for sore throat, trouble swallowing and voice change.         Sore throat sometimes with some blood streaking at times.   Able to drink fluids and harder foods, softer foods get stuck easier  Voice changes since this started     Eyes:  Positive for eye problems.        Reading glasses     Respiratory: Negative.     Cardiovascular: Negative.    Gastrointestinal: Negative.    Endocrine: Negative.    Genitourinary: Negative.     Musculoskeletal: Negative.    Skin: Negative.    Neurological: Negative.    Hematological: Negative.    Psychiatric/Behavioral: Negative.

## 2025-02-28 ENCOUNTER — TELEPHONE (OUTPATIENT)
Dept: RADIATION ONCOLOGY | Facility: HOSPITAL | Age: 60
End: 2025-02-28
Payer: MEDICAID

## 2025-02-28 ENCOUNTER — TELEPHONE (OUTPATIENT)
Dept: HEMATOLOGY/ONCOLOGY | Facility: CLINIC | Age: 60
End: 2025-02-28
Payer: MEDICAID

## 2025-02-28 DIAGNOSIS — G89.3 CANCER ASSOCIATED PAIN: ICD-10-CM

## 2025-02-28 PROCEDURE — RXMED WILLOW AMBULATORY MEDICATION CHARGE

## 2025-02-28 RX ORDER — HYDROMORPHONE HYDROCHLORIDE 5 MG/5ML
4 SOLUTION ORAL EVERY 4 HOURS PRN
Qty: 360 ML | Refills: 0 | Status: SHIPPED | OUTPATIENT
Start: 2025-02-28 | End: 2025-03-15

## 2025-02-28 NOTE — TELEPHONE ENCOUNTER
Called pt to remind of appointment on 3/3/2025 at 12:00. Pt's phone went to voicemail left number if needs to reschedule.

## 2025-03-03 ENCOUNTER — HOSPITAL ENCOUNTER (OUTPATIENT)
Dept: RADIATION ONCOLOGY | Facility: HOSPITAL | Age: 60
Setting detail: RADIATION/ONCOLOGY SERIES
Discharge: HOME | End: 2025-03-03
Payer: MEDICAID

## 2025-03-03 ENCOUNTER — HOSPITAL ENCOUNTER (OUTPATIENT)
Dept: RADIOLOGY | Facility: EXTERNAL LOCATION | Age: 60
Discharge: HOME | End: 2025-03-03

## 2025-03-03 ENCOUNTER — PHARMACY VISIT (OUTPATIENT)
Dept: PHARMACY | Facility: CLINIC | Age: 60
End: 2025-03-03
Payer: MEDICAID

## 2025-03-03 ENCOUNTER — NUTRITION (OUTPATIENT)
Dept: HEMATOLOGY/ONCOLOGY | Facility: HOSPITAL | Age: 60
End: 2025-03-03
Payer: MEDICAID

## 2025-03-03 ENCOUNTER — SOCIAL WORK (OUTPATIENT)
Dept: CASE MANAGEMENT | Facility: HOSPITAL | Age: 60
End: 2025-03-03
Payer: MEDICAID

## 2025-03-03 VITALS
DIASTOLIC BLOOD PRESSURE: 73 MMHG | HEART RATE: 75 BPM | WEIGHT: 154.21 LBS | RESPIRATION RATE: 18 BRPM | BODY MASS INDEX: 27.32 KG/M2 | TEMPERATURE: 97.9 F | SYSTOLIC BLOOD PRESSURE: 112 MMHG | OXYGEN SATURATION: 97 %

## 2025-03-03 DIAGNOSIS — C01 MALIGNANT NEOPLASM OF BASE OF TONGUE (MULTI): ICD-10-CM

## 2025-03-03 DIAGNOSIS — C01 CANCER OF BASE OF TONGUE (MULTI): Primary | ICD-10-CM

## 2025-03-03 PROCEDURE — 2500000005 HC RX 250 GENERAL PHARMACY W/O HCPCS: Mod: SE

## 2025-03-03 PROCEDURE — 99214 OFFICE O/P EST MOD 30 MIN: CPT | Performed by: RADIOLOGY

## 2025-03-03 PROCEDURE — RXMED WILLOW AMBULATORY MEDICATION CHARGE

## 2025-03-03 PROCEDURE — 77334 RADIATION TREATMENT AID(S): CPT | Performed by: RADIOLOGY

## 2025-03-03 PROCEDURE — 31575 DIAGNOSTIC LARYNGOSCOPY: CPT | Performed by: RADIOLOGY

## 2025-03-03 PROCEDURE — 77333 RADIATION TREATMENT AID(S): CPT | Mod: 59 | Performed by: RADIOLOGY

## 2025-03-03 RX ORDER — HEPARIN 100 UNIT/ML
SYRINGE INTRAVENOUS
Status: DISCONTINUED
Start: 2025-03-03 | End: 2025-03-04 | Stop reason: HOSPADM

## 2025-03-03 RX ORDER — LIDOCAINE HYDROCHLORIDE 20 MG/ML
1 JELLY TOPICAL ONCE
Status: COMPLETED | OUTPATIENT
Start: 2025-03-03 | End: 2025-03-03

## 2025-03-03 RX ORDER — LIDOCAINE HYDROCHLORIDE 20 MG/ML
JELLY TOPICAL
Status: COMPLETED
Start: 2025-03-03 | End: 2025-03-03

## 2025-03-03 RX ADMIN — LIDOCAINE HYDROCHLORIDE 1 APPLICATION: 20 JELLY TOPICAL at 13:30

## 2025-03-03 ASSESSMENT — ENCOUNTER SYMPTOMS
GASTROINTESTINAL NEGATIVE: 1
SORE THROAT: 0
PSYCHIATRIC NEGATIVE: 1
NERVOUS/ANXIOUS: 0
TROUBLE SWALLOWING: 1
CARDIOVASCULAR NEGATIVE: 1
MUSCULOSKELETAL NEGATIVE: 1
ENDOCRINE NEGATIVE: 1
OCCASIONAL FEELINGS OF UNSTEADINESS: 0
VOICE CHANGE: 1
RESPIRATORY NEGATIVE: 1
NAUSEA: 0
DEPRESSION: 0
DIARRHEA: 0
EYE PROBLEMS: 1
UNEXPECTED WEIGHT CHANGE: 1
NUMBNESS: 0
APPETITE CHANGE: 1
CONSTIPATION: 0
VOMITING: 0
NEUROLOGICAL NEGATIVE: 1
LOSS OF SENSATION IN FEET: 0
SLEEP DISTURBANCE: 0
HEMATOLOGIC/LYMPHATIC NEGATIVE: 1

## 2025-03-03 ASSESSMENT — PAIN SCALES - GENERAL
PAINLEVEL_OUTOF10: 0 - NO PAIN
PAINLEVEL_OUTOF10: 0-NO PAIN

## 2025-03-03 NOTE — PROGRESS NOTES
Radiation Oncology Nursing Note    Pain: The patient's current pain level was assessed.  They report currently having a pain of 0 out of 10.  They feel their pain is under control without the use of pain medications.    Review of Systems:  Review of Systems   Constitutional:  Positive for appetite change (decreased, only taking water at this time) and unexpected weight change (peg placed in october 2024, weight loss up until that point, weight relatively stable since per patient.).        PEG tube placed, October 2024     HENT:   Positive for trouble swallowing (dysphagia with any solid or soft foods, denies any odynophagia) and voice change. Negative for lump/mass, mouth sores, sore throat and tinnitus.         Reports jaw pain and ears bilaterally, right greater than left  Able to drink fluids and harder foods, softer foods get stuck easier  Voice changes since this started  Denies xerostomia.  Reports increased oral secretions   Eyes:  Positive for eye problems.        Reading glasses     Respiratory: Negative.     Cardiovascular: Negative.    Gastrointestinal: Negative.  Negative for constipation, diarrhea, nausea and vomiting.   Endocrine: Negative.    Genitourinary: Negative.     Musculoskeletal: Negative.    Skin: Negative.    Neurological: Negative.  Negative for numbness.   Hematological: Negative.    Psychiatric/Behavioral: Negative.  Negative for depression, sleep disturbance and suicidal ideas. The patient is not nervous/anxious.

## 2025-03-03 NOTE — PROGRESS NOTES
met with patient's spouse Melvin. He indicated that they were currently staying at [facility] but would be going home to [place], [place]. They are awaiting the new plan for radiation treatment. Once that is determined, Melvin will notify me and a new [facility] Request form will be submitted. Melvin said that [facility] is aware of their needs as well.     CARMELITA Flaherty

## 2025-03-03 NOTE — PROGRESS NOTES
"NUTRITION Follow-Up NOTE    Nutrition Assessment     Reason for Visit:  Amalia Roa is a 59 y.o. female who presents for  base of tongue cancer.   PEG placed 10/18/24  [place] Pembrolizumab start 11/12/24    Enteral Access/ Type: PEG   DME Company: [employer]    Visited with pt today during CT sim/ RadOnc Visit; pt to get radiation    Patient Active Problem List   Diagnosis    Oropharyngeal dysphagia    Tongue mass    Cancer of base of tongue (Multi)    HTN (hypertension)    Hyperlipidemia    Abnormal EKG    CAD (coronary artery disease)    Stented coronary artery    Murmur    Valvular heart disease    CHF (congestive heart failure)    MI (myocardial infarction) (Multi)    Mitral regurgitation    Asthma    MEÑO (obstructive sleep apnea)    Depression    PTSD (post-traumatic stress disorder)    Slow transit constipation    Encounter for antineoplastic immunotherapy    Vasovagal syncope    Hemoptysis    Elevated BUN       Nutrition Significant Labs:  Lab Results   Component Value Date/Time    GLUCOSE 148 (H) 02/25/2025 1225     02/25/2025 1225    K 4.2 02/25/2025 1225    CL 99 02/25/2025 1225    CO2 33 (H) 02/25/2025 1225    ANIONGAP 11 02/25/2025 1225    BUN 25 (H) 02/25/2025 1225    CREATININE 0.78 02/25/2025 1225    EGFR 87 02/25/2025 1225    CALCIUM 9.1 02/25/2025 1225    ALBUMIN 3.7 02/25/2025 1225    ALKPHOS 72 02/25/2025 1225    PROT 6.4 02/25/2025 1225    AST 18 02/25/2025 1225    BILITOT 0.4 02/25/2025 1225    ALT 14 02/25/2025 1225    MG 2.12 12/10/2024 0750    PHOS 3.7 12/10/2024 0750     No results found for: \"VITD25\"      Anthropometrics:  Height: 159.3 cm (5' 2.72\")   Weight: 70 kg (154 lb 3.4 oz)   BMI (Calculated): 27.56    IBW/kg (Dietitian Calculated): 52.2 kg Percent of IBW: 135 %     Adjusted Body Weight (kg): 56 kg    Weight History:   Daily Weight  03/03/25 : 70 kg (154 lb 3.4 oz)  03/04/25 : 70 kg (154 lb 3.4 oz)  02/25/25 : 69.5 kg (153 lb 3.5 oz)  02/04/25 : 70.9 kg (156 lb 4.9 oz)  01/14/25 : " ED Provider Note  Lakeview 72.1 kg (158 lb 15.2 oz)  12/24/24 : 71.9 kg (158 lb 8.2 oz)  12/24/24 : 71.5 kg (157 lb 11.2 oz)  12/03/24 : 70.3 kg (155 lb)  12/03/24 : 70.4 kg (155 lb 1.5 oz)  12/03/24 : 70.4 kg (155 lb 1.6 oz)    Weight Change %:  Weight History / % Weight Change: 3% weight loss noted in the last 6 weeks. stable over the last week with increase in enteral feeds  Significant Weight Loss: No    Nutrition History:  Food and Nutrient History  Food and Nutrient History: Oral intake has decreased; now 100% enteral feed dependant. still able to do some fluids by mouth to help with hydration but only sips/ water. has thick secretions at itmes. Has not seen SLP since hospital. Denies any NIS at this time. but does have jaw/ ear pain. BMs daily; formed.  Energy Intake: Good > 75 %    Food Intake  Fluid Intake: ~ 20 oz water orally (half of what was doing before), some juice sips, PEG flushes with feeds              Enteral Nutrition History:   Enteral Nutrition Formula/Solution: Isosource 1.5 (5.5) cartons daily.Takes 2-2-1.5cartons.  provides 2063 calories, 93 grams protein,1050 ml free water  Method of TF administration:    TF infusion rate:    Feeding Tube Flush: pt reports only using a 20mL syringe for flushes- flushing with 20mL before, in the middle if giving meds, then at the end- flushing with 40-60mL per feed and denies any additional flushes. (total flushes 160mL)      Medications:  Current Outpatient Medications   Medication Instructions    albuterol 90 mcg/actuation inhaler 2 puffs, Every 4 hours PRN    ALPRAZolam (XANAX) 0.5 mg, 2 times daily PRN    aspirin 81 mg, Daily    atorvastatin (Lipitor) 40 mg tablet 1 tablet, Daily    buPROPion (WELLBUTRIN) 150 mg, oral, Every morning    Children's acetaminophen 1,000 mg, g-tube, 3 times daily PRN    dexAMETHasone (DECADRON) 8 mg, oral, Daily, For 3 days starting the day after treatment.    doxepin (SINEQUAN) 10 mg, g-tube, Nightly    fluticasone (Flovent) 44 mcg/actuation inhaler  "2 puffs, 2 times daily RT    gabapentin (NEURONTIN) 300 mg, g-tube, 3 times daily    HYDROmorphone (DILAUDID) 4 mg, oral, Every 4 hours PRN    lidocaine (Lidoderm) 5 % patch 1 patch, transdermal, Daily, Remove & discard patch within 12 hours or as directed by MD.    metoprolol tartrate (LOPRESSOR) 12.5 mg, oral, 2 times daily    OLANZapine (ZYPREXA) 5 mg, oral, Nightly    ondansetron (ZOFRAN) 8 mg, oral, Every 8 hours PRN    prochlorperazine (COMPAZINE) 10 mg, oral, Every 6 hours PRN    senna (Senokot) 8.8 mg/5 mL syrup 5 mL, oral, 2 times daily    transparent dressings (Tegaderm) 3 1/2 X 4 \" bandage 1 each, topical (top), Every 21 days, Cover [facility] site.       Nutrition Focused Physical Exam Findings:    Subcutaneous Fat Loss  Orbital Fat Pads: Well nourished (slightly bulging fat pads)  Buccal Fat Pads: Well nourished (full, rounded cheeks)  Triceps: Defer  Ribs: Defer    Muscle Wasting  Temporalis: Well nourished (well-defined muscle)  Pectoralis (Clavicular Region): Defer  Deltoid/Trapezius: Defer  Interosseous: Well nourished (muscle bulges)  Trapezius/Infraspinatus/Supraspinatus (Scapular Region): Defer  Quadriceps: Defer  Gastrocnemius: Defer              Estimated Needs:  Weight Used for Equation Calculations: 70 kg (154 lb 5.2 oz)    Total Energy Estimated Needs in 24 hours (kCal): 1960 kCal  Energy Estimated Needs per kg Body Weight in 24 hours (kCal/kg): 28 kCal/kg  Method for Estimating Needs: up to 2100kcal/day  Total Protein Estimated Needs in 24 Hours (g): 84 g  Protein Estimated Needs per kg Body Weight in 24 Hours (g/kg): 1.2 g/kg  Method for Estimating 24 Hour Protein Needs: up to 98g per day  Total Fluid Estimated Needs in 24 Hours (mL): 1960 mL  Total Fluid Estimated Needs in 24 hours (mL/kg): 28 mL/kg             Nutrition Diagnosis   Malnutrition Diagnosis  Patient has Malnutrition Diagnosis: No    Nutrition Diagnosis  Patient has Nutrition Diagnosis: Yes  Diagnosis Status (1): " "at age 50     Active  Nutrition Diagnosis 1: Increased energy expenditure  Related to (1): increased metabolic demand, chronic illness  As Evidenced by (1): cancer diagnosis, treatment.  Additional Nutrition Diagnosis: Diagnosis 2  Diagnosis Status (2): Active  Nutrition Diagnosis 2: Swallowing difficulty  Related to (2): diagnosis  As Evidenced by (2): dysphagia, need for PEG       Nutrition Interventions/Recommendations   Nutrition Prescription: Enteral nutrition, Oral nutrition enteral intake    Nutrition Interventions:   Food and Nutrient Delivery: Meals & Snacks: Texture-Modified Diet  Goals: oral diet as able/ tolerated  Enteral Nutrition: Management of volume of enteral nutrition (Continue with enteral feeds primary nutrition)  Goals: Continue with Isosource 1.5 5.5 cartons daily / 1378 ml Isosource 1.5 per day to provide 2067 calories, 93 grams protein, 1050 ml free water. Suggest  2 cartons- 2 cartons- 1 1/2 carton for daily regiment;  Other:: Hydration; enteral flushes  Goals: Recommend to track oral intake of fluids. if able to consume 20oz orally, then would recommend increase in flushes to at least 90mL with each feed. However, if unable to consume  fluids orally to help meet needs, did discussed need to increase flushes even more to maintain hydration.     Coordination of Care: Collaboration and referral of nutrition care: Collaboration and Referral of Nutrition Care  Goals: Dr. Nolan, OTV; Joseph- did not have order from last week, follow-up with RDN and it was resent on 3/3.     Nutrition Education:   Nutrition Education Content: Content related nutrition education  Adequate enteral intake to maintain weight/ hydration.  Phoned pt on 3/4 to follow-up on [employer] orders/ flushes; no answer. Left message encouraging call back.     Readiness to Change : Good  Level of Understanding : Good  Anticipated Compliant : as able         Nutrition Monitoring and Evaluation   Food and Nutrient Intake  Monitoring and Evaluation  Plan: Meal/snack pattern, Energy intake, Protein intake  Energy Intake: Estimated energy intake  Criteria: meet 100% energy needs  Meal/Snack Pattern: Food variety  Criteria: soft diet as tolerated for pleasure  Estimated protein intake: Estimated protein intake  Criteria: meet 100% protein needs  Enteral and Parenteral Nutrition Intake Determination: Enteral nutrition intake  Criteria: tolerate TF at goal rate    Anthropometric measurements  Monitoring and Evaluation Plan: Weight  Body Weight: Body weight  Criteria: maintain weight         Nutrition Focused Physical Findings  Monitoring and Evaluation Plan: Digestive System  Digestive System Finding: Constipation  Criteria: manage symptoms/adhere to prescribed regime         Follow Up: while on treatment     Time Spent  Prep time on day of patient encounter: 5 minutes  Time spent directly with patient, family or caregiver: 20 minutes  Additional Time Spent on Patient Care Activities: 10 minutes  Documentation Time: 25 minutes  Other Time Spent: 0 minutes  Total: 60 minutes         Prescriptions    No medications on file       Final diagnoses:   Anxiety   Insomnia, unspecified type       Ayah HERNANDEZ [facility] EMERGENCY DEPARTMENT  1/12/2023     Ayah Escalante MD  01/12/23 [id number]

## 2025-03-04 VITALS — HEIGHT: 63 IN | WEIGHT: 154.21 LBS | BODY MASS INDEX: 27.32 KG/M2

## 2025-03-05 ENCOUNTER — SOCIAL WORK (OUTPATIENT)
Dept: CASE MANAGEMENT | Facility: HOSPITAL | Age: 60
End: 2025-03-05
Payer: MEDICAID

## 2025-03-05 RX ORDER — OMEPRAZOLE 40 MG/1
40 CAPSULE, DELAYED RELEASE ORAL
Qty: 30 CAPSULE | Refills: 1 | Status: SHIPPED | OUTPATIENT
Start: 2025-03-05 | End: 2025-05-04

## 2025-03-05 RX ORDER — DEXAMETHASONE 4 MG/1
4 TABLET ORAL
Qty: 60 TABLET | Refills: 0 | Status: SHIPPED | OUTPATIENT
Start: 2025-03-05 | End: 2025-04-04

## 2025-03-05 RX ORDER — LORAZEPAM 0.5 MG/1
0.5 TABLET ORAL DAILY
Qty: 10 TABLET | Refills: 0 | Status: SHIPPED | OUTPATIENT
Start: 2025-03-05 | End: 2025-03-15

## 2025-03-05 ASSESSMENT — ENCOUNTER SYMPTOMS
HEMOPTYSIS: 1
APPETITE CHANGE: 0
CONSTIPATION: 0
TROUBLE SWALLOWING: 1
CONSTITUTIONAL NEGATIVE: 1
TROUBLE SWALLOWING: 1
CARDIOVASCULAR NEGATIVE: 1
NAUSEA: 1
CONSTITUTIONAL NEGATIVE: 1
NECK PAIN: 1
HEMOPTYSIS: 1
NAUSEA: 1
NECK PAIN: 1
CARDIOVASCULAR NEGATIVE: 1
CONSTIPATION: 0
APPETITE CHANGE: 0

## 2025-03-05 NOTE — PROGRESS NOTES
Social Work Note    Referral Source: Rad Onc team   Meeting Location: Phone  Person(s) Present: Amirah Holland LSW   Identified Needs: Hope Carpio referral, share appointment details   Impression and Plan: SW and Rad Onc team discussed upcoming radiation treatment, team shared they would like to begin Amalia starting on 3/10. SW sent over Hope Carpio referral beginning on 3/9 and departing 3/23. CHARLOTTE made call to Amalia to inform her of referral being sent over, and that  on 3/14 has been rescheduled. Encouraged her and Diony to reach out for any questions or concerns. She is aware and agreeable. SW will continue to follow.   Interventions Provided: Advocacy, Assessment, Care Coordination, and Referral to Community Resource  Estimated Time Spent: 15 minutes

## 2025-03-05 NOTE — PROGRESS NOTES
Patient ID: Amalia Roa is a 59 y.o. female with oropharyngeal squamous cell ca     Current therapy:    pembrolizumab      Oncologic History:   DIAGNOSIS  Oropharyngeal squamous cell ca     STAGING  [id number]      CURRENT SITES OF DISEASE  R oropharynx, tongue, bilateral lungs, L adrenal gland     MOLECULAR GENOMICS  P16+   CPS 10     PRIOR THERAPY        CURRENT THERAPY  Pembrolizumab 11/12/24 - 2/25/25  Carboplatin/paclitaxel/pembrolizumab 3/19/25 -           HISTORY OF PRESENT ILLNESS  Ms. Roa is a 58 yo with PMH significant for multiple medical comorbidities including HFrEF, CAD s/p JOHN, mitral regurgitation, and MEÑO who had had trouble swallowing for at least a year with throat discomfort, particularly on the R side.     CT of the cervical spine at OSH was concerning for 3.6 x 2.0 necrotic level 2 lymph node.     She was referred to Dr. Montanez who she met on 9/20/24 due to concerns for a mass. Flex laryngoscopy showed a prominence at the base of the tongue of the R and an exophytic mass of the R piriform sinus, with evidence of inga disease on the L.     CT of the neck on 10/8/24 revealed large, irregular mass of the R oropharynx measuring at least 4 x 3.2 x 7.6 cm, with involvement of the R lateral wall/tonsillar region, base of tongue/glossal tonsillar sulcus, soft palate, and abutment of the epiglottis with extension into the R parapharyngeal fat and  space. There appeared to be extension into the R tongue and within the floor of the mouth and oral cavity. Also noted was thrombosis or occlusion of the RIJ, bilateral cervical lymphadenopathy with levels 2-4 noted on the R and on the left, and multiple pulmonary nodules.     She was admitted to the hospital and underwent panendoscopy, biopsy, and PEG placement on 10/14/24.     CT chest also on 10/14 was notable for multiple parenchymal bilateral lung nodules and enlarged mediastinal and hilar lymph nodes, as well as suspicious L adrenal gland  nodule.     She was discussed in tumor board, with PD-L1 added on CPS 10.     PET/CT 10/22/24 confirmed FDG avidity of R oropharynx/oral cavity/hypopharynx mass, L palatine tonsil, R parotid gland, multiple cervical nodes bilaterally, multiple mediastinal and hilar lymph  nodes, pulmonary nodules, small L pleural effusion, and L adrenal gland.     Decided to start pembrolizumab monotherapy, started 11/12/24 after port placement.     Overall tolerated therapy well, but unfortunately with mixed response with progression of disease in the primary site and decision made to add carboplatin/paclitaxel, starting 3/19/25.   Specifically CT neck and chest on  2/20/25 revealed:  IMPRESSION:  1. There has been interval progression of disease compared to the  prior exam 12/06/2024. Interval increased size of the extensive soft  tissue mass involving the oropharynx, oral cavity, hypopharynx, right  carotid space, and right  space.  2. Interval increased size of bilateral lymphadenopathy compared to  the prior exam.  3. The right common and right cervical internal carotid artery are  encased by tumor and mildly narrowed, however remain patent. The  right internal jugular vein is occluded with reconstitution within  the right sigmoid sinus.  4. Bilateral pulmonary nodules and mediastinal adenopathy. Please see  CT chest performed the same day for further details.  IMPRESSION:  Multiple pulmonary metastases are re-identified with the majority of  these unchanged in size. However, several of the larger pulmonary  metastases seen on prior study have diminished in size as outlined  above but there is at least metastasis seen in the left lower lobe  which is increased in size from 0.7 x 1.4 cm to 1.0 x 1.7 cm. A small  left pleural effusion has slightly increased in size as well with  increased pleural metastases within the left hemithorax.     Stable size of the metastatic disease to both stanley but the involve  nodes now show  increasing cystic or necrotic changes.      The tree-in-bud areas seen on prior study throughout right middle  lobe have nearly completely resolved.         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 Lives at home with her . Has two grandsons who are 6 and 2 yo, and two granddaughters who are 3 yo and 10 months.    Tob: quit smoking 2014. 40 years x 1/2 ppd  EtOH: none  Illicits: none     FAMILY HISTORY  Mother - breast cancer dx 50s or 60s  Sister - colon ca dx 58 yo  Niece - cervical cancer dx 30s    Interval History  She is here today with her , and her daughter is on the phone. She's not eating much - feels like her tongue is swollen, making it hard to eat. Just hard to get it down.  Interestingly she is able to swallow hard foods more easily than soft foods or liquids.  She feels like her tongue has a hook and stuff is getting stuck on it. Sometimes it's just easier to not try. She has just noticed this in the past 1-2 weeks, appetite down for the last two weeks. She continues to use 5 of the feeds per day.  She does not report any trouble breathing.  She does have occasional blood streaking in sputum and some coughing.  She has hoarseness.  Not dizzy.  Taking Tylenol, which is controlling her pain.  Has not needed to use much Dilaudid.  Continues to have right greater than left-sided otalgia.  She is not smoking.  She is currently very active and is able to run errands and take care of her grandchildren.  She has extremely poor dentition and has not seen a dentist recently.    Of note, she has trouble swallowing her secretions and is requesting a suction device.  She feels like her secretions are choking her.  This is in spite of using Mucinex.    Meds  (Current):    Current Outpatient Medications:     acetaminophen (Tylenol) 160 mg/5 mL liquid, 31.3 mL (1,000 mg) by g-tube route 3 times a day as needed for mild pain (1 - 3) or moderate pain (4 - 6)., Disp: 2838 mL, Rfl: 2    albuterol 90 mcg/actuation inhaler, Inhale 2 puffs every 4 hours if needed for wheezing., Disp: , Rfl:     ALPRAZolam (Xanax) 0.5 mg tablet, Take 1 tablet (0.5 mg) by mouth 2 times a day as needed for anxiety., Disp: , Rfl:     aspirin 81 mg chewable tablet, Chew 1 tablet (81 mg) once daily., Disp: , Rfl:     atorvastatin (Lipitor) 40 mg tablet, Take 1 tablet (40 mg) by mouth once daily., Disp: , Rfl:     buPROPion (Wellbutrin) 75 mg tablet, Take 2 tablets (150 mg) by mouth once daily in the morning., Disp: 60 tablet, Rfl: 0    dexAMETHasone (Decadron) 4 mg tablet, Take 2 tablets (8 mg) by mouth once daily. For 3 days starting the day after treatment., Disp: 36 tablet, Rfl: 0    doxepin (SINEquan) 10 mg/mL solution, 1 mL (10 mg) by g-tube route once daily at bedtime., Disp: 120 mL, Rfl: 12    fluticasone (Flovent) 44 mcg/actuation inhaler, Inhale 2 puffs 2 times a day., Disp: , Rfl:     gabapentin (Neurontin) 50 mg/mL solution, 6 mL (300 mg) by g-tube route 3 times a day., Disp: 540 mL, Rfl: 2    HYDROmorphone (Dilaudid) liquid, Take 4 mL (4 mg) by mouth every 4 hours if needed for severe pain (7 - 10) for up to 15 days., Disp: 360 mL, Rfl: 0    lidocaine (Lidoderm) 5 % patch, Place 1 patch over 12 hours on the skin once daily. Remove & discard patch within 12 hours or as directed by MD., Disp: 30 patch, Rfl: 3    metoprolol tartrate (Lopressor) 25 mg tablet, Take 0.5 tablets (12.5 mg) by mouth 2 times a day. (Patient not taking: Reported on 2/25/2025), Disp: 60 tablet, Rfl: 5    OLANZapine (ZyPREXA) 5 mg tablet, Take 1 tablet (5 mg) by mouth once daily at bedtime., Disp: 30 tablet, Rfl: 5    ondansetron (Zofran) 8 mg tablet, Take 1 tablet (8 mg) by mouth every 8 hours if needed for nausea or  "vomiting., Disp: 30 tablet, Rfl: 5    prochlorperazine (Compazine) 10 mg tablet, Take 1 tablet (10 mg) by mouth every 6 hours if needed for nausea or vomiting., Disp: 30 tablet, Rfl: 5    senna (Senokot) 8.8 mg/5 mL syrup, Take 5 mL by mouth 2 times a day for 10 days., Disp: 240 mL, Rfl: 3    transparent dressings (Tegaderm) 3 1/2 X 4 \" bandage, Apply 1 each topically every 21 (twenty-one) days. Cover mediport site., Disp: 1 each, Rfl: 11    Review of Systems   Constitutional: Negative.  Negative for appetite change.   HENT:   Positive for trouble swallowing.         Intermittent right ear pain.   Respiratory:  Positive for hemoptysis.    Cardiovascular: Negative.    Gastrointestinal:  Positive for nausea. Negative for constipation.   Musculoskeletal:  Positive for neck pain.   Skin: Negative.    All other systems reviewed and are negative.       Objective   BSA: There is no height or weight on file to calculate BSA.  Wt Readings from Last 5 Encounters:   03/03/25 70 kg (154 lb 3.4 oz)   03/04/25 70 kg (154 lb 3.4 oz)   02/25/25 69.5 kg (153 lb 3.5 oz)   02/04/25 70.9 kg (156 lb 4.9 oz)   01/14/25 72.1 kg (158 lb 15.2 oz)     There were no vitals taken for this visit.    ECOG Score: 0- Fully active, able to carry on all pre-disease performance w/o restriction.      Physical Exam  Vitals reviewed.   Constitutional:       Appearance: Normal appearance.   HENT:      Head: Normocephalic.      Nose: Nose normal.      Mouth/Throat:      Mouth: Mucous membranes are moist.      Pharynx: Oropharynx is clear.   Eyes:      Extraocular Movements: Extraocular movements intact.      Conjunctiva/sclera: Conjunctivae normal.      Pupils: Pupils are equal, round, and reactive to light.   Cardiovascular:      Rate and Rhythm: Normal rate and regular rhythm.      Pulses: Normal pulses.      Heart sounds: Normal heart sounds.   Pulmonary:      Breath sounds: Normal breath sounds.   Abdominal:      General: Bowel sounds are normal.      " Palpations: Abdomen is soft.      Comments: Capped peg.    Musculoskeletal:         General: Normal range of motion.      Cervical back: Normal range of motion and neck supple.   Skin:     General: Skin is warm and dry.   Neurological:      General: No focal deficit present.      Mental Status: She is alert and oriented to person, place, and time.   Psychiatric:         Mood and Affect: Mood normal.         Behavior: Behavior normal.         Thought Content: Thought content normal.         Judgment: Judgment normal.          Results:  Labs:  Lab Results   Component Value Date    WBC 11.4 (H) 02/25/2025    HGB 12.3 02/25/2025    HCT 38.6 02/25/2025    MCV 97 02/25/2025     02/25/2025      Lab Results   Component Value Date    NEUTROABS 8.62 (H) 02/25/2025      Lab Results   Component Value Date    GLUCOSE 148 (H) 02/25/2025    CALCIUM 9.1 02/25/2025     02/25/2025    K 4.2 02/25/2025    CO2 33 (H) 02/25/2025    CL 99 02/25/2025    BUN 25 (H) 02/25/2025    CREATININE 0.78 02/25/2025    MG 2.12 12/10/2024     Lab Results   Component Value Date    ALT 14 02/25/2025    AST 18 02/25/2025    ALKPHOS 72 02/25/2025    BILITOT 0.4 02/25/2025      Lab Results   Component Value Date    ACTH 7.5 02/04/2025    CORTISOL 8.3 02/04/2025    TSH 1.12 02/04/2025       Imaging:  I have personally reviewed the below imaging and concur with the reported findings unless otherwise stated:    CT abdomen w IV contrast    Result Date: 2/25/2025  Impression: 1. At the time of this interpretation the images for this exam are included with the patient's CT chest accession number. 2. Please see the CT chest for findings at the pulmonary bases which are not reported in this exam. 3. 2.2 cm left adrenal mass, unchanged from 10/14/2024, likely benign due to the stability. Nonetheless a dedicated CT adrenal gland is suggested for more definitive characterization. 4. Scattered diverticula descending colon. No evidence for metastatic  disease to the abdomen.     MACRO: none   Signed by: Melvin Ornelas 2/25/2025 8:15 AM Dictation workstation:   [id number]    CT chest w IV contrast    Result Date: 2/21/2025  Impression: Multiple pulmonary metastases are re-identified with the majority of these unchanged in size. However, several of the larger pulmonary metastases seen on prior study have diminished in size as outlined above but there is at least metastasis seen in the left lower lobe which is increased in size from 0.7 x 1.4 cm to 1.0 x 1.7 cm. A small left pleural effusion has slightly increased in size as well with increased pleural metastases within the left hemithorax.   Stable size of the metastatic disease to both stanley but the involve nodes now show increasing cystic or necrotic changes.   The tree-in-bud areas seen on prior study throughout right middle lobe have nearly completely resolved.   Cholelithiasis.   MACRO: None   Signed by: Lissette Phan 2/21/2025 8:56 PM Dictation workstation:   [id number]    CT soft tissue neck w IV contrast    Result Date: 2/20/2025  Impression: 1. There has been interval progression of disease compared to the prior exam 12/06/2024. Interval increased size of the extensive soft tissue mass involving the oropharynx, oral cavity, hypopharynx, right carotid space, and right  space. 2. Interval increased size of bilateral lymphadenopathy compared to the prior exam. 3. The right common and right cervical internal carotid artery are encased by tumor and mildly narrowed, however remain patent. The right internal jugular vein is occluded with reconstitution within the right sigmoid sinus. 4. Bilateral pulmonary nodules and mediastinal adenopathy. Please see CT chest performed the same day for further details.   I personally reviewed the image(s)/study and resident interpretation as stated by Dr. Clary Lagos MD. I agree with the findings as stated. This study was interpreted at [facility]  [facility], [place], [place].   MACRO: None   Signed by: Oneida Lindo 2/20/2025 1:23 PM Dictation workstation:   [id number]         Assessment/Plan      Amalia Roa is a 59 y.o. female here for follow up of oropharyngeal squamous cell ca.    # Oropharyngeal squamous cell ca  I discussed with her that I recommend she return for CT simulation scan to undergo palliative radiation.  She has had significant progression in the oropharynx with a bulky tumor at the back of her throat causing trouble swallowing as well as some mass effect on her larynx.  I worry if she has additional progression, that she could obstruct her airway.  We discussed that a 5 fraction course of radiation using intensity modulated radiation in order to deliver a higher dose would be recommended following the hypo trial (Joseceddu, Radiotherapy and Oncology, 2007), which had a 50% CR rate and 35% VA or stable disease over short term followup.    We discussed side effects including acute swelling that could cause airway compromise, mucositis, pain with swallowing, difficulty swallowing, loss of taste, dry mouth, skin redness/peeling.  The patient wishes to return for CT simulation scan, which will complete early next week.

## 2025-03-05 NOTE — PROGRESS NOTES
Patient ID: Amalia Roa is a 59 y.o. female with oropharyngeal squamous cell ca     Current therapy:    pembrolizumab      Oncologic History:   DIAGNOSIS  Oropharyngeal squamous cell ca     STAGING  [id number]      CURRENT SITES OF DISEASE  R oropharynx, tongue, bilateral lungs, L adrenal gland     MOLECULAR GENOMICS  P16+   CPS 10     PRIOR THERAPY        CURRENT THERAPY  Pembrolizumab 11/12/24 - 2/25/25  Carboplatin/paclitaxel/pembrolizumab 3/19/25 -           HISTORY OF PRESENT ILLNESS  Ms. Roa is a 58 yo with PMH significant for multiple medical comorbidities including HFrEF, CAD s/p JOHN, mitral regurgitation, and MEÑO who had had trouble swallowing for at least a year with throat discomfort, particularly on the R side.     CT of the cervical spine at OSH was concerning for 3.6 x 2.0 necrotic level 2 lymph node.     She was referred to Dr. Montanez who she met on 9/20/24 due to concerns for a mass. Flex laryngoscopy showed a prominence at the base of the tongue of the R and an exophytic mass of the R piriform sinus, with evidence of inga disease on the L.     CT of the neck on 10/8/24 revealed large, irregular mass of the R oropharynx measuring at least 4 x 3.2 x 7.6 cm, with involvement of the R lateral wall/tonsillar region, base of tongue/glossal tonsillar sulcus, soft palate, and abutment of the epiglottis with extension into the R parapharyngeal fat and  space. There appeared to be extension into the R tongue and within the floor of the mouth and oral cavity. Also noted was thrombosis or occlusion of the RIJ, bilateral cervical lymphadenopathy with levels 2-4 noted on the R and on the left, and multiple pulmonary nodules.     She was admitted to the hospital and underwent panendoscopy, biopsy, and PEG placement on 10/14/24.     CT chest also on 10/14 was notable for multiple parenchymal bilateral lung nodules and enlarged mediastinal and hilar lymph nodes, as well as suspicious L adrenal gland  nodule.     She was discussed in tumor board, with PD-L1 added on CPS 10.     PET/CT 10/22/24 confirmed FDG avidity of R oropharynx/oral cavity/hypopharynx mass, L palatine tonsil, R parotid gland, multiple cervical nodes bilaterally, multiple mediastinal and hilar lymph  nodes, pulmonary nodules, small L pleural effusion, and L adrenal gland.     Decided to start pembrolizumab monotherapy, started 11/12/24 after port placement.     Overall tolerated therapy well, but unfortunately with mixed response with progression of disease in the primary site and decision made to add carboplatin/paclitaxel, starting 3/19/25.   Specifically CT neck and chest on  2/20/25 revealed:  IMPRESSION:  1. There has been interval progression of disease compared to the  prior exam 12/06/2024. Interval increased size of the extensive soft  tissue mass involving the oropharynx, oral cavity, hypopharynx, right  carotid space, and right  space.  2. Interval increased size of bilateral lymphadenopathy compared to  the prior exam.  3. The right common and right cervical internal carotid artery are  encased by tumor and mildly narrowed, however remain patent. The  right internal jugular vein is occluded with reconstitution within  the right sigmoid sinus.  4. Bilateral pulmonary nodules and mediastinal adenopathy. Please see  CT chest performed the same day for further details.  IMPRESSION:  Multiple pulmonary metastases are re-identified with the majority of  these unchanged in size. However, several of the larger pulmonary  metastases seen on prior study have diminished in size as outlined  above but there is at least metastasis seen in the left lower lobe  which is increased in size from 0.7 x 1.4 cm to 1.0 x 1.7 cm. A small  left pleural effusion has slightly increased in size as well with  increased pleural metastases within the left hemithorax.     Stable size of the metastatic disease to both stanley but the involve  nodes now show  increasing cystic or necrotic changes.      The tree-in-bud areas seen on prior study throughout right middle  lobe have nearly completely resolved.         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 Lives at home with her . Has two grandsons who are 6 and 2 yo, and two granddaughters who are 3 yo and 10 months.    Tob: quit smoking 2014. 40 years x 1/2 ppd  EtOH: none  Illicits: none     FAMILY HISTORY  Mother - breast cancer dx 50s or 60s  Sister - colon ca dx 58 yo  Niece - cervical cancer dx 30s    Interval History  She is here today with her , and her daughter is on the phone. She's not eating much - feels like her tongue is swollen, making it hard to eat. Just hard to get it down.  Interestingly she is able to swallow hard foods more easily than soft foods or liquids.  She feels like her tongue has a hook and stuff is getting stuck on it. Sometimes it's just easier to not try. She has just noticed this in the past 1-2 weeks, appetite down for the last two weeks. She continues to use 5 of the feeds per day.  She does not report any trouble breathing.  She does have occasional blood streaking in sputum and some coughing.  She has hoarseness.  Not dizzy.  Taking Tylenol, which is controlling her pain.  Has not needed to use much Dilaudid.  Continues to have right greater than left-sided otalgia.  She is not smoking.  She is currently very active and is able to run errands and take care of her grandchildren.  She has extremely poor dentition and has not seen a dentist recently.    Of note, she has trouble swallowing her secretions and is requesting a suction device.  She feels like her secretions are choking her.  This is in spite of using Mucinex.    Meds  (Current):    Current Outpatient Medications:     acetaminophen (Tylenol) 160 mg/5 mL liquid, 31.3 mL (1,000 mg) by g-tube route 3 times a day as needed for mild pain (1 - 3) or moderate pain (4 - 6)., Disp: 2838 mL, Rfl: 2    albuterol 90 mcg/actuation inhaler, Inhale 2 puffs every 4 hours if needed for wheezing., Disp: , Rfl:     ALPRAZolam (Xanax) 0.5 mg tablet, Take 1 tablet (0.5 mg) by mouth 2 times a day as needed for anxiety., Disp: , Rfl:     aspirin 81 mg chewable tablet, Chew 1 tablet (81 mg) once daily., Disp: , Rfl:     atorvastatin (Lipitor) 40 mg tablet, Take 1 tablet (40 mg) by mouth once daily., Disp: , Rfl:     buPROPion (Wellbutrin) 75 mg tablet, Take 2 tablets (150 mg) by mouth once daily in the morning., Disp: 60 tablet, Rfl: 0    dexAMETHasone (Decadron) 4 mg tablet, Take 2 tablets (8 mg) by mouth once daily. For 3 days starting the day after treatment., Disp: 36 tablet, Rfl: 0    doxepin (SINEquan) 10 mg/mL solution, 1 mL (10 mg) by g-tube route once daily at bedtime., Disp: 120 mL, Rfl: 12    fluticasone (Flovent) 44 mcg/actuation inhaler, Inhale 2 puffs 2 times a day., Disp: , Rfl:     gabapentin (Neurontin) 50 mg/mL solution, 6 mL (300 mg) by g-tube route 3 times a day., Disp: 540 mL, Rfl: 2    HYDROmorphone (Dilaudid) liquid, Take 4 mL (4 mg) by mouth every 4 hours if needed for severe pain (7 - 10) for up to 15 days., Disp: 360 mL, Rfl: 0    lidocaine (Lidoderm) 5 % patch, Place 1 patch over 12 hours on the skin once daily. Remove & discard patch within 12 hours or as directed by MD., Disp: 30 patch, Rfl: 3    metoprolol tartrate (Lopressor) 25 mg tablet, Take 0.5 tablets (12.5 mg) by mouth 2 times a day. (Patient not taking: Reported on 2/25/2025), Disp: 60 tablet, Rfl: 5    OLANZapine (ZyPREXA) 5 mg tablet, Take 1 tablet (5 mg) by mouth once daily at bedtime., Disp: 30 tablet, Rfl: 5    ondansetron (Zofran) 8 mg tablet, Take 1 tablet (8 mg) by mouth every 8 hours if needed for nausea or  "vomiting., Disp: 30 tablet, Rfl: 5    prochlorperazine (Compazine) 10 mg tablet, Take 1 tablet (10 mg) by mouth every 6 hours if needed for nausea or vomiting., Disp: 30 tablet, Rfl: 5    senna (Senokot) 8.8 mg/5 mL syrup, Take 5 mL by mouth 2 times a day for 10 days., Disp: 240 mL, Rfl: 3    transparent dressings (Tegaderm) 3 1/2 X 4 \" bandage, Apply 1 each topically every 21 (twenty-one) days. Cover mediport site., Disp: 1 each, Rfl: 11    Review of Systems   Constitutional: Negative.  Negative for appetite change.   HENT:   Positive for trouble swallowing.         Intermittent right ear pain.   Respiratory:  Positive for hemoptysis.    Cardiovascular: Negative.    Gastrointestinal:  Positive for nausea. Negative for constipation.   Musculoskeletal:  Positive for neck pain.   Skin: Negative.    All other systems reviewed and are negative.       Objective   BSA: 1.76 meters squared  Wt Readings from Last 5 Encounters:   03/03/25 70 kg (154 lb 3.4 oz)   03/04/25 70 kg (154 lb 3.4 oz)   02/25/25 69.5 kg (153 lb 3.5 oz)   02/04/25 70.9 kg (156 lb 4.9 oz)   01/14/25 72.1 kg (158 lb 15.2 oz)     /73   Pulse 75   Temp 36.6 °C (97.9 °F) (Skin)   Resp 18   Wt 70 kg (154 lb 3.4 oz)   SpO2 97%   BMI 27.32 kg/m²     ECOG Score: 0- Fully active, able to carry on all pre-disease performance w/o restriction.      Constitutional:       General: Patient is not in acute distress.  HEENT:   Patient has extremely poor dentition with multiple broken teeth throughout her maxilla and several in her mandible.  She has a firm mass in her right tongue that extends past midline.  Difficult to visualize the soft palate given the bulk of the mass in her tongue.  No involvement of the hard palate is visualized.  Cardiovascular:      Rate and Rhythm: Normal rate and regular rhythm.      Heart sounds: Normal heart sounds. No murmur heard.  Pulmonary:      Effort: Pulmonary effort is normal. No respiratory distress.      Breath " sounds: Normal breath sounds.   No stridor or respiratory distress  Abdominal:      General: Abdomen is flat. Bowel sounds are normal. There is no distension.      Palpations: Abdomen is soft.   Skin:     General: Skin is warm and dry. No lesions visualized.  Neurological:      General: No focal deficit present.      Mental Status: He is alert and oriented to person, place, and time.   Psychiatric:         Mood and Affect: Mood normal.         Behavior: Behavior normal.         Thought Content: Thought content normal.   Extremities:      No cyanosis, clubbing or edema      Procedure:   Flexible fiberoptic laryngopharyngoscopy was performed via the nares, after topical anesthesia was instilled:     There is a large mass seen originating from the right base of tongue that crosses midline.  There is significant mass effect on the epiglottis and larynx with deviation to the right.  It is difficult but possible to visualize the vocal cords and there is a small passageway through to them.    Scope was removed. The patient tolerated the procedure well.     Results:  Labs:  Lab Results   Component Value Date    WBC 11.4 (H) 02/25/2025    HGB 12.3 02/25/2025    HCT 38.6 02/25/2025    MCV 97 02/25/2025     02/25/2025      Lab Results   Component Value Date    NEUTROABS 8.62 (H) 02/25/2025      Lab Results   Component Value Date    GLUCOSE 148 (H) 02/25/2025    CALCIUM 9.1 02/25/2025     02/25/2025    K 4.2 02/25/2025    CO2 33 (H) 02/25/2025    CL 99 02/25/2025    BUN 25 (H) 02/25/2025    CREATININE 0.78 02/25/2025    MG 2.12 12/10/2024     Lab Results   Component Value Date    ALT 14 02/25/2025    AST 18 02/25/2025    ALKPHOS 72 02/25/2025    BILITOT 0.4 02/25/2025      Lab Results   Component Value Date    ACTH 7.5 02/04/2025    CORTISOL 8.3 02/04/2025    TSH 1.12 02/04/2025       Imaging:  I have personally reviewed the below imaging and concur with the reported findings unless otherwise stated:    CT abdomen  w IV contrast    Result Date: 2/25/2025  Impression: 1. At the time of this interpretation the images for this exam are included with the patient's CT chest accession number. 2. Please see the CT chest for findings at the pulmonary bases which are not reported in this exam. 3. 2.2 cm left adrenal mass, unchanged from 10/14/2024, likely benign due to the stability. Nonetheless a dedicated CT adrenal gland is suggested for more definitive characterization. 4. Scattered diverticula descending colon. No evidence for metastatic disease to the abdomen.     MACRO: none   Signed by: Melvin Ornelas 2/25/2025 8:15 AM Dictation workstation:   [id number]    CT chest w IV contrast    Result Date: 2/21/2025  Impression: Multiple pulmonary metastases are re-identified with the majority of these unchanged in size. However, several of the larger pulmonary metastases seen on prior study have diminished in size as outlined above but there is at least metastasis seen in the left lower lobe which is increased in size from 0.7 x 1.4 cm to 1.0 x 1.7 cm. A small left pleural effusion has slightly increased in size as well with increased pleural metastases within the left hemithorax.   Stable size of the metastatic disease to both stanley but the involve nodes now show increasing cystic or necrotic changes.   The tree-in-bud areas seen on prior study throughout right middle lobe have nearly completely resolved.   Cholelithiasis.   MACRO: None   Signed by: Lissette Phan 2/21/2025 8:56 PM Dictation workstation:   [id number]    CT soft tissue neck w IV contrast    Result Date: 2/20/2025  Impression: 1. There has been interval progression of disease compared to the prior exam 12/06/2024. Interval increased size of the extensive soft tissue mass involving the oropharynx, oral cavity, hypopharynx, right carotid space, and right  space. 2. Interval increased size of bilateral lymphadenopathy compared to the prior exam. 3. The right common  and right cervical internal carotid artery are encased by tumor and mildly narrowed, however remain patent. The right internal jugular vein is occluded with reconstitution within the right sigmoid sinus. 4. Bilateral pulmonary nodules and mediastinal adenopathy. Please see CT chest performed the same day for further details.   I personally reviewed the image(s)/study and resident interpretation as stated by Dr. Clary Lagos MD. I agree with the findings as stated. This study was interpreted at [facility], [place], [place].   MACRO: None   Signed by: Oneida Lindo 2/20/2025 1:23 PM Dictation workstation:   [id number]         Assessment/Plan      Amalia Roa is a 59 y.o. female here for follow up of oropharyngeal squamous cell ca.    # Oropharyngeal squamous cell ca  I discussed with her that I recommend she return for CT simulation scan to undergo palliative radiation.  She has had significant progression in the oropharynx with a bulky tumor at the back of her throat causing trouble swallowing as well as some mass effect on her larynx.  I worry if she has additional progression, that she could obstruct her airway.  We discussed that a 5 fraction course of radiation using intensity modulated radiation in order to deliver a higher dose would be recommended following the hypo trial ([place]ceddu, Radiotherapy and Oncology, 2007), which had a 50% CR rate and 35% DC or stable disease over short term followup.    We discussed side effects including acute swelling that could cause airway compromise, mucositis, pain with swallowing, difficulty swallowing, loss of taste, dry mouth, skin redness/peeling.  The patient wishes to return for CT simulation scan, which will complete early next week.    I will give her a prescription for Ativan 0.5 mg to use daily prior to radiation.  I will also give her a prescription for dexamethasone 4 mg to take twice daily to prevent airway obstruction along  with omeprazole to protect her stomach.  We will have her return next week to start radiation.  She will also see her head and neck surgeon for ongoing evaluation of her airway.

## 2025-03-07 ENCOUNTER — APPOINTMENT (OUTPATIENT)
Dept: RADIATION ONCOLOGY | Facility: HOSPITAL | Age: 60
End: 2025-03-07
Payer: MEDICAID

## 2025-03-07 PROCEDURE — 77301 RADIOTHERAPY DOSE PLAN IMRT: CPT | Performed by: RADIOLOGY

## 2025-03-07 PROCEDURE — 77338 DESIGN MLC DEVICE FOR IMRT: CPT | Performed by: RADIOLOGY

## 2025-03-07 PROCEDURE — 77300 RADIATION THERAPY DOSE PLAN: CPT | Performed by: RADIOLOGY

## 2025-03-10 DIAGNOSIS — G89.3 CANCER RELATED PAIN: Primary | ICD-10-CM

## 2025-03-10 PROCEDURE — RXMED WILLOW AMBULATORY MEDICATION CHARGE

## 2025-03-11 ENCOUNTER — PHARMACY VISIT (OUTPATIENT)
Dept: PHARMACY | Facility: CLINIC | Age: 60
End: 2025-03-11
Payer: MEDICAID

## 2025-03-11 ENCOUNTER — TELEPHONE (OUTPATIENT)
Dept: HEMATOLOGY/ONCOLOGY | Facility: CLINIC | Age: 60
End: 2025-03-11
Payer: MEDICAID

## 2025-03-11 ENCOUNTER — HOSPITAL ENCOUNTER (OUTPATIENT)
Dept: RADIATION ONCOLOGY | Facility: HOSPITAL | Age: 60
Setting detail: RADIATION/ONCOLOGY SERIES
Discharge: HOME | End: 2025-03-11
Payer: MEDICAID

## 2025-03-11 DIAGNOSIS — C77.0 SECONDARY AND UNSPECIFIED MALIGNANT NEOPLASM OF LYMPH NODES OF HEAD, FACE AND NECK: ICD-10-CM

## 2025-03-11 DIAGNOSIS — C01 MALIGNANT NEOPLASM OF BASE OF TONGUE (MULTI): ICD-10-CM

## 2025-03-11 LAB
RAD ONC MSQ ACTUAL FRACTIONS DELIVERED: 1
RAD ONC MSQ ACTUAL SESSION DELIVERED DOSE: 600 CGRAY
RAD ONC MSQ ACTUAL TOTAL DOSE: 600 CGRAY
RAD ONC MSQ ELAPSED DAYS: 0
RAD ONC MSQ LAST DATE: NORMAL
RAD ONC MSQ PRESCRIBED FRACTIONAL DOSE: 600 CGRAY
RAD ONC MSQ PRESCRIBED NUMBER OF FRACTIONS: 5
RAD ONC MSQ PRESCRIBED TECHNIQUE: NORMAL
RAD ONC MSQ PRESCRIBED TOTAL DOSE: 3000 CGRAY
RAD ONC MSQ PRESCRIPTION PATTERN COMMENT: NORMAL
RAD ONC MSQ START DATE: NORMAL
RAD ONC MSQ TREATMENT COURSE NUMBER: 1
RAD ONC MSQ TREATMENT SITE: NORMAL

## 2025-03-11 PROCEDURE — 77386 HC INTENSITY-MODULATED RADIATION THERAPY (IMRT), COMPLEX: CPT | Performed by: STUDENT IN AN ORGANIZED HEALTH CARE EDUCATION/TRAINING PROGRAM

## 2025-03-11 NOTE — TELEPHONE ENCOUNTER
RN called and spoke to Taylor in regards to her phone call. RN informed her that 3/18 appointment was only a line draw appointment where Amalia will get blood work done and then on 3/19 she will have clinic and treatment. Taylor shared that her mom started radiation today and was asking if treatment will be pushed back. RN will talk with provider and then call taylor back.

## 2025-03-11 NOTE — TELEPHONE ENCOUNTER
RN called Taylor back and let her know that 3/18 and 3/19 will be cancelled while Amalia recovers from radiation and informed her to call us once radiation was complete to see how she is doing. If doing well, then possibly move up her treatment before 4/9. Taylor was appreciative of the call and had no further questions at this time.

## 2025-03-13 ENCOUNTER — HOSPITAL ENCOUNTER (OUTPATIENT)
Dept: RADIATION ONCOLOGY | Facility: HOSPITAL | Age: 60
Setting detail: RADIATION/ONCOLOGY SERIES
Discharge: HOME | End: 2025-03-13
Payer: MEDICAID

## 2025-03-13 DIAGNOSIS — C77.0 SECONDARY AND UNSPECIFIED MALIGNANT NEOPLASM OF LYMPH NODES OF HEAD, FACE AND NECK: ICD-10-CM

## 2025-03-13 DIAGNOSIS — C01 MALIGNANT NEOPLASM OF BASE OF TONGUE (MULTI): ICD-10-CM

## 2025-03-13 DIAGNOSIS — Z51.0 ENCOUNTER FOR ANTINEOPLASTIC RADIATION THERAPY: ICD-10-CM

## 2025-03-13 LAB
RAD ONC MSQ ACTUAL FRACTIONS DELIVERED: 2
RAD ONC MSQ ACTUAL SESSION DELIVERED DOSE: 600 CGRAY
RAD ONC MSQ ACTUAL TOTAL DOSE: 1200 CGRAY
RAD ONC MSQ ELAPSED DAYS: 2
RAD ONC MSQ LAST DATE: NORMAL
RAD ONC MSQ PRESCRIBED FRACTIONAL DOSE: 600 CGRAY
RAD ONC MSQ PRESCRIBED NUMBER OF FRACTIONS: 5
RAD ONC MSQ PRESCRIBED TECHNIQUE: NORMAL
RAD ONC MSQ PRESCRIBED TOTAL DOSE: 3000 CGRAY
RAD ONC MSQ PRESCRIPTION PATTERN COMMENT: NORMAL
RAD ONC MSQ START DATE: NORMAL
RAD ONC MSQ TREATMENT COURSE NUMBER: 1
RAD ONC MSQ TREATMENT SITE: NORMAL

## 2025-03-13 PROCEDURE — 77386 HC INTENSITY-MODULATED RADIATION THERAPY (IMRT), COMPLEX: CPT | Performed by: RADIOLOGY

## 2025-03-13 NOTE — PROGRESS NOTES
History of Present Illness    Amalia Roa was seen in September 2024 at the request of Dr. Hoffmann for the management of a lesion involving her tongue.  She has had some issues with swallowing for 2 years or so she had some fairly significant discomfort in her throat especially on the right side where it shoots to the ear.  She was found on scanning to have a very large oropharyngeal lesion.  She does have multiple neck metastasis.  She had a biopsy done that showed squamous cell carcinoma which was p16 positive.  She had a PET scan in October 2024 that confirms the presence of multiple pulmonary metastasis.  I personally reviewed that scan.  She was presented at our tumor board and it was felt that the she would be a candidate for possibly induction chemo possibly immunotherapy as well as radiation.  She has received 5 cycles at this point.  She seems to be tolerating it well.  She does not seem to have a lot of discomfort.  She had some imaging done in February 2025.  There has been some definite the growth of the tumor in the oropharynx and the neck.  She is seen today at the request of Dr. Nolan because of some airway concern.  On questioning the patient denies any airway issues.  The  seems to agree with that.  The patient is presently getting radiation therapy.  She will be back on chemo afterwards.    Physical Exam    Examination of the oral cavity and oropharynx shows this ulcerative lesion involving the right posterior tongue and floor the mouth area as well as the oropharynx.  It is difficult to look past the posterior aspect of the tongue into the pharynx.    A flexible laryngoscopy was carried out. Under topical Xylocaine and Nik-Synephrine the scope was introduced through the nostril. The nasopharynx, base of tongue, hypopharynx, and larynx are visualized.  The right vocal cord is immobile.  The airway is adequate although reduced.  There is obvious tumor involving the right AE fold and adjacent  base of tongue.     Assessment and Plan    Large oropharyngeal cancer with regional and distant metastasis.  He has received chemotherapy and is now getting radiation.  She will be back on chemotherapy afterwards.    Reduced airway especially intraorally.  She really is not symptomatic.  We discussed the possibility of a tracheotomy.  The patient does not wish to consider that at this point.  I pointed out that if she ever starts having issues with breathing that she should immediately come to our emergency room so we can address the problem.    I will see her in 1 month.

## 2025-03-14 ENCOUNTER — NUTRITION (OUTPATIENT)
Dept: HEMATOLOGY/ONCOLOGY | Facility: CLINIC | Age: 60
End: 2025-03-14

## 2025-03-14 ENCOUNTER — OFFICE VISIT (OUTPATIENT)
Dept: OTOLARYNGOLOGY | Facility: HOSPITAL | Age: 60
End: 2025-03-14
Payer: MEDICAID

## 2025-03-14 VITALS — WEIGHT: 151 LBS | BODY MASS INDEX: 26.75 KG/M2 | HEIGHT: 63 IN | TEMPERATURE: 97.2 F

## 2025-03-14 DIAGNOSIS — C10.9 MALIGNANT NEOPLASM OF OROPHARYNX: Primary | ICD-10-CM

## 2025-03-14 DIAGNOSIS — R13.12 OROPHARYNGEAL DYSPHAGIA: ICD-10-CM

## 2025-03-14 PROCEDURE — 3008F BODY MASS INDEX DOCD: CPT | Performed by: OTOLARYNGOLOGY

## 2025-03-14 PROCEDURE — 99213 OFFICE O/P EST LOW 20 MIN: CPT | Mod: 25 | Performed by: OTOLARYNGOLOGY

## 2025-03-14 PROCEDURE — 99213 OFFICE O/P EST LOW 20 MIN: CPT | Performed by: OTOLARYNGOLOGY

## 2025-03-14 PROCEDURE — 1036F TOBACCO NON-USER: CPT | Performed by: OTOLARYNGOLOGY

## 2025-03-14 PROCEDURE — 31575 DIAGNOSTIC LARYNGOSCOPY: CPT | Performed by: OTOLARYNGOLOGY

## 2025-03-14 RX ORDER — LIDOCAINE AND PRILOCAINE 25; 25 MG/G; MG/G
CREAM TOPICAL
COMMUNITY
Start: 2025-02-26

## 2025-03-14 RX ORDER — DIPHENHYDRAMINE HYDROCHLORIDE 12.5 MG/5ML
LIQUID ORAL
COMMUNITY
Start: 2025-03-10

## 2025-03-14 ASSESSMENT — PAIN SCALES - GENERAL: PAINLEVEL_OUTOF10: 0-NO PAIN

## 2025-03-14 ASSESSMENT — PATIENT HEALTH QUESTIONNAIRE - PHQ9
SUM OF ALL RESPONSES TO PHQ9 QUESTIONS 1 & 2: 0
1. LITTLE INTEREST OR PLEASURE IN DOING THINGS: NOT AT ALL
2. FEELING DOWN, DEPRESSED OR HOPELESS: NOT AT ALL

## 2025-03-14 NOTE — PROGRESS NOTES
Completed asthma action plan for school today. Please advise to keep schedule appt on 7/1. Thank you. MEDIA ENTERED: ENTERAL PRESCRIPTION 5.5 CARTONS ISOSOURCE 1.5

## 2025-03-17 ENCOUNTER — HOSPITAL ENCOUNTER (OUTPATIENT)
Dept: RADIATION ONCOLOGY | Facility: HOSPITAL | Age: 60
Setting detail: RADIATION/ONCOLOGY SERIES
Discharge: HOME | End: 2025-03-17
Payer: MEDICAID

## 2025-03-17 ENCOUNTER — NUTRITION (OUTPATIENT)
Dept: HEMATOLOGY/ONCOLOGY | Facility: HOSPITAL | Age: 60
End: 2025-03-17
Payer: MEDICAID

## 2025-03-17 VITALS
BODY MASS INDEX: 27.82 KG/M2 | SYSTOLIC BLOOD PRESSURE: 129 MMHG | DIASTOLIC BLOOD PRESSURE: 77 MMHG | TEMPERATURE: 97.7 F | WEIGHT: 151.2 LBS | OXYGEN SATURATION: 97 % | HEIGHT: 62 IN | HEART RATE: 73 BPM | RESPIRATION RATE: 18 BRPM

## 2025-03-17 DIAGNOSIS — C01 MALIGNANT NEOPLASM OF BASE OF TONGUE (MULTI): ICD-10-CM

## 2025-03-17 DIAGNOSIS — C01 CANCER OF BASE OF TONGUE (MULTI): Primary | ICD-10-CM

## 2025-03-17 DIAGNOSIS — C77.0 SECONDARY AND UNSPECIFIED MALIGNANT NEOPLASM OF LYMPH NODES OF HEAD, FACE AND NECK: ICD-10-CM

## 2025-03-17 DIAGNOSIS — Z51.0 ENCOUNTER FOR ANTINEOPLASTIC RADIATION THERAPY: ICD-10-CM

## 2025-03-17 LAB
RAD ONC MSQ ACTUAL FRACTIONS DELIVERED: 3
RAD ONC MSQ ACTUAL SESSION DELIVERED DOSE: 600 CGRAY
RAD ONC MSQ ACTUAL TOTAL DOSE: 1800 CGRAY
RAD ONC MSQ ELAPSED DAYS: 6
RAD ONC MSQ LAST DATE: NORMAL
RAD ONC MSQ PRESCRIBED FRACTIONAL DOSE: 600 CGRAY
RAD ONC MSQ PRESCRIBED NUMBER OF FRACTIONS: 5
RAD ONC MSQ PRESCRIBED TECHNIQUE: NORMAL
RAD ONC MSQ PRESCRIBED TOTAL DOSE: 3000 CGRAY
RAD ONC MSQ PRESCRIPTION PATTERN COMMENT: NORMAL
RAD ONC MSQ START DATE: NORMAL
RAD ONC MSQ TREATMENT COURSE NUMBER: 1
RAD ONC MSQ TREATMENT SITE: NORMAL

## 2025-03-17 PROCEDURE — 77386 HC INTENSITY-MODULATED RADIATION THERAPY (IMRT), COMPLEX: CPT | Performed by: RADIOLOGY

## 2025-03-17 NOTE — PROGRESS NOTES
"NUTRITION Follow-Up NOTE    Nutrition Assessment     Reason for Visit:  Amalia Roa is a 59 y.o. female who presents for  base of tongue cancer.   PEG placed 10/18/24  [place] Pembrolizumab start 11/12/24    Enteral Access/ Type: PEG   DME Company: [employer]    Visited with pt today while in for OTV.    Patient Active Problem List   Diagnosis    Oropharyngeal dysphagia    Tongue mass    Cancer of base of tongue (Multi)    HTN (hypertension)    Hyperlipidemia    Abnormal EKG    CAD (coronary artery disease)    Stented coronary artery    Murmur    Valvular heart disease    CHF (congestive heart failure)    MI (myocardial infarction) (Multi)    Mitral regurgitation    Asthma    MEÑO (obstructive sleep apnea)    Depression    PTSD (post-traumatic stress disorder)    Slow transit constipation    Encounter for antineoplastic immunotherapy    Vasovagal syncope    Hemoptysis    Elevated BUN       Nutrition Significant Labs:  Lab Results   Component Value Date/Time    GLUCOSE 148 (H) 02/25/2025 1225     02/25/2025 1225    K 4.2 02/25/2025 1225    CL 99 02/25/2025 1225    CO2 33 (H) 02/25/2025 1225    ANIONGAP 11 02/25/2025 1225    BUN 25 (H) 02/25/2025 1225    CREATININE 0.78 02/25/2025 1225    EGFR 87 02/25/2025 1225    CALCIUM 9.1 02/25/2025 1225    ALBUMIN 3.7 02/25/2025 1225    ALKPHOS 72 02/25/2025 1225    PROT 6.4 02/25/2025 1225    AST 18 02/25/2025 1225    BILITOT 0.4 02/25/2025 1225    ALT 14 02/25/2025 1225    MG 2.12 12/10/2024 0750    PHOS 3.7 12/10/2024 0750     No results found for: \"VITD25\"      Anthropometrics:  Height: 157.5 cm (5' 2.01\")   Weight: 68.6 kg (151 lb 3.2 oz)   BMI (Calculated): 27.65    IBW/kg (Dietitian Calculated): 52.2 kg       Adjusted Body Weight (kg): 56 kg    Weight History:   Daily Weight  03/17/25 : 68.6 kg (151 lb 3.2 oz)  03/18/25 : 68.6 kg (151 lb 3.2 oz)  03/14/25 : 68.5 kg (151 lb)  03/03/25 : 70 kg (154 lb 3.4 oz)  03/04/25 : 70 kg (154 lb 3.4 oz)  02/25/25 : 69.5 kg (153 lb 3.5 " "oz)  02/04/25 : 70.9 kg (156 lb 4.9 oz)  01/14/25 : 72.1 kg (158 lb 15.2 oz)  12/24/24 : 71.9 kg (158 lb 8.2 oz)  12/24/24 : 71.5 kg (157 lb 11.2 oz)    Weight Change %:  Weight History / % Weight Change: 1.9% loss in the last 2 weeks; 4% loss in the last 3 months; noted  Significant Weight Loss: No    Nutrition History:  Food and Nutrient History  Food and Nutrient History: 60mL total flush with feeds, 20mL before, 20mL during and 20mL after; noticed improvement with swallowing so pt has started to try some liquids/ solids by mouth. Pt has not seen SLP since her admission back in December. Pt reports she knows she has some aspiration but has no desire to meet with SLP though advised that they can help assess for safest consistency to swallow. Pt concerned that her tube is \"dirty\".  Energy Intake: Good > 75 % (enteral feeds)    Food Intake  Meal 1: has tried some chicken, potato chips  Fluid Intake: sweet tea (10oz), soda 7oz, 20oz water all by mouth              Enteral Nutrition History:   Enteral Nutrition Formula/Solution: Isosource 1.5 (5.5) cartons daily.Takes 2-2-1.5cartons.  provides 2063 calories, 93 grams protein,1050 ml free water  Method of TF administration: Bolus enteral nutrition feeding  TF infusion rate: 3x/day  Feeding Tube Flush: pt reports only using a 20mL syringe for flushes- flushing with 20mL before, in the middle if giving meds, then at the end- flushing with 40-60mL per feed and denies any additional flushes. (total flushes 160mL)      Medications:  Current Outpatient Medications   Medication Instructions    albuterol 90 mcg/actuation inhaler 2 puffs, Every 4 hours PRN    ALPRAZolam (XANAX) 0.5 mg, 2 times daily PRN    aspirin 81 mg, Daily    atorvastatin (Lipitor) 40 mg tablet 1 tablet, Daily    buPROPion (WELLBUTRIN) 150 mg, oral, Every morning    Children's acetaminophen 1,000 mg, g-tube, 3 times daily PRN    dexAMETHasone (DECADRON) 8 mg, oral, Daily, For 3 days starting the day after " "treatment.    dexAMETHasone (DECADRON) 4 mg, oral, 2 times daily (morning and late afternoon)    diphenhydrAMINE 12.5 mg/5 mL liquid     diphenhydramine/Maalox/lidocaine (Magic Mouthwash) - Compounded - Outpatient Swish and spit 10 mL every 6 hours if needed.    doxepin (SINEQUAN) 10 mg, g-tube, Nightly    fluticasone (Flovent) 44 mcg/actuation inhaler 2 puffs, 2 times daily RT    gabapentin (NEURONTIN) 300 mg, g-tube, 3 times daily    HYDROmorphone (DILAUDID) 4 mg, oral, Every 4 hours PRN    lidocaine (Lidoderm) 5 % patch 1 patch, transdermal, Daily, Remove & discard patch within 12 hours or as directed by MD.    lidocaine-prilocaine (Emla) 2.5-2.5 % cream APPLY TOPICALLY [facility] TO 45 MINUTES PRIOR TO ACCESS. COVER WITH DRESSING/FILM 1 TIME FOR 1 DOSE.    LORazepam (ATIVAN) 0.5 mg, oral, Daily    metoprolol tartrate (LOPRESSOR) 12.5 mg, oral, 2 times daily    NexIUM Packet 20 mg, oral, Daily before breakfast    OLANZapine (ZYPREXA) 5 mg, oral, Nightly    omeprazole (PRILOSEC) 40 mg, oral, Daily before breakfast, Do not crush or chew.    ondansetron (ZOFRAN) 8 mg, oral, Every 8 hours PRN    prochlorperazine (COMPAZINE) 10 mg, oral, Every 6 hours PRN    senna (Senokot) 8.8 mg/5 mL syrup 5 mL, oral, 2 times daily    transparent dressings (Tegaderm) 3 1/2 X 4 \" bandage 1 each, topical (top), Every 21 days, Cover [facility] site.    Tusnel-Ex 400 mg, oral, Every 6 hours PRN       Nutrition Focused Physical Exam Findings:  Completed 3/4/2025                      Estimated Needs:  Weight Used for Equation Calculations: 70 kg (154 lb 5.2 oz)    Total Energy Estimated Needs in 24 hours (kCal): 1960 kCal  Energy Estimated Needs per kg Body Weight in 24 hours (kCal/kg): 28 kCal/kg  Method for Estimating Needs: up to 2100kcal/day  Total Protein Estimated Needs in 24 Hours (g): 84 g  Protein Estimated Needs per kg Body Weight in 24 Hours (g/kg): 1.2 g/kg  Method for Estimating 24 Hour Protein Needs: up " to 98g per day  Total Fluid Estimated Needs in 24 Hours (mL): 1960 mL  Total Fluid Estimated Needs in 24 hours (mL/kg): 28 mL/kg             Nutrition Diagnosis   Malnutrition Diagnosis  Patient has Malnutrition Diagnosis: No    Nutrition Diagnosis  Patient has Nutrition Diagnosis: Yes  Diagnosis Status (1): Active  Nutrition Diagnosis 1: Increased energy expenditure  Related to (1): increased metabolic demand, chronic illness  As Evidenced by (1): cancer diagnosis, treatment.  Additional Nutrition Diagnosis: Diagnosis 2  Diagnosis Status (2): Active  Nutrition Diagnosis 2: Swallowing difficulty  Related to (2): diagnosis  As Evidenced by (2): dysphagia, need for PEG       Nutrition Interventions/Recommendations   Nutrition Prescription: Enteral nutrition, Oral nutrition enteral intake    Nutrition Interventions:   Food and Nutrient Delivery: Meals & Snacks: Texture-Modified Diet  Goals: oral diet as able/ tolerated; pt may benefit from SLP eval  Enteral Nutrition: Management of volume of enteral nutrition (Continue with enteral feeds primary nutrition)  Goals: Continue with Isosource 1.5 5.5 cartons daily / 1378 ml Isosource 1.5 per day to provide 2067 calories, 93 grams protein, 1050 ml free water. Suggest  2 cartons- 2 cartons- 1 1/2 carton for daily regiment;  Other:: Hydration; enteral flushes  Goals: Due to pt's length of external PEG tube, would recommend increasing to 60mL flushes at least at the end of the feed to help with cleaning tube. If tolerated would recommend 60mL before and after, can still flush with 20mL during feed. Encouraged adequate hydration via PEG/ oral if able tolerated- could increase flushes if unable to consume by mouth safely.     Coordination of Care: Collaboration and referral of nutrition care: Collaboration and Referral of Nutrition Care  Goals: Dr. Nolan, OTV; [facility]- received order update, pt to call and reorder, will also send 60mL syringes. Phoned pt 3/18 with update- spoke  with spouse Melvin     Nutrition Education:   Nutrition Education Content: Content related nutrition education  Adequate enteral intake to maintain weight/ hydration.  Reviewed flushes and adequate hydration.     Readiness to Change : Good  Level of Understanding : Good  Anticipated Compliant : as able         Nutrition Monitoring and Evaluation   Food and Nutrient Intake  Monitoring and Evaluation Plan: Meal/snack pattern, Energy intake, Protein intake  Energy Intake: Estimated energy intake  Criteria: meet 100% energy needs  Meal/Snack Pattern: Food variety  Criteria: soft diet as tolerated for pleasure or as directed by SLP  Estimated protein intake: Estimated protein intake  Criteria: meet 100% protein needs  Enteral and Parenteral Nutrition Intake Determination: Enteral nutrition intake  Criteria: tolerate TF at goal rate    Anthropometric measurements  Monitoring and Evaluation Plan: Weight  Body Weight: Body weight  Criteria: maintain weight         Nutrition Focused Physical Findings  Monitoring and Evaluation Plan: Digestive System  Digestive System Finding: Constipation  Criteria: manage symptoms/adhere to prescribed regime as needed         Follow Up: while on treatment/ or as needed with enteral mgmt     Time Spent  Prep time on day of patient encounter: 5 minutes  Time spent directly with patient, family or caregiver: 15 minutes  Additional Time Spent on Patient Care Activities: 5 minutes  Documentation Time: 10 minutes  Other Time Spent: 0 minutes  Total: 35 minutes

## 2025-03-17 NOTE — PROGRESS NOTES
"Radiation Oncology On Treatment Visit    Patient Name:  Amalia Roa  MRN:  [id number]  :  1965    Referring Provider: No ref. provider found  Primary Care Provider: WHITNEY GARCIA MA  Care Team: Patient Care Team:  Whitney Garcia MA as PCP - General  Pauline Garcia MD as On Call Attending Physician (Hematology and Oncology)  LUKAS Palencia as  ()    Date of Service: 3/17/2025     Diagnosis:   Specialty Problems          Radiation Oncology Problems    Cancer of base of tongue (Multi)         Treatment Summary:  Radiation Therapy    Treatment Period Technique Fraction Dose Fractions Total Dose   Course 1 3/11/2025-3/17/2025  (days elapsed: 6)         BOT + B neck 3/11/2025-3/17/2025 VMAT 600 / 600 cGy 3 / 5 1800 / 3,000 cGy     SUBJECTIVE: Patient is doing well. Feels swallowing is slightly better since starting RT. Saw Dr. Montanez last Friday -- he scoped her and found mass effect on larynx by tumor but no need for urgent tracheostomy. Patient declined a tracheostomy at this point.      OBJECTIVE:   Vital Signs:  /77   Pulse 73   Temp 36.5 °C (97.7 °F) (Temporal)   Resp 18   Ht 1.575 m (5' 2\")   Wt 68.6 kg (151 lb 3.2 oz)   SpO2 97%   BMI 27.65 kg/m²     Other Pertinent Findings:   No thrush, no mucositis,   Evidence of tumor shrinkage on CBCT scans for RT alignment.   Toxicity Assessment          3/17/2025    14:51   Toxicity Assessment   Adverse Events Reviewed (WDL) No (Exceptions to WDL)   Treatment  and neck   Anorexia Grade 0       First weight during RT   Dysphagia Grade 2       PEG TUBE+ ORAL intake   Mucositis Oral Grade 0       Glutamine handout provided   Aspiration Grade 0       PT will be scoped on 3-21-25   Hoarseness Grade 0   Voice Alteration Grade 0        Assessment / Plan:  The patient is tolerating radiation therapy as anticipated.  Continue per current treatment plan. Very pleased she has signs of tumor shrinkage on CBCT scan " for RT alignment and only after 2/5 fractions of RT.  Advised patient to continue steroid for now to prevent tumor swelling. Will scope on the day of last fraction of RT to continue to eval mass effect on airway. Patient will f/ollowup for Chemo immunotherapy infusion planned for 2.5 weeks after completing RT.

## 2025-03-18 ENCOUNTER — APPOINTMENT (OUTPATIENT)
Dept: OTOLARYNGOLOGY | Facility: HOSPITAL | Age: 60
End: 2025-03-18
Payer: MEDICAID

## 2025-03-18 ENCOUNTER — APPOINTMENT (OUTPATIENT)
Dept: HEMATOLOGY/ONCOLOGY | Facility: HOSPITAL | Age: 60
End: 2025-03-18
Payer: MEDICAID

## 2025-03-18 VITALS — WEIGHT: 151.2 LBS | BODY MASS INDEX: 27.82 KG/M2 | HEIGHT: 62 IN

## 2025-03-19 ENCOUNTER — PHARMACY VISIT (OUTPATIENT)
Dept: PHARMACY | Facility: CLINIC | Age: 60
End: 2025-03-19
Payer: MEDICAID

## 2025-03-19 ENCOUNTER — APPOINTMENT (OUTPATIENT)
Dept: HEMATOLOGY/ONCOLOGY | Facility: HOSPITAL | Age: 60
End: 2025-03-19
Payer: MEDICAID

## 2025-03-19 ENCOUNTER — HOSPITAL ENCOUNTER (OUTPATIENT)
Dept: RADIATION ONCOLOGY | Facility: HOSPITAL | Age: 60
Setting detail: RADIATION/ONCOLOGY SERIES
Discharge: HOME | End: 2025-03-19
Payer: MEDICAID

## 2025-03-19 DIAGNOSIS — C77.0 SECONDARY AND UNSPECIFIED MALIGNANT NEOPLASM OF LYMPH NODES OF HEAD, FACE AND NECK: ICD-10-CM

## 2025-03-19 DIAGNOSIS — Z51.0 ENCOUNTER FOR ANTINEOPLASTIC RADIATION THERAPY: ICD-10-CM

## 2025-03-19 DIAGNOSIS — C01 MALIGNANT NEOPLASM OF BASE OF TONGUE (MULTI): ICD-10-CM

## 2025-03-19 LAB
RAD ONC MSQ ACTUAL FRACTIONS DELIVERED: 4
RAD ONC MSQ ACTUAL SESSION DELIVERED DOSE: 600 CGRAY
RAD ONC MSQ ACTUAL TOTAL DOSE: 2400 CGRAY
RAD ONC MSQ ELAPSED DAYS: 8
RAD ONC MSQ LAST DATE: NORMAL
RAD ONC MSQ PRESCRIBED FRACTIONAL DOSE: 600 CGRAY
RAD ONC MSQ PRESCRIBED NUMBER OF FRACTIONS: 5
RAD ONC MSQ PRESCRIBED TECHNIQUE: NORMAL
RAD ONC MSQ PRESCRIBED TOTAL DOSE: 3000 CGRAY
RAD ONC MSQ PRESCRIPTION PATTERN COMMENT: NORMAL
RAD ONC MSQ START DATE: NORMAL
RAD ONC MSQ TREATMENT COURSE NUMBER: 1
RAD ONC MSQ TREATMENT SITE: NORMAL

## 2025-03-19 PROCEDURE — RXMED WILLOW AMBULATORY MEDICATION CHARGE

## 2025-03-19 PROCEDURE — 77386 HC INTENSITY-MODULATED RADIATION THERAPY (IMRT), COMPLEX: CPT | Performed by: RADIOLOGY

## 2025-03-19 PROCEDURE — 77336 RADIATION PHYSICS CONSULT: CPT | Performed by: RADIOLOGY

## 2025-03-21 ENCOUNTER — DOCUMENTATION (OUTPATIENT)
Dept: RADIATION ONCOLOGY | Facility: HOSPITAL | Age: 60
End: 2025-03-21

## 2025-03-21 ENCOUNTER — LAB (OUTPATIENT)
Dept: LAB | Facility: HOSPITAL | Age: 60
End: 2025-03-21
Payer: MEDICAID

## 2025-03-21 ENCOUNTER — HOSPITAL ENCOUNTER (OUTPATIENT)
Dept: RADIATION ONCOLOGY | Facility: HOSPITAL | Age: 60
Setting detail: RADIATION/ONCOLOGY SERIES
Discharge: HOME | End: 2025-03-21
Payer: MEDICAID

## 2025-03-21 ENCOUNTER — PHARMACY VISIT (OUTPATIENT)
Dept: PHARMACY | Facility: CLINIC | Age: 60
End: 2025-03-21
Payer: MEDICAID

## 2025-03-21 DIAGNOSIS — C01 CANCER OF BASE OF TONGUE (MULTI): ICD-10-CM

## 2025-03-21 DIAGNOSIS — C77.0 SECONDARY AND UNSPECIFIED MALIGNANT NEOPLASM OF LYMPH NODES OF HEAD, FACE AND NECK: ICD-10-CM

## 2025-03-21 DIAGNOSIS — Z51.0 ENCOUNTER FOR ANTINEOPLASTIC RADIATION THERAPY: ICD-10-CM

## 2025-03-21 LAB
ALBUMIN SERPL BCP-MCNC: 3.6 G/DL (ref 3.4–5)
ALP SERPL-CCNC: 53 U/L (ref 33–136)
ALT SERPL W P-5'-P-CCNC: 22 U/L (ref 7–45)
ANION GAP SERPL CALC-SCNC: 10 MMOL/L (ref 10–20)
AST SERPL W P-5'-P-CCNC: 14 U/L (ref 9–39)
BASOPHILS # BLD AUTO: 0.02 X10*3/UL (ref 0–0.1)
BASOPHILS NFR BLD AUTO: 0.1 %
BILIRUB SERPL-MCNC: 0.8 MG/DL (ref 0–1.2)
BUN SERPL-MCNC: 53 MG/DL (ref 6–23)
CALCIUM SERPL-MCNC: 8.9 MG/DL (ref 8.6–10.3)
CHLORIDE SERPL-SCNC: 96 MMOL/L (ref 98–107)
CO2 SERPL-SCNC: 34 MMOL/L (ref 21–32)
CREAT SERPL-MCNC: 0.78 MG/DL (ref 0.5–1.05)
EGFRCR SERPLBLD CKD-EPI 2021: 87 ML/MIN/1.73M*2
EOSINOPHIL # BLD AUTO: 0 X10*3/UL (ref 0–0.7)
EOSINOPHIL NFR BLD AUTO: 0 %
ERYTHROCYTE [DISTWIDTH] IN BLOOD BY AUTOMATED COUNT: 14.2 % (ref 11.5–14.5)
GLUCOSE SERPL-MCNC: 108 MG/DL (ref 74–99)
HCT VFR BLD AUTO: 43.1 % (ref 36–46)
HGB BLD-MCNC: 13.5 G/DL (ref 12–16)
IMM GRANULOCYTES # BLD AUTO: 0.15 X10*3/UL (ref 0–0.7)
IMM GRANULOCYTES NFR BLD AUTO: 0.7 % (ref 0–0.9)
LYMPHOCYTES # BLD AUTO: 0.4 X10*3/UL (ref 1.2–4.8)
LYMPHOCYTES NFR BLD AUTO: 2 %
MCH RBC QN AUTO: 30.5 PG (ref 26–34)
MCHC RBC AUTO-ENTMCNC: 31.3 G/DL (ref 32–36)
MCV RBC AUTO: 98 FL (ref 80–100)
MONOCYTES # BLD AUTO: 1.16 X10*3/UL (ref 0.1–1)
MONOCYTES NFR BLD AUTO: 5.7 %
NEUTROPHILS # BLD AUTO: 18.5 X10*3/UL (ref 1.2–7.7)
NEUTROPHILS NFR BLD AUTO: 91.5 %
NRBC BLD-RTO: 0 /100 WBCS (ref 0–0)
PLATELET # BLD AUTO: 241 X10*3/UL (ref 150–450)
POTASSIUM SERPL-SCNC: 4.7 MMOL/L (ref 3.5–5.3)
PROT SERPL-MCNC: 5.8 G/DL (ref 6.4–8.2)
RAD ONC MSQ ACTUAL FRACTIONS DELIVERED: 5
RAD ONC MSQ ACTUAL SESSION DELIVERED DOSE: 600 CGRAY
RAD ONC MSQ ACTUAL TOTAL DOSE: 3000 CGRAY
RAD ONC MSQ ELAPSED DAYS: 10
RAD ONC MSQ LAST DATE: NORMAL
RAD ONC MSQ PRESCRIBED FRACTIONAL DOSE: 600 CGRAY
RAD ONC MSQ PRESCRIBED NUMBER OF FRACTIONS: 5
RAD ONC MSQ PRESCRIBED TECHNIQUE: NORMAL
RAD ONC MSQ PRESCRIBED TOTAL DOSE: 3000 CGRAY
RAD ONC MSQ PRESCRIPTION PATTERN COMMENT: NORMAL
RAD ONC MSQ START DATE: NORMAL
RAD ONC MSQ TREATMENT COURSE NUMBER: 1
RAD ONC MSQ TREATMENT SITE: NORMAL
RBC # BLD AUTO: 4.42 X10*6/UL (ref 4–5.2)
SODIUM SERPL-SCNC: 135 MMOL/L (ref 136–145)
WBC # BLD AUTO: 20.2 X10*3/UL (ref 4.4–11.3)

## 2025-03-21 PROCEDURE — 77386 HC INTENSITY-MODULATED RADIATION THERAPY (IMRT), COMPLEX: CPT | Performed by: RADIOLOGY

## 2025-03-21 PROCEDURE — RXMED WILLOW AMBULATORY MEDICATION CHARGE

## 2025-03-21 PROCEDURE — 2500000005 HC RX 250 GENERAL PHARMACY W/O HCPCS: Mod: SE | Performed by: RADIOLOGY

## 2025-03-21 PROCEDURE — 85025 COMPLETE CBC W/AUTO DIFF WBC: CPT

## 2025-03-21 PROCEDURE — 80053 COMPREHEN METABOLIC PANEL: CPT

## 2025-03-21 RX ORDER — LIDOCAINE HYDROCHLORIDE 20 MG/ML
JELLY TOPICAL
Status: DISCONTINUED
Start: 2025-03-21 | End: 2025-03-22 | Stop reason: HOSPADM

## 2025-03-21 RX ORDER — LIDOCAINE HYDROCHLORIDE 20 MG/ML
1 JELLY TOPICAL ONCE
Status: COMPLETED | OUTPATIENT
Start: 2025-03-21 | End: 2025-03-21

## 2025-03-21 RX ADMIN — LIDOCAINE HYDROCHLORIDE 1 APPLICATION: 20 JELLY TOPICAL at 13:12

## 2025-03-21 NOTE — ADDENDUM NOTE
Encounter addended by: Shayna Nolan MD on: 3/21/2025 2:30 PM   Actions taken: Visit diagnoses modified, Order list changed, Diagnosis association updated

## 2025-03-24 ENCOUNTER — TELEPHONE (OUTPATIENT)
Dept: RADIATION ONCOLOGY | Facility: HOSPITAL | Age: 60
End: 2025-03-24
Payer: MEDICAID

## 2025-03-24 DIAGNOSIS — B37.0 THRUSH, ORAL: ICD-10-CM

## 2025-03-24 DIAGNOSIS — C01 CANCER OF BASE OF TONGUE (MULTI): Primary | ICD-10-CM

## 2025-03-24 RX ORDER — FLUCONAZOLE 100 MG/1
TABLET ORAL
Qty: 15 TABLET | Refills: 0 | Status: SHIPPED | OUTPATIENT
Start: 2025-03-24 | End: 2025-04-07

## 2025-04-07 NOTE — TELEPHONE ENCOUNTER
Called pt to remind of appointment on 4/8/2025 at 3:00. Pt's phone went to voicemail left number if needs to reschedule.

## 2025-04-08 NOTE — PROGRESS NOTES
Radiation Oncology Follow-Up    Patient Name:  Amalia Roa  MRN:  [id number]  :  1965    Referring Provider: Shayna Nolan MD  Primary Care Provider: WHITNEY GARCIA MA  Care Team: Patient Care Team:  Whitney Garcia MA as PCP - General  Pauline Garcia MD as On Call Attending Physician (Hematology and Oncology)  LUKAS Palencia as  ()  Shayna Nolan MD as Radiation Oncologist (Radiation Oncology)    Date of Service: 2025    SUBJECTIVE  History of Present Illness:  Amalia Roa is a 59 y.o. 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    The patient has been doing very well since she finished SBRT 3 weeks ago.  She has been tolerating her tube feedings at home without nausea or vomiting.  She has felt significantly fatigued and is sleeping a lot, but is not dizzy or having trouble walking around.  She is most bothered by thick secretions that are hard to clear from her throat.  She has a suction device, but the secretions still are bothersome.  Of note, she is taking Mucinex 2400 mg over 24 hours.  She is scheduled for chemotherapy and immunotherapy tomorrow.    Treatment Rendered:   Radiation Therapy    Treatment Period Technique Fraction Dose Fractions Total Dose   Course 1 3/11/2025-3/21/2025  (days elapsed: 10)         BOT + B neck 3/11/2025-3/21/2025 VMAT 600 / 600 cGy 5 / 5 3000 / 3,000 cGy       Review of Systems:   Review of Systems - Oncology    Performance Status:   The Karnofsky performance scale today is 70, Cares for self; unable to carry on normal activity or to do active work (ECOG equivalent 1).       OBJECTIVE  Vital Signs:  /75   Pulse 97   Temp 36.8 °C (98.2 °F) (Skin)   Resp 18   Wt 67.7 kg (149 lb 4 oz)   SpO2 96%   BMI 27.29 kg/m²   Physical Exam   Constitutional:        General: Patient is not in acute distress.  HEENT:      Head: Normocephalic and atraumatic.      Mouth/Throat: no oral lesions     Mouth: Mucous membranes are moist.      Neck: no palpable ABE  Cardiovascular:      Rate and Rhythm: Normal rate and regular rhythm.      Heart sounds: Normal heart sounds. No murmur heard.  Pulmonary:      Effort: Pulmonary effort is normal. No respiratory distress.      Breath sounds: Normal breath sounds.   Procedure:   Flexible fiberoptic laryngopharyngoscopy was performed via the nares, after topical anesthesia was instilled:     Transoral exam reveals shrinkage of tumor in the oral tongue with less induration.  Patient is able to move her tongue side-to-side with more ease.  She has a patch of mucositis in the anterior tongue, but otherwise no mucositis or thrush in the mouth    Fiberoptic scope was introduced to the right naris.  There are significant secretions seen at the back of the oropharynx and in the larynx that are pooling right above the vocal cords.  Overall, there is much less tumor visualized in the right oropharynx with flattening at the base of tongue and less mass effect on the larynx.  There is significant edema of the epiglottis.  The vocal cords are both visualized and there is no mass effect on the larynx    ASSESSMENT:   Amalia Roa is a 59 y.o. female with metastatic head and neck cancer locally progressive on immunotherapy, now status post stereotactic body radiotherapy.    PLAN: I am so glad she has recovered well from the acute side effects of radiation.  There is significant edema seen in the oropharynx/larynx, but no ulcerations or significant mucositis.  No evidence of thrush as she had previously.  She is tolerating her tube feeds very well and her vitals are all stable.  She will proceed with chemoimmunotherapy tomorrow.  I did discuss with her strategies to help with her thick secretions from radiation.  I recommend that she continue suctioning and  try gargling/sipping black tea and possibly carbonated beverages to see if that could help clear her secretions.  Will see if she can meet with speech and language pathology to test her swallow function and to help with secretion management.  We will see her back in 1 month's time.  I will give her a prescription for scopolamine patch to see if this can help with profuse, thick saliva.  NCCN Guidelines were applicable to guide this patients treatment plan.  Shayna Nolan MD

## 2025-04-08 NOTE — PROGRESS NOTES
Radiation Oncology Nursing Note    Pain: The patient's current pain level was assessed.  They report currently having a pain of 1 out of 10.  They feel their pain is under control without the use of pain medications.    Review of Systems:  Review of Systems   Constitutional:  Positive for appetite change and fatigue.        Was able to eat/drink orally during RT but not right not, relying on the PEG tube  Fatigue has been worse over the past few days       HENT:   Positive for voice change.         Excess mucous   Eyes: Negative.    Respiratory: Negative.     Cardiovascular: Negative.    Gastrointestinal: Negative.    Endocrine: Negative.    Genitourinary: Negative.     Musculoskeletal: Negative.    Skin: Negative.    Neurological:  Positive for speech difficulty.        Exess mucous making it hard to talk     Hematological: Negative.    Psychiatric/Behavioral: Negative.

## 2025-04-09 NOTE — ED PROVIDER NOTES
"Emergency Department Provider Note        History of Present Illness   History provided by: EMS  Limitations to History: Cardiac Arrest    HPI:  Amalia Roa is a 59 y.o. female who is brought in by EMS in cardiac arrest. Per EMS report, patient was found down inside the home, had left the house to \"get some air\" roughly around 12 AM.  Patient is currently from out of town and had been visiting with her .  On arrival, patient was on the auto pulse with active compressions, Igel in place.  Had received 3 epinephrine prior to arrival.  Has prior history of head and neck carcinoma with active treatment.    Physical Exam   General: unconscious, nonresponsive to noxious stimuli  Eyes: Pupils fixed and dilated  HENT: Normo-cephalic, atraumatic.  CV: Chest Compressions via autopulse in progress, femoral pulse palpable with compressions  Resp: BVM with symmetric breath sounds bilaterally  GI: Soft, no bruising  MSK/Extremities: No gross bony deformities.  Skin: Dry  Neuro: GCS 3. Patient is unresponsive with no spontaneous movements.    Medical Decision Making & ED Course   Medical Decision Makin y.o. female presenting to the ED in cardiac arrest.  ACLS with supervision of CPR was continued here.  Patient was bagged with difficulty.  Attempted to intubate, however airway was obscured, difficult anatomy with significant liquid debris.  Additional IO was drilled in the right tibial plateau.  Compressions were continued and multiple rounds of epinephrine were administered with persistent asystole/PEA on the monitor.  Patient did not resume a pulse, with significantly elevated ETCO2.  Pulse ox was 100% while being bagged with compressions.  However, unable to obtain blood work.  Point-of-care glucose was 132.  After multiple rounds of CPR and epinephrine, discussed with family, agreeable to termination of compressions.  Family was invited into the room.  Chest compressions were stopped rhythm on the monitor was " PEA.  Time of death was called at 2:17.    ED Course:  Diagnoses as of 25 0238   Cardiac arrest     Disposition       Procedures   Procedures    Patient seen and discussed with ED attending physician.    Heriberto Garay DO  Emergency Medicine        Heriberto Garay DO  Resident  [phone]       DO Macy Elliott  [phone]

## 2025-04-09 NOTE — ED PROCEDURE NOTE
Brief Attending Summary: 59yoF with hx of cancer, brought in arrest, ACLS protocol followed. Time of death called at 0217.    I have reviewed and evaluated the most recent data and results, personally examined the patient, and formulated the plan of care as presented above. This patient was critically ill and required continued critical care treatment. Teaching and any separately billable procedures are not included in the time calculation.    Billing Provider Critical Care Time: 4 minutes     Lynn Olivera MD  04/09/25 [id number]

## 2025-04-09 NOTE — ED TRIAGE NOTES
"Patient brought in by EMS from home due to being unresponsive. Per EMS, patient's  \"looked over and saw patient is unresponsive\". Unknown down time. EMS gave 3 rounds of EPI and has Igel airway in place. Patient arrives to ED on autopulse.   "

## 2025-04-09 NOTE — PROGRESS NOTES
[facility] - Medical Oncology Follow-Up Visit    Patient ID: Amalia Roa is a 59 y.o. female with oropharyngeal squamous cell ca     Current therapy: methylPREDNISolone sod succinate (SOLU-Medrol) 40 mg/mL injection 40 mg, 40 mg, intravenous, As needed, 6 of 6 cycles        pembrolizumab (Keytruda) 200 mg in sodium chloride 0.9% 118 mL IV, 200 mg, intravenous, Once, 6 of 6 cycles    Administration: 200 mg (11/12/2024), 200 mg (12/3/2024), 200 mg (12/24/2024), 200 mg (1/14/2025), 200 mg (2/4/2025), 200 mg (2/25/2025)    fosaprepitant (Emend) 150 mg in sodium chloride 0.9% 250 mL IV, 150 mg, intravenous, Once, 0 of 6 cycles        CARBOplatin (Paraplatin) in sodium chloride 0.9% 100 mL IV, , intravenous, Once, 0 of 6 cycles        PACLitaxeL (Taxol) 306 mg in dextrose 5% 301 mL IV, 175 mg/m2 = 306 mg, intravenous, Once, 0 of 6 cycles        methylPREDNISolone sod succinate (SOLU-Medrol) 40 mg/mL injection 40 mg, 40 mg, intravenous, As needed, 0 of 6 cycles        palonosetron (Aloxi) injection 0.25 mg, 250 mcg, intravenous, Once, 0 of 6 cycles        pembrolizumab (Keytruda) 200 mg in sodium chloride 0.9% 108 mL IV, 200 mg, intravenous, Once, 0 of 6 cycles       Chief Concern: Readiness to treat and review scans    Oncologic History:   DIAGNOSIS  Oropharyngeal squamous cell ca     STAGING  T4N2M1      CURRENT SITES OF DISEASE  R oropharynx, tongue, bilateral lungs, L adrenal gland     MOLECULAR GENOMICS  P16+   CPS 10     PRIOR THERAPY        CURRENT THERAPY  Pembrolizumab 11/12/24 - 2/25/25  Carboplatin/paclitaxel/pembrolizumab 3/19/25 -      CURRENT ONCOLOGICAL PROBLEMS           HISTORY OF PRESENT ILLNESS  Ms. Roa is a 60 yo with PMH significant for multiple medical comorbidities including HFrEF, CAD s/p JOHN, mitral regurgitation, and MEÑO who had had trouble swallowing for at least a year with throat discomfort, particularly on the R side. CT of the cervical spine at OSH was  concerning for 3.6 x 2.0 necrotic level 2 lymph node. She was referred to Dr. Montanez who she met on 9/20/24 due to concerns for a mass. Flex laryngoscopy showed a prominence at the base of the tongue of the R and an exophytic mass of the R piriform sinus, with evidence of inga disease on the L. CT of the neck on 10/8/24 revealed large, irregular mass of the R oropharynx measuring at least 4 x 3.2 x 7.6 cm, with involvement of the R lateral wall/tonsillar region, base of tongue/glossal tonsillar sulcus, soft palate, and abutment of the epiglottis with extension into the R parapharyngeal fat and  space. There appeared to be extension into the R tongue and within the floor of the mouth and oral cavity. Also noted was thrombosis or occlusion of the RIJ, bilateral cervical lymphadenopathy with levels 2-4 noted on the R and on the left, and multiple pulmonary nodules. She was admitted to the hospital and underwent panendoscopy, biopsy, and PEG placement on 10/14/24. CT chest also on 10/14 was notable for multiple parenchymal bilateral lung nodules and enlarged mediastinal and hilar lymph nodes, as well as suspicious L adrenal gland nodule. She was discussed in tumor board, with PD-L1 added on CPS 10. PET/CT 10/22/24 confirmed FDG avidity of R oropharynx/oral cavity/hypopharynx mass, L palatine tonsil, R parotid gland, multiple cervical nodes bilaterally, multiple mediastinal and hilar lymph  nodes, pulmonary nodules, small L pleural effusion, and L adrenal gland. Decided to start pembrolizumab monotherapy, started 11/12/24 after port placement. Overall tolerated therapy well, but unfortunately with mixed response with progression of disease in the primary site and decision made to add carboplatin/paclitaxel, starting 3/19/25.      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 Lives at home with her . Has two grandsons who are 6 and 2 yo, and two granddaughters who are 3 yo and 10 months.    Tob: quit smoking 2014. 40 years x 1/2 ppd  EtOH: none  Illicits: none     FAMILY HISTORY  Mother - breast cancer dx 50s or 60s  Sister - colon ca dx 56 yo  Niece - cervical cancer dx 30s    HPI   She is here today with her , and her daughter is on the phone.             She's not eating much - feels like her tongue is swollen, making it hard to eat. Just hard to get it down. She feels like her tongue has a hook and stuff is getting stuck on it. Sometimes it's just easier to not try. She has just noticed this in the past 1-2 weeks, appetite down for the last two weeks. She continues to use 5 of the feeds per day.     Meds (Current):  No current facility-administered medications for this visit.    Current Outpatient Medications:     acetaminophen (Tylenol) 160 mg/5 mL liquid, 31.3 mL (1,000 mg) by g-tube route 3 times a day as needed for mild pain (1 - 3) or moderate pain (4 - 6)., Disp: 2838 mL, Rfl: 2    albuterol 90 mcg/actuation inhaler, Inhale 2 puffs every 4 hours if needed for wheezing., Disp: , Rfl:     ALPRAZolam (Xanax) 0.5 mg tablet, Take 1 tablet (0.5 mg) by mouth 2 times a day as needed for anxiety., Disp: , Rfl:     aspirin 81 mg chewable tablet, Chew 1 tablet (81 mg) once daily., Disp: , Rfl:     atorvastatin (Lipitor) 40 mg tablet, Take 1 tablet (40 mg) by mouth once daily., Disp: , Rfl:     buPROPion (Wellbutrin) 75 mg tablet, Take 2 tablets (150 mg) by mouth once daily in the morning., Disp: 60 tablet, Rfl: 0    dexAMETHasone (Decadron) 4 mg tablet, Take 2 tablets (8 mg) by mouth once daily. For 3 days starting the day after treatment., Disp: 36 tablet, Rfl: 0    dexAMETHasone (Decadron) 4 mg tablet, Take 1 tablet (4 mg) by mouth 2 times daily (morning and late afternoon)., Disp: 60  tablet, Rfl: 0    diphenhydrAMINE 12.5 mg/5 mL liquid, , Disp: , Rfl:     diphenhydramine/Maalox/lidocaine (Magic Mouthwash) - Compounded - Outpatient, Swish and spit 10 mL every 6 hours if needed., Disp: 1200 mL, Rfl: 2    doxepin (SINEquan) 10 mg/mL solution, 1 mL (10 mg) by g-tube route once daily at bedtime., Disp: 120 mL, Rfl: 12    esomeprazole (NexIUM Packet) 20 mg packet, Take 20 mg by mouth once daily in the morning. Take before meals., Disp: 600 mg, Rfl: 0    fluticasone (Flovent) 44 mcg/actuation inhaler, Inhale 2 puffs 2 times a day., Disp: , Rfl:     gabapentin (Neurontin) 50 mg/mL solution, 6 mL (300 mg) by g-tube route 3 times a day., Disp: 540 mL, Rfl: 2    guaiFENesin (Robitussin) 100 mg/5 mL syrup, Take 20 mL (400 mg) by mouth every 6 hours if needed (for thick mucous)., Disp: 2400 mL, Rfl: 2    lidocaine (Lidoderm) 5 % patch, Place 1 patch over 12 hours on the skin once daily. Remove & discard patch within 12 hours or as directed by MD., Disp: 30 patch, Rfl: 3    lidocaine-prilocaine (Emla) 2.5-2.5 % cream, APPLY TOPICALLY A THIN LAYER TO MEDIPORT SITE 30 TO 45 MINUTES PRIOR TO ACCESS. COVER WITH DRESSING/FILM 1 TIME FOR 1 DOSE., Disp: , Rfl:     LORazepam (Ativan) 0.5 mg tablet, Take 1 tablet (0.5 mg) by mouth once daily for 10 days., Disp: 10 tablet, Rfl: 0    metoprolol tartrate (Lopressor) 25 mg tablet, Take 0.5 tablets (12.5 mg) by mouth 2 times a day., Disp: 60 tablet, Rfl: 5    OLANZapine (ZyPREXA) 5 mg tablet, Take 1 tablet (5 mg) by mouth once daily at bedtime., Disp: 30 tablet, Rfl: 5    omeprazole (PriLOSEC) 40 mg DR capsule, Take 1 capsule (40 mg) by mouth once daily in the morning. Take before meals. Do not crush or chew., Disp: 30 capsule, Rfl: 1    ondansetron (Zofran) 8 mg tablet, Take 1 tablet (8 mg) by mouth every 8 hours if needed for nausea or vomiting., Disp: 30 tablet, Rfl: 5    prochlorperazine (Compazine) 10 mg tablet, Take 1 tablet (10 mg) by mouth every 6 hours if needed  "for nausea or vomiting., Disp: 30 tablet, Rfl: 5    scopolamine (Transderm-Scop) 1 mg over 3 days patch 3 day, Place 1 patch over 72 hours on the skin every 3rd day., Disp: 10 patch, Rfl: 1    senna (Senokot) 8.8 mg/5 mL syrup, Take 5 mL by mouth 2 times a day for 10 days., Disp: 240 mL, Rfl: 3    transparent dressings (Tegaderm) 3 1/2 X 4 \" bandage, Apply 1 each topically every 21 (twenty-one) days. Cover [facility] site. (Patient not taking: Reported on 4/8/2025), Disp: 1 each, Rfl: 11    Review of Systems   Constitutional: Negative.  Negative for appetite change.   HENT:   Positive for trouble swallowing.         Intermittent right ear pain.   Respiratory:  Positive for hemoptysis.    Cardiovascular: Negative.    Gastrointestinal:  Positive for nausea. Negative for constipation.   Musculoskeletal:  Positive for neck pain.   Skin: Negative.    All other systems reviewed and are negative.       Objective   BSA: There is no height or weight on file to calculate BSA.  Wt Readings from Last 5 Encounters:   04/08/25 67.7 kg (149 lb 4 oz)   03/17/25 68.6 kg (151 lb 3.2 oz)   03/18/25 68.6 kg (151 lb 3.2 oz)   03/14/25 68.5 kg (151 lb)   03/03/25 70 kg (154 lb 3.4 oz)     There were no vitals taken for this visit.    ECOG Score: 0- Fully active, able to carry on all pre-disease performance w/o restriction.      Physical Exam  Vitals reviewed.   Constitutional:       Appearance: Normal appearance.   HENT:      Head: Normocephalic.      Nose: Nose normal.      Mouth/Throat:      Mouth: Mucous membranes are moist.      Pharynx: Oropharynx is clear.   Eyes:      Extraocular Movements: Extraocular movements intact.      Conjunctiva/sclera: Conjunctivae normal.      Pupils: Pupils are equal, round, and reactive to light.   Cardiovascular:      Rate and Rhythm: Normal rate and regular rhythm.      Pulses: Normal pulses.      Heart sounds: Normal heart sounds.   Pulmonary:      Breath sounds: Normal breath sounds.   Abdominal:     "  General: Bowel sounds are normal.      Palpations: Abdomen is soft.      Comments: Capped peg.    Musculoskeletal:         General: Normal range of motion.      Cervical back: Normal range of motion and neck supple.   Skin:     General: Skin is warm and dry.   Neurological:      General: No focal deficit present.      Mental Status: She is alert and oriented to person, place, and time.   Psychiatric:         Mood and Affect: Mood normal.         Behavior: Behavior normal.         Thought Content: Thought content normal.         Judgment: Judgment normal.          Results:  Labs:  Lab Results   Component Value Date    WBC 20.2 (H) 03/21/2025    HGB 13.5 03/21/2025    HCT 43.1 03/21/2025    MCV 98 03/21/2025     03/21/2025      Lab Results   Component Value Date    NEUTROABS 18.50 (H) 03/21/2025      Lab Results   Component Value Date    GLUCOSE 108 (H) 03/21/2025    CALCIUM 8.9 03/21/2025     (L) 03/21/2025    K 4.7 03/21/2025    CO2 34 (H) 03/21/2025    CL 96 (L) 03/21/2025    BUN 53 (H) 03/21/2025    CREATININE 0.78 03/21/2025    MG 2.12 12/10/2024     Lab Results   Component Value Date    ALT 22 03/21/2025    AST 14 03/21/2025    ALKPHOS 53 03/21/2025    BILITOT 0.8 03/21/2025      Lab Results   Component Value Date    ACTH 7.5 02/04/2025    CORTISOL 8.3 02/04/2025    TSH 1.12 02/04/2025       Imaging:  I have personally reviewed the below imaging and concur with the reported findings unless otherwise stated:    CT abdomen w IV contrast    Result Date: 2/25/2025  Impression: 1. At the time of this interpretation the images for this exam are included with the patient's CT chest accession number. 2. Please see the CT chest for findings at the pulmonary bases which are not reported in this exam. 3. 2.2 cm left adrenal mass, unchanged from 10/14/2024, likely benign due to the stability. Nonetheless a dedicated CT adrenal gland is suggested for more definitive characterization. 4. Scattered diverticula  descending colon. No evidence for metastatic disease to the abdomen.     MACRO: none   Signed by: Melvin Ornelas 2/25/2025 8:15 AM Dictation workstation:   [id number]    CT chest w IV contrast    Result Date: 2/21/2025  Impression: Multiple pulmonary metastases are re-identified with the majority of these unchanged in size. However, several of the larger pulmonary metastases seen on prior study have diminished in size as outlined above but there is at least metastasis seen in the left lower lobe which is increased in size from 0.7 x 1.4 cm to 1.0 x 1.7 cm. A small left pleural effusion has slightly increased in size as well with increased pleural metastases within the left hemithorax.   Stable size of the metastatic disease to both stanley but the involve nodes now show increasing cystic or necrotic changes.   The tree-in-bud areas seen on prior study throughout right middle lobe have nearly completely resolved.   Cholelithiasis.   MACRO: None   Signed by: Lissette Phan 2/21/2025 8:56 PM Dictation workstation:   [id number]    CT soft tissue neck w IV contrast    Result Date: 2/20/2025  Impression: 1. There has been interval progression of disease compared to the prior exam 12/06/2024. Interval increased size of the extensive soft tissue mass involving the oropharynx, oral cavity, hypopharynx, right carotid space, and right  space. 2. Interval increased size of bilateral lymphadenopathy compared to the prior exam. 3. The right common and right cervical internal carotid artery are encased by tumor and mildly narrowed, however remain patent. The right internal jugular vein is occluded with reconstitution within the right sigmoid sinus. 4. Bilateral pulmonary nodules and mediastinal adenopathy. Please see CT chest performed the same day for further details.   I personally reviewed the image(s)/study and resident interpretation as stated by Dr. Clary Lagos MD. I agree with the findings as stated. This  study was interpreted at [facility], [place], [place].   MACRO: None   Signed by: Oneida Lindo 2/20/2025 1:23 PM Dictation workstation:   [id number]         Assessment/Plan      Amalia Roa is a 59 y.o. female here for follow up of oropharyngeal squamous cell ca.    # Oropharyngeal squamous cell ca  - T4N2M1  - p16+  - CPS 10  - given CPS, discussed pembrolizumab monotherapy, and consented  - she requested port placement due to poor venous access - Port placed 11/22/24   - previously discussed with Dr. Nolan consideration for consolidation radiation pending response to pembro  - C1 pembrolizumab 11/12/24   - Hospitalization 12/3 - 12/10/24 2/2 vasovagal syncope episode the evening of her C2 infusion.   - personally reviewed restaging scans with mixed response - appears to have some response intrathoracically but progression on CT neck as well as clinically with worsening dysphagia  - discussed clinical trial vs addition of chemotherapy. Given distance, will plan to add carboplatin/paclitaxel to pembrolizumab   - discussed with radiation oncology for consideration of radiation to help with difficulty swallowing, and Dr. Nolan will arrange with patient    # Dysphagia  # Unintentional weight loss  - continues to lose weight despite contineud tube feeds, liekly due to worsening dysphagia  - reached out to nutrition     # Neoplasm-related Pain   - Follows with supportive onc.  Pain managed with current regimen.  Oxycodone changed to PRN Dilaudid.      # Hemoptysis (occurred 2/1 & 2/2/25)  - x2, clot the size of a pencil eraser, H&H stable  - Pt instructed to report increased frequency  - Will monitor for now.      # Anxiety   - Routine HS Alprazolam     # Constipation (slow-transit)  - Rx sent for senna elixir. 5mg BID.  Pt to titrate med to produce routine, soft BM's.  Can also add miralax PRN.    - 12/24:  stools soft d/t recent ATB course, ongoing monitoring.

## 2025-04-11 ENCOUNTER — APPOINTMENT (OUTPATIENT)
Dept: OTOLARYNGOLOGY | Facility: HOSPITAL | Age: 60
End: 2025-04-11
Payer: MEDICAID

## 2025-04-13 LAB — GLUCOSE BLD MANUAL STRIP-MCNC: 130 MG/DL (ref 74–99)

## 2025-04-30 ENCOUNTER — APPOINTMENT (OUTPATIENT)
Dept: HEMATOLOGY/ONCOLOGY | Facility: HOSPITAL | Age: 60
End: 2025-04-30
Payer: MEDICAID

## 2025-05-15 ENCOUNTER — APPOINTMENT (OUTPATIENT)
Dept: RADIOLOGY | Facility: HOSPITAL | Age: 60
End: 2025-05-15
Payer: MEDICAID

## 2025-05-20 ENCOUNTER — APPOINTMENT (OUTPATIENT)
Dept: HEMATOLOGY/ONCOLOGY | Facility: HOSPITAL | Age: 60
End: 2025-05-20
Payer: MEDICAID

## 2025-05-20 ENCOUNTER — APPOINTMENT (OUTPATIENT)
Dept: PALLIATIVE MEDICINE | Facility: HOSPITAL | Age: 60
End: 2025-05-20
Payer: MEDICAID

## (undated) DEVICE — COVER, CART, 45 X 27 X 48 IN, CLEAR

## (undated) DEVICE — Device

## (undated) DEVICE — VALVE, SUCTION

## (undated) DEVICE — BAG, DRAINAGE, URINARY, W/ANTI-REFLUX CHAMBER, INFECTION CON

## (undated) DEVICE — ADAPTER, WATER BOTTLE, SMART CAP, 140/160/180 SERIES

## (undated) DEVICE — REST, HEAD, BAGEL, 9 IN

## (undated) DEVICE — TUBING, SUCTION, CONNECTING, STERILE 0.25 X 120 IN., LF

## (undated) DEVICE — MANIFOLD, 4 PORT NEPTUNE STANDARD

## (undated) DEVICE — TUBE, GASTROSTOMY, PONSKY, NON-BALLOON REPLACE, 20 FR

## (undated) DEVICE — BOWL, UTILITY, 32 OZ, PLASTIC, STERILE

## (undated) DEVICE — NEEDLE, HYPODERMIC, 25 G X 1.5 IN, A BEVEL, STERILE

## (undated) DEVICE — PEG KIT, SAFETY, PULL, 20FR W/ENFIT

## (undated) DEVICE — DRESSING, GAUZE, 16 PLY, 4 X 4 IN, STERILE

## (undated) DEVICE — COVER, TABLE, 54X90

## (undated) DEVICE — VALVE, DEFENDO, 5 PCS BUTTON SET, SINGLE USE

## (undated) DEVICE — VALVE, BIOPSY, BRONCHOSCOPE, DISPOSABLE, STERILE